# Patient Record
Sex: FEMALE | Race: WHITE | Employment: FULL TIME | URBAN - METROPOLITAN AREA
[De-identification: names, ages, dates, MRNs, and addresses within clinical notes are randomized per-mention and may not be internally consistent; named-entity substitution may affect disease eponyms.]

---

## 2017-01-02 DIAGNOSIS — Z94.81 S/P ALLOGENEIC BONE MARROW TRANSPLANT (H): Primary | ICD-10-CM

## 2017-01-02 DIAGNOSIS — B00.9 HSV (HERPES SIMPLEX VIRUS) INFECTION: ICD-10-CM

## 2017-01-02 RX ORDER — VALACYCLOVIR HYDROCHLORIDE 500 MG/1
1000 TABLET, FILM COATED ORAL 3 TIMES DAILY
Qty: 100 TABLET | Refills: 1 | Status: SHIPPED | OUTPATIENT
Start: 2017-01-02 | End: 2017-03-10

## 2017-01-04 ENCOUNTER — CARE COORDINATION (OUTPATIENT)
Dept: TRANSPLANT | Facility: CLINIC | Age: 62
End: 2017-01-04

## 2017-01-04 NOTE — PROGRESS NOTES
Quantity limit override for Valtrex 500mg tabs approved for 60 tablets/30 days, Case #[id number]. Insurance phone # [phone]. Pt notified of the approval.

## 2017-02-15 ENCOUNTER — ONCOLOGY VISIT (OUTPATIENT)
Dept: TRANSPLANT | Facility: CLINIC | Age: 62
End: 2017-02-15
Attending: INTERNAL MEDICINE
Payer: MEDICARE

## 2017-02-15 ENCOUNTER — APPOINTMENT (OUTPATIENT)
Dept: LAB | Facility: CLINIC | Age: 62
End: 2017-02-15
Attending: INTERNAL MEDICINE
Payer: MEDICARE

## 2017-02-15 VITALS
BODY MASS INDEX: 40.97 KG/M2 | OXYGEN SATURATION: 94 % | WEIGHT: 224 LBS | SYSTOLIC BLOOD PRESSURE: 147 MMHG | DIASTOLIC BLOOD PRESSURE: 75 MMHG | TEMPERATURE: 98.9 F | HEART RATE: 101 BPM | RESPIRATION RATE: 16 BRPM

## 2017-02-15 DIAGNOSIS — Z13.29 SCREENING FOR THYROID DISORDER: ICD-10-CM

## 2017-02-15 DIAGNOSIS — C92.01 ACUTE MYELOID LEUKEMIA IN REMISSION (H): Primary | ICD-10-CM

## 2017-02-15 LAB
ALBUMIN SERPL-MCNC: 3.3 G/DL (ref 3.4–5)
ALP SERPL-CCNC: 67 U/L (ref 40–150)
ALT SERPL W P-5'-P-CCNC: 18 U/L (ref 0–50)
ANION GAP SERPL CALCULATED.3IONS-SCNC: 9 MMOL/L (ref 3–14)
AST SERPL W P-5'-P-CCNC: 13 U/L (ref 0–45)
BASOPHILS # BLD AUTO: 0 10E9/L (ref 0–0.2)
BASOPHILS NFR BLD AUTO: 0.2 %
BILIRUB SERPL-MCNC: 0.6 MG/DL (ref 0.2–1.3)
BUN SERPL-MCNC: 8 MG/DL (ref 7–30)
CALCIUM SERPL-MCNC: 8.5 MG/DL (ref 8.5–10.1)
CHLORIDE SERPL-SCNC: 104 MMOL/L (ref 94–109)
CO2 SERPL-SCNC: 27 MMOL/L (ref 20–32)
CREAT SERPL-MCNC: 0.94 MG/DL (ref 0.52–1.04)
DIFFERENTIAL METHOD BLD: NORMAL
EOSINOPHIL # BLD AUTO: 0.2 10E9/L (ref 0–0.7)
EOSINOPHIL NFR BLD AUTO: 1.9 %
ERYTHROCYTE [DISTWIDTH] IN BLOOD BY AUTOMATED COUNT: 14.6 % (ref 10–15)
GFR SERPL CREATININE-BSD FRML MDRD: 61 ML/MIN/1.7M2
GLUCOSE SERPL-MCNC: 138 MG/DL (ref 70–99)
HCT VFR BLD AUTO: 38.3 % (ref 35–47)
HGB BLD-MCNC: 12.2 G/DL (ref 11.7–15.7)
IMM GRANULOCYTES # BLD: 0 10E9/L (ref 0–0.4)
IMM GRANULOCYTES NFR BLD: 0.2 %
LYMPHOCYTES # BLD AUTO: 2.7 10E9/L (ref 0.8–5.3)
LYMPHOCYTES NFR BLD AUTO: 29.1 %
MCH RBC QN AUTO: 28.8 PG (ref 26.5–33)
MCHC RBC AUTO-ENTMCNC: 31.9 G/DL (ref 31.5–36.5)
MCV RBC AUTO: 91 FL (ref 78–100)
MONOCYTES # BLD AUTO: 0.8 10E9/L (ref 0–1.3)
MONOCYTES NFR BLD AUTO: 8.9 %
NEUTROPHILS # BLD AUTO: 5.6 10E9/L (ref 1.6–8.3)
NEUTROPHILS NFR BLD AUTO: 59.7 %
NRBC # BLD AUTO: 0 10*3/UL
NRBC BLD AUTO-RTO: 0 /100
PLATELET # BLD AUTO: 177 10E9/L (ref 150–450)
POTASSIUM SERPL-SCNC: 4 MMOL/L (ref 3.4–5.3)
PROT SERPL-MCNC: 6.8 G/DL (ref 6.8–8.8)
RBC # BLD AUTO: 4.23 10E12/L (ref 3.8–5.2)
SODIUM SERPL-SCNC: 140 MMOL/L (ref 133–144)
WBC # BLD AUTO: 9.4 10E9/L (ref 4–11)

## 2017-02-15 PROCEDURE — 80053 COMPREHEN METABOLIC PANEL: CPT | Performed by: INTERNAL MEDICINE

## 2017-02-15 PROCEDURE — 87799 DETECT AGENT NOS DNA QUANT: CPT | Performed by: INTERNAL MEDICINE

## 2017-02-15 PROCEDURE — 85025 COMPLETE CBC W/AUTO DIFF WBC: CPT | Performed by: INTERNAL MEDICINE

## 2017-02-15 PROCEDURE — 36415 COLL VENOUS BLD VENIPUNCTURE: CPT

## 2017-02-15 PROCEDURE — 82784 ASSAY IGA/IGD/IGG/IGM EACH: CPT | Performed by: INTERNAL MEDICINE

## 2017-02-15 NOTE — PROGRESS NOTES
BMT Clinic Progress Note    ID:  61 yo woman s/p NMA DCBT for AML  CC: follow-up   HPI:  Here for follow-up and with her . She had influenza infection. Still has cough but progressively getting better. Not short of breath but still coughing. No fevers. She will go back to orthopedics in march. bilateral shoulder pain is back but got better when she took steroids for URI. Off coumadin, Off prednisone since 4/20/16, Off MMF since 9/13/16. No CRISTOBAL, N/V/D or new rash.  Old rash wax and wanes. She will breast nodule biopsy later this week. Joint and leg pain improved more recently.     ROS:  Remainder 10 point ROS negative.   Physical Exam:    Wt Readings from Last 4 Encounters:   02/15/17 101.6 kg (224 lb)   12/16/16 99.5 kg (219 lb 6.4 oz)   11/04/16 101.6 kg (223 lb 15.8 oz)   10/24/16 104.8 kg (231 lb)     EXAM: /75  Pulse 101  Temp 98.9  F (37.2  C) (Tympanic)  Resp 16  Wt 101.6 kg (224 lb)  SpO2 94%  BMI 40.97 kg/m2  Gen: Alert, oriented x 3, NAD, cushingoid.  Prominent buffalo hump.    HEENT: OP no ulcerations. No lichenoid changes.  No petechia.   CLARICE. Sclera anicteric. No conj petech  CV: RRR, no m/r/g  Pulm: cta X 2. No wheezing or rhonchi   Abd: obese, soft, NT, ND, BS+  Ext: wearing compression stockings, bilateral LE trace edema. No calf tenderness  Skin: dry rash with scaly skin on skin folds.   MSK:  Has decreased ROM both shoulders  Neuro; non-focal. No nystagmus.     Labs:   Lab Results   Component Value Date    WBC 9.4 02/15/2017    ANEU 5.6 02/15/2017    HGB 12.2 02/15/2017    HCT 38.3 02/15/2017     02/15/2017     02/15/2017    POTASSIUM 4.0 02/15/2017    CHLORIDE 104 02/15/2017    CO2 27 02/15/2017     (H) 02/15/2017    BUN 8 02/15/2017    CR 0.94 02/15/2017    MAG 1.8 10/05/2016    INR 0.94 08/03/2016    BILITOTAL 0.6 02/15/2017    AST 13 02/15/2017    ALT 18 02/15/2017    ALKPHOS 67 02/15/2017    PROTTOTAL 6.8 02/15/2017    ALBUMIN 3.3 (L) 02/15/2017   BMBX no  leukemia; 100% single donor  CD 4 count 500's  IgG 425    A/P: 61 yo woman 18 months s/p NMA DCBT for AML day +832 (2 yr post BMT) in CR with KPS 90%    1. BMT/AML: two year post transplant BM 40% cellular, trilineage hematopoiesis, no leukemia by morphology and flow negative; % donor #2.      2. HEME: stable good counts  - Keep Hgb>8g/dL and plts>10k. No bleeding.   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  Received rituximab X 4 September 2015.    3. DVT- Repeat doppler L lower ext negative on OCT 2016. Off coumadin since JUL 2016.     4. ID: resolving influenza. 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   Had Flu shot on 10/5/16. Continue immunizations on FEB 2017 visit  - PPX: off fluconazole, azithromycin 250 3x/week and pentamidine monthly (last on 12/9)    4. 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 Perhaps skin could be some GVHD  - manifested as hemolytic anemia; Off prednisone since 4/20/16; finished MMF taper on 9/13/16. Improved joint aches. Persistent shoulder pain being followed by Orthopedics. Eczema less visible and being controlled with steroid cream PRN. May need to go back on immuno suppression in case I would consider sirolimus w or w/o prednisone; ECP logisticaly difficult.     5. GI:  No complaints. Continue Protonix.   - LFTs wnl 12/23    6. FEN/Renal: nor Cr and lytes.    7. Cardiovascular: High cholesterol and triglycerides may use cholesterol medication. Continue norvasc for HTN.   - Cont carvedilol 6.25mg bid (increased 12/9) and Norvasc 7.5 mg daily at night. - Needs to watch diet.     8. Neuro: No focal signs.     9. Musculoskelatal: doing PT at home now. Okay to use NSAIDs as long as well hydrated. Continue to follow with PT and Orthopedics and consider palliative care consult for pain control if needed.  - Dexa scan with osteopenia (5/5/15). Had bisphosphonate 10/5/16; repeat in ~12 months  - Continue Calcium and Vit D supplementation 2,800 units daily  - try to  use tramadol that was working better than hydrocodone/acetaminophen     10. Pulm: improving respiratory issues today. much improved with stable Hgb and weight loss.   - Sleep apnea being managed, using CPAP.     11. Endocrine: steroid induced diabetes. Off insulin now.      12. Breast: breast nodule biopsy later this week. Monitor results.       PLAN:  RTC [facility] on  March 2017 with labs; sooner if needed  Continue immunizations on 2017 visit; delayed because of recent influenza infection  Okay to use NSAIDs as long as well hydrated  continue acyclovir because of HSV on lips

## 2017-02-15 NOTE — NURSING NOTE
Chief Complaint   Patient presents with     Blood Draw     Vitals done and labs drawn peripherally from right arm.    Marilu Dowell, TIMOTHYN

## 2017-02-15 NOTE — LETTER
2/15/2017      RE: Eryn Bennett  PO   [facility] [address]       BMT Clinic Progress Note    ID:  61 yo woman s/p NMA DCBT for AML  CC: follow-up   HPI:  Here for follow-up and with her . She had influenza infection. Still has cough but progressively getting better. Not short of breath but still coughing. No fevers. She will go back to orthopedics in march. bilateral shoulder pain is back but got better when she took steroids for URI. Off coumadin, Off prednisone since 4/20/16, Off MMF since 9/13/16. No CRISTOBAL, N/V/D or new rash.  Old rash wax and wanes. She will breast nodule biopsy later this week. Joint and leg pain improved more recently.     ROS:  Remainder 10 point ROS negative.   Physical Exam:    Wt Readings from Last 4 Encounters:   02/15/17 101.6 kg (224 lb)   12/16/16 99.5 kg (219 lb 6.4 oz)   11/04/16 101.6 kg (223 lb 15.8 oz)   10/24/16 104.8 kg (231 lb)     EXAM: /75  Pulse 101  Temp 98.9  F (37.2  C) (Tympanic)  Resp 16  Wt 101.6 kg (224 lb)  SpO2 94%  BMI 40.97 kg/m2  Gen: Alert, oriented x 3, NAD, cushingoid.  Prominent buffalo hump.    HEENT: OP no ulcerations. No lichenoid changes.  No petechia.   CLARICE. Sclera anicteric. No conj petech  CV: RRR, no m/r/g  Pulm: cta X 2. No wheezing or rhonchi   Abd: obese, soft, NT, ND, BS+  Ext: wearing compression stockings, bilateral LE trace edema. No calf tenderness  Skin: dry rash with scaly skin on skin folds.   MSK:  Has decreased ROM both shoulders  Neuro; non-focal. No nystagmus.     Labs:   Lab Results   Component Value Date    WBC 9.4 02/15/2017    ANEU 5.6 02/15/2017    HGB 12.2 02/15/2017    HCT 38.3 02/15/2017     02/15/2017     02/15/2017    POTASSIUM 4.0 02/15/2017    CHLORIDE 104 02/15/2017    CO2 27 02/15/2017     (H) 02/15/2017    BUN 8 02/15/2017    CR 0.94 02/15/2017    MAG 1.8 10/05/2016    INR 0.94 08/03/2016    BILITOTAL 0.6 02/15/2017    AST 13 02/15/2017    ALT 18 02/15/2017    ALKPHOS 67 02/15/2017     PROTTOTAL 6.8 02/15/2017    ALBUMIN 3.3 (L) 02/15/2017   BMBX no leukemia; 100% single donor  CD 4 count 500's  IgG 425    A/P: 59 yo woman 18 months s/p NMA DCBT for AML day +832 (2 yr post BMT) in CR with KPS 90%    1. BMT/AML: two year post transplant BM 40% cellular, trilineage hematopoiesis, no leukemia by morphology and flow negative; % donor #2.      2. HEME: stable good counts  - Keep Hgb>8g/dL and plts>10k. No bleeding.   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  Received rituximab X 4 September 2015.    3. DVT- Repeat doppler L lower ext negative on OCT 2016. Off coumadin since JUL 2016.     4. ID: resolving influenza. 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   Had Flu shot on 10/5/16. Continue immunizations on FEB 2017 visit  - PPX: off fluconazole, azithromycin 250 3x/week and pentamidine monthly (last on 12/9)    4. 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 Perhaps skin could be some GVHD  - manifested as hemolytic anemia; Off prednisone since 4/20/16; finished MMF taper on 9/13/16. Improved joint aches. Persistent shoulder pain being followed by Orthopedics. Eczema less visible and being controlled with steroid cream PRN. May need to go back on immuno suppression in case I would consider sirolimus w or w/o prednisone; ECP logisticaly difficult.     5. GI:  No complaints. Continue Protonix.   - LFTs wnl 12/23    6. FEN/Renal: nor Cr and lytes.    7. Cardiovascular: High cholesterol and triglycerides may use cholesterol medication. Continue norvasc for HTN.   - Cont carvedilol 6.25mg bid (increased 12/9) and Norvasc 7.5 mg daily at night. - Needs to watch diet.     8. Neuro: No focal signs.     9. Musculoskelatal: doing PT at home now. Okay to use NSAIDs as long as well hydrated. Continue to follow with PT and Orthopedics and consider palliative care consult for pain control if needed.  - Dexa scan with osteopenia (5/5/15). Had bisphosphonate 10/5/16; repeat in ~12 months  -  Continue Calcium and Vit D supplementation 2,800 units daily  - try to use tramadol that was working better than hydrocodone/acetaminophen     10. Pulm: improving respiratory issues today. much improved with stable Hgb and weight loss.   - Sleep apnea being managed, using CPAP.     11. Endocrine: steroid induced diabetes. Off insulin now.      12. Breast: breast nodule biopsy later this week. Monitor results.       PLAN:  RTC [facility] on  March 2017 with labs; sooner if needed  Continue immunizations on 2017 visit; delayed because of recent influenza infection  Okay to use NSAIDs as long as well hydrated  continue acyclovir because of HSV on lips    Irving Ni MD

## 2017-02-15 NOTE — MR AVS SNAPSHOT
After Visit Summary   2/15/2017    Eryn Bennett    MRN: [id number]           Patient Information     Date Of Birth          1955        Visit Information        Provider Department      2/15/2017 10:30 AM Irving Ni MD [facility]        Today's Diagnoses     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)    -  1    Screening for thyroid disorder              [facility] ([facility])  [address] [place] [address]  Phone: [phone]  Clinic Hours:   Monday-Friday:    8am to 4:30pm   Weekends and holidays:    8am to noon (in general)  If your fever is 100.5  or greater,   call the clinic.  After hours call the   hospital at [phone] or   [phone]. Ask for the BMT   fellow for pediatric or adult patients           Care Instructions    3/21 @ 3pm ortho appt    3/22 @ 10:30am labs 11:00 Irving        Follow-ups after your visit        Your next 10 appointments already scheduled     Mar 21, 2017  3:15 PM CDT   (Arrive by 3:00 PM)   Return Visit with Renny Tompkins MD   [facility] ([facility])    [address] [place] [address]  [address]th [facility] [address] [phone]            Mar 22, 2017 11:00 AM CDT   Return with Irving Ni MD   [facility] ([facility])    [address] [place] [address]  2nd [facility] [id number] [phone]              Future tests that were ordered for you today     Open Standing Orders        Priority Remaining Interval Expires Ordered    Comprehensive metabolic panel Routine 2/2  3/31/2017 2/15/2017    CBC with platelets differential Routine 2/2  3/31/2017 2/15/2017    IgG Routine 2/2  3/31/2017 2/15/2017    TSH with free T4 reflex Routine 2/2  3/31/2017 2/15/2017            Who to contact     If you have questions or need follow up information about today's clinic visit or your schedule  please contact [facility] directly at [phone].  Normal or non-critical lab and imaging results will be communicated to you by Skyp[employer]hart, letter or phone within 4 business days after the clinic has received the results. If you do not hear from us within 7 days, please contact the clinic through [employer] or phone. If you have a critical or abnormal lab result, we will notify you by phone as soon as possible.  Submit refill requests through [employer] or call your pharmacy and they will forward the refill request to us. Please allow 3 business days for your refill to be completed.          Additional Information About Your Visit        [email or web address] Information     [employer] gives you secure access to your electronic health record. If you see a primary care provider, you can also send messages to your care team and make appointments. If you have questions, please call your primary care clinic.  If you do not have a primary care provider, please call [phone] and they will assist you.        Care EveryWhere ID     This is your Care EveryWhere ID. This could be used by other organizations to access your [facility] medical records  [id number]        Your Vitals Were     Pulse Temperature Respirations Pulse Oximetry BMI (Body Mass Index)       101 98.9  F (37.2  C) (Tympanic) 16 94% 40.97 kg/m2        Blood Pressure from Last 3 Encounters:   02/15/17 147/75   12/16/16 133/72   11/04/16 138/73    Weight from Last 3 Encounters:   02/15/17 101.6 kg (224 lb)   12/16/16 99.5 kg (219 lb 6.4 oz)   11/04/16 101.6 kg (223 lb 15.8 oz)              We Performed the Following     CBC with platelets differential     CMV DNA quantification     Comprehensive metabolic panel     EBV DNA PCR Quantitative Whole Blood     IgG          Today's Medication Changes          These changes are accurate as of: 2/15/17 10:56 AM.  If you have any questions, ask your nurse or doctor.               These medicines have changed or  have updated prescriptions.        Dose/Directions    amLODIPine 5 MG tablet   Commonly known as:  NORVASC   This may have changed:    - how much to take  - additional instructions   Used for:  AML (acute myelogenous leukemia) (H), Essential hypertension        Dose:  7.5 mg   Take 1.5 tablets (7.5 mg) by mouth daily Take 7.5mg daily x1 week then increase to 10mg daily after checking blood pressure.   Quantity:  90 tablet   Refills:  3                Recent Review Flowsheet Data     BMT Recent Results Latest Ref Rng & Units 6/15/2016 6/21/2016 8/3/2016 10/5/2016 11/4/2016 12/16/2016 2/15/2017    WBC 4.0 - 11.0 10e9/L 4.9 5.8 3.8(L) 6.7 8.7 7.7 9.4    Hemoglobin 11.7 - 15.7 g/dL 11.9 11.8 12.8 12.9 13.1 13.6 12.2    Platelet Count 150 - 450 10e9/L 217 210 184 193 193 214 177    Neutrophils (Absolute) 1.6 - 8.3 10e9/L 1.9 2.5 1.5(L) 3.2 4.5 4.4 5.6    INR 0.86 - 1.14 - 2.46(H) 0.94 - - - -    Sodium 133 - 144 mmol/L 139 140 139 140 139 142 140    Potassium 3.4 - 5.3 mmol/L 4.0 3.3(L) 3.8 4.0 4.1 4.0 4.0    Chloride 94 - 109 mmol/L 106 105 105 105 107 107 104    Glucose 70 - 99 mg/dL 111(H) 135(H) 121(H) 127(H) 100(H) 193(H) 138(H)    Urea Nitrogen 7 - 30 mg/dL 17 12 15 13 26 22 8    Creatinine 0.52 - 1.04 mg/dL 1.00 0.90 0.90 1.00 0.94 1.04 0.94    Calcium (Total) 8.5 - 10.1 mg/dL 9.5 9.0 9.3 9.3 8.5 8.8 8.5    Protein (Total) 6.8 - 8.8 g/dL 7.0 6.8 6.7(L) 6.8 6.9 6.9 6.8    Albumin 3.4 - 5.0 g/dL 3.6 3.5 3.7 3.8 3.8 3.7 3.3(L)    Alkaline Phosphatase 40 - 150 U/L 74 74 83 87 97 71 67    AST 0 - 45 U/L 24 22 18 22 12 11 13    ALT 0 - 50 U/L 24 25 31 28 24 25 18    MCV 78 - 100 fl 90 90 93 92 92 94 91               Primary Care Provider Office Phone # Fax #    Carmelo Camacho [phone]       [address] [address]        Thank you!     Thank you for choosing [facility]  for your care. Our goal is always to provide you with excellent care.  Hearing back from our patients is one way we can continue to improve our services. Please take a few minutes to complete the written survey that you may receive in the mail after your visit with us. Thank you!             Your Updated Medication List - Protect others around you: Learn how to safely use, store and throw away your medicines at www.disposemymeds.org.          This list is accurate as of: 2/15/17 10:56 AM.  Always use your most recent med list.                   Brand Name Dispense Instructions for use    acetaminophen 325 MG tablet    TYLENOL    100 tablet    Take 1-2 tablets (325-650 mg) by mouth every 4 hours as needed for mild pain or fever       amLODIPine 5 MG tablet    NORVASC    90 tablet    Take 1.5 tablets (7.5 mg) by mouth daily Take 7.5mg daily x1 week then increase to 10mg daily after checking blood pressure.       amoxicillin-clavulanate 875-125 MG per tablet    AUGMENTIN    20 tablet    Take 1 tablet by mouth 2 times daily       calcium-vitamin D 500-125 MG-UNIT Tabs      Take by mouth 2 times daily       carvedilol 6.25 MG tablet    COREG    60 tablet    Take 1 tablet (6.25 mg) by mouth 2 times daily (with meals)       desonide 0.05 % cream    DESOWEN    60 g    Apply sparingly to affected area three times daily as needed.       folic acid 1 MG tablet    FOLVITE    90 tablet    Take 1 tablet (1 mg) by mouth daily       guaiFENesin-codeine 100-10 MG/5ML Syrp syrup    guaiFENesin AC     Take 10 mLs by mouth as needed       HYDROcodone-acetaminophen 5-325 MG per tablet    NORCO    60 tablet    1 - 2 tablets approximately one hour prior to doing shoulder exercises. Should be taken in the evening and only once per day.       LORazepam 0.5 MG tablet    ATIVAN    90 tablet    Take 1-2 tablets (0.5-1 mg) by mouth nightly as needed for anxiety (nausea/vomiting/sleep)       pantoprazole 40 MG EC tablet    PROTONIX    30 tablet    Take 1 tablet (40 mg) by mouth daily       traMADol 50 MG tablet     ULTRAM    60 tablet    Take 1 tablet (50 mg) by mouth every 6 hours as needed for moderate pain       valACYclovir 500 MG tablet    VALTREX    100 tablet    Take 2 tablets (1,000 mg) by mouth 3 times daily For 10 days. Then decrease to 1 tablet twice a day.       VITAMIN D (CHOLECALCIFEROL) PO      Take 1,000 Units by mouth daily

## 2017-02-16 LAB
CMV DNA SPEC NAA+PROBE-ACNC: NORMAL [IU]/ML
CMV DNA SPEC NAA+PROBE-LOG#: NORMAL {LOG_IU}/ML
EBV DNA # SPEC NAA+PROBE: 1546 {COPIES}/ML
EBV DNA SPEC NAA+PROBE-LOG#: 3.2 {LOG_COPIES}/ML
IGG SERPL-MCNC: 652 MG/DL (ref 695–1620)
SPECIMEN SOURCE: NORMAL

## 2017-03-10 ENCOUNTER — ONCOLOGY VISIT (OUTPATIENT)
Dept: TRANSPLANT | Facility: CLINIC | Age: 62
End: 2017-03-10
Attending: INTERNAL MEDICINE
Payer: MEDICARE

## 2017-03-10 ENCOUNTER — APPOINTMENT (OUTPATIENT)
Dept: LAB | Facility: CLINIC | Age: 62
End: 2017-03-10
Attending: INTERNAL MEDICINE
Payer: MEDICARE

## 2017-03-10 VITALS
RESPIRATION RATE: 16 BRPM | OXYGEN SATURATION: 95 % | WEIGHT: 221.5 LBS | DIASTOLIC BLOOD PRESSURE: 69 MMHG | HEART RATE: 89 BPM | SYSTOLIC BLOOD PRESSURE: 124 MMHG | TEMPERATURE: 98.5 F | BODY MASS INDEX: 40.51 KG/M2

## 2017-03-10 DIAGNOSIS — Z94.81 S/P ALLOGENEIC BONE MARROW TRANSPLANT (H): ICD-10-CM

## 2017-03-10 DIAGNOSIS — I10 BENIGN ESSENTIAL HYPERTENSION: ICD-10-CM

## 2017-03-10 DIAGNOSIS — C92.01 ACUTE MYELOID LEUKEMIA IN REMISSION (H): ICD-10-CM

## 2017-03-10 DIAGNOSIS — Z13.29 SCREENING FOR THYROID DISORDER: ICD-10-CM

## 2017-03-10 DIAGNOSIS — I10 ESSENTIAL HYPERTENSION: ICD-10-CM

## 2017-03-10 DIAGNOSIS — B00.9 HSV (HERPES SIMPLEX VIRUS) INFECTION: ICD-10-CM

## 2017-03-10 LAB
ALBUMIN SERPL-MCNC: 3.5 G/DL (ref 3.4–5)
ALP SERPL-CCNC: 79 U/L (ref 40–150)
ALT SERPL W P-5'-P-CCNC: 18 U/L (ref 0–50)
ANION GAP SERPL CALCULATED.3IONS-SCNC: 10 MMOL/L (ref 3–14)
AST SERPL W P-5'-P-CCNC: 16 U/L (ref 0–45)
BASOPHILS # BLD AUTO: 0 10E9/L (ref 0–0.2)
BASOPHILS NFR BLD AUTO: 0.3 %
BILIRUB SERPL-MCNC: 0.4 MG/DL (ref 0.2–1.3)
BUN SERPL-MCNC: 18 MG/DL (ref 7–30)
CALCIUM SERPL-MCNC: 9 MG/DL (ref 8.5–10.1)
CHLORIDE SERPL-SCNC: 104 MMOL/L (ref 94–109)
CO2 SERPL-SCNC: 26 MMOL/L (ref 20–32)
CREAT SERPL-MCNC: 0.98 MG/DL (ref 0.52–1.04)
DIFFERENTIAL METHOD BLD: ABNORMAL
EOSINOPHIL # BLD AUTO: 0.3 10E9/L (ref 0–0.7)
EOSINOPHIL NFR BLD AUTO: 2.8 %
ERYTHROCYTE [DISTWIDTH] IN BLOOD BY AUTOMATED COUNT: 14.8 % (ref 10–15)
GFR SERPL CREATININE-BSD FRML MDRD: 58 ML/MIN/1.7M2
GLUCOSE SERPL-MCNC: 109 MG/DL (ref 70–99)
HCT VFR BLD AUTO: 39.5 % (ref 35–47)
HGB BLD-MCNC: 12.3 G/DL (ref 11.7–15.7)
IGG SERPL-MCNC: 720 MG/DL (ref 695–1620)
IMM GRANULOCYTES # BLD: 0 10E9/L (ref 0–0.4)
IMM GRANULOCYTES NFR BLD: 0.4 %
LYMPHOCYTES # BLD AUTO: 3.1 10E9/L (ref 0.8–5.3)
LYMPHOCYTES NFR BLD AUTO: 34.6 %
MCH RBC QN AUTO: 27.6 PG (ref 26.5–33)
MCHC RBC AUTO-ENTMCNC: 31.1 G/DL (ref 31.5–36.5)
MCV RBC AUTO: 89 FL (ref 78–100)
MONOCYTES # BLD AUTO: 0.6 10E9/L (ref 0–1.3)
MONOCYTES NFR BLD AUTO: 7.1 %
NEUTROPHILS # BLD AUTO: 4.9 10E9/L (ref 1.6–8.3)
NEUTROPHILS NFR BLD AUTO: 54.8 %
NRBC # BLD AUTO: 0 10*3/UL
NRBC BLD AUTO-RTO: 0 /100
PLATELET # BLD AUTO: 246 10E9/L (ref 150–450)
POTASSIUM SERPL-SCNC: 4 MMOL/L (ref 3.4–5.3)
PROT SERPL-MCNC: 7.3 G/DL (ref 6.8–8.8)
RBC # BLD AUTO: 4.46 10E12/L (ref 3.8–5.2)
SODIUM SERPL-SCNC: 140 MMOL/L (ref 133–144)
TSH SERPL DL<=0.005 MIU/L-ACNC: 2 MU/L (ref 0.4–4)
WBC # BLD AUTO: 9 10E9/L (ref 4–11)

## 2017-03-10 PROCEDURE — 90713 POLIOVIRUS IPV SC/IM: CPT | Mod: ZF | Performed by: INTERNAL MEDICINE

## 2017-03-10 PROCEDURE — 82784 ASSAY IGA/IGD/IGG/IGM EACH: CPT | Performed by: INTERNAL MEDICINE

## 2017-03-10 PROCEDURE — G0009 ADMIN PNEUMOCOCCAL VACCINE: HCPCS

## 2017-03-10 PROCEDURE — 90472 IMMUNIZATION ADMIN EACH ADD: CPT

## 2017-03-10 PROCEDURE — 80053 COMPREHEN METABOLIC PANEL: CPT | Performed by: INTERNAL MEDICINE

## 2017-03-10 PROCEDURE — G0010 ADMIN HEPATITIS B VACCINE: HCPCS

## 2017-03-10 PROCEDURE — 25000125 ZZHC RX 250: Mod: ZF | Performed by: INTERNAL MEDICINE

## 2017-03-10 PROCEDURE — 84443 ASSAY THYROID STIM HORMONE: CPT | Performed by: INTERNAL MEDICINE

## 2017-03-10 PROCEDURE — 85025 COMPLETE CBC W/AUTO DIFF WBC: CPT | Performed by: INTERNAL MEDICINE

## 2017-03-10 PROCEDURE — 90715 TDAP VACCINE 7 YRS/> IM: CPT | Mod: ZF | Performed by: INTERNAL MEDICINE

## 2017-03-10 PROCEDURE — 90746 HEPB VACCINE 3 DOSE ADULT IM: CPT | Mod: ZF | Performed by: INTERNAL MEDICINE

## 2017-03-10 PROCEDURE — 90670 PCV13 VACCINE IM: CPT | Mod: ZF | Performed by: INTERNAL MEDICINE

## 2017-03-10 PROCEDURE — 90648 HIB PRP-T VACCINE 4 DOSE IM: CPT | Mod: ZF | Performed by: INTERNAL MEDICINE

## 2017-03-10 PROCEDURE — 25000128 H RX IP 250 OP 636: Mod: ZF | Performed by: INTERNAL MEDICINE

## 2017-03-10 RX ORDER — VALACYCLOVIR HYDROCHLORIDE 500 MG/1
500 TABLET, FILM COATED ORAL 2 TIMES DAILY
Qty: 60 TABLET | Refills: 6 | Status: SHIPPED | OUTPATIENT
Start: 2017-03-10 | End: 2017-03-10

## 2017-03-10 RX ORDER — VALACYCLOVIR HYDROCHLORIDE 500 MG/1
500 TABLET, FILM COATED ORAL 2 TIMES DAILY
Qty: 60 TABLET | Refills: 6 | Status: SHIPPED | OUTPATIENT
Start: 2017-03-10 | End: 2017-10-25

## 2017-03-10 RX ORDER — CARVEDILOL 6.25 MG/1
6.25 TABLET ORAL 2 TIMES DAILY WITH MEALS
Qty: 180 TABLET | Refills: 3 | Status: SHIPPED | OUTPATIENT
Start: 2017-03-10 | End: 2018-03-20

## 2017-03-10 RX ORDER — AMLODIPINE BESYLATE 5 MG/1
5 TABLET ORAL DAILY
Qty: 90 TABLET | Refills: 3 | Status: SHIPPED | OUTPATIENT
Start: 2017-03-10 | End: 2018-04-02

## 2017-03-10 RX ORDER — PANTOPRAZOLE SODIUM 40 MG/1
40 TABLET, DELAYED RELEASE ORAL DAILY
Qty: 30 TABLET | Refills: 11 | Status: SHIPPED | OUTPATIENT
Start: 2017-03-10 | End: 2017-06-14

## 2017-03-10 RX ADMIN — POLIOVIRUS TYPE 1 ANTIGEN (FORMALDEHYDE INACTIVATED), POLIOVIRUS TYPE 2 ANTIGEN (FORMALDEHYDE INACTIVATED), AND POLIOVIRUS TYPE 3 ANTIGEN (FORMALDEHYDE INACTIVATED) 0.5 ML: 40; 8; 32 INJECTION, SUSPENSION INTRAMUSCULAR at 12:12

## 2017-03-10 RX ADMIN — PNEUMOCOCCAL 13-VALENT CONJUGATE VACCINE 0.5 ML: 2.2; 2.2; 2.2; 2.2; 2.2; 4.4; 2.2; 2.2; 2.2; 2.2; 2.2; 2.2; 2.2 INJECTION, SUSPENSION INTRAMUSCULAR at 12:11

## 2017-03-10 RX ADMIN — HEPATITIS B VACCINE (RECOMBINANT) 20 MCG: 20 INJECTION, SUSPENSION INTRAMUSCULAR at 12:12

## 2017-03-10 RX ADMIN — HAEMOPHILUS B POLYSACCHARIDE CONJUGATE VACCINE FOR INJ 0.5 ML: RECON SOLN at 12:11

## 2017-03-10 RX ADMIN — TETANUS TOXOID, REDUCED DIPHTHERIA TOXOID AND ACELLULAR PERTUSSIS VACCINE, ADSORBED 0.5 ML: 5; 2.5; 8; 8; 2.5 SUSPENSION INTRAMUSCULAR at 12:12

## 2017-03-10 NOTE — PROGRESS NOTES
BMT Clinic Progress Note    ID:  59 yo woman s/p NMA DCBT for AML  CC: follow-up   HPI:  Here for follow-up and with her . Resolved influenza infection. Saw eye doctor and has plan for cataract surgery. Also saw PT and has follow-up with orthopedics for her shoulders. No fevers. Bilateral shoulder pain is controlled. Off coumadin, Off prednisone since 4/20/16, Off MMF since 9/13/16. No CRISTOBAL, N/V/D or new rash.  Old rash wax and wanes. Had breast biopsy and results we negative for cancer, just scar tissue. Joint and leg pain improved more recently.     ROS:  Remainder 10 point ROS negative.   Physical Exam:    Wt Readings from Last 4 Encounters:   03/10/17 100.5 kg (221 lb 8 oz)   02/15/17 101.6 kg (224 lb)   12/16/16 99.5 kg (219 lb 6.4 oz)   11/04/16 101.6 kg (223 lb 15.8 oz)     EXAM: /69 (BP Location: Left arm, Cuff Size: Adult Large)  Pulse 89  Temp 98.5  F (36.9  C) (Oral)  Resp 16  Wt 100.5 kg (221 lb 8 oz)  SpO2 95%  BMI 40.51 kg/m2  Gen: Alert, oriented x 3, NAD, cushingoid.  Prominent buffalo hump.    HEENT: OP no ulcerations. No lichenoid changes.  No petechia.   CLARICE. Sclera anicteric. No conj petech  CV: RRR, no m/r/g  Pulm: cta X 2. No wheezing or rhonchi   Abd: obese, soft, NT, ND, BS+  Ext: wearing compression stockings, bilateral LE trace edema. No calf tenderness  Skin: dry rash with scaly skin on skin folds.   MSK:  Has decreased ROM both shoulders  Neuro; non-focal. No nystagmus.     Labs:   Lab Results   Component Value Date    WBC 9.0 03/10/2017    ANEU 4.9 03/10/2017    HGB 12.3 03/10/2017    HCT 39.5 03/10/2017     03/10/2017     03/10/2017    POTASSIUM 4.0 03/10/2017    CHLORIDE 104 03/10/2017    CO2 26 03/10/2017     (H) 03/10/2017    BUN 18 03/10/2017    CR 0.98 03/10/2017    MAG 1.8 10/05/2016    INR 0.94 08/03/2016    BILITOTAL 0.4 03/10/2017    AST 16 03/10/2017    ALT 18 03/10/2017    ALKPHOS 79 03/10/2017    PROTTOTAL 7.3 03/10/2017    ALBUMIN 3.5  03/10/2017     BMBX no leukemia; 100% single donor  CD 4 count 500's  IgG 425    A/P: 61 yo woman 18 months s/p NMA DCBT for AML day +855 (2 yr post BMT) in CR with KPS 90%    1. BMT/AML: two year post transplant BM 40% cellular, trilineage hematopoiesis, no leukemia by morphology and flow negative; % donor #2.      2. HEME: stable good counts  - Keep Hgb>8g/dL and plts>10k. No bleeding.   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  Received rituximab X 4 September 2015.    3. DVT- Repeat doppler L lower ext negative on OCT 2016. Off coumadin since JUL 2016.     4. 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   Had Flu shot on 10/5/16. Continue immunizations on FEB 2017 visit  - PPX: off fluconazole, azithromycin 250 3x/week and pentamidine monthly (last on 12/9)    4. 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   - manifested as hemolytic anemia; Off prednisone since 4/20/16; finished MMF taper on 9/13/16. Improved joint aches. Persistent shoulder pain being followed by Orthopedics. Eczema less visible and being controlled with steroid cream PRN. May need to go back on immuno suppression in case I would consider sirolimus w or w/o prednisone; ECP logisticaly difficult.     5. GI:  No complaints. Continue Protonix.   - LFTs wnl 12/23    6. FEN/Renal: nor Cr and lytes.    7. Cardiovascular: High cholesterol and triglycerides may use cholesterol medication. Continue norvasc for HTN.   - Cont carvedilol 6.25mg bid (increased 12/9) and Norvasc 7.5 mg daily at night. - Needs to watch diet.     8. Neuro: No focal signs.     9. Musculoskelatal: doing PT at home now. Okay to use NSAIDs as long as well hydrated. Continue to follow with PT and Orthopedics. May consider palliative care consult for pain control if needed.  - Dexa scan with osteopenia (5/5/15). Had bisphosphonate 10/5/16; repeat in ~12 months  - Continue Calcium and Vit D supplementation 2,800 units daily  - try to  use tramadol that was working better than hydrocodone/acetaminophen     10. Pulm: resolved respiratory issues. Needs to discuss best mask with sleep apnea clinic.    - Sleep apnea being managed, using CPAP.     11. Endocrine: steroid induced diabetes. Off insulin now.      12. Breast: breast nodule biopsy showed fatty scar tissue.     13. Eyes: cataracts surgery this month. From our point of view ready to proceed.     PLAN:  RTC [facility] on  5/12/2017 with labs; sooner if needed  Next immunization in Nov 2017  Continue acyclovir because of HSV on lips

## 2017-03-10 NOTE — NURSING NOTE
Chief Complaint   Patient presents with     Lab Only     blood draw   Vitals done and labs drawn peripherally from right arm

## 2017-03-10 NOTE — PATIENT INSTRUCTIONS
ISABELLE Ni on  5/12/2017 with labs; sooner if needed  Next immunization in Nov 2017  Continue acyclovir because of HSV on lips    5/12 10am labs and MD [place] nurse visit/pt will call us    Yessy MILLS

## 2017-03-10 NOTE — MR AVS SNAPSHOT
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  After hours call the   hospital at [phone] or   [phone]. Ask for the BMT   fellow for pediatric or adult patients           Care Instructions    RTC [facility] on  5/12/2017 with labs; sooner if needed  Next immunization in Nov 2017  Continue acyclovir because of HSV on lips    5/12 10am labs and MD        Follow-ups after your visit        Your next 10 appointments already scheduled     Mar 21, 2017  3:15 PM CDT   (Arrive by 3:00 PM)   Return Visit with Renny Tompkins MD   [facility] ([facility])    [address] [place] [id number] [phone]            May 12, 2017 10:00 AM CDT   [facility] with  [facility]   [facility] ([facility])    [address] [place] [id number] [phone]            May 12, 2017 10:30 AM CDT   Return with Irving Ni MD   [facility] ([facility])    [address] [place] [address]  [id number] [phone]              Future tests that were ordered for you today     Open Future Orders        Priority Expected Expires Ordered    CBC with platelets differential Routine 5/12/2017 5/12/2017 3/10/2017    Comprehensive metabolic panel Routine 5/12/2017 5/12/2017 3/10/2017    IgG Routine 5/12/2017 5/12/2017 3/10/2017            Who to contact     If you have questions or need follow up information about today's clinic visit or your schedule please contact [facility] directly at [phone].  Normal or non-critical lab and imaging results will be communicated to you by Stack Exchangehart, letter or phone within 4 business days after the clinic has received the results. If you do not hear from us within 7 days, please contact the clinic through Bozuko or phone. If you have a critical or abnormal lab result, we will notify you by phone as soon as possible.  Submit refill requests through Bo[employer]uko or call your pharmacy and they will forward the refill request to us. Please allow 3 business days for your refill to be completed.          Additional Information About Your Visit        MyChart Information     B[employer]o gives you secure access to your electronic health record. If you see a primary care provider, you can also send messages to your care team and make appointments. If you have questions, please call your primary care clinic.  If you do not have a primary care provider, please call [phone] and they will assist you.        Care EveryWhere ID     This is your Care EveryWhere ID. This could be used by other organizations to access your [facility] medical records  [id number]        Your Vitals Were     Pulse Temperature Respirations Pulse Oximetry BMI (Body Mass Index)       89 98.5  F (36.9  C) (Oral) 16 95% 40.51 kg/m2        Blood Pressure from Last 3 Encounters:   03/10/17 124/69   02/15/17 147/75   12/16/16 133/72    Weight from Last 3 Encounters:   03/10/17 100.5 kg (221 lb  8 oz)   02/15/17 101.6 kg (224 lb)   12/16/16 99.5 kg (219 lb 6.4 oz)              We Performed the Following     CBC with platelets differential     Comprehensive metabolic panel     IgG     TSH with free T4 reflex          Today's Medication Changes          These changes are accurate as of: 3/10/17 12:19 PM.  If you have any questions, ask your nurse or doctor.               These medicines have changed or have updated prescriptions.        Dose/Directions    valACYclovir 500 MG tablet   Commonly known as:  VALTREX   This may have changed:    - how much to take  - when to take this   Used for:  S/P allogeneic bone marrow transplant (H), HSV (herpes simplex virus) infection   Changed by:  Irving Ni MD        Dose:  500 mg   Take 1 tablet (500 mg) by mouth 2 times daily For 10 days. Then decrease to 1 tablet twice a day.   Quantity:  60 tablet   Refills:  6         Stop taking these medicines if you haven't already. Please contact your care team if you have questions.     amoxicillin-clavulanate 875-125 MG per tablet   Commonly known as:  AUGMENTIN   Stopped by:  Irving Ni MD           folic acid 1 MG tablet   Commonly known as:  FOLVITE   Stopped by:  Irving Ni MD           guaiFENesin-codeine 100-10 MG/5ML Syrp syrup   Commonly known as:  guaiFENesin AC   Stopped by:  Irving Ni MD           HYDROcodone-acetaminophen 5-325 MG per tablet   Commonly known as:  NORCO   Stopped by:  Irving Ni MD           LORazepam 0.5 MG tablet   Commonly known as:  ATIVAN   Stopped by:  Irving Ni MD                Where to get your medicines      These medications were sent to [facility] [address] [place] [address]     Phone:  [phone]     amLODIPine 5 MG tablet    carvedilol 6.25 MG tablet    pantoprazole 40 MG EC tablet    valACYclovir 500 MG tablet                 Recent Review Flowsheet Data     BMT Recent Results Latest Ref Rng & Units 6/21/2016 8/3/2016 10/5/2016 11/4/2016 12/16/2016 2/15/2017 3/10/2017    WBC 4.0 - 11.0 10e9/L 5.8 3.8(L) 6.7 8.7 7.7 9.4 9.0    Hemoglobin 11.7 - 15.7 g/dL 11.8 12.8 12.9 13.1 13.6 12.2 12.3    Platelet Count 150 - 450 10e9/L 210 184 193 193 214 177 246    Neutrophils (Absolute) 1.6 - 8.3 10e9/L 2.5 1.5(L) 3.2 4.5 4.4 5.6 4.9    INR 0.86 - 1.14 2.46(H) 0.94 - - - - -    Sodium 133 - 144 mmol/L 140 139 140 139 142 140 140    Potassium 3.4 - 5.3 mmol/L 3.3(L) 3.8 4.0 4.1 4.0 4.0 4.0    Chloride 94 - 109 mmol/L 105 105 105 107 107 104 104    Glucose 70 - 99 mg/dL 135(H) 121(H) 127(H) 100(H) 193(H) 138(H) 109(H)    Urea Nitrogen 7 - 30 mg/dL 12 15 13 26 22 8 18    Creatinine 0.52 - 1.04 mg/dL 0.90 0.90 1.00 0.94 1.04 0.94 0.98    Calcium (Total) 8.5 - 10.1 mg/dL 9.0 9.3 9.3 8.5 8.8 8.5 9.0    Protein (Total) 6.8 - 8.8 g/dL 6.8 6.7(L) 6.8 6.9 6.9 6.8 7.3    Albumin 3.4 - 5.0 g/dL 3.5 3.7 3.8 3.8 3.7 3.3(L) 3.5    Alkaline Phosphatase 40 - 150 U/L 74 83 87 97 71 67 79    AST 0 - 45 U/L 22 18 22 12 11 13 16    ALT 0 - 50 U/L 25 31 28 24 25 18 18    MCV 78 - 100 fl 90 93 92 92 94 91 89               Primary Care Provider Office Phone # Fax #    Carmelo Camacho [phone]       [address] [address]        Thank you!     Thank you for choosing [facility]  for your care. Our goal is always to provide you with excellent care. Hearing back from our patients is one way we can continue to improve our services. Please take a few minutes to complete the written survey that you may receive in the mail after your visit with us. Thank you!             Your Updated Medication List - Protect others around you: Learn how to safely use, store and throw away your medicines at www.disposemymeds.org.          This list is accurate as of: 3/10/17 12:19 PM.  Always use your most recent med  list.                   Brand Name Dispense Instructions for use    acetaminophen 325 MG tablet    TYLENOL    100 tablet    Take 1-2 tablets (325-650 mg) by mouth every 4 hours as needed for mild pain or fever       amLODIPine 5 MG tablet    NORVASC    90 tablet    Take 1 tablet (5 mg) by mouth daily       calcium-vitamin D 500-125 MG-UNIT Tabs      Take by mouth 2 times daily       carvedilol 6.25 MG tablet    COREG    180 tablet    Take 1 tablet (6.25 mg) by mouth 2 times daily (with meals)       desonide 0.05 % cream    DESOWEN    60 g    Apply sparingly to affected area three times daily as needed.       pantoprazole 40 MG EC tablet    PROTONIX    30 tablet    Take 1 tablet (40 mg) by mouth daily       traMADol 50 MG tablet    ULTRAM    60 tablet    Take 1 tablet (50 mg) by mouth every 6 hours as needed for moderate pain       valACYclovir 500 MG tablet    VALTREX    60 tablet    Take 1 tablet (500 mg) by mouth 2 times daily For 10 days. Then decrease to 1 tablet twice a day.       VITAMIN D (CHOLECALCIFEROL) PO      Take 1,000 Units by mouth daily

## 2017-03-10 NOTE — LETTER
3/10/2017      RE: Eryn Bennett  PO   [facility] [address]       BMT Clinic Progress Note    ID:  61 yo woman s/p NMA DCBT for AML  CC: follow-up   HPI:  Here for follow-up and with her . Resolved influenza infection. Saw eye doctor and has plan for cataract surgery. Also saw PT and has follow-up with orthopedics for her shoulders. No fevers. Bilateral shoulder pain is controlled. Off coumadin, Off prednisone since 4/20/16, Off MMF since 9/13/16. No CRISTOBAL, N/V/D or new rash.  Old rash wax and wanes. Had breast biopsy and results we negative for cancer, just scar tissue. Joint and leg pain improved more recently.     ROS:  Remainder 10 point ROS negative.   Physical Exam:    Wt Readings from Last 4 Encounters:   03/10/17 100.5 kg (221 lb 8 oz)   02/15/17 101.6 kg (224 lb)   12/16/16 99.5 kg (219 lb 6.4 oz)   11/04/16 101.6 kg (223 lb 15.8 oz)     EXAM: /69 (BP Location: Left arm, Cuff Size: Adult Large)  Pulse 89  Temp 98.5  F (36.9  C) (Oral)  Resp 16  Wt 100.5 kg (221 lb 8 oz)  SpO2 95%  BMI 40.51 kg/m2  Gen: Alert, oriented x 3, NAD, cushingoid.  Prominent buffalo hump.    HEENT: OP no ulcerations. No lichenoid changes.  No petechia.   CLARICE. Sclera anicteric. No conj petech  CV: RRR, no m/r/g  Pulm: cta X 2. No wheezing or rhonchi   Abd: obese, soft, NT, ND, BS+  Ext: wearing compression stockings, bilateral LE trace edema. No calf tenderness  Skin: dry rash with scaly skin on skin folds.   MSK:  Has decreased ROM both shoulders  Neuro; non-focal. No nystagmus.     Labs:   Lab Results   Component Value Date    WBC 9.0 03/10/2017    ANEU 4.9 03/10/2017    HGB 12.3 03/10/2017    HCT 39.5 03/10/2017     03/10/2017     03/10/2017    POTASSIUM 4.0 03/10/2017    CHLORIDE 104 03/10/2017    CO2 26 03/10/2017     (H) 03/10/2017    BUN 18 03/10/2017    CR 0.98 03/10/2017    MAG 1.8 10/05/2016    INR 0.94 08/03/2016    BILITOTAL 0.4 03/10/2017    AST 16 03/10/2017    ALT 18 03/10/2017     ALKPHOS 79 03/10/2017    PROTTOTAL 7.3 03/10/2017    ALBUMIN 3.5 03/10/2017     BMBX no leukemia; 100% single donor  CD 4 count 500's  IgG 425    A/P: 59 yo woman 18 months s/p NMA DCBT for AML day +855 (2 yr post BMT) in CR with KPS 90%    1. BMT/AML: two year post transplant BM 40% cellular, trilineage hematopoiesis, no leukemia by morphology and flow negative; % donor #2.      2. HEME: stable good counts  - Keep Hgb>8g/dL and plts>10k. No bleeding.   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  Received rituximab X 4 September 2015.    3. DVT- Repeat doppler L lower ext negative on OCT 2016. Off coumadin since JUL 2016.     4. 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   Had Flu shot on 10/5/16. Continue immunizations on FEB 2017 visit  - PPX: off fluconazole, azithromycin 250 3x/week and pentamidine monthly (last on 12/9)    4. 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   - manifested as hemolytic anemia; Off prednisone since 4/20/16; finished MMF taper on 9/13/16. Improved joint aches. Persistent shoulder pain being followed by Orthopedics. Eczema less visible and being controlled with steroid cream PRN. May need to go back on immuno suppression in case I would consider sirolimus w or w/o prednisone; ECP logisticaly difficult.     5. GI:  No complaints. Continue Protonix.   - LFTs wnl 12/23    6. FEN/Renal: nor Cr and lytes.    7. Cardiovascular: High cholesterol and triglycerides may use cholesterol medication. Continue norvasc for HTN.   - Cont carvedilol 6.25mg bid (increased 12/9) and Norvasc 7.5 mg daily at night. - Needs to watch diet.     8. Neuro: No focal signs.     9. Musculoskelatal: doing PT at home now. Okay to use NSAIDs as long as well hydrated. Continue to follow with PT and Orthopedics. May consider palliative care consult for pain control if needed.  - Dexa scan with osteopenia (5/5/15). Had bisphosphonate 10/5/16; repeat in ~12 months  -  Continue Calcium and Vit D supplementation 2,800 units daily  - try to use tramadol that was working better than hydrocodone/acetaminophen     10. Pulm: resolved respiratory issues. Needs to discuss best mask with sleep apnea clinic.    - Sleep apnea being managed, using CPAP.     11. Endocrine: steroid induced diabetes. Off insulin now.      12. Breast: breast nodule biopsy showed fatty scar tissue.     13. Eyes: cataracts surgery this month. From our point of view ready to proceed.     PLAN:  RTC [facility] on  5/12/2017 with labs; sooner if needed  Next immunization in Nov 2017  Continue acyclovir because of HSV on lips    Irving Ni MD

## 2017-03-21 ENCOUNTER — OFFICE VISIT (OUTPATIENT)
Dept: ORTHOPEDICS | Facility: CLINIC | Age: 62
End: 2017-03-21

## 2017-03-21 VITALS — WEIGHT: 221 LBS | BODY MASS INDEX: 40.67 KG/M2 | HEIGHT: 62 IN

## 2017-03-21 DIAGNOSIS — M75.01 ADHESIVE CAPSULITIS OF BOTH SHOULDERS: Primary | ICD-10-CM

## 2017-03-21 DIAGNOSIS — M75.02 ADHESIVE CAPSULITIS OF BOTH SHOULDERS: Primary | ICD-10-CM

## 2017-03-21 NOTE — LETTER
3/21/2017    RE: Eryn Bennett  PO   [facility] [address]      Subjective:   Eryn Bennett is a 61 year old female who is here following up on bilateral shoulder adhesive capsulitis. She notes an increase in pain sleeping at night, a decrease in pain throughout the day.  She has completed her physical therapy.  She acknowledges that she has had improvement in her right shoulder and feels as if she has near full range of motion in that.  She has had limited improvement in the left shoulder.  She does note that she is not quite as faithful as she should be with regards to her home exercise program.  She has stopped using Vicodin and uses tramadol on an intermittent basis for pain but for the most part she has decreased her use for the left shoulder primarily to Tylenol.  She has had no interval injury.  She has had no particular increase in pain or change in pain to suggest progression of a cuff tear.       Date of injury: NA  Date last seen: 11/28/2016  Following Therapeutic Plan: Yes, physical therapy sessions, HEP  Pain: Improving  Function: Improving on right shoulder, left shoulder has stayed the same.   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 She also has no past medical history of Atypical fibroxanthoma; Basal cell carcinoma; Cutaneous T-cell lymphoma (H); Malignant melanoma (H); Other specified malignant neoplasm of skin of trunk, except scrotum; or Squamous cell carcinoma (H).  She  has a past surgical history that includes picc insertion (Left,  "8/28/2014); Dos Santos Catheter Placment (Right, 2010); Cholecystectomy (1987); GYN surgery; Hysterectomy vaginal, bilateral salpingo-oopherectomy, combined; GI surgery; Bladder surgery; and Esophagoscopy, gastroscopy, duodenoscopy (EGD), combined (N/A, 12/11/2014).  Her family history includes CANCER in her father and sister; DIABETES in her sister; Glaucoma in her maternal grandfather and mother; Intellectual Disability (Mental Retardation) in her sister; LUNG DISEASE in her sister; Melanoma in her daughter. There is no history of Macular Degeneration.  She reports that she has never smoked. She has never used smokeless tobacco. She reports that she does not drink alcohol or use illicit drugs.    ALLERGIES: She is allergic to blood transfusion related (informational only); cefepime; sulfa drugs; allopurinol; levofloxacin; and vancomycin.    CURRENT MEDICATIONS: She has a current medication list which includes the following prescription(s): carvedilol, amlodipine, pantoprazole, valacyclovir, tramadol, desonide, acetaminophen, calcium-vitamin d, and cholecalciferol.     REVIEW OF SYSTEMS: 10 point review of systems is negative except as noted above.     Exam:   Ht 5' 2\" (1.575 m)  Wt 221 lb (100.2 kg)  BMI 40.42 kg/m2      CONSTITUTIONIAL: alert and no distress  HEAD: Normocephalic. No masses, lesions, tenderness or abnormalities  SKIN: no suspicious lesions or rashes  GAIT: obese pattern  NEUROLOGIC: Non-focal  PSYCHIATRIC: affect normal/bright and mentation appears normal.  RIGHT SHOULDER:  Range of motion includes external rotation 70 degrees, internal rotation 90 degrees,  flexion 170 degrees, abduction 160 degrees and extension is full.  Manual muscle testing of the rotator cuff demonstrates intact supraspinatus, infraspinatus and subscapularis.   LEFT SHOULDER:  Range of motion demonstrates external rotation of 40 degrees, internal rotation of 60 degrees, flexion of 90 degrees, abduction of 90 degrees and " extension which is full.  She has pain at end ranges of motion.  Active internal rotation, external rotation and supraspinatus testing are 4/5-5/5 in all motions.      ASSESSMENT:  Bilateral frozen shoulder with right improving significantly more than left.      PLAN:  I have recommended that she continue with her home exercise program and her stretching.  I have advised her that the natural history of this is that resolution typically occurs at about 20 months.  She is well ahead of that with regards to the right shoulder but she is being very limited in that left shoulder rehabilitation.  She will return to see me in June for a 30-minute visit.  All other questions have been answered.  She will continue to use Tylenol and/or tramadol for her pain.  If she has any concerns, she can return to therapy which I will authorize if she needs some intermittent assistance.         Renny Tompkins MD

## 2017-03-21 NOTE — PROGRESS NOTES
Subjective:   Eryn Bennett is a 61 year old female who is here following up on bilateral shoulder adhesive capsulitis. She notes an increase in pain sleeping at night, a decrease in pain throughout the day.  She has completed her physical therapy.  She acknowledges that she has had improvement in her right shoulder and feels as if she has near full range of motion in that.  She has had limited improvement in the left shoulder.  She does note that she is not quite as faithful as she should be with regards to her home exercise program.  She has stopped using Vicodin and uses tramadol on an intermittent basis for pain but for the most part she has decreased her use for the left shoulder primarily to Tylenol.  She has had no interval injury.  She has had no particular increase in pain or change in pain to suggest progression of a cuff tear.       Date of injury: NA  Date last seen: 11/28/2016  Following Therapeutic Plan: Yes, physical therapy sessions, HEP  Pain: Improving  Function: Improving on right shoulder, left shoulder has stayed the same.   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 She also has no past medical history of Atypical fibroxanthoma; Basal cell carcinoma; Cutaneous T-cell lymphoma (H); Malignant melanoma (H); Other specified malignant neoplasm of skin of trunk, except scrotum; or Squamous cell carcinoma (H).  She  has a past surgical history that includes picc insertion (Left, 8/28/2014); Dos Santos Catheter Placment (Right, 2010); Cholecystectomy  "(1987); GYN surgery; Hysterectomy vaginal, bilateral salpingo-oopherectomy, combined; GI surgery; Bladder surgery; and Esophagoscopy, gastroscopy, duodenoscopy (EGD), combined (N/A, 12/11/2014).  Her family history includes CANCER in her father and sister; DIABETES in her sister; Glaucoma in her maternal grandfather and mother; Intellectual Disability (Mental Retardation) in her sister; LUNG DISEASE in her sister; Melanoma in her daughter. There is no history of Macular Degeneration.  She reports that she has never smoked. She has never used smokeless tobacco. She reports that she does not drink alcohol or use illicit drugs.    ALLERGIES: She is allergic to blood transfusion related (informational only); cefepime; sulfa drugs; allopurinol; levofloxacin; and vancomycin.    CURRENT MEDICATIONS: She has a current medication list which includes the following prescription(s): carvedilol, amlodipine, pantoprazole, valacyclovir, tramadol, desonide, acetaminophen, calcium-vitamin d, and cholecalciferol.     REVIEW OF SYSTEMS: 10 point review of systems is negative except as noted above.     Exam:   Ht 5' 2\" (1.575 m)  Wt 221 lb (100.2 kg)  BMI 40.42 kg/m2      CONSTITUTIONIAL: alert and no distress  HEAD: Normocephalic. No masses, lesions, tenderness or abnormalities  SKIN: no suspicious lesions or rashes  GAIT: obese pattern  NEUROLOGIC: Non-focal  PSYCHIATRIC: affect normal/bright and mentation appears normal.  RIGHT SHOULDER:  Range of motion includes external rotation 70 degrees, internal rotation 90 degrees,  flexion 170 degrees, abduction 160 degrees and extension is full.  Manual muscle testing of the rotator cuff demonstrates intact supraspinatus, infraspinatus and subscapularis.   LEFT SHOULDER:  Range of motion demonstrates external rotation of 40 degrees, internal rotation of 60 degrees, flexion of 90 degrees, abduction of 90 degrees and extension which is full.  She has pain at end ranges of motion.  Active " internal rotation, external rotation and supraspinatus testing are 4/5-5/5 in all motions.      ASSESSMENT:  Bilateral frozen shoulder with right improving significantly more than left.      PLAN:  I have recommended that she continue with her home exercise program and her stretching.  I have advised her that the natural history of this is that resolution typically occurs at about 20 months.  She is well ahead of that with regards to the right shoulder but she is being very limited in that left shoulder rehabilitation.  She will return to see me in June for a 30-minute visit.  All other questions have been answered.  She will continue to use Tylenol and/or tramadol for her pain.  If she has any concerns, she can return to therapy which I will authorize if she needs some intermittent assistance.

## 2017-03-21 NOTE — MR AVS SNAPSHOT
After Visit Summary   3/21/2017    Eryn Bennett    MRN: [id number]           Patient Information     Date Of Birth          1955        Visit Information        Provider Department      3/21/2017 3:15 PM Renny Tompkins MD [facility] Medicine         Follow-ups after your visit        Your next 10 appointments already scheduled     Mar 21, 2017  3:15 PM CDT   (Arrive by 3:00 PM)   Return Visit with Renny Tompkins MD   [facility] Medicine ([facility])    [address] [place] [id number] [phone]            May 10, 2017 10:30 AM CDT   Masonic Lab Draw with  MASONIC LAB DRAW   [facility] Masonic Lab Draw ([facility])    [address] [place] [id number] [phone]            May 10, 2017 11:00 AM CDT   Return with Irving Ni MD   [facility] Blood and Marrow Transplant ([facility])    [address] [place] [id number] [phone]            Jun 22, 2017  1:00 PM CDT   (Arrive by 12:45 PM)   Return Visit with Renny Tompkins MD   [facility] ([facility])    [address] [place] [id number] [phone]              Who to contact     Please call your clinic at [phone] to:    Ask questions about your health    Make or cancel appointments    Discuss your medicines    Learn about your test results    Speak to your doctor   If you have compliments or concerns about an experience at your clinic, or if you wish to file a complaint, please contact [facility] Patient Relations at [phone] or email us at [email or web address]         Additional Information About Your Visit        MyChart Information     MyChart gives you secure access to your electronic health record. If you see a primary care  "provider, you can also send messages to your care team and make appointments. If you have questions, please call your primary care clinic.  If you do not have a primary care provider, please call [phone] and they will assist you.      [employer] is an electronic gateway that provides easy, online access to your medical records. With [employer], you can request a clinic appointment, read your test results, renew a prescription or communicate with your care team.     To access your existing account, please contact your [facility] or call [phone] for assistance.        Care EveryWhere ID     This is your Care EveryWhere ID. This could be used by other organizations to access your [place] medical records  [id number]        Your Vitals Were     Height BMI (Body Mass Index)                5' 2\" (1.575 m) 40.42 kg/m2           Blood Pressure from Last 3 Encounters:   03/10/17 124/69   02/15/17 147/75   12/16/16 133/72    Weight from Last 3 Encounters:   03/21/17 221 lb (100.2 kg)   03/10/17 221 lb 8 oz (100.5 kg)   02/15/17 224 lb (101.6 kg)              Today, you had the following     No orders found for display       Primary Care Provider Office Phone # Fax #    Carmelo Camacho [phone]       [place] [address]        Thank you!     Thank you for choosing [facility]  for your care. Our goal is always to provide you with excellent care. Hearing back from our patients is one way we can continue to improve our services. Please take a few minutes to complete the written survey that you may receive in the mail after your visit with us. Thank you!             Your Updated Medication List - Protect others around you: Learn how to safely use, store and throw away your medicines at www.disposemymeds.org.          This list is accurate as of: 3/21/17  1:45 PM.  Always use your most recent med list.                   " Brand Name Dispense Instructions for use    acetaminophen 325 MG tablet    TYLENOL    100 tablet    Take 1-2 tablets (325-650 mg) by mouth every 4 hours as needed for mild pain or fever       amLODIPine 5 MG tablet    NORVASC    90 tablet    Take 1 tablet (5 mg) by mouth daily       calcium-vitamin D 500-125 MG-UNIT Tabs      Take by mouth 2 times daily       carvedilol 6.25 MG tablet    COREG    180 tablet    Take 1 tablet (6.25 mg) by mouth 2 times daily (with meals)       desonide 0.05 % cream    DESOWEN    60 g    Apply sparingly to affected area three times daily as needed.       pantoprazole 40 MG EC tablet    PROTONIX    30 tablet    Take 1 tablet (40 mg) by mouth daily       traMADol 50 MG tablet    ULTRAM    60 tablet    Take 1 tablet (50 mg) by mouth every 6 hours as needed for moderate pain       valACYclovir 500 MG tablet    VALTREX    60 tablet    Take 1 tablet (500 mg) by mouth 2 times daily       VITAMIN D (CHOLECALCIFEROL) PO      Take 1,000 Units by mouth daily

## 2017-03-23 ENCOUNTER — TELEPHONE (OUTPATIENT)
Dept: TRANSPLANT | Facility: CLINIC | Age: 62
End: 2017-03-23

## 2017-03-23 ENCOUNTER — APPOINTMENT (OUTPATIENT)
Dept: LAB | Facility: CLINIC | Age: 62
End: 2017-03-23
Attending: PHYSICIAN ASSISTANT
Payer: MEDICARE

## 2017-03-23 ENCOUNTER — INFUSION THERAPY VISIT (OUTPATIENT)
Dept: TRANSPLANT | Facility: CLINIC | Age: 62
End: 2017-03-23
Attending: PHYSICIAN ASSISTANT
Payer: MEDICARE

## 2017-03-23 VITALS
DIASTOLIC BLOOD PRESSURE: 80 MMHG | SYSTOLIC BLOOD PRESSURE: 131 MMHG | OXYGEN SATURATION: 96 % | RESPIRATION RATE: 14 BRPM | TEMPERATURE: 97.4 F | HEART RATE: 104 BPM

## 2017-03-23 DIAGNOSIS — C92.01 ACUTE MYELOID LEUKEMIA IN REMISSION (H): Primary | ICD-10-CM

## 2017-03-23 DIAGNOSIS — R19.7 DIARRHEA OF PRESUMED INFECTIOUS ORIGIN: ICD-10-CM

## 2017-03-23 LAB
ALBUMIN SERPL-MCNC: 3.7 G/DL (ref 3.4–5)
ALP SERPL-CCNC: 74 U/L (ref 40–150)
ALT SERPL W P-5'-P-CCNC: 25 U/L (ref 0–50)
ANION GAP SERPL CALCULATED.3IONS-SCNC: 10 MMOL/L (ref 3–14)
AST SERPL W P-5'-P-CCNC: 27 U/L (ref 0–45)
BASOPHILS # BLD AUTO: 0 10E9/L (ref 0–0.2)
BASOPHILS NFR BLD AUTO: 0.3 %
BILIRUB SERPL-MCNC: 0.4 MG/DL (ref 0.2–1.3)
BUN SERPL-MCNC: 14 MG/DL (ref 7–30)
C DIFF TOX B STL QL: NORMAL
CALCIUM SERPL-MCNC: 8.8 MG/DL (ref 8.5–10.1)
CAMPYLOBACTER GROUP BY NAT: NOT DETECTED
CHLORIDE SERPL-SCNC: 106 MMOL/L (ref 94–109)
CO2 SERPL-SCNC: 24 MMOL/L (ref 20–32)
CREAT SERPL-MCNC: 0.98 MG/DL (ref 0.52–1.04)
DIFFERENTIAL METHOD BLD: ABNORMAL
ENTERIC PATHOGEN COMMENT: NORMAL
EOSINOPHIL # BLD AUTO: 0.1 10E9/L (ref 0–0.7)
EOSINOPHIL NFR BLD AUTO: 1.2 %
ERYTHROCYTE [DISTWIDTH] IN BLOOD BY AUTOMATED COUNT: 15.2 % (ref 10–15)
GFR SERPL CREATININE-BSD FRML MDRD: 58 ML/MIN/1.7M2
GLUCOSE SERPL-MCNC: 112 MG/DL (ref 70–99)
HCT VFR BLD AUTO: 41.2 % (ref 35–47)
HGB BLD-MCNC: 13 G/DL (ref 11.7–15.7)
IMM GRANULOCYTES # BLD: 0 10E9/L (ref 0–0.4)
IMM GRANULOCYTES NFR BLD: 0.3 %
LYMPHOCYTES # BLD AUTO: 3.3 10E9/L (ref 0.8–5.3)
LYMPHOCYTES NFR BLD AUTO: 45.3 %
MCH RBC QN AUTO: 27.4 PG (ref 26.5–33)
MCHC RBC AUTO-ENTMCNC: 31.6 G/DL (ref 31.5–36.5)
MCV RBC AUTO: 87 FL (ref 78–100)
MONOCYTES # BLD AUTO: 0.8 10E9/L (ref 0–1.3)
MONOCYTES NFR BLD AUTO: 10.5 %
NEUTROPHILS # BLD AUTO: 3.1 10E9/L (ref 1.6–8.3)
NEUTROPHILS NFR BLD AUTO: 42.4 %
NOROVIRUS I AND II BY NAT: NOT DETECTED
NRBC # BLD AUTO: 0 10*3/UL
NRBC BLD AUTO-RTO: 0 /100
PLATELET # BLD AUTO: 215 10E9/L (ref 150–450)
POTASSIUM SERPL-SCNC: 3.2 MMOL/L (ref 3.4–5.3)
PROT SERPL-MCNC: 7.4 G/DL (ref 6.8–8.8)
RBC # BLD AUTO: 4.74 10E12/L (ref 3.8–5.2)
ROTAVIRUS A BY NAT: NOT DETECTED
SALMONELLA SPECIES BY NAT: NOT DETECTED
SHIGA TOXIN 1 GENE BY NAT: NOT DETECTED
SHIGA TOXIN 2 GENE BY NAT: NOT DETECTED
SHIGELLA SP+EIEC IPAH STL QL NAA+PROBE: NOT DETECTED
SODIUM SERPL-SCNC: 140 MMOL/L (ref 133–144)
SPECIMEN SOURCE: NORMAL
VIBRIO GROUP BY NAT: NOT DETECTED
WBC # BLD AUTO: 7.3 10E9/L (ref 4–11)
YERSINIA ENTEROCOLITICA BY NAT: NOT DETECTED

## 2017-03-23 PROCEDURE — 85025 COMPLETE CBC W/AUTO DIFF WBC: CPT | Performed by: PHYSICIAN ASSISTANT

## 2017-03-23 PROCEDURE — 80053 COMPREHEN METABOLIC PANEL: CPT | Performed by: PHYSICIAN ASSISTANT

## 2017-03-23 PROCEDURE — 25000132 ZZH RX MED GY IP 250 OP 250 PS 637: Mod: ZF | Performed by: PHYSICIAN ASSISTANT

## 2017-03-23 PROCEDURE — 25000125 ZZHC RX 250: Mod: ZF | Performed by: PHYSICIAN ASSISTANT

## 2017-03-23 PROCEDURE — 87493 C DIFF AMPLIFIED PROBE: CPT | Performed by: PHYSICIAN ASSISTANT

## 2017-03-23 PROCEDURE — A9270 NON-COVERED ITEM OR SERVICE: HCPCS | Mod: ZF | Performed by: PHYSICIAN ASSISTANT

## 2017-03-23 PROCEDURE — 96365 THER/PROPH/DIAG IV INF INIT: CPT | Mod: ZF

## 2017-03-23 PROCEDURE — 99211 OFF/OP EST MAY X REQ PHY/QHP: CPT | Mod: ZF

## 2017-03-23 PROCEDURE — 25000128 H RX IP 250 OP 636: Mod: ZF | Performed by: PHYSICIAN ASSISTANT

## 2017-03-23 PROCEDURE — 87506 IADNA-DNA/RNA PROBE TQ 6-11: CPT | Performed by: PHYSICIAN ASSISTANT

## 2017-03-23 PROCEDURE — 96361 HYDRATE IV INFUSION ADD-ON: CPT | Mod: ZF

## 2017-03-23 RX ORDER — POTASSIUM CHLORIDE 29.8 MG/ML
20 INJECTION INTRAVENOUS ONCE
Status: COMPLETED | OUTPATIENT
Start: 2017-03-23 | End: 2017-03-23

## 2017-03-23 RX ORDER — POTASSIUM CHLORIDE 1500 MG/1
20 TABLET, EXTENDED RELEASE ORAL ONCE
Status: COMPLETED | OUTPATIENT
Start: 2017-03-23 | End: 2017-03-23

## 2017-03-23 RX ORDER — METRONIDAZOLE 500 MG/1
500 TABLET ORAL 3 TIMES DAILY
Qty: 42 TABLET | Refills: 0 | Status: ON HOLD | OUTPATIENT
Start: 2017-03-23 | End: 2017-04-04

## 2017-03-23 RX ADMIN — POTASSIUM CHLORIDE 20 MEQ: 400 INJECTION, SOLUTION INTRAVENOUS at 14:23

## 2017-03-23 RX ADMIN — SODIUM CHLORIDE 1000 ML: 9 INJECTION, SOLUTION INTRAVENOUS at 13:53

## 2017-03-23 RX ADMIN — POTASSIUM CHLORIDE 20 MEQ: 1500 TABLET, EXTENDED RELEASE ORAL at 14:25

## 2017-03-23 NOTE — PROGRESS NOTES
BMT Clinic Note    ID:  59 yo woman 2+ years s/p NMA DCBT for AML  CC: frequent diarrhea x2 days   HPI:  Seen as add-on today for diarrhea that started 2 days ago, Tuesday afternoon. Here on Monday for 2yr vaccinations. Reported a temp to 100.9 Tuesday evening but not since. Diarreha is watery, with mild abd cramping since yesterday. Mild stomach upset but no ruth n/v. Seen at her local urgent care yesterday and stool cx is pending (told it could take at least 2 days). She is taking Imodium, 1 so far today. Creat wnl yesterday and today. Drinking fluids ok but food intake is decreased. No rash. No known sick contacts. Feels dizzy intermittently. Wt down ~5lb per her report. She was treated for influenza and a UTI in February. No hx C.dif.    ROS:  Remainder 10 point ROS negative.   Physical Exam:    EXAM: /80  Pulse 104  Temp 97.4  F (36.3  C) (Oral)  Resp 14  SpO2 96%  Gen: Alert, oriented x 3, NAD, cushingoid.  Prominent buffalo hump.    HEENT: OP no ulcerations. No lichenoid changes.  No petechia.   CLARICE. Sclera anicteric. No conj petech  CV: RRR, no m/r/g  Pulm: CTAB  Abd: obese, soft, NT, ND, hyperactive BS  Ext: trace LE edema bilaterally  Skin: no rash on exposed skin.   Neuro: non-focal.      Labs:   Lab Results   Component Value Date    WBC 7.3 03/23/2017    ANEU 3.1 03/23/2017    HGB 13.0 03/23/2017    HCT 41.2 03/23/2017     03/23/2017     03/23/2017    POTASSIUM 3.2 (L) 03/23/2017    CHLORIDE 106 03/23/2017    CO2 24 03/23/2017     (H) 03/23/2017    BUN 14 03/23/2017    CR 0.98 03/23/2017    MAG 1.8 10/05/2016    INR 0.94 08/03/2016    BILITOTAL 0.4 03/23/2017    AST 27 03/23/2017    ALT 25 03/23/2017    ALKPHOS 74 03/23/2017    PROTTOTAL 7.4 03/23/2017    ALBUMIN 3.7 03/23/2017     BMBX no leukemia; 100% single donor  CD 4 count 500's  IgG 425    A/P: 59 yo woman s/p NMA DCBT for AML day +855 (2 yr post BMT) in CR here with 2d hx diarrhea    1. BMT/AML: two year post  transplant BM 40% cellular, trilineage hematopoiesis, no leukemia by morphology and flow negative; % donor #2.      2. HEME: stable good counts  - Keep Hgb>8g/dL and plts>10k. No bleeding.   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  Received rituximab X 4 September 2015.    3. DVT- Repeat doppler L lower ext negative on OCT 2016. Off coumadin since JUL 2016.     4. ID: one fever 3/21pm. Afebrile today. Possible infectious diarrhea, C.dif and enteric panel pending. Empiric Flagyl Rx given 3/23.   - Flu shot on 10/5/16. 2yr immunizations FEB 2017     5. GVH: new acute diarrhea, check stool cx and monitor for improvement. If persists with neg cx, consider scopes next week.  - manifested as hemolytic anemia; Off prednisone since 4/20/16; finished MMF taper on 9/13/16. Improved joint aches. Persistent shoulder pain being followed by Orthopedics. Eczema less visible and being controlled with steroid cream PRN.     6. GI:  Acute onset diarrhea 3/21. Stool cx pending. Empiric Flagyl. DDx C.dif/infection vs gastroenteritis vs GVHD.   - Continue Protonix.   - LFTs wnl 3/23    7. FEN/Renal: Creat wnl. HypoK form diarrhea, 3.2. Replete 20mEq IV and 20 PO. 1L NS today. Encouraged pushing fluids, gatorade, etc.    8. Cardiovascular: High cholesterol and triglycerides may use cholesterol medication. Continue norvasc for HTN. BPs ok today.  - Cont carvedilol 6.25mg bid (increased 12/9) and Norvasc 7.5 mg daily at night. - Needs to watch diet.     9. Musculoskelatal: (per previous note) doing PT at home now. Okay to use NSAIDs as long as well hydrated. Continue to follow with PT and Orthopedics. May consider palliative care consult for pain control if needed.  - Dexa scan with osteopenia (5/5/15). Had bisphosphonate 10/5/16; repeat in ~12 months  - Continue Calcium and Vit D supplementation 2,800 units daily  - try to use tramadol that was working better than hydrocodone/acetaminophen     10. Pulm:   -  Sleep apnea being managed, using CPAP.     11. Eyes: cataracts surgery this month.     PLAN:  1L NS, replete K  C.dif and enteric panel pending from stool  Empiric Flagyl  Call/RTC with ongoing sx    Emani Newell PA-C  [phone]

## 2017-03-23 NOTE — PROGRESS NOTES
Infusion Nursing Note:  Eryn Bennett presents today for add-on infusion.    Patient seen by provider today: Yes: Emani Newell   present during visit today: Not Applicable.    Note: Labs were monitored.  Stool cultures were sent to lab.    Intravenous Access:  Peripheral IV placed.    Treatment Conditions:  Per Provider order, Patient received 1 liter IV fluids and 20 mEq IV potassium.  Patient received 20 mEq oral potassium.      Post Infusion Assessment:  Patient tolerated infusion without incident.    Discharge Plan:   Patient discharged in stable condition accompanied by: .    NADINE CRAIN RN

## 2017-03-23 NOTE — NURSING NOTE
Chief Complaint   Patient presents with     Blood Draw     Labs Drawn    Peripheral IV started. Labs drawn from IV.     Lauren Schoen, RN

## 2017-03-23 NOTE — MR AVS SNAPSHOT
After Visit Summary   3/23/2017    Eryn Bennett    MRN: [id number]           Patient Information     Date Of Birth          1955        Visit Information        Provider Department      3/23/2017 2:30 PM  BMT SHIRIN #1 [facility] Blood and Marrow Transplant        Today's Diagnoses     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)    -  1    Diarrhea of presumed infectious origin              [facility] ([facility])  [address] [place] [address]  Phone: [phone]  Clinic Hours:   Monday-Friday:    8am to 4:30pm   Weekends and holidays:    8am to noon (in general)  If your fever is 100.5  or greater,   call the clinic.  After hours call the   hospital at [phone] or   [phone]. Ask for the BMT   fellow for pediatric or adult patients            Follow-ups after your visit        Your next 10 appointments already scheduled     May 10, 2017 10:30 AM CDT   Masonic Lab Draw with  MASONIC LAB DRAW   [facility] Masonic Lab Draw ([facility])    [address] [place] [id number] [phone]            May 10, 2017 11:00 AM CDT   Return with Irving Ni MD   [facility] Blood and Marrow Transplant ([facility])    [address] [place] [id number] [phone]            Jun 22, 2017  1:00 PM CDT   (Arrive by 12:45 PM)   Return Visit with Renny Tompkins MD   [facility] Sports Medicine ([facility])    [address] [place] [id number] [phone]              Who to contact     If you have questions or need follow up information about today's clinic visit or your schedule please contact [facility] BLOOD AND MARROW TRANSPLANT directly at [phone].  Normal or non-critical lab and imaging results will be communicated to you by MyChart, letter or phone within 4 business days after the clinic has received the  results. If you do not hear from us within 7 days, please contact the clinic through [employer] or phone. If you have a critical or abnormal lab result, we will notify you by phone as soon as possible.  Submit refill requests through [employer] or call your pharmacy and they will forward the refill request to us. Please allow 3 business days for your refill to be completed.          Additional Information About Your Visit        [email or web address] Information     [employer] gives you secure access to your electronic health record. If you see a primary care provider, you can also send messages to your care team and make appointments. If you have questions, please call your primary care clinic.  If you do not have a primary care provider, please call [phone] and they will assist you.        Care EveryWhere ID     This is your Care EveryWhere ID. This could be used by other organizations to access your [place] medical records  [id number]        Your Vitals Were     Pulse Temperature Respirations Pulse Oximetry          104 97.4  F (36.3  C) (Oral) 14 96%         Blood Pressure from Last 3 Encounters:   03/23/17 131/80   03/10/17 124/69   02/15/17 147/75    Weight from Last 3 Encounters:   03/21/17 100.2 kg (221 lb)   03/10/17 100.5 kg (221 lb 8 oz)   02/15/17 101.6 kg (224 lb)              We Performed the Following     CBC with platelets differential     Clostridium difficile toxin B PCR     Comprehensive metabolic panel     Enteric Bacteria and Virus Panel by KATERYNA Stool          Today's Medication Changes          These changes are accurate as of: 3/23/17  3:44 PM.  If you have any questions, ask your nurse or doctor.               Start taking these medicines.        Dose/Directions    metroNIDAZOLE 500 MG tablet   Commonly known as:  FLAGYL   Used for:  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), Diarrhea of presumed infectious origin        Dose:  500 mg   Take 1 tablet (500 mg) by mouth 3 times daily   Quantity:  42 tablet    Refills:  0            Where to get your medicines      These medications were sent to [facility] [place], [place] - [phone]  [address], [facility] [address]    Hours:  TRANSPLANT PHONE NUMBER [phone] Phone:  [phone]     metroNIDAZOLE 500 MG tablet                Recent Review Flowsheet Data     BMT Recent Results Latest Ref Rng & Units 8/3/2016 10/5/2016 11/4/2016 12/16/2016 2/15/2017 3/10/2017 3/23/2017    WBC 4.0 - 11.0 10e9/L 3.8(L) 6.7 8.7 7.7 9.4 9.0 7.3    Hemoglobin 11.7 - 15.7 g/dL 12.8 12.9 13.1 13.6 12.2 12.3 13.0    Platelet Count 150 - 450 10e9/L 184 193 193 214 177 246 215    Neutrophils (Absolute) 1.6 - 8.3 10e9/L 1.5(L) 3.2 4.5 4.4 5.6 4.9 3.1    INR 0.86 - 1.14 0.94 - - - - - -    Sodium 133 - 144 mmol/L 139 140 139 142 140 140 140    Potassium 3.4 - 5.3 mmol/L 3.8 4.0 4.1 4.0 4.0 4.0 3.2(L)    Chloride 94 - 109 mmol/L 105 105 107 107 104 104 106    Glucose 70 - 99 mg/dL 121(H) 127(H) 100(H) 193(H) 138(H) 109(H) 112(H)    Urea Nitrogen 7 - 30 mg/dL 15 13 26 22 8 18 14    Creatinine 0.52 - 1.04 mg/dL 0.90 1.00 0.94 1.04 0.94 0.98 0.98    Calcium (Total) 8.5 - 10.1 mg/dL 9.3 9.3 8.5 8.8 8.5 9.0 8.8    Protein (Total) 6.8 - 8.8 g/dL 6.7(L) 6.8 6.9 6.9 6.8 7.3 7.4    Albumin 3.4 - 5.0 g/dL 3.7 3.8 3.8 3.7 3.3(L) 3.5 3.7    Alkaline Phosphatase 40 - 150 U/L 83 87 97 71 67 79 74    AST 0 - 45 U/L 18 22 12 11 13 16 27    ALT 0 - 50 U/L 31 28 24 25 18 18 25    MCV 78 - 100 fl 93 92 92 94 91 89 87               Primary Care Provider Office Phone # Fax #    Carmelo BAIG Janice [phone]       [address] [address]        Thank you!     Thank you for choosing [facility] BLOOD AND MARROW TRANSPLANT  for your care. Our goal is always to provide you with excellent care. Hearing back from our patients is one way we can continue to improve our services. Please take a few minutes to complete  the written survey that you may receive in the mail after your visit with us. Thank you!             Your Updated Medication List - Protect others around you: Learn how to safely use, store and throw away your medicines at www.disposemymeds.org.          This list is accurate as of: 3/23/17  3:44 PM.  Always use your most recent med list.                   Brand Name Dispense Instructions for use    acetaminophen 325 MG tablet    TYLENOL    100 tablet    Take 1-2 tablets (325-650 mg) by mouth every 4 hours as needed for mild pain or fever       amLODIPine 5 MG tablet    NORVASC    90 tablet    Take 1 tablet (5 mg) by mouth daily       calcium-vitamin D 500-125 MG-UNIT Tabs      Take by mouth 2 times daily       carvedilol 6.25 MG tablet    COREG    180 tablet    Take 1 tablet (6.25 mg) by mouth 2 times daily (with meals)       desonide 0.05 % cream    DESOWEN    60 g    Apply sparingly to affected area three times daily as needed.       metroNIDAZOLE 500 MG tablet    FLAGYL    42 tablet    Take 1 tablet (500 mg) by mouth 3 times daily       pantoprazole 40 MG EC tablet    PROTONIX    30 tablet    Take 1 tablet (40 mg) by mouth daily       traMADol 50 MG tablet    ULTRAM    60 tablet    Take 1 tablet (50 mg) by mouth every 6 hours as needed for moderate pain       valACYclovir 500 MG tablet    VALTREX    60 tablet    Take 1 tablet (500 mg) by mouth 2 times daily       VITAMIN D (CHOLECALCIFEROL) PO      Take 1,000 Units by mouth daily

## 2017-03-23 NOTE — NURSING NOTE
Chief Complaint   Patient presents with     Blood Draw     Labs Drawn      RECHECK     provider visit s/p bmt txp for AML     Allergies and home medications were reviewed.

## 2017-03-23 NOTE — MR AVS SNAPSHOT
After Visit Summary   3/23/2017    Eryn Bennett    MRN: [id number]           Patient Information     Date Of Birth          1955        Visit Information        Provider Department      3/23/2017 2:00 PM  7 ATC; UC BMT INFUSION [facility] Blood and Marrow Transplant        Today's Diagnoses     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)    -  1          [facility] ([facility]  [address] [place] [address]  Phone: [phone]  Clinic Hours:   Monday-Friday:    8am to 4:30pm   Weekends and holidays:    8am to noon (in general)  If your fever is 100.5  or greater,   call the clinic.  After hours call the   hospital at [phone] or   [phone]. Ask for the BMT   fellow for pediatric or adult patients            Follow-ups after your visit        Your next 10 appointments already scheduled     May 10, 2017 10:30 AM CDT   [facility] with  [facility]   [facility] ([facility])    [address] [place] [id number] [phone]            May 10, 2017 11:00 AM CDT   Return with Irving Ni MD   [facility] Blood and Marrow Transplant ([facility])    [address] [place] [address] [phone]            Jun 22, 2017  1:00 PM CDT   (Arrive by 12:45 PM)   Return Visit with Renny Tompkins MD   [facility] Sports Medicine ([facility])    [address] [place] [id number] [phone]              Who to contact     If you have questions or need follow up information about today's clinic visit or your schedule please contact [facility] BLOOD AND MARROW TRANSPLANT directly at [phone].  Normal or non-critical lab and imaging results will be communicated to you by MyChart, letter or phone within 4 business days after the clinic has received the results. If you do not hear from us  within 7 days, please contact the clinic through [employer] or phone. If you have a critical or abnormal lab result, we will notify you by phone as soon as possible.  Submit refill requests through [employer] or call your pharmacy and they will forward the refill request to us. Please allow 3 business days for your refill to be completed.          Additional Information About Your Visit        [employer] Information     [employer] gives you secure access to your electronic health record. If you see a primary care provider, you can also send messages to your care team and make appointments. If you have questions, please call your primary care clinic.  If you do not have a primary care provider, please call [phone] and they will assist you.        Care EveryWhere ID     This is your Care EveryWhere ID. This could be used by other organizations to access your [facility] medical records  [id number]         Blood Pressure from Last 3 Encounters:   03/23/17 131/80   03/10/17 124/69   02/15/17 147/75    Weight from Last 3 Encounters:   03/21/17 100.2 kg (221 lb)   03/10/17 100.5 kg (221 lb 8 oz)   02/15/17 101.6 kg (224 lb)              Today, you had the following     No orders found for display         Today's Medication Changes          These changes are accurate as of: 3/23/17  5:00 PM.  If you have any questions, ask your nurse or doctor.               Start taking these medicines.        Dose/Directions    metroNIDAZOLE 500 MG tablet   Commonly known as:  FLAGYL   Used for:  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), Diarrhea of presumed infectious origin        Dose:  500 mg   Take 1 tablet (500 mg) by mouth 3 times daily   Quantity:  42 tablet   Refills:  0            Where to get your medicines      These medications were sent to [facility] [place], [place] - [address] [facility] [address]  [address] [place] [address] [address] [address]    Hours:  TRANSPLANT PHONE NUMBER [phone] Phone:   [phone]     metroNIDAZOLE 500 MG tablet                Recent Review Flowsheet Data     BMT Recent Results Latest Ref Rng & Units 8/3/2016 10/5/2016 11/4/2016 12/16/2016 2/15/2017 3/10/2017 3/23/2017    WBC 4.0 - 11.0 10e9/L 3.8(L) 6.7 8.7 7.7 9.4 9.0 7.3    Hemoglobin 11.7 - 15.7 g/dL 12.8 12.9 13.1 13.6 12.2 12.3 13.0    Platelet Count 150 - 450 10e9/L 184 193 193 214 177 246 215    Neutrophils (Absolute) 1.6 - 8.3 10e9/L 1.5(L) 3.2 4.5 4.4 5.6 4.9 3.1    INR 0.86 - 1.14 0.94 - - - - - -    Sodium 133 - 144 mmol/L 139 140 139 142 140 140 140    Potassium 3.4 - 5.3 mmol/L 3.8 4.0 4.1 4.0 4.0 4.0 3.2(L)    Chloride 94 - 109 mmol/L 105 105 107 107 104 104 106    Glucose 70 - 99 mg/dL 121(H) 127(H) 100(H) 193(H) 138(H) 109(H) 112(H)    Urea Nitrogen 7 - 30 mg/dL 15 13 26 22 8 18 14    Creatinine 0.52 - 1.04 mg/dL 0.90 1.00 0.94 1.04 0.94 0.98 0.98    Calcium (Total) 8.5 - 10.1 mg/dL 9.3 9.3 8.5 8.8 8.5 9.0 8.8    Protein (Total) 6.8 - 8.8 g/dL 6.7(L) 6.8 6.9 6.9 6.8 7.3 7.4    Albumin 3.4 - 5.0 g/dL 3.7 3.8 3.8 3.7 3.3(L) 3.5 3.7    Alkaline Phosphatase 40 - 150 U/L 83 87 97 71 67 79 74    AST 0 - 45 U/L 18 22 12 11 13 16 27    ALT 0 - 50 U/L 31 28 24 25 18 18 25    MCV 78 - 100 fl 93 92 92 94 91 89 87               Primary Care Provider Office Phone # Fax #    Carmelo Camacho [phone]       [address] [address]        Thank you!     Thank you for choosing [facility] BLOOD AND MARROW TRANSPLANT  for your care. Our goal is always to provide you with excellent care. Hearing back from our patients is one way we can continue to improve our services. Please take a few minutes to complete the written survey that you may receive in the mail after your visit with us. Thank you!             Your Updated Medication List - Protect others around you: Learn how to safely use, store and throw away your medicines at www.disposemymeds.org.          This list is accurate as  of: 3/23/17  5:00 PM.  Always use your most recent med list.                   Brand Name Dispense Instructions for use    acetaminophen 325 MG tablet    TYLENOL    100 tablet    Take 1-2 tablets (325-650 mg) by mouth every 4 hours as needed for mild pain or fever       amLODIPine 5 MG tablet    NORVASC    90 tablet    Take 1 tablet (5 mg) by mouth daily       calcium-vitamin D 500-125 MG-UNIT Tabs      Take by mouth 2 times daily       carvedilol 6.25 MG tablet    COREG    180 tablet    Take 1 tablet (6.25 mg) by mouth 2 times daily (with meals)       desonide 0.05 % cream    DESOWEN    60 g    Apply sparingly to affected area three times daily as needed.       metroNIDAZOLE 500 MG tablet    FLAGYL    42 tablet    Take 1 tablet (500 mg) by mouth 3 times daily       pantoprazole 40 MG EC tablet    PROTONIX    30 tablet    Take 1 tablet (40 mg) by mouth daily       traMADol 50 MG tablet    ULTRAM    60 tablet    Take 1 tablet (50 mg) by mouth every 6 hours as needed for moderate pain       valACYclovir 500 MG tablet    VALTREX    60 tablet    Take 1 tablet (500 mg) by mouth 2 times daily       VITAMIN D (CHOLECALCIFEROL) PO      Take 1,000 Units by mouth daily

## 2017-03-24 ENCOUNTER — TELEPHONE (OUTPATIENT)
Dept: TRANSPLANT | Facility: CLINIC | Age: 62
End: 2017-03-24

## 2017-03-24 NOTE — TELEPHONE ENCOUNTER
Patient's stool cultures all negative. Discussed with Dr Ni who recommends stopping Flagyl d/t negative c.diff and monitoring symptoms over the weekend. If diarrhea persists or worsens over the weekend, new symptoms develop or if oral intake decreases, patient instructed to call [phone] and ask for the BMT fellow. Patient verbalized understanding and is agreeable with plan.

## 2017-03-27 DIAGNOSIS — C92.01 ACUTE MYELOID LEUKEMIA IN REMISSION (H): ICD-10-CM

## 2017-03-27 DIAGNOSIS — Z94.81 S/P ALLOGENEIC BONE MARROW TRANSPLANT (H): ICD-10-CM

## 2017-03-27 DIAGNOSIS — R19.7 DIARRHEA OF PRESUMED INFECTIOUS ORIGIN: Primary | ICD-10-CM

## 2017-03-28 ENCOUNTER — HOSPITAL ENCOUNTER (OUTPATIENT)
Facility: CLINIC | Age: 62
End: 2017-03-28
Attending: INTERNAL MEDICINE | Admitting: INTERNAL MEDICINE
Payer: MEDICARE

## 2017-03-31 ENCOUNTER — APPOINTMENT (OUTPATIENT)
Dept: GENERAL RADIOLOGY | Facility: CLINIC | Age: 62
DRG: 194 | End: 2017-03-31
Attending: EMERGENCY MEDICINE
Payer: MEDICARE

## 2017-03-31 ENCOUNTER — HOSPITAL ENCOUNTER (INPATIENT)
Facility: CLINIC | Age: 62
LOS: 3 days | Discharge: HOME | DRG: 194 | End: 2017-04-04
Attending: EMERGENCY MEDICINE | Admitting: INTERNAL MEDICINE
Payer: MEDICARE

## 2017-03-31 DIAGNOSIS — Z94.81 S/P ALLOGENEIC BONE MARROW TRANSPLANT (H): ICD-10-CM

## 2017-03-31 DIAGNOSIS — E87.6 HYPOKALEMIA: ICD-10-CM

## 2017-03-31 DIAGNOSIS — R09.02 HYPOXIA: ICD-10-CM

## 2017-03-31 DIAGNOSIS — C92.01 ACUTE MYELOID LEUKEMIA IN REMISSION (H): Primary | ICD-10-CM

## 2017-03-31 DIAGNOSIS — R00.0 SINUS TACHYCARDIA: ICD-10-CM

## 2017-03-31 DIAGNOSIS — R05.9 COUGH: ICD-10-CM

## 2017-03-31 LAB
ALBUMIN SERPL-MCNC: 3.4 G/DL (ref 3.4–5)
ALP SERPL-CCNC: 92 U/L (ref 40–150)
ALT SERPL W P-5'-P-CCNC: 35 U/L (ref 0–50)
ANION GAP SERPL CALCULATED.3IONS-SCNC: 11 MMOL/L (ref 3–14)
AST SERPL W P-5'-P-CCNC: 22 U/L (ref 0–45)
BASOPHILS # BLD AUTO: 0 10E9/L (ref 0–0.2)
BASOPHILS NFR BLD AUTO: 0.2 %
BILIRUB SERPL-MCNC: 0.5 MG/DL (ref 0.2–1.3)
BUN SERPL-MCNC: 14 MG/DL (ref 7–30)
CALCIUM SERPL-MCNC: 8.8 MG/DL (ref 8.5–10.1)
CHLORIDE SERPL-SCNC: 104 MMOL/L (ref 94–109)
CO2 SERPL-SCNC: 24 MMOL/L (ref 20–32)
CREAT SERPL-MCNC: 0.9 MG/DL (ref 0.52–1.04)
DIFFERENTIAL METHOD BLD: ABNORMAL
EOSINOPHIL # BLD AUTO: 0.1 10E9/L (ref 0–0.7)
EOSINOPHIL NFR BLD AUTO: 1 %
ERYTHROCYTE [DISTWIDTH] IN BLOOD BY AUTOMATED COUNT: 15.3 % (ref 10–15)
GFR SERPL CREATININE-BSD FRML MDRD: 64 ML/MIN/1.7M2
GLUCOSE SERPL-MCNC: 118 MG/DL (ref 70–99)
HCT VFR BLD AUTO: 37.6 % (ref 35–47)
HGB BLD-MCNC: 11.6 G/DL (ref 11.7–15.7)
IMM GRANULOCYTES # BLD: 0 10E9/L (ref 0–0.4)
IMM GRANULOCYTES NFR BLD: 0.2 %
INR PPP: 1.03 (ref 0.86–1.14)
LACTATE BLD-SCNC: 0.9 MMOL/L (ref 0.7–2.1)
LYMPHOCYTES # BLD AUTO: 3.8 10E9/L (ref 0.8–5.3)
LYMPHOCYTES NFR BLD AUTO: 40.5 %
MCH RBC QN AUTO: 27.2 PG (ref 26.5–33)
MCHC RBC AUTO-ENTMCNC: 30.9 G/DL (ref 31.5–36.5)
MCV RBC AUTO: 88 FL (ref 78–100)
MONOCYTES # BLD AUTO: 0.7 10E9/L (ref 0–1.3)
MONOCYTES NFR BLD AUTO: 7.2 %
NEUTROPHILS # BLD AUTO: 4.7 10E9/L (ref 1.6–8.3)
NEUTROPHILS NFR BLD AUTO: 50.9 %
NRBC # BLD AUTO: 0 10*3/UL
NRBC BLD AUTO-RTO: 0 /100
PLATELET # BLD AUTO: 172 10E9/L (ref 150–450)
PLATELET # BLD EST: ABNORMAL 10*3/UL
POTASSIUM SERPL-SCNC: 3.2 MMOL/L (ref 3.4–5.3)
PROT SERPL-MCNC: 7 G/DL (ref 6.8–8.8)
RBC # BLD AUTO: 4.27 10E12/L (ref 3.8–5.2)
SODIUM SERPL-SCNC: 138 MMOL/L (ref 133–144)
WBC # BLD AUTO: 9.3 10E9/L (ref 4–11)

## 2017-03-31 PROCEDURE — 85379 FIBRIN DEGRADATION QUANT: CPT | Performed by: EMERGENCY MEDICINE

## 2017-03-31 PROCEDURE — 71020 XR CHEST 2 VW: CPT

## 2017-03-31 PROCEDURE — 84100 ASSAY OF PHOSPHORUS: CPT | Performed by: EMERGENCY MEDICINE

## 2017-03-31 PROCEDURE — 85025 COMPLETE CBC W/AUTO DIFF WBC: CPT | Performed by: EMERGENCY MEDICINE

## 2017-03-31 PROCEDURE — 80053 COMPREHEN METABOLIC PANEL: CPT | Performed by: EMERGENCY MEDICINE

## 2017-03-31 PROCEDURE — 99285 EMERGENCY DEPT VISIT HI MDM: CPT | Mod: 25 | Performed by: EMERGENCY MEDICINE

## 2017-03-31 PROCEDURE — 85610 PROTHROMBIN TIME: CPT | Performed by: EMERGENCY MEDICINE

## 2017-03-31 PROCEDURE — 99285 EMERGENCY DEPT VISIT HI MDM: CPT | Performed by: EMERGENCY MEDICINE

## 2017-03-31 PROCEDURE — 87633 RESP VIRUS 12-25 TARGETS: CPT | Performed by: EMERGENCY MEDICINE

## 2017-03-31 PROCEDURE — 93010 ELECTROCARDIOGRAM REPORT: CPT | Mod: Z6 | Performed by: EMERGENCY MEDICINE

## 2017-03-31 PROCEDURE — 83735 ASSAY OF MAGNESIUM: CPT | Performed by: EMERGENCY MEDICINE

## 2017-03-31 PROCEDURE — 83690 ASSAY OF LIPASE: CPT | Performed by: EMERGENCY MEDICINE

## 2017-03-31 PROCEDURE — 93005 ELECTROCARDIOGRAM TRACING: CPT | Performed by: EMERGENCY MEDICINE

## 2017-03-31 PROCEDURE — 82784 ASSAY IGA/IGD/IGG/IGM EACH: CPT | Performed by: EMERGENCY MEDICINE

## 2017-03-31 PROCEDURE — 87040 BLOOD CULTURE FOR BACTERIA: CPT | Performed by: EMERGENCY MEDICINE

## 2017-03-31 PROCEDURE — 87804 INFLUENZA ASSAY W/OPTIC: CPT | Performed by: EMERGENCY MEDICINE

## 2017-03-31 PROCEDURE — 83605 ASSAY OF LACTIC ACID: CPT | Performed by: EMERGENCY MEDICINE

## 2017-03-31 ASSESSMENT — ENCOUNTER SYMPTOMS
EYE PAIN: 0
VOMITING: 1
HEADACHES: 0
BLOOD IN STOOL: 0
FREQUENCY: 0
NAUSEA: 1
NECK PAIN: 0
ABDOMINAL PAIN: 1
WEAKNESS: 0
DYSURIA: 0
PALPITATIONS: 0
TROUBLE SWALLOWING: 0
CHILLS: 0
SHORTNESS OF BREATH: 0
HEMATURIA: 0
BACK PAIN: 0
COLOR CHANGE: 0
SORE THROAT: 0
CONSTIPATION: 0
POLYDIPSIA: 0

## 2017-04-01 ENCOUNTER — APPOINTMENT (OUTPATIENT)
Dept: CT IMAGING | Facility: CLINIC | Age: 62
DRG: 194 | End: 2017-04-01
Attending: EMERGENCY MEDICINE
Payer: MEDICARE

## 2017-04-01 LAB
ABO + RH BLD: NORMAL
ABO + RH BLD: NORMAL
ALBUMIN UR-MCNC: 30 MG/DL
ANION GAP SERPL CALCULATED.3IONS-SCNC: 9 MMOL/L (ref 3–14)
APPEARANCE UR: CLEAR
BASOPHILS # BLD AUTO: 0 10E9/L (ref 0–0.2)
BASOPHILS NFR BLD AUTO: 0.3 %
BILIRUB UR QL STRIP: NEGATIVE
BLD GP AB SCN SERPL QL: NORMAL
BLOOD BANK CMNT PATIENT-IMP: NORMAL
BUN SERPL-MCNC: 11 MG/DL (ref 7–30)
C DIFF TOX B STL QL: NORMAL
CALCIUM SERPL-MCNC: 8.2 MG/DL (ref 8.5–10.1)
CAMPYLOBACTER GROUP BY NAT: NOT DETECTED
CHLORIDE SERPL-SCNC: 106 MMOL/L (ref 94–109)
CO2 SERPL-SCNC: 22 MMOL/L (ref 20–32)
COLOR UR AUTO: YELLOW
CREAT SERPL-MCNC: 0.89 MG/DL (ref 0.52–1.04)
D DIMER PPP FEU-MCNC: 0.9 UG/ML FEU (ref 0–0.5)
DIFFERENTIAL METHOD BLD: ABNORMAL
ENTERIC PATHOGEN COMMENT: NORMAL
EOSINOPHIL # BLD AUTO: 0.1 10E9/L (ref 0–0.7)
EOSINOPHIL NFR BLD AUTO: 0.6 %
ERYTHROCYTE [DISTWIDTH] IN BLOOD BY AUTOMATED COUNT: 15.3 % (ref 10–15)
FLUAV H1 2009 PAND RNA SPEC QL NAA+PROBE: NEGATIVE
FLUAV H1 RNA SPEC QL NAA+PROBE: NEGATIVE
FLUAV H3 RNA SPEC QL NAA+PROBE: NEGATIVE
FLUAV RNA SPEC QL NAA+PROBE: NEGATIVE
FLUAV+FLUBV AG SPEC QL: NEGATIVE
FLUAV+FLUBV AG SPEC QL: NORMAL
FLUBV RNA SPEC QL NAA+PROBE: NEGATIVE
GFR SERPL CREATININE-BSD FRML MDRD: 65 ML/MIN/1.7M2
GLUCOSE BLDC GLUCOMTR-MCNC: 118 MG/DL (ref 70–99)
GLUCOSE SERPL-MCNC: 118 MG/DL (ref 70–99)
GLUCOSE UR STRIP-MCNC: NEGATIVE MG/DL
HADV DNA SPEC QL NAA+PROBE: NEGATIVE
HADV DNA SPEC QL NAA+PROBE: NEGATIVE
HCT VFR BLD AUTO: 35.1 % (ref 35–47)
HGB BLD-MCNC: 10.8 G/DL (ref 11.7–15.7)
HGB UR QL STRIP: NEGATIVE
HMPV RNA SPEC QL NAA+PROBE: NEGATIVE
HPIV1 RNA SPEC QL NAA+PROBE: NEGATIVE
HPIV2 RNA SPEC QL NAA+PROBE: NEGATIVE
HPIV3 RNA SPEC QL NAA+PROBE: ABNORMAL
IMM GRANULOCYTES # BLD: 0 10E9/L (ref 0–0.4)
IMM GRANULOCYTES NFR BLD: 0.1 %
INTERPRETATION ECG - MUSE: NORMAL
KETONES UR STRIP-MCNC: NEGATIVE MG/DL
L PNEUMO1 AG UR QL IA: NORMAL
LEUKOCYTE ESTERASE UR QL STRIP: NEGATIVE
LIPASE SERPL-CCNC: 124 U/L (ref 73–393)
LYMPHOCYTES # BLD AUTO: 4.1 10E9/L (ref 0.8–5.3)
LYMPHOCYTES NFR BLD AUTO: 42.6 %
MAGNESIUM SERPL-MCNC: 1.8 MG/DL (ref 1.6–2.3)
MCH RBC QN AUTO: 27 PG (ref 26.5–33)
MCHC RBC AUTO-ENTMCNC: 30.8 G/DL (ref 31.5–36.5)
MCV RBC AUTO: 88 FL (ref 78–100)
MICRO REPORT STATUS: NORMAL
MICROBIOLOGIST REVIEW: ABNORMAL
MONOCYTES # BLD AUTO: 1 10E9/L (ref 0–1.3)
MONOCYTES NFR BLD AUTO: 10 %
MUCOUS THREADS #/AREA URNS LPF: PRESENT /LPF
NEUTROPHILS # BLD AUTO: 4.4 10E9/L (ref 1.6–8.3)
NEUTROPHILS NFR BLD AUTO: 46.4 %
NITRATE UR QL: NEGATIVE
NOROVIRUS I AND II BY NAT: NOT DETECTED
NRBC # BLD AUTO: 0 10*3/UL
NRBC BLD AUTO-RTO: 0 /100
PH UR STRIP: 5.5 PH (ref 5–7)
PHOSPHATE SERPL-MCNC: 2.5 MG/DL (ref 2.5–4.5)
PLATELET # BLD AUTO: 167 10E9/L (ref 150–450)
PLATELET # BLD EST: ABNORMAL 10*3/UL
POTASSIUM SERPL-SCNC: 3.5 MMOL/L (ref 3.4–5.3)
RADIOLOGIST FLAGS: NORMAL
RBC # BLD AUTO: 4 10E12/L (ref 3.8–5.2)
RBC #/AREA URNS AUTO: 0 /HPF (ref 0–2)
RHINOVIRUS RNA SPEC QL NAA+PROBE: NEGATIVE
ROTAVIRUS A BY NAT: NOT DETECTED
RSV RNA SPEC QL NAA+PROBE: NEGATIVE
RSV RNA SPEC QL NAA+PROBE: NEGATIVE
SALMONELLA SPECIES BY NAT: NOT DETECTED
SHIGA TOXIN 1 GENE BY NAT: NOT DETECTED
SHIGA TOXIN 2 GENE BY NAT: NOT DETECTED
SHIGELLA SP+EIEC IPAH STL QL NAA+PROBE: NOT DETECTED
SODIUM SERPL-SCNC: 137 MMOL/L (ref 133–144)
SP GR UR STRIP: 1.02 (ref 1–1.03)
SPECIMEN EXP DATE BLD: NORMAL
SPECIMEN SOURCE: ABNORMAL
SPECIMEN SOURCE: NORMAL
SQUAMOUS #/AREA URNS AUTO: <1 /HPF (ref 0–1)
URN SPEC COLLECT METH UR: ABNORMAL
UROBILINOGEN UR STRIP-MCNC: NORMAL MG/DL (ref 0–2)
VIBRIO GROUP BY NAT: NOT DETECTED
WBC # BLD AUTO: 9.6 10E9/L (ref 4–11)
WBC #/AREA URNS AUTO: 1 /HPF (ref 0–2)
YERSINIA ENTEROCOLITICA BY NAT: NOT DETECTED

## 2017-04-01 PROCEDURE — 20000002 ZZH R&B BMT INTERMEDIATE

## 2017-04-01 PROCEDURE — 25000132 ZZH RX MED GY IP 250 OP 250 PS 637: Mod: GY | Performed by: EMERGENCY MEDICINE

## 2017-04-01 PROCEDURE — 86850 RBC ANTIBODY SCREEN: CPT | Performed by: INTERNAL MEDICINE

## 2017-04-01 PROCEDURE — 96368 THER/DIAG CONCURRENT INF: CPT | Performed by: EMERGENCY MEDICINE

## 2017-04-01 PROCEDURE — 36415 COLL VENOUS BLD VENIPUNCTURE: CPT | Performed by: INTERNAL MEDICINE

## 2017-04-01 PROCEDURE — 80048 BASIC METABOLIC PNL TOTAL CA: CPT | Performed by: INTERNAL MEDICINE

## 2017-04-01 PROCEDURE — 25000132 ZZH RX MED GY IP 250 OP 250 PS 637: Mod: GY | Performed by: INTERNAL MEDICINE

## 2017-04-01 PROCEDURE — 25000128 H RX IP 250 OP 636: Performed by: EMERGENCY MEDICINE

## 2017-04-01 PROCEDURE — 87081 CULTURE SCREEN ONLY: CPT | Performed by: INTERNAL MEDICINE

## 2017-04-01 PROCEDURE — A9270 NON-COVERED ITEM OR SERVICE: HCPCS | Mod: GY | Performed by: EMERGENCY MEDICINE

## 2017-04-01 PROCEDURE — A9270 NON-COVERED ITEM OR SERVICE: HCPCS | Mod: GY | Performed by: INTERNAL MEDICINE

## 2017-04-01 PROCEDURE — 00000146 ZZHCL STATISTIC GLUCOSE BY METER IP

## 2017-04-01 PROCEDURE — 81001 URINALYSIS AUTO W/SCOPE: CPT | Performed by: EMERGENCY MEDICINE

## 2017-04-01 PROCEDURE — 25500064 ZZH RX 255 OP 636: Performed by: EMERGENCY MEDICINE

## 2017-04-01 PROCEDURE — 87506 IADNA-DNA/RNA PROBE TQ 6-11: CPT | Performed by: INTERNAL MEDICINE

## 2017-04-01 PROCEDURE — 85025 COMPLETE CBC W/AUTO DIFF WBC: CPT | Performed by: INTERNAL MEDICINE

## 2017-04-01 PROCEDURE — 71260 CT THORAX DX C+: CPT

## 2017-04-01 PROCEDURE — 87493 C DIFF AMPLIFIED PROBE: CPT | Performed by: INTERNAL MEDICINE

## 2017-04-01 PROCEDURE — 83735 ASSAY OF MAGNESIUM: CPT | Performed by: EMERGENCY MEDICINE

## 2017-04-01 PROCEDURE — 87899 AGENT NOS ASSAY W/OPTIC: CPT | Performed by: EMERGENCY MEDICINE

## 2017-04-01 PROCEDURE — 86901 BLOOD TYPING SEROLOGIC RH(D): CPT | Performed by: INTERNAL MEDICINE

## 2017-04-01 PROCEDURE — 86900 BLOOD TYPING SEROLOGIC ABO: CPT | Performed by: INTERNAL MEDICINE

## 2017-04-01 PROCEDURE — 96365 THER/PROPH/DIAG IV INF INIT: CPT | Performed by: EMERGENCY MEDICINE

## 2017-04-01 PROCEDURE — 87040 BLOOD CULTURE FOR BACTERIA: CPT | Performed by: EMERGENCY MEDICINE

## 2017-04-01 PROCEDURE — 25800025 ZZH RX 258: Performed by: INTERNAL MEDICINE

## 2017-04-01 PROCEDURE — 25000128 H RX IP 250 OP 636: Performed by: INTERNAL MEDICINE

## 2017-04-01 RX ORDER — MEROPENEM 500 MG/1
500 INJECTION, POWDER, FOR SOLUTION INTRAVENOUS ONCE
Status: COMPLETED | OUTPATIENT
Start: 2017-04-01 | End: 2017-04-01

## 2017-04-01 RX ORDER — LINEZOLID 2 MG/ML
600 INJECTION, SOLUTION INTRAVENOUS ONCE
Status: COMPLETED | OUTPATIENT
Start: 2017-04-01 | End: 2017-04-01

## 2017-04-01 RX ORDER — DEXTROSE MONOHYDRATE, SODIUM CHLORIDE, AND POTASSIUM CHLORIDE 50; 1.49; 4.5 G/1000ML; G/1000ML; G/1000ML
INJECTION, SOLUTION INTRAVENOUS CONTINUOUS
Status: DISPENSED | OUTPATIENT
Start: 2017-04-01 | End: 2017-04-01

## 2017-04-01 RX ORDER — PROCHLORPERAZINE MALEATE 5 MG
5-10 TABLET ORAL EVERY 6 HOURS PRN
Status: DISCONTINUED | OUTPATIENT
Start: 2017-04-01 | End: 2017-04-04 | Stop reason: HOSPADM

## 2017-04-01 RX ORDER — ACETAMINOPHEN 325 MG/1
325-650 TABLET ORAL EVERY 4 HOURS PRN
Status: DISCONTINUED | OUTPATIENT
Start: 2017-04-01 | End: 2017-04-01

## 2017-04-01 RX ORDER — DESONIDE 0.5 MG/G
CREAM TOPICAL 3 TIMES DAILY PRN
Status: DISCONTINUED | OUTPATIENT
Start: 2017-04-01 | End: 2017-04-04 | Stop reason: HOSPADM

## 2017-04-01 RX ORDER — HEPARIN SODIUM 5000 [USP'U]/.5ML
5000 INJECTION, SOLUTION INTRAVENOUS; SUBCUTANEOUS EVERY 12 HOURS
Status: DISCONTINUED | OUTPATIENT
Start: 2017-04-01 | End: 2017-04-04 | Stop reason: HOSPADM

## 2017-04-01 RX ORDER — AZITHROMYCIN 250 MG/1
250 TABLET, FILM COATED ORAL DAILY
Status: DISCONTINUED | OUTPATIENT
Start: 2017-04-01 | End: 2017-04-01

## 2017-04-01 RX ORDER — POTASSIUM CHLORIDE 7.45 MG/ML
10 INJECTION INTRAVENOUS
Status: DISCONTINUED | OUTPATIENT
Start: 2017-04-01 | End: 2017-04-04 | Stop reason: HOSPADM

## 2017-04-01 RX ORDER — POTASSIUM CHLORIDE 1.5 G/1.58G
20-40 POWDER, FOR SOLUTION ORAL
Status: DISCONTINUED | OUTPATIENT
Start: 2017-04-01 | End: 2017-04-04 | Stop reason: HOSPADM

## 2017-04-01 RX ORDER — CARVEDILOL 6.25 MG/1
6.25 TABLET ORAL 2 TIMES DAILY WITH MEALS
Status: DISCONTINUED | OUTPATIENT
Start: 2017-04-01 | End: 2017-04-04 | Stop reason: HOSPADM

## 2017-04-01 RX ORDER — MAGNESIUM SULFATE HEPTAHYDRATE 40 MG/ML
4 INJECTION, SOLUTION INTRAVENOUS EVERY 4 HOURS PRN
Status: DISCONTINUED | OUTPATIENT
Start: 2017-04-01 | End: 2017-04-04 | Stop reason: HOSPADM

## 2017-04-01 RX ORDER — AZITHROMYCIN 250 MG/1
250 TABLET, FILM COATED ORAL DAILY
Status: DISCONTINUED | OUTPATIENT
Start: 2017-04-02 | End: 2017-04-02

## 2017-04-01 RX ORDER — POTASSIUM CHLORIDE 29.8 MG/ML
20 INJECTION INTRAVENOUS
Status: DISCONTINUED | OUTPATIENT
Start: 2017-04-01 | End: 2017-04-04 | Stop reason: HOSPADM

## 2017-04-01 RX ORDER — MEROPENEM 500 MG/1
500 INJECTION, POWDER, FOR SOLUTION INTRAVENOUS EVERY 6 HOURS
Status: DISCONTINUED | OUTPATIENT
Start: 2017-04-01 | End: 2017-04-02

## 2017-04-01 RX ORDER — NALOXONE HYDROCHLORIDE 0.4 MG/ML
.1-.4 INJECTION, SOLUTION INTRAMUSCULAR; INTRAVENOUS; SUBCUTANEOUS
Status: DISCONTINUED | OUTPATIENT
Start: 2017-04-01 | End: 2017-04-04 | Stop reason: HOSPADM

## 2017-04-01 RX ORDER — ONDANSETRON 2 MG/ML
4 INJECTION INTRAMUSCULAR; INTRAVENOUS EVERY 6 HOURS PRN
Status: DISCONTINUED | OUTPATIENT
Start: 2017-04-01 | End: 2017-04-04 | Stop reason: HOSPADM

## 2017-04-01 RX ORDER — IOPAMIDOL 755 MG/ML
70 INJECTION, SOLUTION INTRAVASCULAR ONCE
Status: COMPLETED | OUTPATIENT
Start: 2017-04-01 | End: 2017-04-01

## 2017-04-01 RX ORDER — VALACYCLOVIR HYDROCHLORIDE 500 MG/1
500 TABLET, FILM COATED ORAL 2 TIMES DAILY
Status: DISCONTINUED | OUTPATIENT
Start: 2017-04-01 | End: 2017-04-04 | Stop reason: HOSPADM

## 2017-04-01 RX ORDER — LORAZEPAM 0.5 MG/1
.5-1 TABLET ORAL EVERY 4 HOURS PRN
Status: DISCONTINUED | OUTPATIENT
Start: 2017-04-01 | End: 2017-04-04 | Stop reason: HOSPADM

## 2017-04-01 RX ORDER — POTASSIUM CHLORIDE 20MEQ/15ML
40 LIQUID (ML) ORAL ONCE
Status: COMPLETED | OUTPATIENT
Start: 2017-04-01 | End: 2017-04-01

## 2017-04-01 RX ORDER — LORAZEPAM 2 MG/ML
.5-1 INJECTION INTRAMUSCULAR EVERY 4 HOURS PRN
Status: DISCONTINUED | OUTPATIENT
Start: 2017-04-01 | End: 2017-04-04 | Stop reason: HOSPADM

## 2017-04-01 RX ORDER — PANTOPRAZOLE SODIUM 40 MG/1
40 TABLET, DELAYED RELEASE ORAL DAILY
Status: DISCONTINUED | OUTPATIENT
Start: 2017-04-01 | End: 2017-04-04 | Stop reason: HOSPADM

## 2017-04-01 RX ORDER — TRAMADOL HYDROCHLORIDE 50 MG/1
50 TABLET ORAL EVERY 6 HOURS PRN
Status: DISCONTINUED | OUTPATIENT
Start: 2017-04-01 | End: 2017-04-04 | Stop reason: HOSPADM

## 2017-04-01 RX ORDER — ACETAMINOPHEN 325 MG/1
325-650 TABLET ORAL EVERY 4 HOURS PRN
Status: DISCONTINUED | OUTPATIENT
Start: 2017-04-01 | End: 2017-04-04 | Stop reason: HOSPADM

## 2017-04-01 RX ORDER — GUAIFENESIN 200 MG/1
200 TABLET ORAL EVERY 4 HOURS PRN
Status: DISCONTINUED | OUTPATIENT
Start: 2017-04-01 | End: 2017-04-01

## 2017-04-01 RX ORDER — LINEZOLID 2 MG/ML
600 INJECTION, SOLUTION INTRAVENOUS EVERY 12 HOURS
Status: DISCONTINUED | OUTPATIENT
Start: 2017-04-01 | End: 2017-04-01

## 2017-04-01 RX ORDER — AMLODIPINE BESYLATE 5 MG/1
5 TABLET ORAL DAILY
Status: DISCONTINUED | OUTPATIENT
Start: 2017-04-01 | End: 2017-04-04 | Stop reason: HOSPADM

## 2017-04-01 RX ORDER — POTASSIUM CHLORIDE 750 MG/1
20-40 TABLET, EXTENDED RELEASE ORAL
Status: DISCONTINUED | OUTPATIENT
Start: 2017-04-01 | End: 2017-04-04 | Stop reason: HOSPADM

## 2017-04-01 RX ADMIN — CARVEDILOL 6.25 MG: 6.25 TABLET, FILM COATED ORAL at 07:37

## 2017-04-01 RX ADMIN — POTASSIUM CHLORIDE, DEXTROSE MONOHYDRATE AND SODIUM CHLORIDE 1000 ML: 150; 5; 450 INJECTION, SOLUTION INTRAVENOUS at 04:04

## 2017-04-01 RX ADMIN — GUAIFENESIN 10 ML: 100 SOLUTION ORAL at 10:46

## 2017-04-01 RX ADMIN — VALACYCLOVIR HYDROCHLORIDE 500 MG: 500 TABLET, FILM COATED ORAL at 07:37

## 2017-04-01 RX ADMIN — LINEZOLID 600 MG: 600 INJECTION, SOLUTION INTRAVENOUS at 03:15

## 2017-04-01 RX ADMIN — MEROPENEM 500 MG: 500 INJECTION, POWDER, FOR SOLUTION INTRAVENOUS at 06:53

## 2017-04-01 RX ADMIN — CARVEDILOL 6.25 MG: 6.25 TABLET, FILM COATED ORAL at 18:10

## 2017-04-01 RX ADMIN — TRAMADOL HYDROCHLORIDE 50 MG: 50 TABLET, COATED ORAL at 08:02

## 2017-04-01 RX ADMIN — HEPARIN SODIUM 5000 UNITS: 5000 INJECTION, SOLUTION INTRAVENOUS; SUBCUTANEOUS at 07:53

## 2017-04-01 RX ADMIN — AZITHROMYCIN MONOHYDRATE 500 MG: 500 INJECTION, POWDER, LYOPHILIZED, FOR SOLUTION INTRAVENOUS at 01:58

## 2017-04-01 RX ADMIN — VITAMIN D, TAB 1000IU (100/BT) 1000 UNITS: 25 TAB at 07:37

## 2017-04-01 RX ADMIN — ACETAMINOPHEN 650 MG: 325 TABLET, FILM COATED ORAL at 04:30

## 2017-04-01 RX ADMIN — Medication 40 MEQ: at 01:16

## 2017-04-01 RX ADMIN — IOPAMIDOL 70 ML: 755 INJECTION, SOLUTION INTRAVENOUS at 02:14

## 2017-04-01 RX ADMIN — MEROPENEM 500 MG: 500 INJECTION, POWDER, FOR SOLUTION INTRAVENOUS at 13:01

## 2017-04-01 RX ADMIN — TRAMADOL HYDROCHLORIDE 50 MG: 50 TABLET, COATED ORAL at 16:24

## 2017-04-01 RX ADMIN — VALACYCLOVIR HYDROCHLORIDE 500 MG: 500 TABLET, FILM COATED ORAL at 20:11

## 2017-04-01 RX ADMIN — MEROPENEM 500 MG: 500 INJECTION, POWDER, FOR SOLUTION INTRAVENOUS at 01:15

## 2017-04-01 RX ADMIN — GUAIFENESIN 10 ML: 100 SOLUTION ORAL at 16:24

## 2017-04-01 RX ADMIN — MEROPENEM 500 MG: 500 INJECTION, POWDER, FOR SOLUTION INTRAVENOUS at 19:07

## 2017-04-01 RX ADMIN — PANTOPRAZOLE SODIUM 40 MG: 40 TABLET, DELAYED RELEASE ORAL at 07:37

## 2017-04-01 RX ADMIN — HEPARIN SODIUM 5000 UNITS: 5000 INJECTION, SOLUTION INTRAVENOUS; SUBCUTANEOUS at 19:05

## 2017-04-01 RX ADMIN — AMLODIPINE BESYLATE 5 MG: 5 TABLET ORAL at 07:36

## 2017-04-01 ASSESSMENT — PAIN DESCRIPTION - DESCRIPTORS: DESCRIPTORS: HEADACHE

## 2017-04-01 NOTE — PLAN OF CARE
"Problem: Goal Outcome Summary  Goal: Goal Outcome Summary  Outcome: No Change  /64 (BP Location: Right arm)  Pulse 76  Temp 96.7  F (35.9  C) (Axillary)  Resp 18  Ht 1.549 m (5' 1\")  Wt 97.7 kg (215 lb 4.8 oz)  SpO2 95%  BMI 40.68 kg/m2        VSS. Afebrile. O2 2L NC sating mid 90's. C/O upper abdomen pain. Gave tramadol with relief. Has frequent non productive cough. Still needs sputum culture. Had 1 soft brown bm. Stool samples sent. Up to bathroom with SBA. IVF d/c'd. Positive for para influenza 3. MD's made aware. On droplet and enteric precautions.    Problem: Blood and Marrow Transplantation  Goal: Blood and Marrow Transplantation  Signs and symptoms of listed problems will be absent or manageable.     04/01/17 1416 04/01/17 1435   Blood and Marrow Transplantation   Blood and Marrow Transplantation Assessed --  all   Blood and Marrow Transplantation Present Infection  --            "

## 2017-04-01 NOTE — H&P
[facility] History and Physical    Eryn Bennett MRN# [id number]   Age: 61 year old YOB: 1955     Date of Admission:  3/31/2017    Home clinic: [facility]  Primary care provider: Carmelo Camacho          Assessment and Plan:   Assessment:   Ms. Eryn Bennett is a 59 yo woman s/p NMA DCBT for AML day +877 (2 yr post BMT) in CR with KPS 90% presenting with increased cough and ongoing nausea.       Plan:   #. BMT/AML: two year post transplant BM 40% cellular, trilineage hematopoiesis, no leukemia by morphology and flow negative; % donor #2.      #. HEME: stable good counts  - Keep Hgb>8g/dL and plts>10k. No bleeding.   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 Received rituximab X 4 September 2015.     #. DVT- Repeat doppler L lower ext negative on OCT 2016. Off coumadin since JUL 2016.      #. ID: Febrile upon presentation to the [facility] ED with cough and hypoxia. Tmax 101 F here. Patient may have viral bronchitis but will also treat for possible nosocomial pneumonia. IV meropenem, linezolid, and oral azithromycin given in the ED. Flu shot given on 10/5/16.  Blood cultures collected in the ED w/ results pending.  - RSV results pending. Influenza negative. Stool studies still pending.   -continue meropenem and azithromycin, will d/c linezolid      #. GVH:  Off prednisone since 4/20/16; finished MMF taper on 9/13/16. Improved joint aches. Persistent shoulder pain being followed by Orthopedics. Eczema less visible and being controlled with steroid cream PRN.      #. GI:  Diarrhea has resolved and suspect viral gastroenteritis. LFTs WNL. Pt still having nausea which could be residual sx of viral gastroenteritis v constitutional symptoms of the patient's acute illness.   -Repeat enteric panel and clostridium difficile PCR ordered in the ED, results pending    -prn zofran for nausea      #. FEN/Renal: Creat wnl. HypoK likely from diarrhea, 3.2.  Will gently hydrate with IV D5 + half normal saline + KCl. Otherwise will order clear liquid diet, ADAT.  -electrolyte replacement protocol      #. Cardiovascular: High cholesterol and triglycerides may use cholesterol medication. Continue norvasc for HTN. Will continue PTA carvedilol 6.25mg bid (increased 12/9) and Norvasc 7.5 mg daily at night.       #. Musculoskelatal: Continue to follow with PT and Orthopedics.  - Dexa scan with osteopenia (5/5/15). Had bisphosphonate 10/5/16; repeat in ~12 months  - Continue Calcium and Vit D supplementation 2,800 units daily  - try to use tramadol that was working better than hydrocodone/acetaminophen      #. Pulm: Pt hypoxic. Please see ID above. Treating suspected pulmonary infection. CT chest negative for pulmonary embolism. Sleep apnea being managed, using CPAP.  RSV results pending.   -will order supportive cares for suspected bronchitis (prn/scheduled albuterol)   -guaifenesin ordered for cough     #. Code Status: Full Code     #. PPx: SQ heparin every 12 hours                Chief Complaint:   Cough, shortness of breath      History is obtained from the patient     Ms. Eryn Bennett is a 59 yo woman s/p NMA DCBT for AML day +877 (2 yr post BMT) in CR with KPS 90% presenting to [facility] ED with increased cough and shortness of breath. The patient started off having loose stools (diarrhea) over a week ago; however, she has yet to have a bowel movement in the last two days. The patient subsequently developed a non productive cough with vomiting and fever of 101F at home. Pt continues to be nauseated and has a poor appetite overall. In the ED, the patient was given IV linezolid, IV meropenem, and oral azithromycin for possible pneumonia. The patient was also sent to radiology for CTPE given positive d dimer. By the time I saw the patient, she was still reporting a dry cough. She also felt nauseated.           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    While pt had Hicman in, pt was on a blood thinner          Past Surgical History:      Past Surgical History:   Procedure Laterality Date     BLADDER SURGERY       CHOLECYSTECTOMY  1987     ESOPHAGOSCOPY, GASTROSCOPY, DUODENOSCOPY (EGD), COMBINED N/A 12/11/2014    Procedure: COMBINED ESOPHAGOSCOPY, GASTROSCOPY, DUODENOSCOPY (EGD), BIOPSY SINGLE OR MULTIPLE;  Surgeon: Saturnino Valera MD;  Location: [facility]     GI SURGERY      part of colon removed r.t benign tumor     GYN SURGERY       Dos Santos Catheter Placment Right 2010    Right IJ vein, in for 8 months     HYSTERECTOMY VAGINAL, BILATERAL SALPINGO-OOPHERECTOMY, COMBINED       PICC INSERTION Left 8/28/2014    5fr DL Power PICC, 46cm (1cm external) in the L basilic vein w/ tip in the SVC RA junction.          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  [employer]     Social History Main Topics     Smoking status: Never Smoker     Smokeless tobacco: Never Used     Alcohol use No     Drug use: No     Sexual activity: Not on file     Other Topics Concern     Not on file     Social History Narrative          Family History:     Family History   Problem Relation Age of Onset     CANCER Father      lung, smoker     CANCER Sister      non-melanoma skin cancer     DIABETES Sister      LUNG DISEASE Sister      Intellectual Disability (Mental Retardation) Sister      Melanoma Daughter      Glaucoma Mother      Glaucoma Maternal Grandfather       Macular Degeneration No family hx of           Immunizations:     Immunization History   Administered Date(s) Administered     DTAP/HEPB/POLIO, INACTIVATED <7Y (PEDIARIX) 11/09/2016     HIB 11/09/2016, 03/10/2017     Hepatitis B 03/10/2017     IPV 03/10/2017     Influenza Vaccine IM 3yrs+ 4 Valent IIV4 01/09/2015, 09/23/2015, 10/05/2016     Pneumococcal (PCV 13) 11/09/2016, 03/10/2017     Pneumococcal 23 valent 10/12/2010     TDAP Vaccine (Boostrix) 03/10/2017          Allergies:      Allergies   Allergen Reactions     Blood Transfusion Related (Informational Only) Other (See Comments)     7/15/15 - Patient has a complex history of clinically significant antibodies against RBC antigens.  Finding compatible RBCs may take up to 24 hours or more.  Consult with the Blood Bank MD for transfusion guidance.  Itching in palms during platelet transfusion in 2010- IV benadryl was given at that time.  Pt has had platelets since then with no reaction.   11/6/14 - Stem cell transplant patient.  Give type O RBCs.     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 Pt would like to avoid, even with premedication, if at all possible.           Medications:       No current facility-administered medications on file prior to encounter.   Current Outpatient Prescriptions on File Prior to Encounter:  carvedilol (COREG) 6.25 MG tablet Take 1 tablet (6.25 mg) by mouth 2 times daily (with meals)   amLODIPine (NORVASC) 5 MG tablet Take 1 tablet (5 mg) by mouth daily   valACYclovir (VALTREX) 500 MG tablet Take 1 tablet (500 mg) by mouth 2 times daily   calcium-vitamin D 500-125 MG-UNIT TABS Take by  "mouth 2 times daily   VITAMIN D, CHOLECALCIFEROL, PO Take 1,000 Units by mouth daily    metroNIDAZOLE (FLAGYL) 500 MG tablet Take 1 tablet (500 mg) by mouth 3 times daily   pantoprazole (PROTONIX) 40 MG EC tablet Take 1 tablet (40 mg) by mouth daily   traMADol (ULTRAM) 50 MG tablet Take 1 tablet (50 mg) by mouth every 6 hours as needed for moderate pain   desonide (DESOWEN) 0.05 % cream Apply sparingly to affected area three times daily as needed.   acetaminophen (TYLENOL) 325 MG tablet Take 1-2 tablets (325-650 mg) by mouth every 4 hours as needed for mild pain or fever          Review of Systems:   The Review of Systems is negative other than noted in the HPI      Blood pressure 119/65, pulse 110, temperature 99  F (37.2  C), temperature source Oral, height 1.549 m (5' 1\"), weight 96.5 kg (212 lb 12.8 oz), SpO2 94 %.  Gen: NAD, pleasant  HEENT: MMM, EOMI  Lungs: CTAB, no W/R/R  CV: RRR, no M/G/R  Abd: NTND, active bowel sounds x4   Ext: no c/c/e   Neuro: AOx3   Skin: no concerning skin lesions on limited skin exam          Data:     ROUTINE LABS (Last four results)  CMP  Recent Labs  Lab 03/31/17 2155      POTASSIUM 3.2*   CHLORIDE 104   CO2 24   ANIONGAP 11   *   BUN 14   CR 0.90   GFRESTIMATED 64   GFRESTBLACK 77   SARAH 8.8   MAG 1.8   PHOS 2.5   PROTTOTAL 7.0   ALBUMIN 3.4   BILITOTAL 0.5   ALKPHOS 92   AST 22   ALT 35     CBC  Recent Labs  Lab 03/31/17 2155   WBC 9.3   RBC 4.27   HGB 11.6*   HCT 37.6   MCV 88   MCH 27.2   MCHC 30.9*   RDW 15.3*        INR  Recent Labs  Lab 03/31/17 2155   INR 1.03     Arterial Blood GasNo lab results found in last 7 days.     All cardiac studies reviewed by me.   All imaging studies reviewed by me.      Attestation:  I have reviewed today's vital signs, notes, medications, labs and imaging.     Avtar Lambert MD  "

## 2017-04-01 NOTE — ED PROVIDER NOTES
History     Chief Complaint   Patient presents with     Nausea & Vomiting     Fever     HPI  Eryn Bennett is a 61 year old female, PMH notable for previous BMT transplant for AML, HTN, AIHA, CMV and EBV viremia, line-related RIJ venous thrombus, who presents to the ED today with nausea, vomiting and fever and cough.     Patient reports about recently having 7-10 days of diarrhea. She reports 24-30 episodes daily for the first 4 days and 10-15 episodes daily for the next 3 days. Her last BM was about 41 hours ago. She now has a persistent nonproductive/nonbloody cough that started about 4 days ago as well as fever and non-bloody vomiting. Highest recorded temperature was 101F at home.     Patient reports upper abdominal pain with cough only. She states she received her post BMT-transplant booster shot about 3 weeks ago. She reports reduced appetite and 9 pound weight loss in the past 7-10 days. She reports difficulty breathing and lightheadedness while when she turns in bed. Patient notes she had Bronchitis in December, influenza A in January, and then she had a UTI. Here in the ED, patient denies any blood in her secretions. No chest pain, back pain or urinary symptoms. She also denies any leg swelling or pain. She denies any new vision or hearing changes. No other new symptoms or complaints at this time. See ROS for further details.         I have reviewed the Medications, Allergies, Past Medical and Surgical History, and Social History in the [facility] system.    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    While pt  had [place] in, pt was on a blood thinner       PAST SURGICAL HISTORY:   Past Surgical History:   Procedure Laterality Date     BLADDER SURGERY       CHOLECYSTECTOMY  1987     ESOPHAGOSCOPY, GASTROSCOPY, DUODENOSCOPY (EGD), COMBINED N/A 12/11/2014    Procedure: COMBINED ESOPHAGOSCOPY, GASTROSCOPY, DUODENOSCOPY (EGD), BIOPSY SINGLE OR MULTIPLE;  Surgeon: Saturnino Valera MD;  Location: [facility]     GI SURGERY      part of colon removed r.t benign tumor     GYN SURGERY       Dos Santos Catheter Placment Right 2010    Right IJ vein, in for 8 months     HYSTERECTOMY VAGINAL, BILATERAL SALPINGO-OOPHERECTOMY, COMBINED       PICC INSERTION Left 8/28/2014    5fr DL Power PICC, 46cm (1cm external) in the L basilic vein w/ tip in the SVC RA junction.       FAMILY HISTORY:   Family History   Problem Relation Age of Onset     CANCER Father      lung, smoker     CANCER Sister      non-melanoma skin cancer     DIABETES Sister      LUNG DISEASE Sister      Intellectual Disability (Mental Retardation) Sister      Melanoma Daughter      Glaucoma Mother      Glaucoma Maternal Grandfather      Macular Degeneration No family hx of        SOCIAL HISTORY:   Social History   Substance Use Topics     Smoking status: Never Smoker     Smokeless tobacco: Never Used     Alcohol use No      No current facility-administered medications for this encounter.      Current Outpatient Prescriptions   Medication     Benzonatate (TESSALON PO)     carvedilol (COREG) 6.25 MG tablet     amLODIPine (NORVASC) 5 MG tablet     valACYclovir (VALTREX) 500 MG tablet     calcium-vitamin D 500-125 MG-UNIT TABS     VITAMIN D, CHOLECALCIFEROL, PO     metroNIDAZOLE (FLAGYL) 500 MG tablet     pantoprazole (PROTONIX) 40 MG EC tablet     traMADol (ULTRAM) 50 MG tablet     desonide (DESOWEN) 0.05 % cream     acetaminophen (TYLENOL) 325 MG tablet          Allergies   Allergen Reactions     Blood Transfusion Related (Informational Only) Other (See Comments)     7/15/15 -  "Patient has a complex history of clinically significant antibodies against RBC antigens.  Finding compatible RBCs may take up to 24 hours or more.  Consult with the Blood Bank MD for transfusion guidance.  Itching in palms during platelet transfusion in 2010- IV benadryl was given at that time.  Pt has had platelets since then with no reaction.   11/6/14 - Stem cell transplant patient.  Give type O RBCs.     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 Pt would like to avoid, even with premedication, if at all possible.            Review of Systems   Constitutional: Positive for fever. Negative for chills.   HENT: Negative for sore throat and trouble swallowing.    Eyes: Negative for pain and visual disturbance.   Respiratory: Positive for cough. Negative for shortness of breath.    Cardiovascular: Negative for chest pain, palpitations and leg swelling.   Gastrointestinal: Positive for abdominal pain, diarrhea (resolved), nausea and vomiting. Negative for blood in stool and constipation.   Endocrine: Negative for polydipsia and polyuria.   Genitourinary: Negative for dysuria, frequency and hematuria.   Musculoskeletal: Negative for back pain and neck pain.   Skin: Negative for color change and rash.   Allergic/Immunologic: Positive for immunocompromised state. Negative for food allergies.   Neurological: Negative for weakness and headaches.       Physical Exam   BP: 161/90  Pulse: 110  Temp: 100.3  F (37.9  C)  Height: 154.9 cm (5' 1\")  Weight: 96.5 kg (212 lb 12.8 oz)  SpO2: 93 %  Physical Exam  CONSTITUTIONAL: Well-developed and well-nourished. Awake and alert. Non-toxic " appearance. No acute distress.   HENT:   - Head: Normocephalic and atraumatic.   - Ears: Hearing and external ear grossly normal.   - Nose: Nose normal. No rhinorrhea. No epistaxis.   - Mouth/Throat: Oropharynx is clear and MMM  EYES: Conjunctivae and lids are normal. No scleral icterus.   NECK: Normal range of motion and phonation normal. Neck supple.  No tracheal deviation, no stridor. No edema or erythema noted.  CARDIOVASCULAR: Normal rate, regular rhythm and no appreciable abnormal heart sounds.  PULMONARY/CHEST: Effort normal. No accessory muscle usage or stridor. No respiratory distress. Crackles left base  ABDOMEN: Soft, non-distended. No tenderness. No rigidity, rebound or guarding.   MUSCULOSKELETAL: Extremities warm and seemingly well perfused. No edema or calf tenderness.  NEUROLOGIC: Awake, alert. Not disoriented. She displays no atrophy and no tremor. Normal tone. No seizure activity. Coordination normal. GCS 15  SKIN: Skin is warm and dry. No rash noted. No diaphoresis. No pallor.   PSYCHIATRIC: Normal mood and affect. Speech and behavior normal. Thought processes linear. Cognition and memory are normal.     ED Course     ED Course     Procedures       10:49 PM  The patient was seen and examined by Dr. Celeste in Room 8.            EKG Interpretation:      Interpreted by Dr. Arleen Celeste  Time reviewed: 22:48:47  Symptoms at time of EKG: nausea, vomiting, and fever  Rhythm: sinus tachycardia  Rate: 111 bpm  Axis: Normal  Ectopy: none  Conduction: normal  ST Segments/ T Waves: No ST-T wave changes  Q Waves: none  Comparison to prior: Patient now has sinus tachycardia, the morphology looks a bit more consistent with 10/15/2014 EKG than 01/01/215 EKG.   Clinical Impression: no acute changes outside of increase in HR         Labs Ordered and Resulted from Time of ED Arrival Up to the Time of Departure from the ED   CBC WITH PLATELETS DIFFERENTIAL - Abnormal; Notable for the following:        Result Value     Hemoglobin 11.6 (*)     MCHC 30.9 (*)     RDW 15.3 (*)     All other components within normal limits   COMPREHENSIVE METABOLIC PANEL - Abnormal; Notable for the following:     Potassium 3.2 (*)     Glucose 118 (*)     All other components within normal limits   LACTIC ACID WHOLE BLOOD   INR   CBC WITH PLATELETS DIFFERENTIAL   BASIC METABOLIC PANEL   INFLUENZA A/B ANTIGEN   RESPIRATORY VIRUS PANEL BY PCR       Assessments & Plan (with Medical Decision Making)   IMPRESSION: 60 yo F, PMH notable for BMT transplant in 2014 for AML, recent diarrheal illness with prevous influenza infection earlier this season, et al. As listed above, presents today for ongoing generalized weakness, cough, N/V as described further above in the HPI/ROS. Clinically patient appears nontoxic, NAD, though is having fairly frequent nonproductive coughs.     PLAN: Labs, imaging, viral swabs, likely admission given dropping SpO2    RESULTS:  See ED Course section above for particular pertinent findings and comments  - Labs: D-dimer positive, mild hypokalemia  - Imaging: Images and written preliminary reports reviewed by myself and revealed:  --- CXR: No acute findings reportedly by Radiology. I am still suspicious for the left base based upon auscultory findings and I think there may be some very subtle findings in this area on CXR    INTERVENTIONS:   - Linezolid, Meropenem, Azithromycin (Discussed w/ Pharmacy who reviewed pts case and hx), should cover HCAP causes  - PO KCl    RE-EVALUATION:  See ED Course section above for particular pertinent findings and comments  - The patient's symptoms were grossly unchanged during ED stay, and while symptomatic, continued to appear nontoxic  - Patient observed for multiple hours with multiple repeat exams and remains hemodynamically stable and stable from resp. Stand point.    DISCUSSIONS:  - I discussed the care of the patient with BMT, Pharmacy  - I have reviewed the available findings, plan with the  patient and her SO. They expressed understanding and agreement with this plan. All questions answered to the best of our ability at this time.   DISPOSITION/PLANNING:  - FINAL IMPRESSION: Cough in setting of immunosuppresed state, hypoxia, recent diarrheal illness, mild hypokalemia  - DISPOSITION: Admit to BMT  --- Pending: Blood cultures, respiratory virus panel, CT Chest      ______________________________________________________________________________    - I have reviewed the available nursing notes.          New Prescriptions    No medications on file       Final diagnoses:   None     Shannen RODRIGUEZ , am serving as a trained medical scribe to document services personally performed by Arleen Celeste MD, based on the provider's statements to me.   IArleen MD, was physically present and have reviewed and verified the accuracy of this note documented by Shannen Aguilera.    3/31/2017   [facility], [place], EMERGENCY DEPARTMENT     Arleen Celeste MD  04/02/17 2573

## 2017-04-01 NOTE — PLAN OF CARE
Problem: Blood and Marrow Transplantation  Goal: Blood and Marrow Transplantation  Signs and symptoms of listed problems will be absent or manageable.  Outcome: No Change  S: Pt came in from ED in a wheel chair accompanied by 1 staff. All personal belongings with pt as listed in room. Pt had a temp of 101.0 ax with       Low 02 saturation of 89 to 90 %. Pt continue to have persistent dry nonproductive cough. Pt came with  Azithromycin infusing via left PIV       A/O x 4 up with stand by assist to bathroom. Voided clear yellow urine.  B: Pt Hx AML Day + 877 S/P NMA DUCBT  Presented to ED with persistent cough and Hx frequent N/V/D.   A: Pt got Linezolid when she got here. D51/2 NS + 20 mEq /KCL /L at 100 ML / hour infusing.Pt placed on Enteric and droplet Isolation.HO        Notified with cough and he came to sign blood transfusion consent and also ordered Robitussin but still not here yet. Pt placed on 2 L NC with        Good saturation rafael. Tylenol given with good relief. Blood culture done in ED already.  R: Still needs stool cultures. Continue with plan of care and continue to monitor pt.

## 2017-04-01 NOTE — ED NOTES
Patient arrived for diarrhea lasting 10 days which has resolved. Now she has a persistent cough and a fever and vomiting. She is alert, oriented, ambulatory.

## 2017-04-01 NOTE — PROGRESS NOTES
"BMT Daily Progress Note    Date of Service:4/1/2017    History of Present Illness:  Still coughing.  Dizzy when moving from lying to sitting.  No diarrhea in last ~40 hours or so.  Febrile to 101 overnight.      Review of Systems:  ROS otherwise unremarkable other than what is noted in the HPI.     Physical Exam:    /67 (BP Location: Right arm)  Pulse 81  Temp 97.4  F (36.3  C) (Axillary)  Resp 18  Ht 1.549 m (5' 1\")  Wt 97.7 kg (215 lb 4.8 oz)  SpO2 (!) 83%  BMI 40.68 kg/m2    General: A&O. NAD.   HEENT: CLARICE, sclera anicteric  Neck: supple  Lungs: crackles in bases  Heart: RRR, no m/r/g  Abdomen: +BS, soft, NT/ND, no HSM  Extremities: No edema  Skin: no rashes or petechiae  Neurologic: dizzy when moving from lying to sitting      Laboratory Data:  Lab Results   Component Value Date    WBC 9.6 04/01/2017    ANEU 4.7 03/31/2017    HGB 10.8 (L) 04/01/2017    HCT 35.1 04/01/2017     04/01/2017     04/01/2017    POTASSIUM 3.5 04/01/2017    CHLORIDE 106 04/01/2017    CO2 22 04/01/2017     (H) 04/01/2017    BUN 11 04/01/2017    CR 0.89 04/01/2017    MAG 1.8 03/31/2017    INR 1.03 03/31/2017       Assessment/Plan:  Assessment:    Ms. Eryn Bennett is a 61 yo woman s/p NMA DCBT for AML day +877 (2 yr post BMT) in [place] presenting with increased cough and ongoing nausea.    Plan:    #. BMT/AML: two year post transplant BM 40% cellular, trilineage hematopoiesis, no leukemia by morphology and flow negative; % donor #2.       #. HEME: stable good counts  - Keep Hgb>8g/dL and plts>10k. No bleeding.   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 Received rituximab X 4 September 2015.      #. DVT- Repeat doppler L lower ext negative on OCT 2016. Off coumadin since JUL 2016.        #. ID: Febrile upon presentation to the [facility] ED with cough and hypoxia. Tmax 101 F here. Patient may have viral bronchitis but will also treat for possible nosocomial pneumonia. IV meropenem, " linezolid, and oral azithromycin given in the ED. Flu shot given on 10/5/16. Blood cultures collected in the ED w/ results pending.  - RSV results pending. Influenza negative. Stool studies still pending (no stool output since arrival)  -continue meropenem and azithromycin  - attempt to collect sputum culture.  - valtrex for HSV oral lesions    #. GVH: Off prednisone since 4/20/16; finished MMF taper on 9/13/16. Improved joint aches. Persistent shoulder pain being followed by Orthopedics. Eczema less visible and being controlled with steroid cream PRN.       #. GI: Diarrhea has resolved and suspect viral gastroenteritis. LFTs WNL. Pt still having nausea which could be residual sx of viral gastroenteritis v constitutional symptoms of the patient's acute illness.   -Repeat enteric panel and clostridium difficile PCR ordered in the ED, results pending   -prn zofran for nausea      #. FEN/Renal: Creat wnl. HypoK likely from diarrhea, 3.2. Will gently hydrate with IV D5 + half normal saline + KCl at 100/hour. Clear liquid diet, ADAT.  -electrolyte replacement protocol       #. Cardiovascular: High cholesterol and triglycerides may use cholesterol medication. Continue norvasc for HTN. Will continue PTA carvedilol 6.25mg bid (increased 12/9) and Norvasc 7.5 mg daily at night.       #. Musculoskelatal: Continue to follow with PT and Orthopedics.  - Dexa scan with osteopenia (5/5/15). Had bisphosphonate 10/5/16; repeat in ~12 months  - Continue Calcium and Vit D supplementation 2,800 units daily  - try to use tramadol that was working better than hydrocodone/acetaminophen       #. Pulm: Pt hypoxic. Please see ID above. Treating suspected pulmonary infection. CT chest negative for pulmonary embolism. Sleep apnea being managed, using CPAP. RSV results pending.   -will order supportive cares for suspected bronchitis (prn/scheduled albuterol)   -guaifenesin ordered for cough      #. Code Status: Full Code     #. Indcidental  "breast nodule noted on CT; recommend mammogram f/u on non-urgent basis     #. PPx: SQ heparin every 12 hours         Jacquelyn Bledsoe  4/1/2017    Patient has been seen and evaluated by me. I have reviewed today's vital signs, medications, labs and imaging results independently. I have discussed the plan with the team and agree with the findings and plan in this note.      Admitted with fever and cough    PE:   Blood pressure 114/59, pulse 87, temperature 97.9  F (36.6  C), temperature source Axillary, resp. rate 16, height 1.549 m (5' 1\"), weight 97.8 kg (215 lb 11.2 oz), SpO2 96 %.    HEENT: PERRL, EOMI, MMM  Chest: crackles over bases  CVS: S1 S2 RRR no murmurs or gallops  PA: soft, non tender  CNS: non focal    A/P:  Pulmonary: CT suggestive of infective process as above- RVP and sputum culture pending- will treat with broad spectrum antibiotics as above  H/o DVT  H/o hemolytic anemia  H/o GVHD  Gay Pizarro MD        "

## 2017-04-01 NOTE — PROGRESS NOTES
Patient admitted toto station 5c  Admitted from:ED  Arrived by:W/C  Reason for admission:Fever, N/V/D and persistent cough.  Patient accompanied by: one ED staff  Belongings: as listed with pt in room.  Teaching: IS use

## 2017-04-02 ENCOUNTER — APPOINTMENT (OUTPATIENT)
Dept: OCCUPATIONAL THERAPY | Facility: CLINIC | Age: 62
DRG: 194 | End: 2017-04-02
Attending: INTERNAL MEDICINE
Payer: MEDICARE

## 2017-04-02 LAB
ANION GAP SERPL CALCULATED.3IONS-SCNC: 8 MMOL/L (ref 3–14)
BASOPHILS # BLD AUTO: 0 10E9/L (ref 0–0.2)
BASOPHILS NFR BLD AUTO: 0.3 %
BUN SERPL-MCNC: 9 MG/DL (ref 7–30)
CALCIUM SERPL-MCNC: 7.9 MG/DL (ref 8.5–10.1)
CHLORIDE SERPL-SCNC: 106 MMOL/L (ref 94–109)
CO2 SERPL-SCNC: 26 MMOL/L (ref 20–32)
CREAT SERPL-MCNC: 0.79 MG/DL (ref 0.52–1.04)
DIFFERENTIAL METHOD BLD: ABNORMAL
EOSINOPHIL # BLD AUTO: 0.3 10E9/L (ref 0–0.7)
EOSINOPHIL NFR BLD AUTO: 3.6 %
ERYTHROCYTE [DISTWIDTH] IN BLOOD BY AUTOMATED COUNT: 15.3 % (ref 10–15)
GFR SERPL CREATININE-BSD FRML MDRD: 74 ML/MIN/1.7M2
GLUCOSE SERPL-MCNC: 96 MG/DL (ref 70–99)
HCT VFR BLD AUTO: 34.4 % (ref 35–47)
HGB BLD-MCNC: 10.6 G/DL (ref 11.7–15.7)
IGG SERPL-MCNC: 647 MG/DL (ref 695–1620)
IGG SERPL-MCNC: 738 MG/DL (ref 695–1620)
IMM GRANULOCYTES # BLD: 0 10E9/L (ref 0–0.4)
IMM GRANULOCYTES NFR BLD: 0.1 %
LYMPHOCYTES # BLD AUTO: 2.9 10E9/L (ref 0.8–5.3)
LYMPHOCYTES NFR BLD AUTO: 40.9 %
MCH RBC QN AUTO: 27.3 PG (ref 26.5–33)
MCHC RBC AUTO-ENTMCNC: 30.8 G/DL (ref 31.5–36.5)
MCV RBC AUTO: 89 FL (ref 78–100)
MONOCYTES # BLD AUTO: 0.5 10E9/L (ref 0–1.3)
MONOCYTES NFR BLD AUTO: 7.1 %
NEUTROPHILS # BLD AUTO: 3.4 10E9/L (ref 1.6–8.3)
NEUTROPHILS NFR BLD AUTO: 48 %
NRBC # BLD AUTO: 0 10*3/UL
NRBC BLD AUTO-RTO: 0 /100
PLATELET # BLD AUTO: 137 10E9/L (ref 150–450)
POTASSIUM SERPL-SCNC: 3.3 MMOL/L (ref 3.4–5.3)
POTASSIUM SERPL-SCNC: 3.8 MMOL/L (ref 3.4–5.3)
RBC # BLD AUTO: 3.88 10E12/L (ref 3.8–5.2)
SODIUM SERPL-SCNC: 140 MMOL/L (ref 133–144)
WBC # BLD AUTO: 7 10E9/L (ref 4–11)

## 2017-04-02 PROCEDURE — 25000125 ZZHC RX 250: Performed by: INTERNAL MEDICINE

## 2017-04-02 PROCEDURE — 36415 COLL VENOUS BLD VENIPUNCTURE: CPT | Performed by: INTERNAL MEDICINE

## 2017-04-02 PROCEDURE — 82784 ASSAY IGA/IGD/IGG/IGM EACH: CPT | Performed by: PHYSICIAN ASSISTANT

## 2017-04-02 PROCEDURE — 25000132 ZZH RX MED GY IP 250 OP 250 PS 637: Mod: GY | Performed by: INTERNAL MEDICINE

## 2017-04-02 PROCEDURE — 25000125 ZZHC RX 250: Performed by: PHYSICIAN ASSISTANT

## 2017-04-02 PROCEDURE — 80048 BASIC METABOLIC PNL TOTAL CA: CPT | Performed by: INTERNAL MEDICINE

## 2017-04-02 PROCEDURE — 25000132 ZZH RX MED GY IP 250 OP 250 PS 637: Mod: GY | Performed by: PHYSICIAN ASSISTANT

## 2017-04-02 PROCEDURE — 36415 COLL VENOUS BLD VENIPUNCTURE: CPT | Performed by: PHYSICIAN ASSISTANT

## 2017-04-02 PROCEDURE — 20000002 ZZH R&B BMT INTERMEDIATE

## 2017-04-02 PROCEDURE — A9270 NON-COVERED ITEM OR SERVICE: HCPCS | Mod: GY | Performed by: INTERNAL MEDICINE

## 2017-04-02 PROCEDURE — 25000128 H RX IP 250 OP 636: Performed by: INTERNAL MEDICINE

## 2017-04-02 PROCEDURE — 97165 OT EVAL LOW COMPLEX 30 MIN: CPT | Mod: GO | Performed by: OCCUPATIONAL THERAPIST

## 2017-04-02 PROCEDURE — 94640 AIRWAY INHALATION TREATMENT: CPT

## 2017-04-02 PROCEDURE — 40000275 ZZH STATISTIC RCP TIME EA 10 MIN

## 2017-04-02 PROCEDURE — 40000274 ZZH STATISTIC RCP CONSULT EA 30 MIN

## 2017-04-02 PROCEDURE — 84132 ASSAY OF SERUM POTASSIUM: CPT | Performed by: PHYSICIAN ASSISTANT

## 2017-04-02 PROCEDURE — A9270 NON-COVERED ITEM OR SERVICE: HCPCS | Mod: GY | Performed by: PHYSICIAN ASSISTANT

## 2017-04-02 PROCEDURE — 97530 THERAPEUTIC ACTIVITIES: CPT | Mod: GO | Performed by: OCCUPATIONAL THERAPIST

## 2017-04-02 PROCEDURE — 85025 COMPLETE CBC W/AUTO DIFF WBC: CPT | Performed by: INTERNAL MEDICINE

## 2017-04-02 PROCEDURE — 97535 SELF CARE MNGMENT TRAINING: CPT | Mod: GO | Performed by: OCCUPATIONAL THERAPIST

## 2017-04-02 PROCEDURE — 40000133 ZZH STATISTIC OT WARD VISIT: Performed by: OCCUPATIONAL THERAPIST

## 2017-04-02 RX ORDER — CODEINE PHOSPHATE AND GUAIFENESIN 10; 100 MG/5ML; MG/5ML
5 SOLUTION ORAL EVERY 4 HOURS PRN
Status: DISCONTINUED | OUTPATIENT
Start: 2017-04-02 | End: 2017-04-04 | Stop reason: HOSPADM

## 2017-04-02 RX ORDER — ALBUTEROL SULFATE 0.83 MG/ML
2.5 SOLUTION RESPIRATORY (INHALATION)
Status: DISCONTINUED | OUTPATIENT
Start: 2017-04-02 | End: 2017-04-02

## 2017-04-02 RX ORDER — ALBUTEROL SULFATE 0.83 MG/ML
2.5 SOLUTION RESPIRATORY (INHALATION) EVERY 4 HOURS PRN
Status: DISCONTINUED | OUTPATIENT
Start: 2017-04-02 | End: 2017-04-04 | Stop reason: HOSPADM

## 2017-04-02 RX ADMIN — MEROPENEM 500 MG: 500 INJECTION, POWDER, FOR SOLUTION INTRAVENOUS at 02:55

## 2017-04-02 RX ADMIN — AZITHROMYCIN 250 MG: 250 TABLET, FILM COATED ORAL at 02:55

## 2017-04-02 RX ADMIN — AMLODIPINE BESYLATE 5 MG: 5 TABLET ORAL at 08:43

## 2017-04-02 RX ADMIN — HEPARIN SODIUM 5000 UNITS: 5000 INJECTION, SOLUTION INTRAVENOUS; SUBCUTANEOUS at 20:40

## 2017-04-02 RX ADMIN — VALACYCLOVIR HYDROCHLORIDE 500 MG: 500 TABLET, FILM COATED ORAL at 20:40

## 2017-04-02 RX ADMIN — PANTOPRAZOLE SODIUM 40 MG: 40 TABLET, DELAYED RELEASE ORAL at 08:43

## 2017-04-02 RX ADMIN — CARVEDILOL 6.25 MG: 6.25 TABLET, FILM COATED ORAL at 18:30

## 2017-04-02 RX ADMIN — GUAIFENESIN AND CODEINE PHOSPHATE 5 ML: 100; 10 SOLUTION ORAL at 23:53

## 2017-04-02 RX ADMIN — POTASSIUM CHLORIDE 10 MEQ: 14.9 INJECTION, SOLUTION, CONCENTRATE PARENTERAL at 07:06

## 2017-04-02 RX ADMIN — HEPARIN SODIUM 5000 UNITS: 5000 INJECTION, SOLUTION INTRAVENOUS; SUBCUTANEOUS at 08:43

## 2017-04-02 RX ADMIN — ALBUTEROL SULFATE 2.5 MG: 2.5 SOLUTION RESPIRATORY (INHALATION) at 09:29

## 2017-04-02 RX ADMIN — VITAMIN D, TAB 1000IU (100/BT) 1000 UNITS: 25 TAB at 08:43

## 2017-04-02 RX ADMIN — VALACYCLOVIR HYDROCHLORIDE 500 MG: 500 TABLET, FILM COATED ORAL at 08:43

## 2017-04-02 RX ADMIN — CARVEDILOL 6.25 MG: 6.25 TABLET, FILM COATED ORAL at 08:43

## 2017-04-02 RX ADMIN — POTASSIUM CHLORIDE 10 MEQ: 14.9 INJECTION, SOLUTION, CONCENTRATE PARENTERAL at 08:45

## 2017-04-02 RX ADMIN — POTASSIUM CHLORIDE 20 MEQ: 750 TABLET, EXTENDED RELEASE ORAL at 11:24

## 2017-04-02 ASSESSMENT — ENCOUNTER SYMPTOMS
DIARRHEA: 1
FEVER: 1
COUGH: 1

## 2017-04-02 NOTE — PLAN OF CARE
Problem: Blood and Marrow Transplantation  Goal: Blood and Marrow Transplantation  Signs and symptoms of listed problems will be absent or manageable.   Outcome: No Change  Pt sating well on 2L NC but still feels SOB at times. She had a frequent dry cough and we still need to obtain a sputum culture but she has been unable to productively cough. Her lung sounds have fine crackles in the bases but are otherwise clear. 10K given this morning. She will need 30 more meq K and a two hour recheck. No other replacements needed this morning. VSS and WNL.        04/02/17 0608   Blood and Marrow Transplantation   Blood and Marrow Transplantation Assessed all   Blood and Marrow Transplantation Present none

## 2017-04-02 NOTE — PLAN OF CARE
Problem: Goal Outcome Summary  Goal: Goal Outcome Summary  PT 5C: DEFER- Per discussion with OT, pt does not have skilled IP PT needs at this time. Pt ambulating and transferring independently. PT order completed.

## 2017-04-02 NOTE — PLAN OF CARE
Problem: Goal Outcome Summary  Goal: Goal Outcome Summary  OT 5C: Occupational therapy evaluation completed and treatment initiated. Patient demonstrated independence with ADLs and functional mobility. Ambulated in hallway without AD and distant SBA, fatigued. Patient would benefit from occupational therapy to increase endurance and strength needed for ADLs. Recommend discharge home with family asst once medically appropriate

## 2017-04-02 NOTE — PLAN OF CARE
Problem: Blood and Marrow Transplantation  Goal: Blood and Marrow Transplantation  Signs and symptoms of listed problems will be absent or manageable.   VSS. 2L NC. SOB with activity. Lungs diminished. Frequent dry cough. k 3.3 replaced- recheck 3.8. Loose BM today x 1. Showered. PIV saline locked.             04/02/17 1423   Blood and Marrow Transplantation   Blood and Marrow Transplantation Assessed all   Blood and Marrow Transplantation Present none

## 2017-04-02 NOTE — PROGRESS NOTES
" 04/02/17 1310   Quick Adds   Type of Visit Initial Occupational Therapy Evaluation   Living Environment   Lives With spouse   Living Arrangements house   Home Accessibility stairs to enter home   Number of Stairs to Enter Home 7   Number of Stairs Within Home 0   Transportation Available family or friend will provide   Living Environment Comment Patient states stairs are difficult getting into house and states she has to \"take one step at a time\"   Self-Care   Dominant Hand right   Usual Activity Tolerance good   Current Activity Tolerance fair   Regular Exercise yes   Activity/Exercise Type other (see comments);walking  (theraband exercises)   Exercise Amount/Frequency 3-5 times/wk   Equipment Currently Used at Home other (see comments)   Functional Level Prior   Ambulation 0-->independent   Transferring 0-->independent   Toileting 0-->independent   Bathing 0-->independent   Dressing 2-->assistive person   Eating 0-->independent   Communication 0-->understands/communicates without difficulty   Swallowing 0-->swallows foods/liquids without difficulty   Cognition 0 - no cognition issues reported   Fall history within last six months no   Which of the above functional risks had a recent onset or change? ambulation;transferring;toileting;bathing;dressing   Prior Functional Level Comment Independent with ADL/IADLs   General Information   Onset of Illness/Injury or Date of Surgery - Date 03/31/17   Referring Physician Avtar Lambert MD   Patient/Family Goals Statement To get better   Additional Occupational Profile Info/Pertinent History of Current Problem Per chart review: \"61 yo woman s/p NMA DCBT for AML day +877 (2 yr post BMT) in CR presenting with increased cough and ongoing nausea\"   Precautions/Limitations oxygen therapy device and L/min   Heart Disease Risk Factors Medical history   General Observations Patient supine in bed upon arrival, on 2L O2 NC   Cognitive Status Examination   Orientation orientation " to person, place and time   Level of Consciousness alert   Able to Follow Commands WNL/WFL   Personal Safety (Cognitive) WNL/WFL   Memory intact   Attention No deficits were identified   Visual Perception   Visual Perception Wears glasses   Sensory Examination   Sensory Quick Adds No deficits were identified   Pain Assessment   Patient Currently in Pain No   Integumentary/Edema   Integumentary/Edema no deficits were identifed   Posture   Posture forward head position;protracted shoulders   Range of Motion (ROM)   ROM Comment Patient with previous bilateral frozen shoulders, limited shoulder mobility   Strength   Strength Comments Functional during transfers and ADLs   Muscle Tone Assessment   Muscle Tone Quick Adds No deficits were identified   Coordination   Upper Extremity Coordination No deficits were identified   Mobility   Bed Mobility Comments Independent supine<>sit    Transfer Skills   Transfer Comments SBA sit<>Stand   Transfer Skill: Toilet Transfer   Level of [place]: Toilet stand-by assist   Physical Assist/Nonphysical Assist: Toilet supervision   Lower Body Dressing   Level of [place]: Dress Lower Body stand-by assist   Physical Assist/Nonphysical Assist: Dress Lower Body 1 person assist;verbal cues   Activities of Daily Living Analysis   Impairments Contributing to Impaired Activities of Daily Living balance impaired;strength decreased;ROM decreased   General Therapy Interventions   Planned Therapy Interventions ADL retraining;balance training;strengthening;stretching;ROM;transfer training;home program guidelines;progressive activity/exercise;risk factor education   Clinical Impression   Criteria for Skilled Therapeutic Interventions Met yes, treatment indicated   OT Diagnosis Decrease independence with ADLs and functional mobility   Influenced by the following impairments Decrease strength, decrease endurance   Assessment of Occupational Performance 1-3 Performance Deficits   Therapy  "Frequency (4x/week)   Predicted Duration of Therapy Intervention (days/wks) x1 week   Anticipated Discharge Disposition Home with Assist   Risks and Benefits of Treatment have been explained. Yes   Patient, Family & other staff in agreement with plan of care Yes   [facility] TM \"6 Clicks\"   2016, Trustees of [facility], under license to [employer].  All rights reserved.   6 Clicks Short Forms Daily Activity Inpatient Short Form   [facility]  \"6 Clicks\" Daily Activity Inpatient Short Form   1. Putting on and taking off regular lower body clothing? 4 - None   2. Bathing (including washing, rinsing, drying)? 3 - A Little   3. Toileting, which includes using toilet, bedpan or urinal? 4 - None   4. Putting on and taking off regular upper body clothing? 4 - None   5. Taking care of personal grooming such as brushing teeth? 4 - None   6. Eating meals? 4 - None   Daily Activity Raw Score (Score out of 24.Lower scores equate to lower levels of function) 23   Total Evaluation Time   Total Evaluation Time (Minutes) 8     "

## 2017-04-03 ENCOUNTER — APPOINTMENT (OUTPATIENT)
Dept: OCCUPATIONAL THERAPY | Facility: CLINIC | Age: 62
DRG: 194 | End: 2017-04-03
Payer: MEDICARE

## 2017-04-03 LAB
ALBUMIN SERPL-MCNC: 2.8 G/DL (ref 3.4–5)
ALP SERPL-CCNC: 70 U/L (ref 40–150)
ALT SERPL W P-5'-P-CCNC: 22 U/L (ref 0–50)
ANION GAP SERPL CALCULATED.3IONS-SCNC: 10 MMOL/L (ref 3–14)
AST SERPL W P-5'-P-CCNC: 9 U/L (ref 0–45)
BACTERIA SPEC CULT: NORMAL
BASOPHILS # BLD AUTO: 0 10E9/L (ref 0–0.2)
BASOPHILS NFR BLD AUTO: 0.2 %
BILIRUB DIRECT SERPL-MCNC: <0.1 MG/DL (ref 0–0.2)
BILIRUB SERPL-MCNC: 0.3 MG/DL (ref 0.2–1.3)
BUN SERPL-MCNC: 10 MG/DL (ref 7–30)
CALCIUM SERPL-MCNC: 7.9 MG/DL (ref 8.5–10.1)
CHLORIDE SERPL-SCNC: 108 MMOL/L (ref 94–109)
CO2 SERPL-SCNC: 24 MMOL/L (ref 20–32)
CREAT SERPL-MCNC: 0.81 MG/DL (ref 0.52–1.04)
DIFFERENTIAL METHOD BLD: ABNORMAL
EOSINOPHIL # BLD AUTO: 0.2 10E9/L (ref 0–0.7)
EOSINOPHIL NFR BLD AUTO: 3 %
ERYTHROCYTE [DISTWIDTH] IN BLOOD BY AUTOMATED COUNT: 15 % (ref 10–15)
GFR SERPL CREATININE-BSD FRML MDRD: 72 ML/MIN/1.7M2
GLUCOSE SERPL-MCNC: 111 MG/DL (ref 70–99)
HCT VFR BLD AUTO: 34.8 % (ref 35–47)
HGB BLD-MCNC: 10.4 G/DL (ref 11.7–15.7)
IMM GRANULOCYTES # BLD: 0 10E9/L (ref 0–0.4)
IMM GRANULOCYTES NFR BLD: 0.2 %
INR PPP: 1.03 (ref 0.86–1.14)
LYMPHOCYTES # BLD AUTO: 2.9 10E9/L (ref 0.8–5.3)
LYMPHOCYTES NFR BLD AUTO: 44.3 %
MAGNESIUM SERPL-MCNC: 1.8 MG/DL (ref 1.6–2.3)
MCH RBC QN AUTO: 26.8 PG (ref 26.5–33)
MCHC RBC AUTO-ENTMCNC: 29.9 G/DL (ref 31.5–36.5)
MCV RBC AUTO: 90 FL (ref 78–100)
MICRO REPORT STATUS: NORMAL
MONOCYTES # BLD AUTO: 0.6 10E9/L (ref 0–1.3)
MONOCYTES NFR BLD AUTO: 8.6 %
NEUTROPHILS # BLD AUTO: 2.8 10E9/L (ref 1.6–8.3)
NEUTROPHILS NFR BLD AUTO: 43.7 %
NRBC # BLD AUTO: 0 10*3/UL
NRBC BLD AUTO-RTO: 0 /100
PHOSPHATE SERPL-MCNC: 2.2 MG/DL (ref 2.5–4.5)
PLATELET # BLD AUTO: 140 10E9/L (ref 150–450)
POTASSIUM SERPL-SCNC: 3.8 MMOL/L (ref 3.4–5.3)
PROT SERPL-MCNC: 6 G/DL (ref 6.8–8.8)
RBC # BLD AUTO: 3.88 10E12/L (ref 3.8–5.2)
SODIUM SERPL-SCNC: 142 MMOL/L (ref 133–144)
SPECIMEN SOURCE: NORMAL
WBC # BLD AUTO: 6.4 10E9/L (ref 4–11)

## 2017-04-03 PROCEDURE — 36415 COLL VENOUS BLD VENIPUNCTURE: CPT | Performed by: INTERNAL MEDICINE

## 2017-04-03 PROCEDURE — 97110 THERAPEUTIC EXERCISES: CPT | Mod: GO | Performed by: OCCUPATIONAL THERAPIST

## 2017-04-03 PROCEDURE — A9270 NON-COVERED ITEM OR SERVICE: HCPCS | Mod: GY | Performed by: INTERNAL MEDICINE

## 2017-04-03 PROCEDURE — A9270 NON-COVERED ITEM OR SERVICE: HCPCS | Mod: GY | Performed by: PHYSICIAN ASSISTANT

## 2017-04-03 PROCEDURE — 20600000 ZZH R&B BMT

## 2017-04-03 PROCEDURE — 25000128 H RX IP 250 OP 636: Performed by: INTERNAL MEDICINE

## 2017-04-03 PROCEDURE — 85025 COMPLETE CBC W/AUTO DIFF WBC: CPT | Performed by: INTERNAL MEDICINE

## 2017-04-03 PROCEDURE — 80048 BASIC METABOLIC PNL TOTAL CA: CPT | Performed by: INTERNAL MEDICINE

## 2017-04-03 PROCEDURE — 25000132 ZZH RX MED GY IP 250 OP 250 PS 637: Mod: GY | Performed by: PHYSICIAN ASSISTANT

## 2017-04-03 PROCEDURE — 25000132 ZZH RX MED GY IP 250 OP 250 PS 637: Mod: GY | Performed by: INTERNAL MEDICINE

## 2017-04-03 PROCEDURE — 85610 PROTHROMBIN TIME: CPT | Performed by: INTERNAL MEDICINE

## 2017-04-03 PROCEDURE — 83735 ASSAY OF MAGNESIUM: CPT | Performed by: INTERNAL MEDICINE

## 2017-04-03 PROCEDURE — 80076 HEPATIC FUNCTION PANEL: CPT | Performed by: INTERNAL MEDICINE

## 2017-04-03 PROCEDURE — 40000133 ZZH STATISTIC OT WARD VISIT: Performed by: OCCUPATIONAL THERAPIST

## 2017-04-03 PROCEDURE — 84100 ASSAY OF PHOSPHORUS: CPT | Performed by: INTERNAL MEDICINE

## 2017-04-03 RX ORDER — CALCIUM CARBONATE 500(1250)
1250 TABLET ORAL 2 TIMES DAILY WITH MEALS
Status: DISCONTINUED | OUTPATIENT
Start: 2017-04-03 | End: 2017-04-04 | Stop reason: HOSPADM

## 2017-04-03 RX ORDER — BENZONATATE 100 MG/1
100 CAPSULE ORAL 3 TIMES DAILY
Status: DISCONTINUED | OUTPATIENT
Start: 2017-04-03 | End: 2017-04-04 | Stop reason: HOSPADM

## 2017-04-03 RX ADMIN — HEPARIN SODIUM 5000 UNITS: 5000 INJECTION, SOLUTION INTRAVENOUS; SUBCUTANEOUS at 18:54

## 2017-04-03 RX ADMIN — AMLODIPINE BESYLATE 5 MG: 5 TABLET ORAL at 08:00

## 2017-04-03 RX ADMIN — CALCIUM 1250 MG: 500 TABLET ORAL at 10:55

## 2017-04-03 RX ADMIN — CARVEDILOL 6.25 MG: 6.25 TABLET, FILM COATED ORAL at 17:36

## 2017-04-03 RX ADMIN — VALACYCLOVIR HYDROCHLORIDE 500 MG: 500 TABLET, FILM COATED ORAL at 19:51

## 2017-04-03 RX ADMIN — BENZONATATE 100 MG: 100 CAPSULE, LIQUID FILLED ORAL at 19:51

## 2017-04-03 RX ADMIN — GUAIFENESIN AND CODEINE PHOSPHATE 5 ML: 100; 10 SOLUTION ORAL at 11:37

## 2017-04-03 RX ADMIN — HEPARIN SODIUM 5000 UNITS: 5000 INJECTION, SOLUTION INTRAVENOUS; SUBCUTANEOUS at 08:00

## 2017-04-03 RX ADMIN — CALCIUM 1250 MG: 500 TABLET ORAL at 18:54

## 2017-04-03 RX ADMIN — GUAIFENESIN AND CODEINE PHOSPHATE 5 ML: 100; 10 SOLUTION ORAL at 04:40

## 2017-04-03 RX ADMIN — TRAMADOL HYDROCHLORIDE 50 MG: 50 TABLET, COATED ORAL at 04:41

## 2017-04-03 RX ADMIN — BENZONATATE 100 MG: 100 CAPSULE, LIQUID FILLED ORAL at 13:55

## 2017-04-03 RX ADMIN — CARVEDILOL 6.25 MG: 6.25 TABLET, FILM COATED ORAL at 08:00

## 2017-04-03 RX ADMIN — VALACYCLOVIR HYDROCHLORIDE 500 MG: 500 TABLET, FILM COATED ORAL at 08:00

## 2017-04-03 RX ADMIN — VITAMIN D, TAB 1000IU (100/BT) 1000 UNITS: 25 TAB at 08:00

## 2017-04-03 RX ADMIN — PANTOPRAZOLE SODIUM 40 MG: 40 TABLET, DELAYED RELEASE ORAL at 08:00

## 2017-04-03 ASSESSMENT — PAIN DESCRIPTION - DESCRIPTORS: DESCRIPTORS: ACHING

## 2017-04-03 NOTE — PLAN OF CARE
Problem: Goal Outcome Summary  Goal: Goal Outcome Summary  Outcome: Improving  AVSS.  Sats on RA 88% this am.  Walking to the bathroom with no O2 and sats are ok.  Had her walk with therapy and do sats.  She got light headed with no O2 on.  Lightheadedness improved with oxygen on at 1L NC.  No pain, no nausea.  Frequent cough that is sounding looser but still not productive.  Tessalon pearls started and robitussin x1 given.  Heparin SQ for HX of clots.  And calcium restarted.  Eating and drinking ok.  Stool x1.  Will stay until she does not require O2.      Problem: Blood and Marrow Transplantation  Goal: Blood and Marrow Transplantation  Signs and symptoms of listed problems will be absent or manageable.   Outcome: Improving    04/03/17 1807   Blood and Marrow Transplantation   Blood and Marrow Transplantation Assessed all   Blood and Marrow Transplantation Present hypoxia/hypoxemia

## 2017-04-03 NOTE — PROGRESS NOTES
"BMT Daily Progress Note    Date of Service:4/3/2017    History of Present Illness:  Still coughing but no hemoptysis. Patient has parainfluenza by NP swab.Tmax=99.5. Denies n,v,d.  No bleeding or rash.     Review of Systems:  ROS otherwise unremarkable other than what is noted in the HPI.     Physical Exam:    /54 (BP Location: Right arm)  Pulse 83  Temp 98.7  F (37.1  C) (Axillary)  Resp 18  Ht 1.549 m (5' 1\")  Wt 97.7 kg (215 lb 6.2 oz)  SpO2 96%  BMI 40.7 kg/m2    General: A&O. NAD.   HEENT: CLARICE, sclera anicteric  Neck: supple  Lungs: crackles in bases bilaterally  Heart: RRR, no m/r/g  Abdomen: +BS, soft, NT/ND, no HSM  Extremities: No edema  Skin: no rashes or petechiae  Peripheral IV      Laboratory Data:  Lab Results   Component Value Date    WBC 6.4 04/03/2017    ANEU 2.8 04/03/2017    HGB 10.4 (L) 04/03/2017    HCT 34.8 (L) 04/03/2017     (L) 04/03/2017     04/03/2017    POTASSIUM 3.8 04/03/2017    CHLORIDE 108 04/03/2017    CO2 24 04/03/2017     (H) 04/03/2017    BUN 10 04/03/2017    CR 0.81 04/03/2017    MAG 1.8 04/03/2017    INR 1.03 04/03/2017       Assessment/Plan:  Assessment:    Ms. Eryn Bennett is a 61 yo woman s/p NMA DCBT for AML day +877 (2 yr post BMT) in CR presenting with increased cough and ongoing nausea.    Plan:    1. BMT/AML: two year post transplant BM 40% cellular, trilineage hematopoiesis, no leukemia by morphology and flow negative; % donor #2.       2. HEME: stable   - Keep Hgb>8g/dL and plts>10k. No bleeding.   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 Received rituximab X 4 September 2015.   -. DVT- Repeat doppler L lower ext negative on OCT 2016. Off coumadin since JUL 2016.      -Px: SQ heparin every 12 hours   3. ID: tmax last 24 hours=99.5. 3/31 and 4/1 BC are negative.  -Parainfluenza:  3/31 NP swab shows para influenza.Febrile upon presentation to the [facility] ED with cough and hypoxia .  Had treated with meropenem, " linezolid, and oral azithromycin  . for possible nosocomial pneumonia. Stopped with return of NP swab results with virus.  - Flu shot given on 10/5/16.   - Prophy valtrex for history of HSV oral lesions    4. GVH: No sign of GVH, n,v,d essentially resolved.Cancel outpatient scopes ordered  -Off prednisone since 4/20/16; finished MMF taper on 9/13/16  -Eczema less visible and being controlled with steroid cream PRN.       5. GI: Diarrhea has resolved and suspect viral gastroenteritis. 4/3 LFTs WNL.  -4/1 Repeat enteric panel and clostridium difficile PCR are negative   -prn zofran for nausea but overall having some nausea associated with coughing.      6. FEN/Renal: Creat wnl.  -Hypokalemia resolving with resolution of diarrhea.  K=3.8 today  -electrolyte replacement protocol       7. Cardiovascular: High cholesterol and triglycerides may use cholesterol medication. Continue norvasc for HTN. Will continue PTA carvedilol 6.25mg bid       8. Musculoskelatal:   -Persistent shoulder pain being followed by Orthopedics  - Dexa scan with osteopenia (5/5/15). Had bisphosphonate 10/5/16; repeat in ~12 months  - Continue Calcium and Vit D supplementation 2,800 units daily  - tramadol that was working better than hydrocodone/acetaminophen       9. Pulm: Pt hypoxic but today is 93-94% on room air while laying down.  CT chest negative for pulmonary embolism. Sleep apnea being managed, using CPAP. Has parainfluenza, treat symptoms.  Continue robitussin/guaf and add tessalon perles for cough today.   -(prn albuterol)   -          10. Indcidental breast nodule noted on CT; recommend mammogram f/u on non-urgent basis             ASHLEY Mojica  [phone]  Patient has been seen and evaluated by me. I have reviewed today's vital signs, medications, labs and imaging results independently. I have discussed the plan with the team and agree with the findings and plan in this note.      Admitted with fever and cough    PE:   Blood pressure  "104/54, pulse 83, temperature 98.7  F (37.1  C), temperature source Axillary, resp. rate 18, height 1.549 m (5' 1\"), weight 97.7 kg (215 lb 6.2 oz), SpO2 96 %.    HEENT: PERRL, EOMI, MMM  Chest: crackles over bases  CVS: S1 S2 RRR no murmurs or gallops  PA: soft, non tender  CNS: non focal    A/P:  Pulmonary: CT suggestive of infective process as above- RVP positive for parainfluenza- manage symptomatically- she is improving  H/o DVT  H/o hemolytic anemia  H/o GVHD      Gay Pizarro  "

## 2017-04-03 NOTE — PLAN OF CARE
Problem: Goal Outcome Summary  Goal: Goal Outcome Summary  Outcome: No Change  Pt Avss. On 2lpm NC sating mid 90s. Denies nausea. C/o chronic right hip pain, received prn Tramadol x1. Intermittent nonproductive cough, received prn Robitussin codeine x2 with relief. Cont with poc.     Problem: Blood and Marrow Transplantation  Goal: Blood and Marrow Transplantation  Signs and symptoms of listed problems will be absent or manageable.     04/03/17 0613   Blood and Marrow Transplantation   Blood and Marrow Transplantation Assessed all   Blood and Marrow Transplantation Present acute pain;hypoxia/hypoxemia

## 2017-04-03 NOTE — PLAN OF CARE
Problem: Goal Outcome Summary  Goal: Goal Outcome Summary  Outcome: No Change  VSS- T max 99.5.  Dry hacky cough-offered cough syrup but pt declined.  Slight sore throat.  Ice chips and popsicle given.  Eating and drinking ok.  States she was on Calium BID at home and would like this restarted.   Will continue to monitor.      Problem: Blood and Marrow Transplantation  Goal: Blood and Marrow Transplantation  Signs and symptoms of listed problems will be absent or manageable.   Outcome: No Change    04/02/17 2151   Blood and Marrow Transplantation   Blood and Marrow Transplantation Assessed all   Blood and Marrow Transplantation Present infection --> SIRS --> sepsis

## 2017-04-03 NOTE — PLAN OF CARE
Problem: Goal Outcome Summary  Goal: Goal Outcome Summary     5C: Recommend discharge home with daily (I) exercise. Pt issued, educated on, and completed BLE HEP. Pt able to complete functional mobility ~800ft with SBA-(I) and no AD. Pt's O2 on RA resting 92-95% (higher saturation after having been in bathroom without O2 which necessitated deeper breathing; lower if truly resting for several minutes); with mobility O2 sats 91-93% on RA. Pt did report some lightheadedness with O2 sats at 91% on RA - /84; placed 1L O2 on pt and pt reported relief after a few minutes. RN informed.

## 2017-04-04 VITALS
HEIGHT: 61 IN | WEIGHT: 215.83 LBS | SYSTOLIC BLOOD PRESSURE: 132 MMHG | OXYGEN SATURATION: 92 % | DIASTOLIC BLOOD PRESSURE: 57 MMHG | BODY MASS INDEX: 40.75 KG/M2 | TEMPERATURE: 97.6 F | HEART RATE: 87 BPM | RESPIRATION RATE: 16 BRPM

## 2017-04-04 LAB
ABO + RH BLD: NORMAL
ABO + RH BLD: NORMAL
ANION GAP SERPL CALCULATED.3IONS-SCNC: 7 MMOL/L (ref 3–14)
BACTERIA SPEC CULT: ABNORMAL
BASOPHILS # BLD AUTO: 0 10E9/L (ref 0–0.2)
BASOPHILS NFR BLD AUTO: 0.2 %
BLD GP AB SCN SERPL QL: NORMAL
BLOOD BANK CMNT PATIENT-IMP: NORMAL
BUN SERPL-MCNC: 13 MG/DL (ref 7–30)
CALCIUM SERPL-MCNC: 8.2 MG/DL (ref 8.5–10.1)
CHLORIDE SERPL-SCNC: 103 MMOL/L (ref 94–109)
CO2 SERPL-SCNC: 29 MMOL/L (ref 20–32)
CREAT SERPL-MCNC: 0.88 MG/DL (ref 0.52–1.04)
DIFFERENTIAL METHOD BLD: ABNORMAL
EOSINOPHIL # BLD AUTO: 0.3 10E9/L (ref 0–0.7)
EOSINOPHIL NFR BLD AUTO: 4.1 %
ERYTHROCYTE [DISTWIDTH] IN BLOOD BY AUTOMATED COUNT: 15.1 % (ref 10–15)
GFR SERPL CREATININE-BSD FRML MDRD: 66 ML/MIN/1.7M2
GLUCOSE SERPL-MCNC: 129 MG/DL (ref 70–99)
GRAM STN SPEC: ABNORMAL
HCT VFR BLD AUTO: 35 % (ref 35–47)
HGB BLD-MCNC: 10.6 G/DL (ref 11.7–15.7)
IMM GRANULOCYTES # BLD: 0 10E9/L (ref 0–0.4)
IMM GRANULOCYTES NFR BLD: 0.2 %
LYMPHOCYTES # BLD AUTO: 2.6 10E9/L (ref 0.8–5.3)
LYMPHOCYTES NFR BLD AUTO: 39.8 %
Lab: ABNORMAL
MCH RBC QN AUTO: 27 PG (ref 26.5–33)
MCHC RBC AUTO-ENTMCNC: 30.3 G/DL (ref 31.5–36.5)
MCV RBC AUTO: 89 FL (ref 78–100)
MICRO REPORT STATUS: ABNORMAL
MICRO REPORT STATUS: ABNORMAL
MONOCYTES # BLD AUTO: 0.7 10E9/L (ref 0–1.3)
MONOCYTES NFR BLD AUTO: 10.6 %
NEUTROPHILS # BLD AUTO: 3 10E9/L (ref 1.6–8.3)
NEUTROPHILS NFR BLD AUTO: 45.1 %
NRBC # BLD AUTO: 0 10*3/UL
NRBC BLD AUTO-RTO: 0 /100
PLATELET # BLD AUTO: 189 10E9/L (ref 150–450)
POTASSIUM SERPL-SCNC: 3.8 MMOL/L (ref 3.4–5.3)
RBC # BLD AUTO: 3.93 10E12/L (ref 3.8–5.2)
SODIUM SERPL-SCNC: 139 MMOL/L (ref 133–144)
SPECIMEN EXP DATE BLD: NORMAL
SPECIMEN SOURCE: ABNORMAL
SPECIMEN SOURCE: ABNORMAL
WBC # BLD AUTO: 6.6 10E9/L (ref 4–11)

## 2017-04-04 PROCEDURE — A9270 NON-COVERED ITEM OR SERVICE: HCPCS | Mod: GY | Performed by: PHYSICIAN ASSISTANT

## 2017-04-04 PROCEDURE — 25000132 ZZH RX MED GY IP 250 OP 250 PS 637: Mod: GY | Performed by: INTERNAL MEDICINE

## 2017-04-04 PROCEDURE — 25000132 ZZH RX MED GY IP 250 OP 250 PS 637: Mod: GY | Performed by: PHYSICIAN ASSISTANT

## 2017-04-04 PROCEDURE — A9270 NON-COVERED ITEM OR SERVICE: HCPCS | Mod: GY | Performed by: INTERNAL MEDICINE

## 2017-04-04 PROCEDURE — 80048 BASIC METABOLIC PNL TOTAL CA: CPT | Performed by: INTERNAL MEDICINE

## 2017-04-04 PROCEDURE — 36415 COLL VENOUS BLD VENIPUNCTURE: CPT | Performed by: INTERNAL MEDICINE

## 2017-04-04 PROCEDURE — 86850 RBC ANTIBODY SCREEN: CPT | Performed by: INTERNAL MEDICINE

## 2017-04-04 PROCEDURE — 87205 SMEAR GRAM STAIN: CPT | Performed by: PHYSICIAN ASSISTANT

## 2017-04-04 PROCEDURE — 86901 BLOOD TYPING SEROLOGIC RH(D): CPT | Performed by: INTERNAL MEDICINE

## 2017-04-04 PROCEDURE — 85025 COMPLETE CBC W/AUTO DIFF WBC: CPT | Performed by: INTERNAL MEDICINE

## 2017-04-04 PROCEDURE — 25000128 H RX IP 250 OP 636: Performed by: INTERNAL MEDICINE

## 2017-04-04 PROCEDURE — 86900 BLOOD TYPING SEROLOGIC ABO: CPT | Performed by: INTERNAL MEDICINE

## 2017-04-04 RX ORDER — PROCHLORPERAZINE MALEATE 5 MG
5-10 TABLET ORAL EVERY 6 HOURS PRN
Qty: 20 TABLET | Refills: 0 | Status: SHIPPED | OUTPATIENT
Start: 2017-04-04 | End: 2019-02-13

## 2017-04-04 RX ORDER — GUAIFENESIN AND CODEINE PHOSPHATE 100; 10 MG/5ML; MG/5ML
1-2 SOLUTION ORAL ONCE
Qty: 420 ML | Refills: 0 | Status: SHIPPED | OUTPATIENT
Start: 2017-04-04 | End: 2017-04-04

## 2017-04-04 RX ORDER — GUAIFENESIN AND CODEINE PHOSPHATE 100; 10 MG/5ML; MG/5ML
1-2 SOLUTION ORAL EVERY 4 HOURS PRN
Qty: 420 ML | Refills: 0 | Status: SHIPPED | OUTPATIENT
Start: 2017-04-04 | End: 2017-06-14

## 2017-04-04 RX ORDER — BENZONATATE 100 MG/1
100 CAPSULE ORAL 3 TIMES DAILY
Qty: 42 CAPSULE | Refills: 0 | Status: SHIPPED | OUTPATIENT
Start: 2017-04-04 | End: 2017-04-18

## 2017-04-04 RX ADMIN — BENZONATATE 100 MG: 100 CAPSULE, LIQUID FILLED ORAL at 08:09

## 2017-04-04 RX ADMIN — CALCIUM 1250 MG: 500 TABLET ORAL at 08:08

## 2017-04-04 RX ADMIN — AMLODIPINE BESYLATE 5 MG: 5 TABLET ORAL at 08:08

## 2017-04-04 RX ADMIN — VALACYCLOVIR HYDROCHLORIDE 500 MG: 500 TABLET, FILM COATED ORAL at 08:08

## 2017-04-04 RX ADMIN — HEPARIN SODIUM 5000 UNITS: 5000 INJECTION, SOLUTION INTRAVENOUS; SUBCUTANEOUS at 08:09

## 2017-04-04 RX ADMIN — CARVEDILOL 6.25 MG: 6.25 TABLET, FILM COATED ORAL at 08:09

## 2017-04-04 RX ADMIN — PANTOPRAZOLE SODIUM 40 MG: 40 TABLET, DELAYED RELEASE ORAL at 08:08

## 2017-04-04 RX ADMIN — VITAMIN D, TAB 1000IU (100/BT) 1000 UNITS: 25 TAB at 08:09

## 2017-04-04 NOTE — DISCHARGE SUMMARY
[facility]  Discharge Summary   Eryn Bennett MRN# [id number]   Age: 61 year old  YOB: 1955   Date of Admission: 3/31/2017  Date of Discharge:    Admitting Physician: Avtar Lambert MD  Discharge Physician: Dr. Gay Pizarro  Primary BMT physician:  Dr. Irving Ni   Discharge Diagnoses:    1. Parainfluenza Virus 3 pneumonia  2. Worsening cough and hypoxia, secondary to Parainfluenza 3  3. Fevers secondary to Parainfluenza Virus 3  4. S/P NMA DUCBT for AML on 11/6/2014  5. History of culture negative diarrhea, resolved  6. Poor appetite/nausea resolved on discharge  7. Hyperkalemia, resolved  8. History of hypertension, controlled with medication  Discharge Medications:       Eryn Bennett   Home Medication Instructions JUDD:[id number]    Printed on:04/04/17 0985   Medication Information                      acetaminophen (TYLENOL) 325 MG tablet  Take 1-2 tablets (325-650 mg) by mouth every 4 hours as needed for mild pain or fever             amLODIPine (NORVASC) 5 MG tablet  Take 1 tablet (5 mg) by mouth daily             benzonatate (TESSALON) 100 MG capsule  Take 1 capsule (100 mg) by mouth 3 times daily for 14 days             calcium-vitamin D 500-125 MG-UNIT TABS  Take by mouth 2 times daily             carvedilol (COREG) 6.25 MG tablet  Take 1 tablet (6.25 mg) by mouth 2 times daily (with meals)             desonide (DESOWEN) 0.05 % cream  Apply sparingly to affected area three times daily as needed.             guaiFENesin-codeine (ROBITUSSIN AC) 100-10 MG/5ML SOLN  Take 1-2 tsp. by mouth every 4 hours as needed for cough             pantoprazole (PROTONIX) 40 MG EC tablet  Take 1 tablet (40 mg) by mouth daily             prochlorperazine (COMPAZINE) 5 MG tablet  Take 1-2 tablets (5-10 mg) by mouth every 6 hours as needed for nausea or vomiting             traMADol (ULTRAM) 50 MG tablet  Take 1 tablet (50 mg) by mouth every 6 hours as needed for moderate pain              valACYclovir (VALTREX) 500 MG tablet  Take 1 tablet (500 mg) by mouth 2 times daily             VITAMIN D, CHOLECALCIFEROL, PO  Take 1,000 Units by mouth daily                ROUTINE IP LABS (Last four results)  BMP  Recent Labs  Lab 04/04/17  0330 04/03/17  0300 04/02/17  1323 04/02/17  0547 04/01/17  0541    142  --  140 137   POTASSIUM 3.8 3.8 3.8 3.3* 3.5   CHLORIDE 103 108  --  106 106   SARAH 8.2* 7.9*  --  7.9* 8.2*   CO2 29 24  --  26 22   BUN 13 10  --  9 11   CR 0.88 0.81  --  0.79 0.89   * 111*  --  96 118*     CBC  Recent Labs  Lab 04/04/17  0330 04/03/17  0300 04/02/17  0547 04/01/17  0541   WBC 6.6 6.4 7.0 9.6   RBC 3.93 3.88 3.88 4.00   HGB 10.6* 10.4* 10.6* 10.8*   HCT 35.0 34.8* 34.4* 35.1   MCV 89 90 89 88   MCH 27.0 26.8 27.3 27.0   MCHC 30.3* 29.9* 30.8* 30.8*   RDW 15.1* 15.0 15.3* 15.3*    140* 137* 167     INR  Recent Labs  Lab 04/03/17  0300 03/31/17  2155   INR 1.03 1.03     IMAGING:  CT Chest 4/1:No pulmonary embolism demonstrated. Left lower lobe micronodularity with bronchial wall thickening, new airspace nodule right middle lobe. Findings favored to be due to infective etiology, differential includes aspiration or inflammatory etiology. Other small pulmonary nodules are stable, since the prior exam when they were noted to be stable since 1/3/2014. Small right breast upper inner quadrant nodule is similar to prior  and may be correlated with a mammogram on a nonemergent basis.    Brief History of Illness:    **Adopted from H&P  Eryn Bennett is a 59 yo woman who is s/p NMA DCBT for AML day +877 (2 yr post BMT) in CR with KPS 90% presented to [facility] ED with increased cough and shortness of breath. The patient started with loose stools (diarrhea) over a week ago but over the last 2 days prior to admission she has yet to have a bowel movement. The patient subsequently developed a non productive cough with vomiting and fever of 101F at home. On the day of admission  "the patient continued to be nauseated with a  poor appetite. In the ED, the patient was given IV linezolid, IV meropenem, and oral azithromycin for possible pneumonia. The patient was also sent to radiology for CTPE given positive d dimer. Patient was admitted to work up nausea, diarrhea, cough and hypoxia.     Physical Exam:    /57  Pulse 87  Temp 97.6  F (36.4  C) (Axillary)  Resp 16  Ht 1.549 m (5' 1\")  Wt 97.9 kg (215 lb 13.3 oz)  SpO2 92%  BMI 40.78 kg/m2  General: A&O. NAD.   HEENT: CLARICE, sclera anicteric  Neck: supple  Lungs: crackles in bases of right, but left lung sounds better  Heart: RRR, no m/r/g  Abdomen: +BS, soft, NT/ND, no HSM  Extremities: No edema  Skin: no rashes or petechiae  Peripheral IV  Hospital Course:    Ms. Eryn Bennett is a 61 yo woman s/p NMA DCBT for AML day +878 (2 yr post BMT) in CR presented with increased cough, hypoxia, nausea.     1. BMT/AML: two year post transplant BM 40% cellular, trilineage hematopoiesis, no leukemia by morphology and flow negative; % donor #2.   2. HEME: Platelets had dropped with viral illness but are better today at 189.She did not require any transfusions.  Transfusion parameters were to keep Hgb>8g/dL and plts>10k. No bleeding.   Patient has history of Hemolytic anemia: Warm anti E; IgG and completment. Received rituximab X 4 September 2015.Hgb was stable during admission.   Patient has history DVT but her repeat doppler L lower ext negative on OCT 2016. Off coumadin since JUL 2016. During this admission as she was less mobile with viral illness she was given SQ heparin every 12 hours which was stopped on discharge.   3. ID:  On admission in the ED Eryn had a low grade temperature to 100.3. She had one other fever spike to 101 while inpatient but otherwise temperatures were 98-99. Her tmax prior to discharge was 98.  She was treated with  meropenem, linezolid, and oral azithromycin for possible nosocomial pneumonia, started in the " ED.  Antibiotics were stopped with return of Naso pharyngeal swab results with virus.  Eryn was found to have Parainfluenza: 3/31 NP swab shows parainfluenza virus. Her treatment for virus is symptomatic support. Flu shot given on 10/5/16.   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  She had also had diarrhea but it stopped 2 days prior to admission with no stool.  She did not have diarrhea during her admission.  She was admitted with nausea/decreased appetite with differential diagnosis of GI gastroenteritis as sx resolved during admission.  No signs of GVH. On 4/3 after discussion her outpatient scopes, communicated with outpatient NC were cancelled..  -Off prednisone since 4/20/16; finished MMF taper on 9/13/16  -Eczema less visible and being controlled with steroid cream PRN.       5. GI: Diarrhea and nausea resolved during admission. Again  suspect viral gastroenteritis. 4/3 LFTs WNL. She did have 4/1 repeat enteric panel and clostridium difficile PCR are negative.  She was eating better with improvement in viral illness. She did have some occasional gagging/nausea but after coughing spasms.  She was sent home with some compazine.         6. FEN/Renal: Creat remained wnl.Creat on discharge=0.8. She had some hypokalemia resolving with resolution of diarrhea. K=3.8 on discharge  -Her electrolytes were replaced per protocol       7. Cardiovascular: High cholesterol and triglycerides may use cholesterol medication. Continue norvasc for HTN. Will continue PTA carvedilol 6.25mg bid.  Some dizziness when she sat up but orthostatic blood pressures did not show drop in BP/increase HR with sitting or standing.  Dizziness resolved on discharge.      8. Musculoskelatal:   -Persistent shoulder pain being followed by Orthopedics. No complaints during the last 2 days of admission.  - Dexa scan with osteopenia (5/5/15). Had  bisphosphonate 10/5/16; repeat in ~12 months  - Continue Calcium and Vit D supplementation 2,800 units daily  - tramadol that was working better than hydrocodone/acetaminophen       9. Pulm:   Admitted with hypoxia and changes on CT scan as above. Hypoxia improved top resolved during admission. Today on discharge saturation were 91-92% on room air while laying down. She was walked 4/3/2017 and sats>91%. CT chest negative for pulmonary embolism. Sleep apnea  managed, using CPAP. Has parainfluenza, treat symptoms. Continue robitussin with codeine and tessalon perles for cough today. She declined albuterol on discharge.      10. Indcidental breast nodule noted on CT; recommend mammogram f/u on non-urgent basis.  Notified primary BMT physician.  CODE STATUS:   FULL  Discharge Instructions and Follow-Up:    Discharge diet: Regular diet as tolerated  Discharge activity: Activity as tolerated   Discharge follow-up: Follow up with BMT Clinic as follows:  1. BMT Clinic: 4/7/2017: Check in at 09:30 am for labs and see provider at 10:00  2. BMT Clinic: 5/10/2017: Check in at 10:30 for labs and see Dr. Ni at 11:00     Discharge Disposition:    Discharged to home.    Sara Drew PA-C  Pager: [phone]  Blood and Marrow Transplant  April 4, 2017    Patient has been seen and evaluated by me. I have reviewed today's vital signs, medications, labs and imaging results independently. I have discussed the plan with the team and agree with the findings and plan in this note.    Gay Pizarro MD

## 2017-04-04 NOTE — INTERIM SUMMARY
Eryn eBnnett   Home Medication Instructions JUDD:[id number]    Printed on:04/04/17 105   Medication Information                      acetaminophen (TYLENOL) 325 MG tablet  Take 1-2 tablets (325-650 mg) by mouth every 4 hours as needed for mild pain or fever             amLODIPine (NORVASC) 5 MG tablet  Take 1 tablet (5 mg) by mouth daily             benzonatate (TESSALON) 100 MG capsule  Take 1 capsule (100 mg) by mouth 3 times daily for 14 days for cough             calcium-vitamin D 500-125 MG-UNIT TABS  Take by mouth 2 times daily             carvedilol (COREG) 6.25 MG tablet  Take 1 tablet (6.25 mg) by mouth 2 times daily (with meals)             desonide (DESOWEN) 0.05 % cream  Apply sparingly to affected area three times daily as needed.             guaiFENesin-codeine (ROBITUSSIN AC) 100-10 MG/5ML SOLN  Take 1-2 tsp. by mouth once for 1 dose for cough             pantoprazole (PROTONIX) 40 MG EC tablet  Take 1 tablet (40 mg) by mouth daily             prochlorperazine (COMPAZINE) 5 MG tablet  Take 1-2 tablets (5-10 mg) by mouth every 6 hours as needed for nausea or vomiting             traMADol (ULTRAM) 50 MG tablet  Take 1 tablet (50 mg) by mouth every 6 hours as needed for moderate pain             valACYclovir (VALTREX) 500 MG tablet  Take 1 tablet (500 mg) by mouth 2 times daily             VITAMIN D, CHOLECALCIFEROL, PO  Take 1,000 Units by mouth daily                Stop Empiric flagyl given in clinic 3/23/2017.  Stool testing is negative for c. diff

## 2017-04-04 NOTE — PROGRESS NOTES
"BMT Daily Progress Note    Date of Service:4/4/2017    History of Present Illness: Eryn is still coughing, coughed a sputum specimen that was an unsatisfatory sample.She has not had fevers.  No hemoptysis but at times coughs so much she gags/has nausea. No diarrhea. No abdominal pain.  She was walked yesterday and her sats were 91-93%. On Room air this am her sats were 91-92%. No other bleeding.  No rash.  She would like to go home.    Review of Systems:  ROS otherwise unremarkable other than what is noted in the HPI.     Physical Exam:    /57  Pulse 87  Temp 97.6  F (36.4  C) (Axillary)  Resp 16  Ht 1.549 m (5' 1\")  Wt 97.9 kg (215 lb 13.3 oz)  SpO2 92%  BMI 40.78 kg/m2    General: A&O. NAD.   HEENT: CLARICE, sclera anicteric  Neck: supple  Lungs: crackles in bases of right, but left sounds better  Heart: RRR, no m/r/g  Abdomen: +BS, soft, NT/ND, no HSM  Extremities: No edema  Skin: no rashes or petechiae  Peripheral IV      Laboratory Data:  Lab Results   Component Value Date    WBC 6.6 04/04/2017    ANEU 3.0 04/04/2017    HGB 10.6 (L) 04/04/2017    HCT 35.0 04/04/2017     04/04/2017     04/04/2017    POTASSIUM 3.8 04/04/2017    CHLORIDE 103 04/04/2017    CO2 29 04/04/2017     (H) 04/04/2017    BUN 13 04/04/2017    CR 0.88 04/04/2017    MAG 1.8 04/03/2017    INR 1.03 04/03/2017       Assessment/Plan:  Assessment:    Ms. Eryn Bennett is a 61 yo woman s/p NMA DCBT for AML day +878 (2 yr post BMT) in [place] presenting with increased cough and ongoing nausea.    Plan:    1. BMT/AML: two year post transplant BM 40% cellular, trilineage hematopoiesis, no leukemia by morphology and flow negative; % donor #2.       2. HEME: Platelets had dropped with viral illness but are better today at 189.   - Keep Hgb>8g/dL and plts>10k. No bleeding.   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 Received rituximab X 4 September 2015.   -. DVT- Repeat doppler L lower ext negative on " OCT 2016. Off coumadin since JUL 2016.      -Px: SQ heparin every 12 hours, stopped on discharge.   3. ID: tmax last 24 hours=98.8. 3/31 and 4/1 BC are negative.  -Parainfluenza:  3/31 NP swab shows para influenza.Febrile upon presentation to the [facility] ED with cough and hypoxia .  Had treated with meropenem, linezolid, and oral azithromycin  . for possible nosocomial pneumonia. Stopped with return of NP swab results with virus.  - Flu shot given on 10/5/16.   - Prophy valtrex for history of HSV oral lesions    4. GVH: No sign of GVH, n,v,d essentially resolved.Cancel outpatient scopes, communicated with outpatient NC to cancel.  -Off prednisone since 4/20/16; finished MMF taper on 9/13/16  -Eczema less visible and being controlled with steroid cream PRN.       5. GI: Diarrhea has resolved and suspect viral gastroenteritis. 4/3 LFTs WNL.  -4/1 Repeat enteric panel and clostridium difficile PCR are negative   -prn compazine for nausea but overall having some nausea associated with coughing.      6. FEN/Renal: Creat wnl.  -Hypokalemia resolving with resolution of diarrhea.  K=3.8 today  -electrolyte replacement protocol       7. Cardiovascular: High cholesterol and triglycerides may use cholesterol medication. Continue norvasc for HTN. Will continue PTA carvedilol 6.25mg bid       8. Musculoskelatal:   -Persistent shoulder pain being followed by Orthopedics.  No complaints during the last 2 days of admission.  - Dexa scan with osteopenia (5/5/15). Had bisphosphonate 10/5/16; repeat in ~12 months  - Continue Calcium and Vit D supplementation 2,800 units daily  - tramadol that was working better than hydrocodone/acetaminophen       9. Pulm: Hypoxia improving, today is 91-92% on room air while laying down.  She was walked yesterday and sats>91%.  CT chest negative for pulmonary embolism. Sleep apnea being managed, using CPAP. Has parainfluenza, treat symptoms.  Continue robitussin with codeine and  tessalon perles for  "cough today. She declined albuterol on discharge.         10. Indcidental breast nodule noted on CT; recommend mammogram f/u on non-urgent basis           Dispo: discharge today.  She said she did not need any meds refilled that she is currently on.  She just received cheritussin and tessalone perles from her other physician.  She will follow up on Friday with BMT clinic, sooner if she worsens.  Cough will hang around for quite some time.  Sara Drew, PA_C  [phone]  Patient has been seen and evaluated by me. I have reviewed today's vital signs, medications, labs and imaging results independently. I have discussed the plan with the team and agree with the findings and plan in this note.      Fever resolved, cough improved    PE:   Blood pressure 132/57, pulse 87, temperature 97.6  F (36.4  C), temperature source Axillary, resp. rate 16, height 1.549 m (5' 1\"), weight 97.9 kg (215 lb 13.3 oz), SpO2 92 %.    HEENT: PERRL, EOMI, MMM  Chest: crackles over bases  CVS: S1 S2 RRR no murmurs or gallops  PA: soft, non tender  CNS: non focal    A/P:  Pulmonary: CT suggestive of infective process as above- RVP positive for parainfluenza- managed symptomatically- she is improved  H/o DVT  H/o hemolytic anemia  H/o GVHD    We will discharge to home today      Gay Pizarro  "

## 2017-04-04 NOTE — PLAN OF CARE
Problem: Goal Outcome Summary  Goal: Goal Outcome Summary  Outcome: No Change  Pt on 1lpm NC sating mid 90s. Ongoing dry cough, was able to cough up small phlegm this morning-sent for sputum Cx. IS at bedside and encouraged to use while awake. Denies pain or nausea. Offers no complaints. Cont with poc.     Problem: Blood and Marrow Transplantation  Goal: Blood and Marrow Transplantation  Signs and symptoms of listed problems will be absent or manageable.     04/04/17 0556   Blood and Marrow Transplantation   Blood and Marrow Transplantation Assessed all   Blood and Marrow Transplantation Present hypoxia/hypoxemia

## 2017-04-04 NOTE — INTERIM SUMMARY
BMT Summary of Care        April 4, 2017 10:52 AM  Eryn Bennett  MRN: [id number]    Discharge Date:  4/4/2017       BMT Primary Physician: Dr. Ni    BMT Nurse Coordinator: Natacha Prather    Discharge Diagnosis:  1. Parainfluenza    Discharge To: Home    Activity: As tolerated      Nutrition: Regular diet as tolerated    Blood Transfusions:  Transfuse if Hemoglobin < or equal 8 mg/dL  Red Blood Cell Order: 2 units, irradiated and leukoreduced   Transfuse if Platelet count < or equal 10 uL  Platelet order: 1 adult dose, irradiated and leukoreduced    Laboratory Tests:  At next clinic appointment (date: 4/7/2017)  Hemogram (CBC) differential, platelet count  Comprehensive Metabolic Panel    Support Services:  None    Appointments:   BMT Clinic (date, time, provider):     1.  BMT Clinic: 4/7/2017: Check in at 09:30 am for labs and see provider at 10:00  2.  BMT Clinic: 5/10/2017: Check in at 10:30 for labs and see provider at 11:00     Sara Drew PA-C

## 2017-04-04 NOTE — PROGRESS NOTES
"Discharge SBAR:    Situation:  Eryn Bennett is a 61 year old being discharged to: Home  Admission reason: Shortness of Breath/parainfluenza B  Is this a readmission? Yes    Background:  Primary diagnosis: AML  /57  Pulse 87  Temp 97.6  F (36.4  C) (Axillary)  Resp 16  Ht 1.549 m (5' 1\")  Wt 97.9 kg (215 lb 13.3 oz)  SpO2 92%  BMI 40.78 kg/m2  Type of donor: Allogeneic  Type of stem cells: cords  Relapsed? No  Falls Precautions? No  Isolation? Yes droplet, contact  DNR? No  DNI? No  Confidential Patient? No  Positive blood cultures? No    Assessment:  Discharge teaching    Review discharge medications and schedule: Yes    Set up pill box: No    Discharge instructions reviewed: Yes    Special considerations (think about previous reactions, issues with flushing CVC, premedication needs, etc): No    Patient Concerns: No    Recommendations:  Anticipated needs:    Daily infusions: No    Daily transfusions: No    G-CSF: No    Other: Not Applicable  Verbal report called to clinic: No      "

## 2017-04-04 NOTE — PLAN OF CARE
Problem: Goal Outcome Summary  Goal: Goal Outcome Summary  Occupational Therapy Discharge Summary     Reason for therapy discharge:    Discharged to home.     Progress towards therapy goal(s). See goals on Care Plan in [facility] electronic health record for goal details.  Goals partially met.  Barriers to achieving goals:   discharge from facility.     Therapy recommendation(s):    Continue home exercise program.

## 2017-04-07 ENCOUNTER — ONCOLOGY VISIT (OUTPATIENT)
Dept: TRANSPLANT | Facility: CLINIC | Age: 62
End: 2017-04-07
Attending: INTERNAL MEDICINE
Payer: MEDICARE

## 2017-04-07 ENCOUNTER — APPOINTMENT (OUTPATIENT)
Dept: LAB | Facility: CLINIC | Age: 62
End: 2017-04-07
Attending: STUDENT IN AN ORGANIZED HEALTH CARE EDUCATION/TRAINING PROGRAM
Payer: MEDICARE

## 2017-04-07 VITALS
TEMPERATURE: 98 F | SYSTOLIC BLOOD PRESSURE: 104 MMHG | BODY MASS INDEX: 40.91 KG/M2 | WEIGHT: 216.5 LBS | OXYGEN SATURATION: 94 % | HEART RATE: 98 BPM | DIASTOLIC BLOOD PRESSURE: 70 MMHG

## 2017-04-07 DIAGNOSIS — C92.01 ACUTE MYELOID LEUKEMIA IN REMISSION (H): ICD-10-CM

## 2017-04-07 DIAGNOSIS — C92.00 AML (ACUTE MYELOGENOUS LEUKEMIA) (H): Primary | ICD-10-CM

## 2017-04-07 LAB
ALBUMIN SERPL-MCNC: 3.2 G/DL (ref 3.4–5)
ALP SERPL-CCNC: 76 U/L (ref 40–150)
ALT SERPL W P-5'-P-CCNC: 28 U/L (ref 0–50)
ANION GAP SERPL CALCULATED.3IONS-SCNC: 4 MMOL/L (ref 3–14)
AST SERPL W P-5'-P-CCNC: 21 U/L (ref 0–45)
BACTERIA SPEC CULT: NO GROWTH
BACTERIA SPEC CULT: NO GROWTH
BASOPHILS # BLD AUTO: 0 10E9/L (ref 0–0.2)
BASOPHILS NFR BLD AUTO: 0.4 %
BILIRUB SERPL-MCNC: 0.5 MG/DL (ref 0.2–1.3)
BUN SERPL-MCNC: 19 MG/DL (ref 7–30)
CALCIUM SERPL-MCNC: 9.9 MG/DL (ref 8.5–10.1)
CHLORIDE SERPL-SCNC: 101 MMOL/L (ref 94–109)
CO2 SERPL-SCNC: 34 MMOL/L (ref 20–32)
CREAT SERPL-MCNC: 0.98 MG/DL (ref 0.52–1.04)
DIFFERENTIAL METHOD BLD: ABNORMAL
EOSINOPHIL # BLD AUTO: 0.2 10E9/L (ref 0–0.7)
EOSINOPHIL NFR BLD AUTO: 3.4 %
ERYTHROCYTE [DISTWIDTH] IN BLOOD BY AUTOMATED COUNT: 14.7 % (ref 10–15)
GFR SERPL CREATININE-BSD FRML MDRD: 58 ML/MIN/1.7M2
GLUCOSE SERPL-MCNC: 139 MG/DL (ref 70–99)
HCT VFR BLD AUTO: 39.2 % (ref 35–47)
HGB BLD-MCNC: 12 G/DL (ref 11.7–15.7)
IMM GRANULOCYTES # BLD: 0 10E9/L (ref 0–0.4)
IMM GRANULOCYTES NFR BLD: 0.3 %
LYMPHOCYTES # BLD AUTO: 2.7 10E9/L (ref 0.8–5.3)
LYMPHOCYTES NFR BLD AUTO: 37.7 %
MCH RBC QN AUTO: 27.3 PG (ref 26.5–33)
MCHC RBC AUTO-ENTMCNC: 30.6 G/DL (ref 31.5–36.5)
MCV RBC AUTO: 89 FL (ref 78–100)
MICRO REPORT STATUS: NORMAL
MICRO REPORT STATUS: NORMAL
MONOCYTES # BLD AUTO: 0.7 10E9/L (ref 0–1.3)
MONOCYTES NFR BLD AUTO: 10.3 %
NEUTROPHILS # BLD AUTO: 3.4 10E9/L (ref 1.6–8.3)
NEUTROPHILS NFR BLD AUTO: 47.9 %
NRBC # BLD AUTO: 0 10*3/UL
NRBC BLD AUTO-RTO: 0 /100
PLATELET # BLD AUTO: 246 10E9/L (ref 150–450)
POTASSIUM SERPL-SCNC: 4.8 MMOL/L (ref 3.4–5.3)
PROT SERPL-MCNC: 7.1 G/DL (ref 6.8–8.8)
RBC # BLD AUTO: 4.4 10E12/L (ref 3.8–5.2)
SODIUM SERPL-SCNC: 138 MMOL/L (ref 133–144)
SPECIMEN SOURCE: NORMAL
SPECIMEN SOURCE: NORMAL
WBC # BLD AUTO: 7.1 10E9/L (ref 4–11)

## 2017-04-07 PROCEDURE — 99211 OFF/OP EST MAY X REQ PHY/QHP: CPT | Mod: ZF

## 2017-04-07 PROCEDURE — 40000141 ZZH STATISTIC PERIPHERAL IV START W/O US GUIDANCE

## 2017-04-07 PROCEDURE — 25000128 H RX IP 250 OP 636: Mod: ZF | Performed by: STUDENT IN AN ORGANIZED HEALTH CARE EDUCATION/TRAINING PROGRAM

## 2017-04-07 PROCEDURE — 85025 COMPLETE CBC W/AUTO DIFF WBC: CPT | Performed by: PHYSICIAN ASSISTANT

## 2017-04-07 PROCEDURE — 96360 HYDRATION IV INFUSION INIT: CPT

## 2017-04-07 PROCEDURE — 80053 COMPREHEN METABOLIC PANEL: CPT | Performed by: PHYSICIAN ASSISTANT

## 2017-04-07 RX ADMIN — SODIUM CHLORIDE 1000 ML: 9 INJECTION, SOLUTION INTRAVENOUS at 10:35

## 2017-04-07 ASSESSMENT — PAIN SCALES - GENERAL: PAINLEVEL: NO PAIN (0)

## 2017-04-07 NOTE — MR AVS SNAPSHOT
After Visit Summary   4/7/2017    Eryn Bennett    MRN: [id number]           Patient Information     Date Of Birth          1955        Visit Information        Provider Department      4/7/2017 11:00 AM  5 ATC; UC BMT INFUSION [facility] Blood and Marrow Transplant        Today's Diagnoses     AML (acute myelogenous leukemia) (H)    -  1          [facility] ([facility])  [address] [place] [address]  Phone: [phone]  Clinic Hours:   Monday-Friday:    8am to 4:30pm   Weekends and holidays:    8am to noon (in general)  If your fever is 100.5  or greater,   call the clinic.  After hours call the   hospital at [phone] or   [phone]. Ask for the BMT   fellow for pediatric or adult patients            Follow-ups after your visit        Your next 10 appointments already scheduled     Apr 14, 2017 11:30 AM CDT   Masonic Lab Draw with  MASONIC LAB DRAW   [facility] Masonic Lab Draw ([facility])    [address] [place] [id number] [phone]            Apr 14, 2017 12:00 PM CDT   Return with Irving Ni MD   [facility] Blood and Marrow Transplant ([facility])    [address] [place] [id number] [phone]            May 10, 2017 10:30 AM CDT   Masonic Lab Draw with  MASONIC LAB DRAW   [facility] Masonic Lab Draw ([facility])    [address] [place] [address]   [phone]            May 10, 2017 11:00 AM CDT   Return with Irving Ni MD   [facility] Blood and Marrow Transplant ([facility])    [address] [place] [address]   [phone]            Jun 26, 2017  1:00 PM CDT   (Arrive by 12:45 PM)   Return Visit with Renny Tompkins MD   [facility] Sports Medicine ([facility])    [address] [place] [address]  Se  5th Floor  [facility] [address] [phone]              Who to contact     If you have questions or need follow up information about today's clinic visit or your schedule please contact [facility] BLOOD AND MARROW TRANSPLANT directly at [phone].  Normal or non-critical lab and imaging results will be communicated to you by MyChart, letter or phone within 4 business days after the clinic has received the results. If you do not hear from us within 7 days, please contact the clinic through Cybersourcehart or phone. If you have a critical or abnormal lab result, we will notify you by phone as soon as possible.  Submit refill requests through Nubity or call your pharmacy and they will forward the refill request to us. Please allow 3 business days for your refill to be completed.          Additional Information About Your Visit        Cybersourcehart Information     Nubity gives you secure access to your electronic health record. If you see a primary care provider, you can also send messages to your care team and make appointments. If you have questions, please call your primary care clinic.  If you do not have a primary care provider, please call [phone] and they will assist you.        Care EveryWhere ID     This is your Care EveryWhere ID. This could be used by other organizations to access your [place] medical records  [id number]         Blood Pressure from Last 3 Encounters:   04/07/17 104/70   04/04/17 132/57   03/23/17 131/80    Weight from Last 3 Encounters:   04/07/17 98.2 kg (216 lb 8 oz)   04/04/17 97.9 kg (215 lb 13.3 oz)   03/21/17 100.2 kg (221 lb)              Today, you had the following     No orders found for display       Recent Review Flowsheet Data     BMT Recent Results Latest Ref Rng & Units 4/1/2017 4/1/2017 4/2/2017 4/2/2017 4/3/2017 4/4/2017 4/7/2017    WBC 4.0 - 11.0 10e9/L - 9.6 7.0 - 6.4 6.6 7.1    Hemoglobin 11.7 - 15.7 g/dL - 10.8(L) 10.6(L) - 10.4(L) 10.6(L) 12.0    Platelet Count  150 - 450 10e9/L - 167 137(L) - 140(L) 189 246    Neutrophils (Absolute) 1.6 - 8.3 10e9/L - 4.4 3.4 - 2.8 3.0 3.4    INR 0.86 - 1.14 - - - - 1.03 - -    Sodium 133 - 144 mmol/L - 137 140 - 142 139 138    Potassium 3.4 - 5.3 mmol/L - 3.5 3.3(L) 3.8 3.8 3.8 4.8    Chloride 94 - 109 mmol/L - 106 106 - 108 103 101    Glucose 70 - 99 mg/dL 118(H) 118(H) 96 - 111(H) 129(H) 139(H)    Urea Nitrogen 7 - 30 mg/dL - 11 9 - 10 13 19    Creatinine 0.52 - 1.04 mg/dL - 0.89 0.79 - 0.81 0.88 0.98    Calcium (Total) 8.5 - 10.1 mg/dL - 8.2(L) 7.9(L) - 7.9(L) 8.2(L) 9.9    Protein (Total) 6.8 - 8.8 g/dL - - - - 6.0(L) - 7.1    Albumin 3.4 - 5.0 g/dL - - - - 2.8(L) - 3.2(L)    Bilirubin (Direct) 0.0 - 0.2 mg/dL - - - - <0.1 - -    Alkaline Phosphatase 40 - 150 U/L - - - - 70 - 76    AST 0 - 45 U/L - - - - 9 - 21    ALT 0 - 50 U/L - - - - 22 - 28    MCV 78 - 100 fl - 88 89 - 90 89 89               Primary Care Provider Office Phone # Fax #    Carmelo BAIG Janice [phone]       [address] [address]        Thank you!     Thank you for choosing [facility] BLOOD AND MARROW TRANSPLANT  for your care. Our goal is always to provide you with excellent care. Hearing back from our patients is one way we can continue to improve our services. Please take a few minutes to complete the written survey that you may receive in the mail after your visit with us. Thank you!             Your Updated Medication List - Protect others around you: Learn how to safely use, store and throw away your medicines at www.disposemymeds.org.          This list is accurate as of: 4/7/17 11:33 AM.  Always use your most recent med list.                   Brand Name Dispense Instructions for use    acetaminophen 325 MG tablet    TYLENOL    100 tablet    Take 1-2 tablets (325-650 mg) by mouth every 4 hours as needed for mild pain or fever       amLODIPine 5 MG tablet    NORVASC    90 tablet    Take 1 tablet (5 mg) by mouth  daily       benzonatate 100 MG capsule    TESSALON    42 capsule    Take 1 capsule (100 mg) by mouth 3 times daily for 14 days       calcium-vitamin D 500-125 MG-UNIT Tabs      Take by mouth 2 times daily       carvedilol 6.25 MG tablet    COREG    180 tablet    Take 1 tablet (6.25 mg) by mouth 2 times daily (with meals)       desonide 0.05 % cream    DESOWEN    60 g    Apply sparingly to affected area three times daily as needed.       guaiFENesin-codeine 100-10 MG/5ML Soln    ROBITUSSIN AC    420 mL    Take 1-2 tsp. by mouth every 4 hours as needed for cough       pantoprazole 40 MG EC tablet    PROTONIX    30 tablet    Take 1 tablet (40 mg) by mouth daily       prochlorperazine 5 MG tablet    COMPAZINE    20 tablet    Take 1-2 tablets (5-10 mg) by mouth every 6 hours as needed for nausea or vomiting       traMADol 50 MG tablet    ULTRAM    60 tablet    Take 1 tablet (50 mg) by mouth every 6 hours as needed for moderate pain       valACYclovir 500 MG tablet    VALTREX    60 tablet    Take 1 tablet (500 mg) by mouth 2 times daily       VITAMIN D (CHOLECALCIFEROL) PO      Take 1,000 Units by mouth daily

## 2017-04-07 NOTE — PROGRESS NOTES
Chief Complaint   Patient presents with     Infusion     Add on Fluids post BMT for AML     Infusion Nursing Note:  Eryn Bennett presents today for add on IV fluids.    Patient seen by provider today: Yes: Diana CARLISLE   present during visit today: Not Applicable.    Note: Pt received 1 liter of normal saline today.    Intravenous Access:  Peripheral IV placed.    Treatment Conditions:  Not Applicable.      Post Infusion Assessment:  Patient tolerated infusion without incident.    Discharge Plan:   Patient discharged in stable condition accompanied by: .    DIONICIO GHOTRA RN

## 2017-04-07 NOTE — MR AVS SNAPSHOT
After Visit Summary   4/7/2017    Eryn Bennett    MRN: [id number]           Patient Information     Date Of Birth          1955        Visit Information        Provider Department      4/7/2017 10:30 AM  BMT SHIRIN #4 [facility] Blood and Marrow Transplant        Today's Diagnoses     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)              [facility] ([facility])  [address] [place] [address]  Phone: [phone]  Clinic Hours:   Monday-Friday:    8am to 4:30pm   Weekends and holidays:    8am to noon (in general)  If your fever is 100.5  or greater,   call the clinic.  After hours call the   hospital at [phone] or   [phone]. Ask for the BMT   fellow for pediatric or adult patients           Care Instructions    Pt left/no instructions as 1238pm    Yessy        Follow-ups after your visit        Your next 10 appointments already scheduled     Apr 14, 2017 11:30 AM CDT   Masonic Lab Draw with  MASONIC LAB DRAW   [facility] Masonic Lab Draw ([facility])    [address] [place] [address]   [phone]            Apr 14, 2017 12:00 PM CDT   Return with Irving Ni MD   [facility] Blood and Marrow Transplant ([facility])    [address] [place] [address]   [phone]            May 10, 2017 10:30 AM CDT   Masonic Lab Draw with  MASONIC LAB DRAW   [facility] Masonic Lab Draw ([facility])    [address] [place] [address]   [phone]            May 10, 2017 11:00 AM CDT   Return with Irving Ni MD   [facility] Blood and Marrow Transplant ([facility])    [address] [place] [address]   [phone]            Jun 26, 2017  1:00 PM CDT   (Arrive by 12:45 PM)   Return Visit with Renny Tompkins MD   [facility] Sports Medicine   [facility])    [address] [facility]  5th Floor  [facility] [id number] [phone]              Future tests that were ordered for you today     Open Future Orders        Priority Expected Expires Ordered    CBC with platelets differential Routine 4/14/2017 4/26/2017 4/7/2017    Comprehensive metabolic panel Routine 4/14/2017 4/26/2017 4/7/2017            Who to contact     If you have questions or need follow up information about today's clinic visit or your schedule please contact [facility] BLOOD AND MARROW TRANSPLANT directly at [phone].  Normal or non-critical lab and imaging results will be communicated to you by Acid Labshart, letter or phone within 4 business days after the clinic has received the results. If you do not hear from us within 7 days, please contact the clinic through elicitt or phone. If you have a critical or abnormal lab result, we will notify you by phone as soon as possible.  Submit refill requests through DSTLD or call your pharmacy and they will forward the refill request to us. Please allow 3 business days for your refill to be completed.          Additional Information About Your Visit        MyChart Information     DSTLD gives you secure access to your electronic health record. If you see a primary care provider, you can also send messages to your care team and make appointments. If you have questions, please call your primary care clinic.  If you do not have a primary care provider, please call [phone] and they will assist you.        Care EveryWhere ID     This is your Care EveryWhere ID. This could be used by other organizations to access your [place] medical records  [id number]        Your Vitals Were     Pulse Temperature Pulse Oximetry BMI (Body Mass Index)          98 98  F (36.7  C) (Oral) 94% 40.91 kg/m2         Blood Pressure from Last 3 Encounters:   04/07/17 104/70   04/04/17 132/57   03/23/17 131/80    Weight from Last 3 Encounters:    04/07/17 98.2 kg (216 lb 8 oz)   04/04/17 97.9 kg (215 lb 13.3 oz)   03/21/17 100.2 kg (221 lb)              We Performed the Following     CBC with platelets differential     Comprehensive metabolic panel        Recent Review Flowsheet Data     BMT Recent Results Latest Ref Rng & Units 4/1/2017 4/1/2017 4/2/2017 4/2/2017 4/3/2017 4/4/2017 4/7/2017    WBC 4.0 - 11.0 10e9/L - 9.6 7.0 - 6.4 6.6 7.1    Hemoglobin 11.7 - 15.7 g/dL - 10.8(L) 10.6(L) - 10.4(L) 10.6(L) 12.0    Platelet Count 150 - 450 10e9/L - 167 137(L) - 140(L) 189 246    Neutrophils (Absolute) 1.6 - 8.3 10e9/L - 4.4 3.4 - 2.8 3.0 3.4    INR 0.86 - 1.14 - - - - 1.03 - -    Sodium 133 - 144 mmol/L - 137 140 - 142 139 138    Potassium 3.4 - 5.3 mmol/L - 3.5 3.3(L) 3.8 3.8 3.8 4.8    Chloride 94 - 109 mmol/L - 106 106 - 108 103 101    Glucose 70 - 99 mg/dL 118(H) 118(H) 96 - 111(H) 129(H) 139(H)    Urea Nitrogen 7 - 30 mg/dL - 11 9 - 10 13 19    Creatinine 0.52 - 1.04 mg/dL - 0.89 0.79 - 0.81 0.88 0.98    Calcium (Total) 8.5 - 10.1 mg/dL - 8.2(L) 7.9(L) - 7.9(L) 8.2(L) 9.9    Protein (Total) 6.8 - 8.8 g/dL - - - - 6.0(L) - 7.1    Albumin 3.4 - 5.0 g/dL - - - - 2.8(L) - 3.2(L)    Bilirubin (Direct) 0.0 - 0.2 mg/dL - - - - <0.1 - -    Alkaline Phosphatase 40 - 150 U/L - - - - 70 - 76    AST 0 - 45 U/L - - - - 9 - 21    ALT 0 - 50 U/L - - - - 22 - 28    MCV 78 - 100 fl - 88 89 - 90 89 89               Primary Care Provider Office Phone # Fax #    Carmelo Camacho [phone]       [address] [address]        Thank you!     Thank you for choosing [facility] BLOOD AND MARROW TRANSPLANT  for your care. Our goal is always to provide you with excellent care. Hearing back from our patients is one way we can continue to improve our services. Please take a few minutes to complete the written survey that you may receive in the mail after your visit with us. Thank you!             Your Updated Medication List -  Protect others around you: Learn how to safely use, store and throw away your medicines at www.disposemymeds.org.          This list is accurate as of: 4/7/17  3:57 PM.  Always use your most recent med list.                   Brand Name Dispense Instructions for use    acetaminophen 325 MG tablet    TYLENOL    100 tablet    Take 1-2 tablets (325-650 mg) by mouth every 4 hours as needed for mild pain or fever       amLODIPine 5 MG tablet    NORVASC    90 tablet    Take 1 tablet (5 mg) by mouth daily       benzonatate 100 MG capsule    TESSALON    42 capsule    Take 1 capsule (100 mg) by mouth 3 times daily for 14 days       calcium-vitamin D 500-125 MG-UNIT Tabs      Take by mouth 2 times daily       carvedilol 6.25 MG tablet    COREG    180 tablet    Take 1 tablet (6.25 mg) by mouth 2 times daily (with meals)       desonide 0.05 % cream    DESOWEN    60 g    Apply sparingly to affected area three times daily as needed.       guaiFENesin-codeine 100-10 MG/5ML Soln    ROBITUSSIN AC    420 mL    Take 1-2 tsp. by mouth every 4 hours as needed for cough       pantoprazole 40 MG EC tablet    PROTONIX    30 tablet    Take 1 tablet (40 mg) by mouth daily       prochlorperazine 5 MG tablet    COMPAZINE    20 tablet    Take 1-2 tablets (5-10 mg) by mouth every 6 hours as needed for nausea or vomiting       traMADol 50 MG tablet    ULTRAM    60 tablet    Take 1 tablet (50 mg) by mouth every 6 hours as needed for moderate pain       valACYclovir 500 MG tablet    VALTREX    60 tablet    Take 1 tablet (500 mg) by mouth 2 times daily       VITAMIN D (CHOLECALCIFEROL) PO      Take 1,000 Units by mouth daily

## 2017-04-07 NOTE — PROGRESS NOTES
BMT Clinic Progress Notes    Recently admitted with parainfluenza viral infection    History of Present Illness: Eryn returns after hospital discharge earlier this week. She reports a persistent cough. No increased sputum production. No increased SOB. Sleep is disturbed by frequent coughing. She denies hemoptysis. No coughing fits causing gaging or vomiting. She is having trouble sleeping and states she cannot use CPAP as it causes her throat to be irritated and cough more. She endorses some early satiety and an upset stomach that is not improved since the hospital. No diarrhea. Had been on Protonix in the past with improvement of similar symptoms. No other bleeding. No rash. She would like to go home. No fevers at home.     Review of Systems:  ROS otherwise unremarkable other than what is noted in the HPI.     Physical Exam  /70  Pulse 90  Temp 98  F (36.7  C) (Oral)  Wt 98.2 kg (216 lb 8 oz)  SpO2 94%  BMI 40.91 kg/m2  General: A&O. NAD.   HEENT: CLARICE, sclera anicteric  Neck: supple  Lungs: CTA bilaterally to bases, no crackles   Heart: RRR, no m/r/g  Abdomen: +BS, soft, NT/ND, no HSM  Extremities: No edema  Skin: no rashes or petechiae  Peripheral IV    Labs  CBC RESULTS:   Recent Labs   Lab Test  04/07/17   0932   WBC  7.1   RBC  4.40   HGB  12.0   HCT  39.2   MCV  89   MCH  27.3   MCHC  30.6*   RDW  14.7   PLT  246     Last Basic Metabolic Panel:  Lab Results   Component Value Date     04/07/2017      Lab Results   Component Value Date    POTASSIUM 4.8 04/07/2017     Lab Results   Component Value Date    CHLORIDE 101 04/07/2017     Lab Results   Component Value Date    SARAH 9.9 04/07/2017     Lab Results   Component Value Date    CO2 34 04/07/2017     Lab Results   Component Value Date    BUN 19 04/07/2017     Lab Results   Component Value Date    CR 0.98 04/07/2017     Lab Results   Component Value Date     04/07/2017       CCT from 3/31  IMPRESSION: 1. No pulmonary embolism  demonstrated. 2. Left lower lobe micronodularity with bronchial wall thickening, new airspace nodule right middle lobe. Findings favored to be due to infective etiology, differential includes aspiration or inflammatory etiology. Other small pulmonary nodules are stable, since the prior exam when they were noted to be stable since 1/3/2014. 3. Small right breast upper inner quadrant nodule is similar to prior  and may be correlated with a mammogram on a nonemergent basis.    Ms. Eryn Bennett is a 59 yo woman s/p NMA DCBT for AML day +883 (2 yr post BMT) in [place] presented with increased cough, hypoxia, nausea.      1. BMT/AML: two year post transplant BM 40% cellular, trilineage hematopoiesis, no leukemia by morphology and flow negative; % donor #2.     2. HEME: Transfusion parameters keep Hgb>8g/dL and plts>10k. No bleeding.   - Patient has history of Hemolytic anemia: Warm anti E; IgG and completment. Received rituximab X 4 September 2015.    Patient has history DVT but her repeat doppler L lower ext negative on OCT 2016. Off coumadin since JUL 2016. During this admission as she was less mobile with viral illness she was given SQ heparin every 12 hours which was stopped on discharge.     3. ID: afebrile, parainfluenza sx of cough and fatigue persistent, no new sx  - Last fevers on day of admission in the ED Eryn had a low grade temperature and +parainfluenza viral swab 3/31 tx with supportive measures. Prior to viral result she was treated with  meropenem, linezolid, and oral azithromycin for possible nosocomial pneumonia  - Flu shot given on 10/5/16.   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 GVH:   - Recent anorexia and mild nausea, diagnosis of GI gastroenteritis as sx resolved during admission. No signs of GVH, see GI plan below  - Off prednisone since 4/20/16; finished MMF taper on 9/13/16  - Eczema less visible and being controlled with steroid cream PRN.       5. GI: Mild  nausea, early satiety, some GI upset, resume protonix. No v/d  - Diarrhea and nausea resolved during admission, suspected viral gastroenteritis. 4/3 LFTs WNL. She did have 4/1 repeat enteric panel and clostridium difficile PCR are negative.   - Compazine available prn      6. FEN/Renal: WNL      7. Cardiovascular: High cholesterol and triglycerides may use cholesterol medication. HOLD norvasc (was on 5mg daily) for soft pressures and dizziness this morning at home. Not orthostatic here in clinic. Will continue PTA carvedilol 6.25mg bid      8. Musculoskelatal:   - Persistent shoulder pain being followed by Orthopedics. No complaints during the last 2 days of admission.  - Dexa scan with osteopenia (5/5/15). Had bisphosphonate 10/5/16; repeat in ~12 months  - Continue Calcium and Vit D supplementation 2,800 units daily  - tramadol that was working better than hydrocodone/acetaminophen       9. Pulm: +parainfluenza virus. Persistent cough. Using Robitussin with codeine and tessalon pearls with improvement during the day, difficult to sleep at night. O2 94% in clinic. Encouraged her to use CPAP machine and take trazadone at night in addition to antitussives.   - Admitted with hypoxia and changes on CT scan as above. Hypoxia improved top resolved during admission.   - She states she cannot use her CPAP machine as it causes her to feel dry in her throat and cough more. Using biotene spray.  - She declined albuterol on discharge.      10. Indcidental breast nodule noted on CT; recommend mammogram f/u on non-urgent basis. Notified primary BMT physician.    Plan: Resume protonix. Take trazadone for sleep and use CPAP machine as able  RTC: next week with Dr Ni, sooner with worsening GI sx SOB or other issues    Diana Salvador PAC  [phone]

## 2017-04-07 NOTE — NURSING NOTE
Chief Complaint   Patient presents with     Blood Draw     Pt with hx of aml labs collected via venipuncture.

## 2017-04-14 ENCOUNTER — APPOINTMENT (OUTPATIENT)
Dept: LAB | Facility: CLINIC | Age: 62
End: 2017-04-14
Attending: STUDENT IN AN ORGANIZED HEALTH CARE EDUCATION/TRAINING PROGRAM
Payer: MEDICARE

## 2017-04-14 ENCOUNTER — ONCOLOGY VISIT (OUTPATIENT)
Dept: TRANSPLANT | Facility: CLINIC | Age: 62
End: 2017-04-14
Attending: INTERNAL MEDICINE
Payer: MEDICARE

## 2017-04-14 VITALS
DIASTOLIC BLOOD PRESSURE: 66 MMHG | OXYGEN SATURATION: 94 % | SYSTOLIC BLOOD PRESSURE: 141 MMHG | HEART RATE: 93 BPM | TEMPERATURE: 98.4 F | BODY MASS INDEX: 40.83 KG/M2 | RESPIRATION RATE: 18 BRPM | WEIGHT: 216.1 LBS

## 2017-04-14 DIAGNOSIS — C92.01 ACUTE MYELOID LEUKEMIA IN REMISSION (H): ICD-10-CM

## 2017-04-14 PROBLEM — H26.9 CATARACT: Status: ACTIVE | Noted: 2017-04-14

## 2017-04-14 LAB
ALBUMIN SERPL-MCNC: 3.7 G/DL (ref 3.4–5)
ALP SERPL-CCNC: 72 U/L (ref 40–150)
ALT SERPL W P-5'-P-CCNC: 19 U/L (ref 0–50)
ANION GAP SERPL CALCULATED.3IONS-SCNC: 9 MMOL/L (ref 3–14)
AST SERPL W P-5'-P-CCNC: 18 U/L (ref 0–45)
BASOPHILS # BLD AUTO: 0 10E9/L (ref 0–0.2)
BASOPHILS NFR BLD AUTO: 0.5 %
BILIRUB SERPL-MCNC: 0.5 MG/DL (ref 0.2–1.3)
BUN SERPL-MCNC: 18 MG/DL (ref 7–30)
CALCIUM SERPL-MCNC: 9.2 MG/DL (ref 8.5–10.1)
CHLORIDE SERPL-SCNC: 101 MMOL/L (ref 94–109)
CO2 SERPL-SCNC: 28 MMOL/L (ref 20–32)
CREAT SERPL-MCNC: 1.04 MG/DL (ref 0.52–1.04)
DIFFERENTIAL METHOD BLD: ABNORMAL
EOSINOPHIL # BLD AUTO: 0.3 10E9/L (ref 0–0.7)
EOSINOPHIL NFR BLD AUTO: 4.3 %
ERYTHROCYTE [DISTWIDTH] IN BLOOD BY AUTOMATED COUNT: 14.8 % (ref 10–15)
GFR SERPL CREATININE-BSD FRML MDRD: 54 ML/MIN/1.7M2
GLUCOSE SERPL-MCNC: 126 MG/DL (ref 70–99)
HCT VFR BLD AUTO: 39.5 % (ref 35–47)
HGB BLD-MCNC: 12 G/DL (ref 11.7–15.7)
IMM GRANULOCYTES # BLD: 0 10E9/L (ref 0–0.4)
IMM GRANULOCYTES NFR BLD: 0.2 %
LYMPHOCYTES # BLD AUTO: 2.8 10E9/L (ref 0.8–5.3)
LYMPHOCYTES NFR BLD AUTO: 42.3 %
MCH RBC QN AUTO: 26.5 PG (ref 26.5–33)
MCHC RBC AUTO-ENTMCNC: 30.4 G/DL (ref 31.5–36.5)
MCV RBC AUTO: 87 FL (ref 78–100)
MONOCYTES # BLD AUTO: 0.6 10E9/L (ref 0–1.3)
MONOCYTES NFR BLD AUTO: 8.5 %
NEUTROPHILS # BLD AUTO: 2.9 10E9/L (ref 1.6–8.3)
NEUTROPHILS NFR BLD AUTO: 44.2 %
NRBC # BLD AUTO: 0 10*3/UL
NRBC BLD AUTO-RTO: 0 /100
PLATELET # BLD AUTO: 233 10E9/L (ref 150–450)
POTASSIUM SERPL-SCNC: 4 MMOL/L (ref 3.4–5.3)
PROT SERPL-MCNC: 7 G/DL (ref 6.8–8.8)
RBC # BLD AUTO: 4.52 10E12/L (ref 3.8–5.2)
SODIUM SERPL-SCNC: 138 MMOL/L (ref 133–144)
WBC # BLD AUTO: 6.5 10E9/L (ref 4–11)

## 2017-04-14 PROCEDURE — 99212 OFFICE O/P EST SF 10 MIN: CPT | Mod: ZF

## 2017-04-14 PROCEDURE — 80053 COMPREHEN METABOLIC PANEL: CPT | Performed by: STUDENT IN AN ORGANIZED HEALTH CARE EDUCATION/TRAINING PROGRAM

## 2017-04-14 PROCEDURE — 36415 COLL VENOUS BLD VENIPUNCTURE: CPT

## 2017-04-14 PROCEDURE — 85025 COMPLETE CBC W/AUTO DIFF WBC: CPT | Performed by: STUDENT IN AN ORGANIZED HEALTH CARE EDUCATION/TRAINING PROGRAM

## 2017-04-14 NOTE — NURSING NOTE
BMT Heme Malignancy Rooming Note    Eryn Bennett - 4/14/2017 11:19 AM     Chief Complaint   Patient presents with     Blood Draw     RECHECK     AML        /66  Pulse 93  Temp 98.4  F (36.9  C) (Oral)  Resp 18  Wt 98 kg (216 lb 1.6 oz)  SpO2 94%  BMI 40.83 kg/m2     Medications reviewed: Yes    Labs drawn: No    Dressing changed: No     Medications given: No    Staff time:7    Additional information if applicable: n/a    TERESITA JORDAN CMA

## 2017-04-14 NOTE — LETTER
4/14/2017      RE: Eryn Bennett  PO   [facility] [id number]       BMT Clinic Progress Notes    Ms Bennett, 61 you s/p NMA DUCB transplant    CC: follow up\    History of Present Illness: progressively getting better. Still sleeping poorly. Diarrhea resolved. No increased sputum production. No SOB. No using CPAO, yet. Eating better. No rash. No fevers at home.     Review of Systems:  ROS otherwise unremarkable other than what is noted in the HPI.     Physical Exam  /66  Pulse 93  Temp 98.4  F (36.9  C) (Oral)  Resp 18  Wt 98 kg (216 lb 1.6 oz)  SpO2 94%  BMI 40.83 kg/m2  General: A&O. NAD.   HEENT: CLARICE, sclera anicteric  Neck: supple  Lungs: CTA bilaterally to bases, no crackles, no wheezing   Heart: RRR, no m/r/g  Abdomen: +BS, soft, NT/ND, no HSM  Extremities: No edema  Skin: no rashes or petechiae  Peripheral IV    Labs  Lab Results   Component Value Date    WBC 6.5 04/14/2017    ANEU 2.9 04/14/2017    HGB 12.0 04/14/2017    HCT 39.5 04/14/2017     04/14/2017     04/14/2017    POTASSIUM 4.0 04/14/2017    CHLORIDE 101 04/14/2017    CO2 28 04/14/2017     (H) 04/14/2017    BUN 18 04/14/2017    CR 1.04 04/14/2017    MAG 1.8 04/03/2017    INR 1.03 04/03/2017       Lab Results   Component Value Date    AST 18 04/14/2017     Lab Results   Component Value Date    ALT 19 04/14/2017     No results found for: BILICONJ   Lab Results   Component Value Date    BILITOTAL 0.5 04/14/2017     Lab Results   Component Value Date    ALBUMIN 3.7 04/14/2017     Lab Results   Component Value Date    PROTTOTAL 7.0 04/14/2017      Lab Results   Component Value Date    ALKPHOS 72 04/14/2017       Ms. Eryn Bennett is a 61 yo woman s/p NMA DCBT for AML day +890 (2 yr post BMT) in [place] presented with increased cough, hypoxia, nausea.      1. BMT/AML: two year post transplant BM 40% cellular, trilineage hematopoiesis, no leukemia by morphology and flow negative; % donor #2.     2. HEME: Transfusion  parameters keep Hgb>8g/dL and plts>10k. Stable counts.  - Patient has history of Hemolytic anemia: Warm anti E; IgG and completment. Received rituximab X 4 September 2015.    Patient has history DVT but her repeat doppler L lower ext negative on OCT 2016. Off coumadin since JUL 2016. During this admission as she was less mobile with viral illness she was given SQ heparin every 12 hours which was stopped on discharge.     3. ID: afebrile, resolving parainfluenza sx of cough and fatigue persistent, no new sx  - Flu shot given on 10/5/16.   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 GVH: none active.  - Recent anorexia and mild nausea, diagnosis of GI gastroenteritis as sx resolved during admission. No signs of GVH, see GI plan below  - Off prednisone since 4/20/16; finished MMF taper on 9/13/16  - Eczema less visible and being controlled with steroid cream PRN.       5. GI: Mild nausea, early satiety, some GI upset, resume protonix. No v/d  - Diarrhea and nausea resolved during admission, suspected viral gastroenteritis. 4/3 LFTs WNL. She did have 4/1 repeat enteric panel and clostridium difficile PCR are negative.   - Compazine available prn      6. FEN/Renal: WNL      7. Cardiovascular: High cholesterol and triglycerides may use cholesterol medication. HOLD norvasc (was on 5mg daily) for soft pressures and dizziness this morning at home. Not orthostatic here in clinic. Will continue PTA carvedilol 6.25mg bid      8. Musculoskelatal: no change still has significant joint problems as below.  - Persistent shoulder pain being followed by Orthopedics. No complaints during the last 2 days of admission.  - Dexa scan with osteopenia (5/5/15). Had bisphosphonate 10/5/16; repeat in ~12 months  - Continue Calcium and Vit D supplementation 2,800 units daily  - tramadol that was working better than hydrocodone/acetaminophen       9. Pulm: +parainfluenza virus. Improving symptoms. Reducing need for  Robitussin with codeine and tessalon pearls Improving to sleep at night. O2 94% in clinic. Encouraged her to use CPAP machine and take trazadone at night in addition to antitussives.       Plan:   RTC Raine on 6/14/17 with labs      Irving Ni MD

## 2017-04-14 NOTE — NURSING NOTE
Chief Complaint   Patient presents with     Blood Draw       Martha Vigil CMA April 14, 2017 11:05 AM  Labs and vitals done see flow sheets.

## 2017-04-14 NOTE — MR AVS SNAPSHOT
After Visit Summary   4/14/2017    Eryn Bennett    MRN: [id number]           Patient Information     Date Of Birth          1955        Visit Information        Provider Department      4/14/2017 12:00 PM Irving Ni MD [facility]        Today's Diagnoses     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)              [facility] [place]  [address] [place] [address]  Phone: [phone]  Clinic Hours:   Monday-Friday:    8am to 4:30pm   Weekends and holidays:    8am to noon (in general)  If your fever is 100.5  or greater,   call the clinic.  After hours call the   hospital at [phone] or   [phone]. Ask for the BMT   fellow for pediatric or adult patients           Care Instructions    6/14 10am arrival         Follow-ups after your visit        Your next 10 appointments already scheduled     Jun 14, 2017 10:00 AM CDT   [facility] with  [facility]   [facility] ([facility])    [address] [place] [id number]   [phone]            Jun 14, 2017 10:30 AM CDT   Return with Irving Ni MD   [facility] ([facility])    [address] [place] [id number]   [phone]            Jun 26, 2017  1:00 PM CDT   (Arrive by 12:45 PM)   Return Visit with Renny Tompkins MD   [facility] ([facility])    [address] [place] [id number]   [phone]              Future tests that were ordered for you today     Open Future Orders        Priority Expected Expires Ordered    IgG Routine 6/14/2017 6/14/2017 4/14/2017    T cell subset extended profile Routine 6/14/2017 6/14/2017 4/14/2017    Comprehensive metabolic panel Routine 6/14/2017 6/14/2017 4/14/2017    CBC with platelets differential Routine  6/14/2017 6/14/2017 4/14/2017            Who to contact     If you have questions or need follow up information about today's clinic visit or your schedule please contact [facility] directly at [phone].  Normal or non-critical lab and imaging results will be communicated to you by [email or web address], letter or phone within 4 business days after the clinic has received the results. If you do not hear from us within 7 days, please contact the clinic through KnowledgeTreet or phone. If you have a critical or abnormal lab result, we will notify you by phone as soon as possible.  Submit refill requests through [employer] or call your pharmacy and they will forward the refill request to us. Please allow 3 business days for your refill to be completed.          Additional Information About Your Visit        [employer] Information     [employer] gives you secure access to your electronic health record. If you see a primary care provider, you can also send messages to your care team and make appointments. If you have questions, please call your primary care clinic.  If you do not have a primary care provider, please call [phone] and they will assist you.        Care EveryWhere ID     This is your Care EveryWhere ID. This could be used by other organizations to access your [facility] medical records  [id number]        Your Vitals Were     Pulse Temperature Respirations Pulse Oximetry BMI (Body Mass Index)       93 98.4  F (36.9  C) (Oral) 18 94% 40.83 kg/m2        Blood Pressure from Last 3 Encounters:   04/14/17 141/66   04/07/17 104/70   04/04/17 132/57    Weight from Last 3 Encounters:   04/14/17 98 kg (216 lb 1.6 oz)   04/07/17 98.2 kg (216 lb 8 oz)   04/04/17 97.9 kg (215 lb 13.3 oz)              We Performed the Following     CBC with platelets differential     Comprehensive metabolic panel        Recent Review Flowsheet Data     BMT Recent Results Latest Ref Rng & Units 4/1/2017 4/2/2017 4/2/2017 4/3/2017  4/4/2017 4/7/2017 4/14/2017    WBC 4.0 - 11.0 10e9/L 9.6 7.0 - 6.4 6.6 7.1 6.5    Hemoglobin 11.7 - 15.7 g/dL 10.8(L) 10.6(L) - 10.4(L) 10.6(L) 12.0 12.0    Platelet Count 150 - 450 10e9/L 167 137(L) - 140(L) 189 246 233    Neutrophils (Absolute) 1.6 - 8.3 10e9/L 4.4 3.4 - 2.8 3.0 3.4 2.9    INR 0.86 - 1.14 - - - 1.03 - - -    Sodium 133 - 144 mmol/L 137 140 - 142 139 138 138    Potassium 3.4 - 5.3 mmol/L 3.5 3.3(L) 3.8 3.8 3.8 4.8 4.0    Chloride 94 - 109 mmol/L 106 106 - 108 103 101 101    Glucose 70 - 99 mg/dL 118(H) 96 - 111(H) 129(H) 139(H) 126(H)    Urea Nitrogen 7 - 30 mg/dL 11 9 - 10 13 19 18    Creatinine 0.52 - 1.04 mg/dL 0.89 0.79 - 0.81 0.88 0.98 1.04    Calcium (Total) 8.5 - 10.1 mg/dL 8.2(L) 7.9(L) - 7.9(L) 8.2(L) 9.9 9.2    Protein (Total) 6.8 - 8.8 g/dL - - - 6.0(L) - 7.1 7.0    Albumin 3.4 - 5.0 g/dL - - - 2.8(L) - 3.2(L) 3.7    Bilirubin (Direct) 0.0 - 0.2 mg/dL - - - <0.1 - - -    Alkaline Phosphatase 40 - 150 U/L - - - 70 - 76 72    AST 0 - 45 U/L - - - 9 - 21 18    ALT 0 - 50 U/L - - - 22 - 28 19    MCV 78 - 100 fl 88 89 - 90 89 89 87               Primary Care Provider Office Phone # Fax #    Carmelo Camacho [phone]       [address] [address]        Thank you!     Thank you for choosing [facility]  for your care. Our goal is always to provide you with excellent care. Hearing back from our patients is one way we can continue to improve our services. Please take a few minutes to complete the written survey that you may receive in the mail after your visit with us. Thank you!             Your Updated Medication List - Protect others around you: Learn how to safely use, store and throw away your medicines at www.disposemymeds.org.          This list is accurate as of: 4/14/17 12:04 PM.  Always use your most recent med list.                   Brand Name Dispense Instructions for use    acetaminophen 325 MG tablet     TYLENOL    100 tablet    Take 1-2 tablets (325-650 mg) by mouth every 4 hours as needed for mild pain or fever       amLODIPine 5 MG tablet    NORVASC    90 tablet    Take 1 tablet (5 mg) by mouth daily       benzonatate 100 MG capsule    TESSALON    42 capsule    Take 1 capsule (100 mg) by mouth 3 times daily for 14 days       calcium-vitamin D 500-125 MG-UNIT Tabs      Take by mouth 2 times daily       carvedilol 6.25 MG tablet    COREG    180 tablet    Take 1 tablet (6.25 mg) by mouth 2 times daily (with meals)       desonide 0.05 % cream    DESOWEN    60 g    Apply sparingly to affected area three times daily as needed.       guaiFENesin-codeine 100-10 MG/5ML Soln    ROBITUSSIN AC    420 mL    Take 1-2 tsp. by mouth every 4 hours as needed for cough       pantoprazole 40 MG EC tablet    PROTONIX    30 tablet    Take 1 tablet (40 mg) by mouth daily       prochlorperazine 5 MG tablet    COMPAZINE    20 tablet    Take 1-2 tablets (5-10 mg) by mouth every 6 hours as needed for nausea or vomiting       traMADol 50 MG tablet    ULTRAM    60 tablet    Take 1 tablet (50 mg) by mouth every 6 hours as needed for moderate pain       valACYclovir 500 MG tablet    VALTREX    60 tablet    Take 1 tablet (500 mg) by mouth 2 times daily       VITAMIN D (CHOLECALCIFEROL) PO      Take 1,000 Units by mouth daily

## 2017-04-14 NOTE — PROGRESS NOTES
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 Still sleeping poorly. Diarrhea resolved. No increased sputum production. No SOB. No using CPAO, yet. Eating better. No rash. No fevers at home.     Review of Systems:  ROS otherwise unremarkable other than what is noted in the HPI.     Physical Exam  /66  Pulse 93  Temp 98.4  F (36.9  C) (Oral)  Resp 18  Wt 98 kg (216 lb 1.6 oz)  SpO2 94%  BMI 40.83 kg/m2  General: A&O. NAD.   HEENT: CLARICE, sclera anicteric  Neck: supple  Lungs: CTA bilaterally to bases, no crackles, no wheezing   Heart: RRR, no m/r/g  Abdomen: +BS, soft, NT/ND, no HSM  Extremities: No edema  Skin: no rashes or petechiae  Peripheral IV    Labs  Lab Results   Component Value Date    WBC 6.5 04/14/2017    ANEU 2.9 04/14/2017    HGB 12.0 04/14/2017    HCT 39.5 04/14/2017     04/14/2017     04/14/2017    POTASSIUM 4.0 04/14/2017    CHLORIDE 101 04/14/2017    CO2 28 04/14/2017     (H) 04/14/2017    BUN 18 04/14/2017    CR 1.04 04/14/2017    MAG 1.8 04/03/2017    INR 1.03 04/03/2017       Lab Results   Component Value Date    AST 18 04/14/2017     Lab Results   Component Value Date    ALT 19 04/14/2017     No results found for: BILICONJ   Lab Results   Component Value Date    BILITOTAL 0.5 04/14/2017     Lab Results   Component Value Date    ALBUMIN 3.7 04/14/2017     Lab Results   Component Value Date    PROTTOTAL 7.0 04/14/2017      Lab Results   Component Value Date    ALKPHOS 72 04/14/2017       Ms. Eryn Bennett is a 61 yo woman s/p NMA DCBT for AML day +890 (2 yr post BMT) in [place] presented with increased cough, hypoxia, nausea.      1. BMT/AML: two year post transplant BM 40% cellular, trilineage hematopoiesis, no leukemia by morphology and flow negative; % donor #2.     2. HEME: Transfusion parameters keep Hgb>8g/dL and plts>10k. Stable counts.  - Patient has  history of Hemolytic anemia: Warm anti E; IgG and completment. Received rituximab X 4 September 2015.    Patient has history DVT but her repeat doppler L lower ext negative on OCT 2016. Off coumadin since JUL 2016. During this admission as she was less mobile with viral illness she was given SQ heparin every 12 hours which was stopped on discharge.     3. ID: afebrile, resolving parainfluenza sx of cough and fatigue persistent, no new sx  - Flu shot given on 10/5/16.   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 GVH: none active.  - Recent anorexia and mild nausea, diagnosis of GI gastroenteritis as sx resolved during admission. No signs of GVH, see GI plan below  - Off prednisone since 4/20/16; finished MMF taper on 9/13/16  - Eczema less visible and being controlled with steroid cream PRN.       5. GI: Mild nausea, early satiety, some GI upset, resume protonix. No v/d  - Diarrhea and nausea resolved during admission, suspected viral gastroenteritis. 4/3 LFTs WNL. She did have 4/1 repeat enteric panel and clostridium difficile PCR are negative.   - Compazine available prn      6. FEN/Renal: WNL      7. Cardiovascular: High cholesterol and triglycerides may use cholesterol medication. HOLD norvasc (was on 5mg daily) for soft pressures and dizziness this morning at home. Not orthostatic here in clinic. Will continue PTA carvedilol 6.25mg bid      8. Musculoskelatal: no change still has significant joint problems as below.  - Persistent shoulder pain being followed by Orthopedics. No complaints during the last 2 days of admission.  - Dexa scan with osteopenia (5/5/15). Had bisphosphonate 10/5/16; repeat in ~12 months  - Continue Calcium and Vit D supplementation 2,800 units daily  - tramadol that was working better than hydrocodone/acetaminophen       9. Pulm: +parainfluenza virus. Improving symptoms. Reducing need for Robitussin with codeine and tessalon pearls Improving to sleep at night.  O2 94% in clinic. Encouraged her to use CPAP machine and take trazadone at night in addition to antitussives.       Plan:   RTC [facility] on 6/14/17 with labs

## 2017-06-14 ENCOUNTER — ONCOLOGY VISIT (OUTPATIENT)
Dept: TRANSPLANT | Facility: CLINIC | Age: 62
End: 2017-06-14
Attending: INTERNAL MEDICINE
Payer: MEDICARE

## 2017-06-14 VITALS
TEMPERATURE: 97.6 F | SYSTOLIC BLOOD PRESSURE: 138 MMHG | BODY MASS INDEX: 41.44 KG/M2 | HEART RATE: 89 BPM | RESPIRATION RATE: 18 BRPM | WEIGHT: 219.3 LBS | OXYGEN SATURATION: 92 % | DIASTOLIC BLOOD PRESSURE: 81 MMHG

## 2017-06-14 DIAGNOSIS — C92.01 ACUTE MYELOID LEUKEMIA IN REMISSION (H): ICD-10-CM

## 2017-06-14 LAB
ALBUMIN SERPL-MCNC: 3.8 G/DL (ref 3.4–5)
ALP SERPL-CCNC: 89 U/L (ref 40–150)
ALT SERPL W P-5'-P-CCNC: 18 U/L (ref 0–50)
ANION GAP SERPL CALCULATED.3IONS-SCNC: 7 MMOL/L (ref 3–14)
AST SERPL W P-5'-P-CCNC: 14 U/L (ref 0–45)
BASOPHILS # BLD AUTO: 0 10E9/L (ref 0–0.2)
BASOPHILS NFR BLD AUTO: 0.2 %
BILIRUB SERPL-MCNC: 0.4 MG/DL (ref 0.2–1.3)
BUN SERPL-MCNC: 23 MG/DL (ref 7–30)
CALCIUM SERPL-MCNC: 9.3 MG/DL (ref 8.5–10.1)
CD19 CELLS # BLD: 567 CELLS/UL (ref 107–698)
CD19 CELLS NFR BLD: 20 % (ref 6–27)
CD3 CELLS # BLD: 1857 CELLS/UL (ref 603–2990)
CD3 CELLS NFR BLD: 65 % (ref 49–84)
CD3+CD4+ CELLS # BLD: 497 CELLS/UL (ref 441–2156)
CD3+CD4+ CELLS NFR BLD: 17 % (ref 28–63)
CD3+CD4+ CELLS/CD3+CD8+ CLL BLD: 0.35 % (ref 1.4–2.6)
CD3+CD8+ CELLS # BLD: 1373 CELLS/UL (ref 125–1312)
CD3+CD8+ CELLS NFR BLD: 48 % (ref 10–40)
CD3-CD16+CD56+ CELLS # BLD: 406 CELLS/UL (ref 95–640)
CD3-CD16+CD56+ CELLS NFR BLD: 14 % (ref 4–25)
CHLORIDE SERPL-SCNC: 103 MMOL/L (ref 94–109)
CO2 SERPL-SCNC: 27 MMOL/L (ref 20–32)
CREAT SERPL-MCNC: 1.13 MG/DL (ref 0.52–1.04)
DIFFERENTIAL METHOD BLD: ABNORMAL
EOSINOPHIL # BLD AUTO: 0.2 10E9/L (ref 0–0.7)
EOSINOPHIL NFR BLD AUTO: 2.7 %
ERYTHROCYTE [DISTWIDTH] IN BLOOD BY AUTOMATED COUNT: 14.8 % (ref 10–15)
GFR SERPL CREATININE-BSD FRML MDRD: 49 ML/MIN/1.7M2
GLUCOSE SERPL-MCNC: 109 MG/DL (ref 70–99)
HCT VFR BLD AUTO: 40.3 % (ref 35–47)
HGB BLD-MCNC: 12.5 G/DL (ref 11.7–15.7)
IFC SPECIMEN: ABNORMAL
IGG SERPL-MCNC: 726 MG/DL (ref 695–1620)
IMM GRANULOCYTES # BLD: 0 10E9/L (ref 0–0.4)
IMM GRANULOCYTES NFR BLD: 0.4 %
IMMUNODEFICIENCY MARKERS SPEC-IMP: ABNORMAL
LYMPHOCYTES # BLD AUTO: 2.8 10E9/L (ref 0.8–5.3)
LYMPHOCYTES NFR BLD AUTO: 34.9 %
MCH RBC QN AUTO: 27.5 PG (ref 26.5–33)
MCHC RBC AUTO-ENTMCNC: 31 G/DL (ref 31.5–36.5)
MCV RBC AUTO: 89 FL (ref 78–100)
MONOCYTES # BLD AUTO: 0.7 10E9/L (ref 0–1.3)
MONOCYTES NFR BLD AUTO: 8.3 %
NEUTROPHILS # BLD AUTO: 4.3 10E9/L (ref 1.6–8.3)
NEUTROPHILS NFR BLD AUTO: 53.5 %
NRBC # BLD AUTO: 0 10*3/UL
NRBC BLD AUTO-RTO: 0 /100
PLATELET # BLD AUTO: 194 10E9/L (ref 150–450)
POTASSIUM SERPL-SCNC: 4.2 MMOL/L (ref 3.4–5.3)
PROT SERPL-MCNC: 7 G/DL (ref 6.8–8.8)
RBC # BLD AUTO: 4.55 10E12/L (ref 3.8–5.2)
SODIUM SERPL-SCNC: 137 MMOL/L (ref 133–144)
WBC # BLD AUTO: 8 10E9/L (ref 4–11)

## 2017-06-14 PROCEDURE — 86359 T CELLS TOTAL COUNT: CPT | Performed by: INTERNAL MEDICINE

## 2017-06-14 PROCEDURE — 86355 B CELLS TOTAL COUNT: CPT | Performed by: INTERNAL MEDICINE

## 2017-06-14 PROCEDURE — 85025 COMPLETE CBC W/AUTO DIFF WBC: CPT | Performed by: INTERNAL MEDICINE

## 2017-06-14 PROCEDURE — 86357 NK CELLS TOTAL COUNT: CPT | Performed by: INTERNAL MEDICINE

## 2017-06-14 PROCEDURE — 86360 T CELL ABSOLUTE COUNT/RATIO: CPT | Performed by: INTERNAL MEDICINE

## 2017-06-14 PROCEDURE — 80053 COMPREHEN METABOLIC PANEL: CPT | Performed by: INTERNAL MEDICINE

## 2017-06-14 PROCEDURE — 36415 COLL VENOUS BLD VENIPUNCTURE: CPT | Performed by: INTERNAL MEDICINE

## 2017-06-14 PROCEDURE — 82784 ASSAY IGA/IGD/IGG/IGM EACH: CPT | Performed by: INTERNAL MEDICINE

## 2017-06-14 ASSESSMENT — PAIN SCALES - GENERAL: PAINLEVEL: SEVERE PAIN (6)

## 2017-06-14 NOTE — NURSING NOTE
Chief Complaint   Patient presents with     Blood Draw     Labs only     Vitals and labs done peripherally.  See doc flow sheets for details.  Roxanne Nixon CMA

## 2017-06-14 NOTE — MR AVS SNAPSHOT
After Visit Summary   6/14/2017    Eryn Bennett    MRN: [id number]           Patient Information     Date Of Birth          1955        Visit Information        Provider Department      6/14/2017 10:30 AM Irving Ni MD [facility] Blood and Marrow Transplant        Today's Diagnoses     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)              [facility] ([facility])  [address] [place] [id number]  Phone: [phone]  Clinic Hours:   Monday-Thursday:7am to 7pm   Friday: 7am to 5pm   Weekends and holidays:    8am to noon (in general)  If your fever is 100.5  or greater,   call the clinic.  After hours call the   hospital at [phone] or   [phone]. Ask for the BMT   fellow on-call           Care Instructions    RTSOWMYA Ni on 11/10/17 with labs  Plan for zolendronic acid infusion 11/10  Plan for vaccine round #3 with live vaccines 11/10    11/10 10am labs and            Follow-ups after your visit        Your next 10 appointments already scheduled     Jun 26, 2017  1:00 PM CDT   (Arrive by 12:45 PM)   Return Visit with Renny Tompkins MD   [facility] ([facility])    [address] [place] [address]  [address] [facility] [address]   [phone]            Nov 10, 2017 10:00 AM CST   [facility] Draw with [facility] DRAW   [facility] Draw ([facility])    [address] [place] [address]  [address] [facility] [address]   [phone]            Nov 10, 2017 10:30 AM CST   Return with Irving Ni MD   [facility] Blood and Marrow Transplant ([facility])    [address] [place] [address]  [address] [facility] [address]   [phone]            Nov 10, 2017 11:00 AM CST   Infusion 60 with UC BMT INFUSION, UC 1 ATC   [facility] and Marrow Transplant ([facility])    [address] [place] [address]  [id number] [phone]              Future tests that were ordered for you today     Open Future Orders        Priority Expected Expires Ordered    IgG Routine 11/10/2017 12/11/2017 6/14/2017    Comprehensive metabolic panel Routine 11/10/2017 11/10/2017 6/14/2017    CBC with platelets differential Routine 11/10/2017 11/10/2017 6/14/2017            Who to contact     If you have questions or need follow up information about today's clinic visit or your schedule please contact [facility] B[facility]OD [facility] MARROW TRANSPL[facility] directly at [phone].  Normal or non-critical lab and imaging results will be communicated to you by [place], letter or phone within 4 business days after the clinic has received the results. If you do not hear from us within 7 days, please contact the clinic through [facility] or phone. If you have a critical or abnormal lab result, we will notify you by phone as soon as possible.  Submit refill requests through [facility] or call your pharmacy and they will forward the refill request to us. Please allow 3 business days for your refill to be completed.          Additional Information About Your Visit        MyChart Information     [facility] gives you secure access to your electronic health record. If you see a primary care provider, you can also send messages to your care team and make appointments. If you have questions, please call your primary care clinic.  If you do not have a primary care provider, please call [phone] and they will assist you.        Care EveryWhere ID     This is your Care EveryWhere ID. This could be used by other organizations to access your [facility] medical records  [id number]        Your Vitals Were     Pulse Temperature Respirations Pulse Oximetry BMI (Body Mass Index)       89 97.6  F (36.4  C) (Oral) 18 92% 41.44 kg/m2        Blood Pressure from Last 3 Encounters:   06/14/17 138/81   04/14/17 141/66   04/07/17 104/70    Weight from Last 3 Encounters:   06/14/17 99.5 kg  (219 lb 4.8 oz)   04/14/17 98 kg (216 lb 1.6 oz)   04/07/17 98.2 kg (216 lb 8 oz)              We Performed the Following     CBC with platelets differential     Comprehensive metabolic panel     IgG     T cell subset extended profile          Today's Medication Changes          These changes are accurate as of: 6/14/17 11:05 AM.  If you have any questions, ask your nurse or doctor.               Stop taking these medicines if you haven't already. Please contact your care team if you have questions.     guaiFENesin-codeine 100-10 MG/5ML Soln   Commonly known as:  ROBITUSSIN AC   Stopped by:  Irving Ni MD           pantoprazole 40 MG EC tablet   Commonly known as:  PROTONIX   Stopped by:  Irving Ni MD                    Recent Review Flowsheet Data     BMT Recent Results Latest Ref Rng & Units 4/2/2017 4/2/2017 4/3/2017 4/4/2017 4/7/2017 4/14/2017 6/14/2017    WBC 4.0 - 11.0 10e9/L 7.0 - 6.4 6.6 7.1 6.5 8.0    Hemoglobin 11.7 - 15.7 g/dL 10.6(L) - 10.4(L) 10.6(L) 12.0 12.0 12.5    Platelet Count 150 - 450 10e9/L 137(L) - 140(L) 189 246 233 194    Neutrophils (Absolute) 1.6 - 8.3 10e9/L 3.4 - 2.8 3.0 3.4 2.9 4.3    INR 0.86 - 1.14 - - 1.03 - - - -    Sodium 133 - 144 mmol/L 140 - 142 139 138 138 137    Potassium 3.4 - 5.3 mmol/L 3.3(L) 3.8 3.8 3.8 4.8 4.0 4.2    Chloride 94 - 109 mmol/L 106 - 108 103 101 101 103    Glucose 70 - 99 mg/dL 96 - 111(H) 129(H) 139(H) 126(H) 109(H)    Urea Nitrogen 7 - 30 mg/dL 9 - 10 13 19 18 23    Creatinine 0.52 - 1.04 mg/dL 0.79 - 0.81 0.88 0.98 1.04 1.13(H)    Calcium (Total) 8.5 - 10.1 mg/dL 7.9(L) - 7.9(L) 8.2(L) 9.9 9.2 9.3    Protein (Total) 6.8 - 8.8 g/dL - - 6.0(L) - 7.1 7.0 7.0    Albumin 3.4 - 5.0 g/dL - - 2.8(L) - 3.2(L) 3.7 3.8    Bilirubin (Direct) 0.0 - 0.2 mg/dL - - <0.1 - - - -    Alkaline Phosphatase 40 - 150 U/L - - 70 - 76 72 89    AST 0 - 45 U/L - - 9 - 21 18 14    ALT 0 - 50 U/L - - 22 - 28 19 18    MCV 78 - 100 fl 89 - 90 89 89 87  89               Primary Care Provider Office Phone # Fax #    Carmelo Camacho [phone]       [address] [address]        Thank you!     Thank you for choosing [facility]OD [facility]ARROW TRANSPL[facility]  for your care. Our goal is always to provide you with excellent care. Hearing back from our patients is one way we can continue to improve our services. Please take a few minutes to complete the written survey that you may receive in the mail after your visit with us. Thank you!             Your Updated Medication List - Protect others around you: Learn how to safely use, store and throw away your medicines at www.disposemymeds.org.          This list is accurate as of: 6/14/17 11:05 AM.  Always use your most recent med list.                   Brand Name Dispense Instructions for use    acetaminophen 325 MG tablet    TYLENOL    100 tablet    Take 1-2 tablets (325-650 mg) by mouth every 4 hours as needed for mild pain or fever       amLODIPine 5 MG tablet    NORVASC    90 tablet    Take 1 tablet (5 mg) by mouth daily       calcium-vitamin D 500-125 MG-UNIT Tabs      Take by mouth 2 times daily       carvedilol 6.25 MG tablet    COREG    180 tablet    Take 1 tablet (6.25 mg) by mouth 2 times daily (with meals)       desonide 0.05 % cream    DESOWEN    60 g    Apply sparingly to affected area three times daily as needed.       prochlorperazine 5 MG tablet    COMPAZINE    20 tablet    Take 1-2 tablets (5-10 mg) by mouth every 6 hours as needed for nausea or vomiting       traMADol 50 MG tablet    ULTRAM    60 tablet    Take 1 tablet (50 mg) by mouth every 6 hours as needed for moderate pain       valACYclovir 500 MG tablet    VALTREX    60 tablet    Take 1 tablet (500 mg) by mouth 2 times daily       VITAMIN D (CHOLECALCIFEROL) PO      Take 1,000 Units by mouth daily

## 2017-06-14 NOTE — PATIENT INSTRUCTIONS
ISABELLE Ni on 11/10/17 with labs  Plan for zolendronic acid infusion 11/10  Plan for vaccine round #3 with live vaccines 11/10    11/10 10am labs and

## 2017-06-14 NOTE — LETTER
6/14/2017      RE: Eryn Bennett  PO   [facility] [address]       BMT Clinic Progress Notes    Ms Bennett, 61 you s/p NMA DUCB transplant    CC: follow-up    History of Present Illness: her respiratory symptoms resolved in mid may. Continue to have join and muscle pain. Limited physical activity. She thinks she could do more PT at home. Should with limited range on motion. Has to take pain medication for muscle and joint pain in mid of the night somewhat often. Not using CPAP for sleep apnea because would make her cough. Has not re challenged with CPAP since cough for parainfiuenza infection resolved. Numbness below feet R > L. SOB on exertion. Trouble climbing steps at home from combination of breathing and leg strength issues. Neck and groin eczema rash. No fevers at home.     Review of Systems:  ROS otherwise unremarkable other than what is noted in the HPI.     Physical Exam  /81  Pulse 89  Temp 97.6  F (36.4  C) (Oral)  Resp 18  Wt 99.5 kg (219 lb 4.8 oz)  SpO2 92%  BMI 41.44 kg/m2  General: A&O. NAD.   HEENT: CLARICE, sclera anicteric  Neck: supple  Lungs: CTA bilaterally to bases, no crackles, no wheezing   Heart: RRR, no m/r/g  Abdomen: +BS, soft, NT/ND, no HSM  Extremities: No edema; unable to raise her shoulder above her head.   Skin: erythematous eczema plaques on her neck and groinrashes. No bruises or petechiae    Labs  Lab Results   Component Value Date    WBC 8.0 06/14/2017    ANEU 4.3 06/14/2017    HGB 12.5 06/14/2017    HCT 40.3 06/14/2017     06/14/2017     06/14/2017    POTASSIUM 4.2 06/14/2017    CHLORIDE 103 06/14/2017    CO2 27 06/14/2017     (H) 06/14/2017    BUN 23 06/14/2017    CR 1.13 (H) 06/14/2017    MAG 1.8 04/03/2017    INR 1.03 04/03/2017     Lab Results   Component Value Date    AST 14 06/14/2017     Lab Results   Component Value Date    ALT 18 06/14/2017     No results found for: BILICONJ   Lab Results   Component Value Date    BILITOTAL 0.4  06/14/2017     Lab Results   Component Value Date    ALBUMIN 3.8 06/14/2017     Lab Results   Component Value Date    PROTTOTAL 7.0 06/14/2017      Lab Results   Component Value Date    ALKPHOS 89 06/14/2017       Ms. Eryn Bennett is a 59 yo woman s/p NMA DCBT for AML day +31 months in CR presented      1. BMT/AML: two year post transplant BM 40% cellular, trilineage hematopoiesis, no leukemia by morphology and flow negative; % donor #2.     2. HEME: Transfusion parameters keep Hgb>8g/dL and plts>10k. Stable counts.  - Patient has history of Hemolytic anemia: Warm anti E; IgG and completment. Received rituximab X 4 September 2015.    Patient has history DVT but her repeat doppler L lower ext negative on OCT 2016. Off coumadin since JUL 2016. During this admission as she was less mobile with viral illness she was given SQ heparin every 12 hours which was stopped on discharge.     3. ID: afebrile; next round of immunization in Nov 2017; flu shot in the fall.  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 GVH: none active. But has eczema rash on her neck and groins that could be mild GVHD. Controlled with creams. musculo-skeletal symptoms could also be GVHD; see below.  - Recent anorexia and mild nausea, diagnosis of GI gastroenteritis as sx resolved during admission. No signs of GVH, see GI plan below  - Off prednisone since 4/20/16; finished MMF taper on 9/13/16      5. GI: no GI symptoms; stop pantoprazole and monitor GERD  - Compazine available prn      6. FEN/Renal: WNL      7. Cardiovascular: High cholesterol and triglycerides may use cholesterol medication. Continue Norvasc 5mg daily Carvedilol 6.25mg bid for cardiomyopathy.      8. Musculoskelatal: still has significant joint problems; physical condition and endurance is limited. Trouble climbing steps at home. Severe stiffness in AM. Must work on exercises at home.   - Persistent shoulder pain being followed by Orthopedics. She  will follow-up with Orthopedics on 6/26.    - Dexa scan with osteopenia (5/5/15). Had bisphosphonate 10/5/16; repeat in ~12 months  - Continue Calcium and Vit D supplementation 2,800 units daily  - continue Tramadol and tylenol for muscle pain and that was working better than hydrocodone/acetaminophen       9. Pulmonary: no active issues. Today. Cough finally resolved in mid may. Need flu shot in the fall.    10. Neurology: feet neuropathy. The Left side seems worse in the last few months. Monitor and refer to neurology/neuropathy group for assessment.     11. Eyes: she will have cataract surgery in August 2017.      Plan:   RTC Raine on 11/10/17 with labs  Plan for zolendronic acid infusion 11/10  Plan for vaccine round #3 with live vaccines 11/10      Irving Ni MD

## 2017-06-14 NOTE — PROGRESS NOTES
BMT Clinic Progress Notes    Ms Bennett, 61 you s/p NMA DUCB transplant    CC: follow-up    History of Present Illness: her respiratory symptoms resolved in mid may. Continue to have join and muscle pain. Limited physical activity. She thinks she could do more PT at home. Should with limited range on motion. Has to take pain medication for muscle and joint pain in mid of the night somewhat often. Not using CPAP for sleep apnea because would make her cough. Has not re challenged with CPAP since cough for parainfiuenza infection resolved. Numbness below feet R > L. SOB on exertion. Trouble climbing steps at home from combination of breathing and leg strength issues. Neck and groin eczema rash. No fevers at home.     Review of Systems:  ROS otherwise unremarkable other than what is noted in the HPI.     Physical Exam  /81  Pulse 89  Temp 97.6  F (36.4  C) (Oral)  Resp 18  Wt 99.5 kg (219 lb 4.8 oz)  SpO2 92%  BMI 41.44 kg/m2  General: A&O. NAD.   HEENT: CLARICE, sclera anicteric  Neck: supple  Lungs: CTA bilaterally to bases, no crackles, no wheezing   Heart: RRR, no m/r/g  Abdomen: +BS, soft, NT/ND, no HSM  Extremities: No edema; unable to raise her shoulder above her head.   Skin: erythematous eczema plaques on her neck and groinrashes. No bruises or petechiae    Labs  Lab Results   Component Value Date    WBC 8.0 06/14/2017    ANEU 4.3 06/14/2017    HGB 12.5 06/14/2017    HCT 40.3 06/14/2017     06/14/2017     06/14/2017    POTASSIUM 4.2 06/14/2017    CHLORIDE 103 06/14/2017    CO2 27 06/14/2017     (H) 06/14/2017    BUN 23 06/14/2017    CR 1.13 (H) 06/14/2017    MAG 1.8 04/03/2017    INR 1.03 04/03/2017     Lab Results   Component Value Date    AST 14 06/14/2017     Lab Results   Component Value Date    ALT 18 06/14/2017     No results found for: BILICONJ   Lab Results   Component Value Date    BILITOTAL 0.4 06/14/2017     Lab Results   Component Value Date    ALBUMIN 3.8 06/14/2017      Lab Results   Component Value Date    PROTTOTAL 7.0 06/14/2017      Lab Results   Component Value Date    ALKPHOS 89 06/14/2017       Ms. Eryn Bennett is a 59 yo woman s/p NMA DCBT for AML day +31 months in CR presented      1. BMT/AML: two year post transplant BM 40% cellular, trilineage hematopoiesis, no leukemia by morphology and flow negative; % donor #2.     2. HEME: Transfusion parameters keep Hgb>8g/dL and plts>10k. Stable counts.  - Patient has history of Hemolytic anemia: Warm anti E; IgG and completment. Received rituximab X 4 September 2015.    Patient has history DVT but her repeat doppler L lower ext negative on OCT 2016. Off coumadin since JUL 2016. During this admission as she was less mobile with viral illness she was given SQ heparin every 12 hours which was stopped on discharge.     3. ID: afebrile; next round of immunization in Nov 2017; flu shot in the fall.  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 GVH: none active. But has eczema rash on her neck and groins that could be mild GVHD. Controlled with creams. musculo-skeletal symptoms could also be GVHD; see below.  - Recent anorexia and mild nausea, diagnosis of GI gastroenteritis as sx resolved during admission. No signs of GVH, see GI plan below  - Off prednisone since 4/20/16; finished MMF taper on 9/13/16      5. GI: no GI symptoms; stop pantoprazole and monitor GERD  - Compazine available prn      6. FEN/Renal: WNL      7. Cardiovascular: High cholesterol and triglycerides may use cholesterol medication. Continue Norvasc 5mg daily Carvedilol 6.25mg bid for cardiomyopathy.      8. Musculoskelatal: still has significant joint problems; physical condition and endurance is limited. Trouble climbing steps at home. Severe stiffness in AM. Must work on exercises at home.   - Persistent shoulder pain being followed by Orthopedics. She will follow-up with Orthopedics on 6/26.    - Dexa scan with osteopenia  (5/5/15). Had bisphosphonate 10/5/16; repeat in ~12 months  - Continue Calcium and Vit D supplementation 2,800 units daily  - continue Tramadol and tylenol for muscle pain and that was working better than hydrocodone/acetaminophen       9. Pulmonary: no active issues. Today. Cough finally resolved in mid may. Need flu shot in the fall.    10. Neurology: feet neuropathy. The Left side seems worse in the last few months. Monitor and refer to neurology/neuropathy group for assessment.     11. Eyes: she will have cataract surgery in August 2017.      Plan:   ISABELLE Ni on 11/10/17 with labs  Plan for zolendronic acid infusion 11/10  Plan for vaccine round #3 with live vaccines 11/10

## 2017-06-26 ENCOUNTER — OFFICE VISIT (OUTPATIENT)
Dept: ORTHOPEDICS | Facility: CLINIC | Age: 62
End: 2017-06-26

## 2017-06-26 VITALS — SYSTOLIC BLOOD PRESSURE: 138 MMHG | DIASTOLIC BLOOD PRESSURE: 81 MMHG | WEIGHT: 219 LBS | BODY MASS INDEX: 41.38 KG/M2

## 2017-06-26 DIAGNOSIS — M75.02 ADHESIVE CAPSULITIS OF LEFT SHOULDER: Primary | ICD-10-CM

## 2017-06-26 NOTE — MR AVS SNAPSHOT
After Visit Summary   6/26/2017    Eryn Bennett    MRN: [id number]           Patient Information     Date Of Birth          1955        Visit Information        Provider Department      6/26/2017 1:00 PM Renny Tompkins MD [facility] Medicine        Today's Diagnoses     Adhesive capsulitis of left shoulder    -  1       Follow-ups after your visit        Your next 10 appointments already scheduled     Jun 26, 2017  1:30 PM CDT   (Arrive by 1:15 PM)   XR INJECTION with UCXR3,  IMAGING NURSE, UC NEURO RAD   [facility] [place] [facility] ([facility])    [address] [place] [address]  [address] [facility] [address] [phone]           Stop drinking 1 hour before the exam.  You may take your medicines as usual, except for blood thinners (Coumadin, Plavix, Ticlid, Persantine, Aggrenox, Pletal, Effient, Brilliant). Talk to your doctor if you take these.  Tell your doctor if:   You have ever had an allergic reaction to X-ray dye (contrast fluid).   There is a chance you may be pregnant.  Please bring a list of your current medicines to your exam. Include vitamins, minerals and over-the-counter medicines.  Please call the Imaging Department at your exam site with any questions.            Sep 18, 2017 10:00 AM CDT   (Arrive by 9:45 AM)   Return Visit with Renny Tompkins MD   [facility] Sports Medicine ([facility])    [address] [place] [address]  [address] [facility] [address]   [phone]            Nov 10, 2017 10:00 AM CST   [facility] Draw with  [facility] DRAW   [facility] Draw ([facility])    [address] [place] [address]  [address] [facility] [address]   [phone]            Nov 10, 2017 10:30 AM CST   Return with Irving Ni MD   [facility] Blood and Marrow Transplant ([facility])    [address] [place] [id number]    [phone]            Nov 10, 2017 11:00 AM CST   Infusion 60 with UC BMT INFUSION, UC 1 ATC   [facility] Blood and Marrow Transplant ([facility])    [address] [address] [phone]              Who to contact     Please call your clinic at [phone] to:    Ask questions about your health    Make or cancel appointments    Discuss your medicines    Learn about your test results    Speak to your doctor   If you have compliments or concerns about an experience at your clinic, or if you wish to file a complaint, please contact [facility] Patient Relations at [phone] or email us at [email or web address]         Additional Information About Your Visit        [employer] Information     [employer] gives you secure access to your electronic health record. If you see a primary care provider, you can also send messages to your care team and make appointments. If you have questions, please call your primary care clinic.  If you do not have a primary care provider, please call [phone] and they will assist you.      [employer] is an electronic gateway that provides easy, online access to your medical records. With [employer], you can request a clinic appointment, read your test results, renew a prescription or communicate with your care team.     To access your existing account, please contact your [facility] or call [phone] for assistance.        Care EveryWhere ID     This is your Care EveryWhere ID. This could be used by other organizations to access your [facility] medical records  [id number]        Your Vitals Were     BMI (Body Mass Index)                   41.38 kg/m2            Blood Pressure from Last 3 Encounters:   06/26/17 138/81   06/14/17 138/81   04/14/17 141/66    Weight from Last 3 Encounters:   06/26/17 99.3 kg (219 lb)   06/14/17 99.5 kg (219 lb 4.8 oz)   04/14/17 98 kg  (216 lb 1.6 oz)               Primary Care Provider Office Phone # Fax #    Carmelo Camacho [phone]       [place] [address]        Equal Access to Services     BRIAN FERNANDEZ : Hadii ni lyles larryo Sorachelali, waaxda luqadaha, qaybta kaalmada adeegyada, susy felixn emily keenan nirmala mandel. So [facility] [phone].    ATENCIÓN: Si habla español, tiene a orona disposición servicios gratuitos de asistencia lingüística. Llame al [phone].    We comply with applicable federal civil rights laws and [place] laws. We do not discriminate on the basis of race, color, national origin, age, disability sex, sexual orientation or gender identity.            Thank you!     Thank you for choosing [facility]  for your care. Our goal is always to provide you with excellent care. Hearing back from our patients is one way we can continue to improve our services. Please take a few minutes to complete the written survey that you may receive in the mail after your visit with us. Thank you!             Your Updated Medication List - Protect others around you: Learn how to safely use, store and throw away your medicines at www.disposemymeds.org.          This list is accurate as of: 6/26/17  1:26 PM.  Always use your most recent med list.                   Brand Name Dispense Instructions for use Diagnosis    acetaminophen 325 MG tablet    TYLENOL    100 tablet    Take 1-2 tablets (325-650 mg) by mouth every 4 hours as needed for mild pain or fever    Influenza B       amLODIPine 5 MG tablet    NORVASC    90 tablet    Take 1 tablet (5 mg) by mouth daily    Essential hypertension       calcium-vitamin D 500-125 MG-UNIT Tabs      Take by mouth 2 times daily        carvedilol 6.25 MG tablet    COREG    180 tablet    Take 1 tablet (6.25 mg) by mouth 2 times daily (with meals)    Essential hypertension       desonide 0.05 % cream    DESOWEN    60 g    Apply sparingly  to affected area three times daily as needed.    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)       traMADol 50 MG tablet    ULTRAM    60 tablet    Take 1 tablet (50 mg) by mouth every 6 hours as needed for moderate pain    S/P allogeneic bone marrow transplant (H), 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

## 2017-06-26 NOTE — LETTER
6/26/2017      RE: Eryn Bennett  PO   [facility] [address]        Subjective:   Eryn Bennett is a 61 year old female who follows up with bilateral shoulder pain. She has been continuing with her home exercise program.  She states that her right shoulder is overall much improved, but her left shoulder still has limited painful motion.  She has had no interval injury.       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 She also has no past medical history of Atypical fibroxanthoma; Basal cell carcinoma; Cutaneous T-cell lymphoma (H); Malignant melanoma (H); Other specified malignant neoplasm of skin of trunk, except scrotum; or Squamous cell carcinoma.  She  has a past surgical history that includes picc insertion (Left, 8/28/2014); Doss Antos Catheter Placment (Right, 2010); Cholecystectomy (1987); GYN surgery; Hysterectomy vaginal, bilateral salpingo-oopherectomy, combined; GI surgery; Bladder surgery; and Esophagoscopy, gastroscopy, duodenoscopy (EGD), combined (N/A, 12/11/2014).  Her family history includes CANCER in her father and sister; DIABETES in her sister; Glaucoma in her maternal grandfather and mother; Intellectual Disability (Mental Retardation) in her sister; LUNG DISEASE in her sister; Melanoma in her daughter. There is no history of Macular Degeneration.  She reports that she has never smoked. She has never used smokeless tobacco.  She reports that she does not drink alcohol or use illicit drugs.    ALLERGIES: She is allergic to blood transfusion related (informational only); cefepime; sulfa drugs; allopurinol; levofloxacin; and vancomycin.    CURRENT MEDICATIONS: She has a current medication list which includes the following prescription(s): valacyclovir, tramadol, desonide, acetaminophen, calcium-vitamin d, cholecalciferol, prochlorperazine, carvedilol, and amlodipine.     REVIEW OF SYSTEMS: 12 point review of systems is negative except as noted above.     Exam:   /81  Wt 219 lb (99.3 kg)  BMI 41.38 kg/m2      CONSTITUTIONAL: healthy, alert and no distress  HEAD: Normocephalic. No masses, lesions, tenderness or abnormalities  SKIN: no suspicious lesions or rashes  GAIT: obese pattern  NEUROLOGIC: Non-focal  PSYCHIATRIC: affect normal/bright and mentation appears normal.  RIGHT SHOULDER:  Range of motion demonstrates external rotation of 70 degrees, internal rotation of 90 degrees, flexion of 160 degrees and abduction of 160 degrees.  Extension is full.  She has minor pain in ranges of motion.   LEFT SHOULDER:  Demonstrates internal rotation of 90 degrees, external rotation of 40 degrees, abduction of 100 degrees and flexion of 90 degrees of full extension.  She has pain at end ranges of motion.      ASSESSMENT:  Frozen shoulder.      PLAN:  With regards to the right shoulder, she is essentially resolved and I told her that we will stop following this unless she has any acute changes.  With regards to the left shoulder, we are going to repeat a glenohumeral intra-articular corticosteroid injection and we would like to do this with 20 mL of fluid total so utilizing a balance of normal saline to have some distention of the joint would be beneficial as well.  I will see her for a 30-minute followup in approximately 2 months following the injection.         Rneny Tompkins MD

## 2017-06-26 NOTE — PROGRESS NOTES
Subjective:   Eryn Bennett is a 61 year old female who follows up with bilateral shoulder pain. She has been continuing with her home exercise program.  She states that her right shoulder is overall much improved, but her left shoulder still has limited painful motion.  She has had no interval injury.       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 She also has no past medical history of Atypical fibroxanthoma; Basal cell carcinoma; Cutaneous T-cell lymphoma (H); Malignant melanoma (H); Other specified malignant neoplasm of skin of trunk, except scrotum; or Squamous cell carcinoma.  She  has a past surgical history that includes picc insertion (Left, 8/28/2014); Dos Santos Catheter Placment (Right, 2010); Cholecystectomy (1987); GYN surgery; Hysterectomy vaginal, bilateral salpingo-oopherectomy, combined; GI surgery; Bladder surgery; and Esophagoscopy, gastroscopy, duodenoscopy (EGD), combined (N/A, 12/11/2014).  Her family history includes CANCER in her father and sister; DIABETES in her sister; Glaucoma in her maternal grandfather and mother; Intellectual Disability (Mental Retardation) in her sister; LUNG DISEASE in her sister; Melanoma in her daughter. There is no history of Macular Degeneration.  She reports that she has never smoked. She has never used smokeless tobacco. She reports that she does not drink alcohol or use illicit  drugs.    ALLERGIES: She is allergic to blood transfusion related (informational only); cefepime; sulfa drugs; allopurinol; levofloxacin; and vancomycin.    CURRENT MEDICATIONS: She has a current medication list which includes the following prescription(s): valacyclovir, tramadol, desonide, acetaminophen, calcium-vitamin d, cholecalciferol, prochlorperazine, carvedilol, and amlodipine.     REVIEW OF SYSTEMS: 12 point review of systems is negative except as noted above.     Exam:   /81  Wt 219 lb (99.3 kg)  BMI 41.38 kg/m2      CONSTITUTIONAL: healthy, alert and no distress  HEAD: Normocephalic. No masses, lesions, tenderness or abnormalities  SKIN: no suspicious lesions or rashes  GAIT: obese pattern  NEUROLOGIC: Non-focal  PSYCHIATRIC: affect normal/bright and mentation appears normal.  RIGHT SHOULDER:  Range of motion demonstrates external rotation of 70 degrees, internal rotation of 90 degrees, flexion of 160 degrees and abduction of 160 degrees.  Extension is full.  She has minor pain in ranges of motion.   LEFT SHOULDER:  Demonstrates internal rotation of 90 degrees, external rotation of 40 degrees, abduction of 100 degrees and flexion of 90 degrees of full extension.  She has pain at end ranges of motion.      ASSESSMENT:  Frozen shoulder.      PLAN:  With regards to the right shoulder, she is essentially resolved and I told her that we will stop following this unless she has any acute changes.  With regards to the left shoulder, we are going to repeat a glenohumeral intra-articular corticosteroid injection and we would like to do this with 20 mL of fluid total so utilizing a balance of normal saline to have some distention of the joint would be beneficial as well.  I will see her for a 30-minute followup in approximately 2 months following the injection.

## 2017-09-18 ENCOUNTER — OFFICE VISIT (OUTPATIENT)
Dept: ORTHOPEDICS | Facility: CLINIC | Age: 62
End: 2017-09-18

## 2017-09-18 VITALS
BODY MASS INDEX: 41.75 KG/M2 | SYSTOLIC BLOOD PRESSURE: 130 MMHG | HEART RATE: 82 BPM | HEIGHT: 62 IN | WEIGHT: 226.9 LBS | DIASTOLIC BLOOD PRESSURE: 71 MMHG

## 2017-09-18 DIAGNOSIS — M75.02 ADHESIVE CAPSULITIS OF LEFT SHOULDER: Primary | ICD-10-CM

## 2017-09-18 NOTE — PROGRESS NOTES
Subjective:   Eryn Bennett is a 62 year old female who is here for follow up of her left adhesive capsulitis. She is doing her home PT. She feels she is much improved. She is much more functional and very little pain in the left shoulder.      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 She also has no past medical history of Atypical fibroxanthoma; Basal cell carcinoma; Cutaneous T-cell lymphoma (H); Malignant melanoma (H); Other specified malignant neoplasm of skin of trunk, except scrotum; or Squamous cell carcinoma.  She  has a past surgical history that includes picc insertion (Left, 8/28/2014); Dos Santos Catheter Placment (Right, 2010); Cholecystectomy (1987); GYN surgery; Hysterectomy vaginal, bilateral salpingo-oopherectomy, combined; GI surgery; Bladder surgery; and Esophagoscopy, gastroscopy, duodenoscopy (EGD), combined (N/A, 12/11/2014).  Her family history includes CANCER in her father and sister; DIABETES in her sister; Glaucoma in her maternal grandfather and mother; Intellectual Disability (Mental Retardation) in her sister; LUNG DISEASE in her sister; Melanoma in her daughter. There is no history of Macular Degeneration.  She reports that she has never smoked. She has never used smokeless tobacco. She reports that she does not drink alcohol or use illicit drugs.    ALLERGIES: She is allergic to blood transfusion related  "(informational only); cefepime; sulfa drugs; allopurinol; levofloxacin; and vancomycin.    CURRENT MEDICATIONS: She has a current medication list which includes the following prescription(s): valacyclovir, tramadol, desonide, acetaminophen, calcium-vitamin d, cholecalciferol, prochlorperazine, carvedilol, and amlodipine.     REVIEW OF SYSTEMS: 10 point review of systems is negative except as noted above.     Exam:   /71  Pulse 82  Ht 5' 1.5\" (1.562 m)  Wt 226 lb 14.4 oz (102.9 kg)  BMI 42.18 kg/m2      CONSTITUTIONAL: healthy, alert and no distress  HEAD: Normocephalic. No masses, lesions, tenderness or abnormalities  GAIT: normal  NEUROLOGIC: Non-focal  PSYCHIATRIC: affect normal/bright and mentation appears normal.    MUSCULOSKELETAL:   LEFT SHOULDER: ROM demonstrates ER of 45 deg, IR of 95 deg, Abduction of 140 deg, and Flexion of 135 deg. MMT of the RC demonstrates intact functions.        Assessment/Plan:   (M75.02) Adhesive capsulitis of left shoulder  (primary encounter diagnosis)  Comment: Will continue with PT HEP for the next 2-3 months until she has resolution. No further follow up is needed. She is markedly improved. She remains at risk for recurrence of this condition affecting either shoulder.        "

## 2017-09-18 NOTE — LETTER
9/18/2017      RE: Eryn Bennett  PO   [facility] [address]        Subjective:   Eryn Bennett is a 62 year old female who is here for follow up of her left adhesive capsulitis. She is doing her home PT. She feels she is much improved. She is much more functional and very little pain in the left shoulder.      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 She also has no past medical history of Atypical fibroxanthoma; Basal cell carcinoma; Cutaneous T-cell lymphoma (H); Malignant melanoma (H); Other specified malignant neoplasm of skin of trunk, except scrotum; or Squamous cell carcinoma.  She  has a past surgical history that includes picc insertion (Left, 8/28/2014); Dos Santos Catheter Placment (Right, 2010); Cholecystectomy (1987); GYN surgery; Hysterectomy vaginal, bilateral salpingo-oopherectomy, combined; GI surgery; Bladder surgery; and Esophagoscopy, gastroscopy, duodenoscopy (EGD), combined (N/A, 12/11/2014).  Her family history includes CANCER in her father and sister; DIABETES in her sister; Glaucoma in her maternal grandfather and mother; Intellectual Disability (Mental Retardation) in her sister; LUNG DISEASE in her sister; Melanoma in her daughter. There is no history of Macular Degeneration.  She reports that she has never smoked. She has never used smokeless tobacco. She reports that she does not drink alcohol or use illicit  "drugs.    ALLERGIES: She is allergic to blood transfusion related (informational only); cefepime; sulfa drugs; allopurinol; levofloxacin; and vancomycin.    CURRENT MEDICATIONS: She has a current medication list which includes the following prescription(s): valacyclovir, tramadol, desonide, acetaminophen, calcium-vitamin d, cholecalciferol, prochlorperazine, carvedilol, and amlodipine.     REVIEW OF SYSTEMS: 10 point review of systems is negative except as noted above.     Exam:   /71  Pulse 82  Ht 5' 1.5\" (1.562 m)  Wt 226 lb 14.4 oz (102.9 kg)  BMI 42.18 kg/m2      CONSTITUTIONAL: healthy, alert and no distress  HEAD: Normocephalic. No masses, lesions, tenderness or abnormalities  GAIT: normal  NEUROLOGIC: Non-focal  PSYCHIATRIC: affect normal/bright and mentation appears normal.    MUSCULOSKELETAL:   LEFT SHOULDER: ROM demonstrates ER of 45 deg, IR of 95 deg, Abduction of 140 deg, and Flexion of 135 deg. MMT of the RC demonstrates intact functions.        Assessment/Plan:   (M75.02) Adhesive capsulitis of left shoulder  (primary encounter diagnosis)  Comment: Will continue with PT HEP for the next 2-3 months until she has resolution. No further follow up is needed. She is markedly improved. She remains at risk for recurrence of this condition affecting either shoulder.        Renny Tompkins MD    "

## 2017-09-18 NOTE — MR AVS SNAPSHOT
After Visit Summary   9/18/2017    Eryn Bennett    MRN: [id number]           Patient Information     Date Of Birth          1955        Visit Information        Provider Department      9/18/2017 10:00 AM Renny Tompkins MD [facility]        Today's Diagnoses     Adhesive capsulitis of left shoulder    -  1       Follow-ups after your visit        Your next 10 appointments already scheduled     Sep 18, 2017 10:00 AM CDT   (Arrive by 9:45 AM)   Return Visit with Renny Tompkins MD   [facility] ([facility])    [address] [place] [address]  [address] [facility] [address]   [phone]            Nov 10, 2017 10:00 AM CST   [facility] with [facility]   [facility] Draw ([facility])    [address] [place] [id number] [phone]            Nov 10, 2017 10:30 AM CST   Return with Irving Ni MD   [facility] ([facility])    [address] [place] [id number] [phone]            Nov 10, 2017 11:00 AM CST   Infusion 60 with UC BMT INFUSION, UC 1 ATC   [facility] ([facility])    [address] [place] [id number] [phone]              Who to contact     Please call your clinic at [phone] to:    Ask questions about your health    Make or cancel appointments    Discuss your medicines    Learn about your test results    Speak to your doctor   If you have compliments or concerns about an experience at your clinic, or if you wish to file a complaint, please contact [facility] Patient Relations at [phone] or email us at [email or web address]         Additional Information About Your Visit        MyChart Information     [employer] gives you secure access  "to your electronic health record. If you see a primary care provider, you can also send messages to your care team and make appointments. If you have questions, please call your primary care clinic.  If you do not have a primary care provider, please call [phone] and they will assist you.      [employer] is an electronic gateway that provides easy, online access to your medical records. With [employer], you can request a clinic appointment, read your test results, renew a prescription or communicate with your care team.     To access your existing account, please contact your [facility] or call [phone] for assistance.        Care EveryWhere ID     This is your Care EveryWhere ID. This could be used by other organizations to access your [facility] medical records  [id number]        Your Vitals Were     Pulse Height BMI (Body Mass Index)             82 5' 1.5\" (1.562 m) 42.18 kg/m2          Blood Pressure from Last 3 Encounters:   09/18/17 130/71   06/26/17 138/81   06/14/17 138/81    Weight from Last 3 Encounters:   09/18/17 226 lb 14.4 oz (102.9 kg)   06/26/17 219 lb (99.3 kg)   06/14/17 219 lb 4.8 oz (99.5 kg)              Today, you had the following     No orders found for display       Primary Care Provider Office Phone # Fax #    Carmelo Camacho [phone]       [place] [address]        Equal Access to Services     BRIAN FERNANDEZ AH: Hadii ni ku hadasho Soomaali, waaxda luqadaha, qaybta kaalmada adeegyada, susy silvestre . So [facility] [phone].    ATENCIÓN: Si habla español, tiene a orona disposición servicios gratuitos de asistencia lingüística. Llame al [phone].    We comply with applicable federal civil rights laws and [place] laws. We do not discriminate on the basis of race, color, national origin, age, disability sex, sexual orientation or gender identity.            Thank you!     " Thank you for choosing [facility]  for your care. Our goal is always to provide you with excellent care. Hearing back from our patients is one way we can continue to improve our services. Please take a few minutes to complete the written survey that you may receive in the mail after your visit with us. Thank you!             Your Updated Medication List - Protect others around you: Learn how to safely use, store and throw away your medicines at www.disposemymeds.org.          This list is accurate as of: 9/18/17  9:21 AM.  Always use your most recent med list.                   Brand Name Dispense Instructions for use Diagnosis    acetaminophen 325 MG tablet    TYLENOL    100 tablet    Take 1-2 tablets (325-650 mg) by mouth every 4 hours as needed for mild pain or fever    Influenza B       amLODIPine 5 MG tablet    NORVASC    90 tablet    Take 1 tablet (5 mg) by mouth daily    Essential hypertension       calcium-vitamin D 500-125 MG-UNIT Tabs      Take by mouth 2 times daily        carvedilol 6.25 MG tablet    COREG    180 tablet    Take 1 tablet (6.25 mg) by mouth 2 times daily (with meals)    Essential hypertension       desonide 0.05 % cream    DESOWEN    60 g    Apply sparingly to affected area three times daily as needed.    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)       traMADol 50 MG tablet    ULTRAM    60 tablet    Take 1 tablet (50 mg) by mouth every 6 hours as  needed for moderate pain    S/P allogeneic bone marrow transplant (H), 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

## 2017-10-25 DIAGNOSIS — Z94.81 S/P ALLOGENEIC BONE MARROW TRANSPLANT (H): ICD-10-CM

## 2017-10-25 DIAGNOSIS — B00.9 HSV (HERPES SIMPLEX VIRUS) INFECTION: ICD-10-CM

## 2017-10-25 RX ORDER — VALACYCLOVIR HYDROCHLORIDE 500 MG/1
TABLET, FILM COATED ORAL
Qty: 60 TABLET | Refills: 6 | Status: SHIPPED | OUTPATIENT
Start: 2017-10-25 | End: 2017-11-17

## 2017-10-30 DIAGNOSIS — Z94.81 S/P ALLOGENEIC BONE MARROW TRANSPLANT (H): ICD-10-CM

## 2017-10-30 DIAGNOSIS — C92.01 ACUTE MYELOID LEUKEMIA IN REMISSION (H): Primary | ICD-10-CM

## 2017-10-30 RX ORDER — PANTOPRAZOLE SODIUM 40 MG/1
40 TABLET, DELAYED RELEASE ORAL DAILY
Qty: 30 TABLET | Refills: 6 | Status: SHIPPED | OUTPATIENT
Start: 2017-10-30 | End: 2019-02-13

## 2017-11-17 ENCOUNTER — ONCOLOGY VISIT (OUTPATIENT)
Dept: TRANSPLANT | Facility: CLINIC | Age: 62
End: 2017-11-17
Attending: INTERNAL MEDICINE
Payer: MEDICARE

## 2017-11-17 ENCOUNTER — APPOINTMENT (OUTPATIENT)
Dept: LAB | Facility: CLINIC | Age: 62
End: 2017-11-17
Attending: INTERNAL MEDICINE
Payer: MEDICARE

## 2017-11-17 VITALS
DIASTOLIC BLOOD PRESSURE: 71 MMHG | WEIGHT: 226.5 LBS | HEART RATE: 80 BPM | OXYGEN SATURATION: 96 % | BODY MASS INDEX: 42.1 KG/M2 | TEMPERATURE: 97.8 F | SYSTOLIC BLOOD PRESSURE: 124 MMHG | RESPIRATION RATE: 16 BRPM

## 2017-11-17 DIAGNOSIS — C92.01 ACUTE MYELOID LEUKEMIA IN REMISSION (H): Primary | ICD-10-CM

## 2017-11-17 LAB
ALBUMIN SERPL-MCNC: 3.8 G/DL (ref 3.4–5)
ALP SERPL-CCNC: 86 U/L (ref 40–150)
ALT SERPL W P-5'-P-CCNC: 24 U/L (ref 0–50)
ANION GAP SERPL CALCULATED.3IONS-SCNC: 8 MMOL/L (ref 3–14)
AST SERPL W P-5'-P-CCNC: 16 U/L (ref 0–45)
BASOPHILS # BLD AUTO: 0 10E9/L (ref 0–0.2)
BASOPHILS NFR BLD AUTO: 0.3 %
BILIRUB SERPL-MCNC: 0.4 MG/DL (ref 0.2–1.3)
BUN SERPL-MCNC: 22 MG/DL (ref 7–30)
CALCIUM SERPL-MCNC: 9.4 MG/DL (ref 8.5–10.1)
CHLORIDE SERPL-SCNC: 104 MMOL/L (ref 94–109)
CO2 SERPL-SCNC: 27 MMOL/L (ref 20–32)
CREAT SERPL-MCNC: 1.04 MG/DL (ref 0.52–1.04)
DIFFERENTIAL METHOD BLD: NORMAL
EOSINOPHIL # BLD AUTO: 0.2 10E9/L (ref 0–0.7)
EOSINOPHIL NFR BLD AUTO: 3.3 %
ERYTHROCYTE [DISTWIDTH] IN BLOOD BY AUTOMATED COUNT: 13.1 % (ref 10–15)
GFR SERPL CREATININE-BSD FRML MDRD: 54 ML/MIN/1.7M2
GLUCOSE SERPL-MCNC: 108 MG/DL (ref 70–99)
HCT VFR BLD AUTO: 42.9 % (ref 35–47)
HGB BLD-MCNC: 13.9 G/DL (ref 11.7–15.7)
IGG SERPL-MCNC: 546 MG/DL (ref 695–1620)
IMM GRANULOCYTES # BLD: 0 10E9/L (ref 0–0.4)
IMM GRANULOCYTES NFR BLD: 0.3 %
LYMPHOCYTES # BLD AUTO: 2.8 10E9/L (ref 0.8–5.3)
LYMPHOCYTES NFR BLD AUTO: 39 %
MCH RBC QN AUTO: 30.3 PG (ref 26.5–33)
MCHC RBC AUTO-ENTMCNC: 32.4 G/DL (ref 31.5–36.5)
MCV RBC AUTO: 94 FL (ref 78–100)
MONOCYTES # BLD AUTO: 0.6 10E9/L (ref 0–1.3)
MONOCYTES NFR BLD AUTO: 7.9 %
NEUTROPHILS # BLD AUTO: 3.5 10E9/L (ref 1.6–8.3)
NEUTROPHILS NFR BLD AUTO: 49.2 %
NRBC # BLD AUTO: 0 10*3/UL
NRBC BLD AUTO-RTO: 0 /100
PLATELET # BLD AUTO: 171 10E9/L (ref 150–450)
POTASSIUM SERPL-SCNC: 4.1 MMOL/L (ref 3.4–5.3)
PROT SERPL-MCNC: 7.1 G/DL (ref 6.8–8.8)
RBC # BLD AUTO: 4.59 10E12/L (ref 3.8–5.2)
SODIUM SERPL-SCNC: 138 MMOL/L (ref 133–144)
WBC # BLD AUTO: 7.2 10E9/L (ref 4–11)

## 2017-11-17 PROCEDURE — 90713 POLIOVIRUS IPV SC/IM: CPT | Mod: ZF | Performed by: INTERNAL MEDICINE

## 2017-11-17 PROCEDURE — 36415 COLL VENOUS BLD VENIPUNCTURE: CPT

## 2017-11-17 PROCEDURE — 85025 COMPLETE CBC W/AUTO DIFF WBC: CPT | Performed by: INTERNAL MEDICINE

## 2017-11-17 PROCEDURE — 82784 ASSAY IGA/IGD/IGG/IGM EACH: CPT | Performed by: INTERNAL MEDICINE

## 2017-11-17 PROCEDURE — 80053 COMPREHEN METABOLIC PANEL: CPT | Performed by: INTERNAL MEDICINE

## 2017-11-17 PROCEDURE — 90715 TDAP VACCINE 7 YRS/> IM: CPT | Mod: ZF | Performed by: INTERNAL MEDICINE

## 2017-11-17 PROCEDURE — 90670 PCV13 VACCINE IM: CPT | Mod: ZF | Performed by: INTERNAL MEDICINE

## 2017-11-17 PROCEDURE — 90648 HIB PRP-T VACCINE 4 DOSE IM: CPT | Mod: ZF | Performed by: INTERNAL MEDICINE

## 2017-11-17 PROCEDURE — 90746 HEPB VACCINE 3 DOSE ADULT IM: CPT | Mod: ZF | Performed by: INTERNAL MEDICINE

## 2017-11-17 PROCEDURE — G0010 ADMIN HEPATITIS B VACCINE: HCPCS

## 2017-11-17 PROCEDURE — 90707 MMR VACCINE SC: CPT | Mod: ZF | Performed by: INTERNAL MEDICINE

## 2017-11-17 PROCEDURE — 90472 IMMUNIZATION ADMIN EACH ADD: CPT

## 2017-11-17 PROCEDURE — G0009 ADMIN PNEUMOCOCCAL VACCINE: HCPCS

## 2017-11-17 PROCEDURE — 25000128 H RX IP 250 OP 636: Mod: ZF | Performed by: INTERNAL MEDICINE

## 2017-11-17 RX ORDER — ZOLEDRONIC ACID 5 MG/100ML
5 INJECTION, SOLUTION INTRAVENOUS ONCE
Status: DISCONTINUED | OUTPATIENT
Start: 2017-11-17 | End: 2017-11-17

## 2017-11-17 RX ADMIN — POLIOVIRUS TYPE 1 ANTIGEN (FORMALDEHYDE INACTIVATED), POLIOVIRUS TYPE 2 ANTIGEN (FORMALDEHYDE INACTIVATED), AND POLIOVIRUS TYPE 3 ANTIGEN (FORMALDEHYDE INACTIVATED) 0.5 ML: 40; 8; 32 INJECTION, SUSPENSION INTRAMUSCULAR at 11:01

## 2017-11-17 RX ADMIN — PNEUMOCOCCAL 13-VALENT CONJUGATE VACCINE 0.5 ML: 2.2; 2.2; 2.2; 2.2; 2.2; 4.4; 2.2; 2.2; 2.2; 2.2; 2.2; 2.2; 2.2 INJECTION, SUSPENSION INTRAMUSCULAR at 11:03

## 2017-11-17 RX ADMIN — MEASLES, MUMPS, AND RUBELLA VIRUS VACCINE LIVE 0.5 ML: 1000; 12500; 1000 INJECTION, POWDER, LYOPHILIZED, FOR SUSPENSION SUBCUTANEOUS at 11:02

## 2017-11-17 RX ADMIN — HEPATITIS B VACCINE (RECOMBINANT) 20 MCG: 20 INJECTION, SUSPENSION INTRAMUSCULAR at 11:02

## 2017-11-17 RX ADMIN — TETANUS TOXOID, REDUCED DIPHTHERIA TOXOID AND ACELLULAR PERTUSSIS VACCINE, ADSORBED 0.5 ML: 5; 2.5; 8; 8; 2.5 SUSPENSION INTRAMUSCULAR at 11:03

## 2017-11-17 RX ADMIN — HAEMOPHILUS B POLYSACCHARIDE CONJUGATE VACCINE FOR INJ 0.5 ML: RECON SOLN at 11:00

## 2017-11-17 ASSESSMENT — PAIN SCALES - GENERAL: PAINLEVEL: MILD PAIN (3)

## 2017-11-17 NOTE — PROGRESS NOTES
BMT Clinic Progress Notes    Ms Bennett, 61 you s/p NMA DUCB transplant    CC: follow-up    History of Present Illness: achyness on muscles and some joint mostly in AM. Improves during the day with movement. Shoulder improved with PT but will not be normal, PT thinks. No breathing problems unless vigorous exercise. No GI issues. No new rash. Had cataract surgery and vision is good. Need to go back for follow-up eye doctor as josh planned. Had flu shot already. Taking pantoprazole on and off. thinks she could do more PT at home. No recent infection.     Review of Systems: ROS otherwise unremarkable other than what is noted in the HPI.     Physical Exam: KPS 80%  /71 (BP Location: Right arm, Patient Position: Sitting, Cuff Size: Adult Large)  Pulse 80  Temp 97.8  F (36.6  C) (Oral)  Resp 16  Wt 102.7 kg (226 lb 8 oz)  SpO2 96%  BMI 42.1 kg/m2  General: A&O. NAD.   HEENT: CLARICE, sclera anicteric  Neck: supple  Lungs: CTA bilaterally to bases, no crackles, no wheezing   Heart: RRR, no m/r/g  Abdomen: +BS, soft, NT/ND, no HSM  Extremities: No edema; unable to raise her shoulder above her head.   Skin: erythematous eczema plaques on her neck and groinrashes. No bruises or petechiae. Carefully examined her hands and there is no skin thickening but some decreased strength and limite motion more notable with R ahnd because of pain on R Thumb. No inflammatory signs.    Labs   Lab Results   Component Value Date    WBC 7.2 11/17/2017    ANEU 3.5 11/17/2017    HGB 13.9 11/17/2017    HCT 42.9 11/17/2017     11/17/2017     11/17/2017    POTASSIUM 4.1 11/17/2017    CHLORIDE 104 11/17/2017    CO2 27 11/17/2017     (H) 11/17/2017    BUN 22 11/17/2017    CR 1.04 11/17/2017    MAG 1.8 04/03/2017    INR 1.03 04/03/2017    BILITOTAL 0.4 11/17/2017    AST 16 11/17/2017    ALT 24 11/17/2017    ALKPHOS 86 11/17/2017    PROTTOTAL 7.1 11/17/2017    ALBUMIN 3.8 11/17/2017       Ms. Eryn Bennett is a 59 yo woman  s/p NMA DCBT for AML day 3+ years      1. BMT/AML: continued CR. two year post transplant BM 40% cellular, trilineage hematopoiesis, no leukemia by morphology and flow negative; % donor #2.     2. HEME: Transfusion parameters keep Hgb>8g/dL and plts>10k. Stable counts.  - Patient has history of Hemolytic anemia: Warm anti E; IgG and completment. Received rituximab X 4 September 2015.    Patient has history DVT but her repeat doppler L lower ext negative on OCT 2016. Off coumadin since JUL 2016. During this admission as she was less mobile with viral illness she was given SQ heparin every 12 hours which was stopped on discharge.     3. ID: afebrile; received immunizations today; she will think about shingles vaccine. Booster in 2 months. She has calendar. Valtrex PRN if HSV reactivation.      4. GVH: none active. But has eczema rash on her neck and groins that could be mild GVHD. Controlled with creams. musculo-skeletal symptoms could also be GVHD but not enough to start systemic steroids;  - Off prednisone since 4/20/16; finished MMF taper on 9/13/16      5. GI: monitor and use PRN medication for GERD  - Compazine available prn      6. FEN/Renal: WNL      7. Cardiovascular: High cholesterol and triglycerides may use cholesterol medication. Continue Norvasc 5mg daily Carvedilol 6.25mg bid for cardiomyopathy.      8. Musculoskelatal: physical condition overall improved but achy body in AM and R thumb pain. Unclear cause could be GVHD but she will try other measures and call if worsening symptoms. Must work on exercises at home.   - Dexa scan with osteopenia (5/5/15). Had bisphosphonate 10/5/16; repeat in ~12 months; she will discuss this with primary care  - Continue Calcium and Vit D supplementation 2,800 units daily  - continue Tramadol and tylenol for muscle pain and that was working better than hydrocodone/acetaminophen       9. Pulmonary: no active issues. Had flu shot this fall already.    10. Neurology:  feet neuropathy not changed. Monitor and refer to neurology/neuropathy group for assessment.     11. Eyes: eye clinic following after cataract surgery in August 2017.      Plan:   [facility] on 5/18/18 with labs  Boosters + MMR vaccine today 11/17/17 as planned; shingle vaccine later if she decides.  Need regular health care screenings including but not limited to bone density and health, cholesterol, glucose, colonoscopy, mammograms, pap smears, oral cancer yearly, skin cancer yearly.  Watch hand function closely; call with questions or new issues.

## 2017-11-17 NOTE — LETTER
11/17/2017      RE: Eryn Bennett  PO   [facility] [address]       BMT Clinic Progress Notes    Ms Bennett, 61 you s/p NMA DUCB transplant    CC: follow-up    History of Present Illness: achyness on muscles and some joint mostly in AM. Improves during the day with movement. Shoulder improved with PT but will not be normal, PT thinks. No breathing problems unless vigorous exercise. No GI issues. No new rash. Had cataract surgery and vision is good. Need to go back for follow-up eye doctor as josh planned. Had flu shot already. Taking pantoprazole on and off. thinks she could do more PT at home. No recent infection.     Review of Systems: ROS otherwise unremarkable other than what is noted in the HPI.     Physical Exam: KPS 80%  /71 (BP Location: Right arm, Patient Position: Sitting, Cuff Size: Adult Large)  Pulse 80  Temp 97.8  F (36.6  C) (Oral)  Resp 16  Wt 102.7 kg (226 lb 8 oz)  SpO2 96%  BMI 42.1 kg/m2  General: A&O. NAD.   HEENT: CLARICE, sclera anicteric  Neck: supple  Lungs: CTA bilaterally to bases, no crackles, no wheezing   Heart: RRR, no m/r/g  Abdomen: +BS, soft, NT/ND, no HSM  Extremities: No edema; unable to raise her shoulder above her head.   Skin: erythematous eczema plaques on her neck and groinrashes. No bruises or petechiae. Carefully examined her hands and there is no skin thickening but some decreased strength and limite motion more notable with R ahnd because of pain on R Thumb. No inflammatory signs.    Labs   Lab Results   Component Value Date    WBC 7.2 11/17/2017    ANEU 3.5 11/17/2017    HGB 13.9 11/17/2017    HCT 42.9 11/17/2017     11/17/2017     11/17/2017    POTASSIUM 4.1 11/17/2017    CHLORIDE 104 11/17/2017    CO2 27 11/17/2017     (H) 11/17/2017    BUN 22 11/17/2017    CR 1.04 11/17/2017    MAG 1.8 04/03/2017    INR 1.03 04/03/2017    BILITOTAL 0.4 11/17/2017    AST 16 11/17/2017    ALT 24 11/17/2017    ALKPHOS 86 11/17/2017    PROTTOTAL 7.1  11/17/2017    ALBUMIN 3.8 11/17/2017       Ms. Eryn Bennett is a 61 yo woman s/p NMA DCBT for AML day 3+ years      1. BMT/AML: continued CR. two year post transplant BM 40% cellular, trilineage hematopoiesis, no leukemia by morphology and flow negative; % donor #2.     2. HEME: Transfusion parameters keep Hgb>8g/dL and plts>10k. Stable counts.  - Patient has history of Hemolytic anemia: Warm anti E; IgG and completment. Received rituximab X 4 September 2015.    Patient has history DVT but her repeat doppler L lower ext negative on OCT 2016. Off coumadin since JUL 2016. During this admission as she was less mobile with viral illness she was given SQ heparin every 12 hours which was stopped on discharge.     3. ID: afebrile; received immunizations today; she will think about shingles vaccine. Booster in 2 months. She has calendar. Valtrex PRN if HSV reactivation.      4. GVH: none active. But has eczema rash on her neck and groins that could be mild GVHD. Controlled with creams. musculo-skeletal symptoms could also be GVHD but not enough to start systemic steroids;  - Off prednisone since 4/20/16; finished MMF taper on 9/13/16      5. GI: monitor and use PRN medication for GERD  - Compazine available prn      6. FEN/Renal: WNL      7. Cardiovascular: High cholesterol and triglycerides may use cholesterol medication. Continue Norvasc 5mg daily Carvedilol 6.25mg bid for cardiomyopathy.      8. Musculoskelatal: physical condition overall improved but achy body in AM and R thumb pain. Unclear cause could be GVHD but she will try other measures and call if worsening symptoms. Must work on exercises at home.   - Dexa scan with osteopenia (5/5/15). Had bisphosphonate 10/5/16; repeat in ~12 months; she will discuss this with primary care  - Continue Calcium and Vit D supplementation 2,800 units daily  - continue Tramadol and tylenol for muscle pain and that was working better than hydrocodone/acetaminophen       9.  Pulmonary: no active issues. Had flu shot this fall already.    10. Neurology: feet neuropathy not changed. Monitor and refer to neurology/neuropathy group for assessment.     11. Eyes: eye clinic following after cataract surgery in August 2017.      Plan:   RTC [facility] on 5/18/18 with labs  Boosters + MMR vaccine today 11/17/17 as planned; shingle vaccine later if she decides.  Need regular health care screenings including but not limited to bone density and health, cholesterol, glucose, colonoscopy, mammograms, pap smears, oral cancer yearly, skin cancer yearly.  Watch hand function closely; call with questions or new issues.    BMT DOM

## 2017-11-17 NOTE — MR AVS SNAPSHOT
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  After hours call the   hospital at [phone] or   [phone]. Ask for the BMT   fellow on-call           Care Instructions    ISABELLE Ni on 5/18/18 with labs  Boosters + MMR vaccine today 11/17/17 as planned; shingle vaccine later if she decides.  Need regular health care screenings including but not limited to bone density and health, cholesterol, glucose, colonoscopy, mammograms, pap smears, oral cancer yearly, skin cancer yearly.  Watch hand function closely; call with questions or new issues.          Follow-ups after your visit        Your next 10 appointments already scheduled     May 18, 2018 10:00 AM CDT   [facility] with  [facility]   [facility] ([facility])    [address] [place] [id number] [phone]            May 18, 2018 10:30 AM CDT   Return with Irving Ni MD   [facility] Blood and Marrow Transplant ([facility])    [address] [place] [id number] [phone]              Future tests that were ordered for you today     Open Future Orders        Priority Expected Expires Ordered    CBC with platelets differential Routine 5/18/2018 6/17/2018 11/17/2017    Comprehensive metabolic panel Routine 5/18/2018 6/17/2018  11/17/2017            Who to contact     If you have questions or need follow up information about today's clinic visit or your schedule please contact [facility] BLOOD AND MARROW TRANSPLANT directly at [phone].  Normal or non-critical lab and imaging results will be communicated to you by MyChart, letter or phone within 4 business days after the clinic has received the results. If you do not hear from us within 7 days, please contact the clinic through LocalSorthart or phone. If you have a critical or abnormal lab result, we will notify you by phone as soon as possible.  Submit refill requests through FileTrek or call your pharmacy and they will forward the refill request to us. Please allow 3 business days for your refill to be completed.          Additional Information About Your Visit        FileTrek Information     FileTrek gives you secure access to your electronic health record. If you see a primary care provider, you can also send messages to your care team and make appointments. If you have questions, please call your primary care clinic.  If you do not have a primary care provider, please call [phone] and they will assist you.        Care EveryWhere ID     This is your Care EveryWhere ID. This could be used by other organizations to access your [place] medical records  [id number]        Your Vitals Were     Pulse Temperature Respirations Pulse Oximetry BMI (Body Mass Index)       80 97.8  F (36.6  C) (Oral) 16 96% 42.1 kg/m2        Blood Pressure from Last 3 Encounters:   11/17/17 124/71   09/18/17 130/71   06/26/17 138/81    Weight from Last 3 Encounters:   11/17/17 102.7 kg (226 lb 8 oz)   09/18/17 102.9 kg (226 lb 14.4 oz)   06/26/17 99.3 kg (219 lb)              We Performed the Following     CBC with platelets differential     Comprehensive metabolic panel     IgG          Today's Medication Changes          These changes are accurate as of: 11/17/17 11:22 AM.  If you have any questions, ask your  nurse or doctor.               Stop taking these medicines if you haven't already. Please contact your care team if you have questions.     valACYclovir 500 MG tablet   Commonly known as:  VALTREX                    Recent Review Flowsheet Data     BMT Recent Results Latest Ref Rng & Units 4/2/2017 4/3/2017 4/4/2017 4/7/2017 4/14/2017 6/14/2017 11/17/2017    WBC 4.0 - 11.0 10e9/L - 6.4 6.6 7.1 6.5 8.0 7.2    Hemoglobin 11.7 - 15.7 g/dL - 10.4(L) 10.6(L) 12.0 12.0 12.5 13.9    Platelet Count 150 - 450 10e9/L - 140(L) 189 246 233 194 171    Neutrophils (Absolute) 1.6 - 8.3 10e9/L - 2.8 3.0 3.4 2.9 4.3 3.5    INR 0.86 - 1.14 - 1.03 - - - - -    Sodium 133 - 144 mmol/L - 142 139 138 138 137 138    Potassium 3.4 - 5.3 mmol/L 3.8 3.8 3.8 4.8 4.0 4.2 4.1    Chloride 94 - 109 mmol/L - 108 103 101 101 103 104    Glucose 70 - 99 mg/dL - 111(H) 129(H) 139(H) 126(H) 109(H) 108(H)    Urea Nitrogen 7 - 30 mg/dL - 10 13 19 18 23 22    Creatinine 0.52 - 1.04 mg/dL - 0.81 0.88 0.98 1.04 1.13(H) 1.04    Calcium (Total) 8.5 - 10.1 mg/dL - 7.9(L) 8.2(L) 9.9 9.2 9.3 9.4    Protein (Total) 6.8 - 8.8 g/dL - 6.0(L) - 7.1 7.0 7.0 7.1    Albumin 3.4 - 5.0 g/dL - 2.8(L) - 3.2(L) 3.7 3.8 3.8    Bilirubin (Direct) 0.0 - 0.2 mg/dL - <0.1 - - - - -    Alkaline Phosphatase 40 - 150 U/L - 70 - 76 72 89 86    AST 0 - 45 U/L - 9 - 21 18 14 16    ALT 0 - 50 U/L - 22 - 28 19 18 24    MCV 78 - 100 fl - 90 89 89 87 89 94               Primary Care Provider Office Phone # Fax #    Carmelo Camacho [phone]       [place] [address]        Equal Access to Services     BRIAN FERNANDEZ AH: Hadii ni Hogan, wakobeda luqadaha, qaybta kaalmada adeegyacarmela, susy mandel. [facility] [phone].    ATENCIÓN: Si habla español, tiene a orona disposición servicios gratuitos de asistencia lingüística. Teagan al [phone].    We comply with applicable federal civil rights laws and  [place] laws. We do not discriminate on the basis of race, color, national origin, age, disability, sex, sexual orientation, or gender identity.            Thank you!     Thank you for choosing [facility] BLOOD AND MARROW TRANSPLANT  for your care. Our goal is always to provide you with excellent care. Hearing back from our patients is one way we can continue to improve our services. Please take a few minutes to complete the written survey that you may receive in the mail after your visit with us. Thank you!             Your Updated Medication List - Protect others around you: Learn how to safely use, store and throw away your medicines at www.disposemymeds.org.          This list is accurate as of: 11/17/17 11:22 AM.  Always use your most recent med list.                   Brand Name Dispense Instructions for use Diagnosis    acetaminophen 325 MG tablet    TYLENOL    100 tablet    Take 1-2 tablets (325-650 mg) by mouth every 4 hours as needed for mild pain or fever    Influenza B       amLODIPine 5 MG tablet    NORVASC    90 tablet    Take 1 tablet (5 mg) by mouth daily    Essential hypertension       calcium-vitamin D 500-125 MG-UNIT Tabs      Take by mouth 2 times daily        carvedilol 6.25 MG tablet    COREG    180 tablet    Take 1 tablet (6.25 mg) by mouth 2 times daily (with meals)    Essential hypertension       desonide 0.05 % cream    DESOWEN    60 g    Apply sparingly to affected area three times daily as needed.    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), EBV (Georgette-Barr virus) viremia, Cytomegalovirus (CMV) viremia (H), Autoimmune hemolytic anemia (H)       pantoprazole 40 MG EC tablet    PROTONIX    30 tablet    Take 1 tablet (40 mg) by mouth daily    Acute  myeloid leukemia in remission (H), S/P allogeneic bone marrow transplant (H)       prochlorperazine 5 MG tablet    COMPAZINE    20 tablet    Take 1-2 tablets (5-10 mg) by mouth every 6 hours as needed for nausea or vomiting    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

## 2017-11-17 NOTE — NURSING NOTE
Chief Complaint   Patient presents with     Blood Draw     labs drawn with IV start by rn.  vs taken.     Labs drawn with IV start by RN.  Vital signs taken.  Jenna Ji RN

## 2017-11-17 NOTE — MR AVS SNAPSHOT
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  After hours call the   hospital at [phone] or   [phone]. Ask for the BMT   fellow on-call            Follow-ups after your visit        Your next 10 appointments already scheduled     Nov 17, 2017 10:30 AM CST   Infusion 61 with UC BMT INFUSION, UC 1 ATC   [facility] Blood and Marrow Transplant ([facility])    [address] [place] [id number] [phone]              Who to contact     If you have questions or need follow up information about today's clinic visit or your schedule please contact [facility] BLOOD AND MARROW TRANSPLANT directly at [phone].  Normal or non-critical lab and imaging results will be communicated to you by [employer]esp[employer] Innovationshart, letter or phone within 4 business days after the clinic has received the results. If you do not hear from us within 7 days, please contact the clinic through MyChart or phone. If you have a critical or abnormal lab result, we will notify you by phone as soon as possible.  Submit refill requests through RIGID or call your pharmacy and they will forward the refill request to us. Please allow 3 business days for your refill to be completed.          Additional Information About Your Visit        RIGID Information     RIGID gives you secure access to your electronic health  record. If you see a primary care provider, you can also send messages to your care team and make appointments. If you have questions, please call your primary care clinic.  If you do not have a primary care provider, please call [phone] and they will assist you.        Care EveryWhere ID     This is your Care EveryWhere ID. This could be used by other organizations to access your [place] medical records  [id number]         Blood Pressure from Last 3 Encounters:   11/17/17 124/71   09/18/17 130/71   06/26/17 138/81    Weight from Last 3 Encounters:   11/17/17 102.7 kg (226 lb 8 oz)   09/18/17 102.9 kg (226 lb 14.4 oz)   06/26/17 99.3 kg (219 lb)              Today, you had the following     No orders found for display         Today's Medication Changes          These changes are accurate as of: 11/17/17 10:15 AM.  If you have any questions, ask your nurse or doctor.               Stop taking these medicines if you haven't already. Please contact your care team if you have questions.     valACYclovir 500 MG tablet   Commonly known as:  VALTREX                    Recent Review Flowsheet Data     BMT Recent Results Latest Ref Rng & Units 4/2/2017 4/3/2017 4/4/2017 4/7/2017 4/14/2017 6/14/2017 11/17/2017    WBC 4.0 - 11.0 10e9/L - 6.4 6.6 7.1 6.5 8.0 7.2    Hemoglobin 11.7 - 15.7 g/dL - 10.4(L) 10.6(L) 12.0 12.0 12.5 13.9    Platelet Count 150 - 450 10e9/L - 140(L) 189 246 233 194 171    Neutrophils (Absolute) 1.6 - 8.3 10e9/L - 2.8 3.0 3.4 2.9 4.3 3.5    INR 0.86 - 1.14 - 1.03 - - - - -    Sodium 133 - 144 mmol/L - 142 139 138 138 137 -    Potassium 3.4 - 5.3 mmol/L 3.8 3.8 3.8 4.8 4.0 4.2 -    Chloride 94 - 109 mmol/L - 108 103 101 101 103 -    Glucose 70 - 99 mg/dL - 111(H) 129(H) 139(H) 126(H) 109(H) -    Urea Nitrogen 7 - 30 mg/dL - 10 13 19 18 23 -    Creatinine 0.52 - 1.04 mg/dL - 0.81 0.88 0.98 1.04 1.13(H) -    Calcium (Total) 8.5 - 10.1 mg/dL - 7.9(L) 8.2(L) 9.9 9.2 9.3 -    Protein (Total) 6.8 - 8.8  g/dL - 6.0(L) - 7.1 7.0 7.0 -    Albumin 3.4 - 5.0 g/dL - 2.8(L) - 3.2(L) 3.7 3.8 -    Bilirubin (Direct) 0.0 - 0.2 mg/dL - <0.1 - - - - -    Alkaline Phosphatase 40 - 150 U/L - 70 - 76 72 89 -    AST 0 - 45 U/L - 9 - 21 18 14 -    ALT 0 - 50 U/L - 22 - 28 19 18 -    MCV 78 - 100 fl - 90 89 89 87 89 94               Primary Care Provider Office Phone # Fax #    Carmelo Camacho [phone]       [place] [address]        Equal Access to Services     BRIAN FERNANDEZ : Kyle becker Soseth, waaxda luqadaha, qaybta kaalmada adeegyada, susy silvestre . [facility] [phone].    ATENCIÓN: Si habla español, tiene a orona disposición servicios gratuitos de asistencia lingüística. [facility] of [phone].    We comply with applicable federal civil rights laws and [place] laws. We do not discriminate on the basis of race, color, national origin, age, disability, sex, sexual orientation, or gender identity.            Thank you!     Thank you for choosing [facility] BLOOD AND MARROW TRANSPLANT  for your care. Our goal is always to provide you with excellent care. Hearing back from our patients is one way we can continue to improve our services. Please take a few minutes to complete the written survey that you may receive in the mail after your visit with us. Thank you!             Your Updated Medication List - Protect others around you: Learn how to safely use, store and throw away your medicines at www.disposemymeds.org.          This list is accurate as of: 11/17/17 10:16 AM.  Always use your most recent med list.                   Brand Name Dispense Instructions for use Diagnosis    acetaminophen 325 MG tablet    TYLENOL    100 tablet    Take 1-2 tablets (325-650 mg) by mouth every 4 hours as needed for mild pain or fever    Influenza B       amLODIPine 5 MG tablet    NORVASC    90 tablet    Take 1 tablet (5 mg) by mouth daily    Essential  hypertension       calcium-vitamin D 500-125 MG-UNIT Tabs      Take by mouth 2 times daily        carvedilol 6.25 MG tablet    COREG    180 tablet    Take 1 tablet (6.25 mg) by mouth 2 times daily (with meals)    Essential hypertension       desonide 0.05 % cream    DESOWEN    60 g    Apply sparingly to affected area three times daily as needed.    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), S/P allogeneic bone marrow transplant (H)       prochlorperazine 5 MG tablet    COMPAZINE    20 tablet    Take 1-2 tablets (5-10 mg) by mouth every 6 hours as needed for nausea or vomiting    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

## 2017-11-17 NOTE — PATIENT INSTRUCTIONS
RTC Raine on 5/18/18 with labs  Boosters + MMR vaccine today 11/17/17 as planned; shingle vaccine later if she decides.  Need regular health care screenings including but not limited to bone density and health, cholesterol, glucose, colonoscopy, mammograms, pap smears, oral cancer yearly, skin cancer yearly.  Watch hand function closely; call with questions or new issues.

## 2017-12-15 ENCOUNTER — TRANSFERRED RECORDS (OUTPATIENT)
Dept: HEALTH INFORMATION MANAGEMENT | Facility: CLINIC | Age: 62
End: 2017-12-15

## 2017-12-17 ENCOUNTER — HEALTH MAINTENANCE LETTER (OUTPATIENT)
Age: 62
End: 2017-12-17

## 2018-01-10 ENCOUNTER — CARE COORDINATION (OUTPATIENT)
Dept: TRANSPLANT | Facility: CLINIC | Age: 63
End: 2018-01-10

## 2018-01-10 NOTE — PROGRESS NOTES
Received disability insurance correspondence letter from Dr Jaiden Landry with [employer] [facility] depicting recent phone conversation with Dr Ni regarding pt's medical history/status. Dr Ni reviewed this letter and made additional notes. Writer faxed letter back to Dr Landry at [phone]. Letter scanned into pt's EMR.

## 2018-02-22 NOTE — PROGRESS NOTES
BMT Clinic Notes    Ms Bennett, 62 yo female, 3yr s/p NMA DUCB transplant for AML    History of Present Illness: Eryn is here as an add on today.  Around thanksgiving she had URI symptoms that lasted for 6 weeks.  She was seen by her local MD & according to the patient she had a negative resp viral panel, but was treated with Tamiflu.  Her symptoms eventually resolved & she was back to her usual state of health.  On 2/12/18 she had vaccine boosters & on 2/13/18 developed a fever to 102.3 with nasal congestion, cough & diarrhea.  She had fevers that persisted for 3 days & finally went back to her local MD.  She had negative resp viral panel, negative strep & clear lungs.  She was started on a a Z pack ion 2/15 & her fevers resolved, but she still has cough, congestion & fatigue.  In addition she developed a cold sore & started taking Valtrex BID.     Review of Systems: ROS otherwise unremarkable other than what is noted in the HPI.     Physical Exam: KPS 80%  /78  Pulse 74  Temp 98.3  F (36.8  C) (Tympanic)  Resp 16  Wt 102.5 kg (226 lb)  SpO2 98%  BMI 42.01 kg/m2  General: A&O. NAD.   HEENT: CLARICE, sclera anicteric.  No sinus pain  Lungs: CTA bilaterally to bases, no crackles, no wheezing   Heart: RRR, no m/r/g  Abdomen: +BS, soft, NT/ND, no HSM  Extremities: No edema   Skin: no rashes or petiechaie    Labs   Lab Results   Component Value Date    WBC 7.9 02/23/2018    ANEU 3.9 02/23/2018    HGB 13.2 02/23/2018    HCT 42.3 02/23/2018     02/23/2018     02/23/2018    POTASSIUM 4.3 02/23/2018    CHLORIDE 103 02/23/2018    CO2 29 02/23/2018     (H) 02/23/2018    BUN 21 02/23/2018    CR 1.07 (H) 02/23/2018    MAG 1.8 04/03/2017    INR 1.03 04/03/2017    BILITOTAL 0.3 02/23/2018    AST 14 02/23/2018    ALT 21 02/23/2018    ALKPHOS 88 02/23/2018    PROTTOTAL 7.1 02/23/2018    ALBUMIN 3.6 02/23/2018     Sinus CT:  There is bubbly debris and small areas of air-fluid levels within the bilateral  maxillary sinuses. Fluid in mucosal thickening is  seen within the sphenoid, ethmoid, and frontal sinuses.  The ostiomeatal units appear occluded bilaterally at the maxillary  ostium. The bony walls of the paranasal sinuses are intact. Mild leftward septal deviation.    Chest CT:  Persistent bronchial wall thickening and nodularity in the in the left lower lobe, slightly improved when compared with exam dated 4/1/2017. Findings may represent ongoing aspiration/infection. Atypical infection cannot be excluded in this patient who is status  post bone marrow transplant. Sub-6 mm pulmonary nodules are stable since at least 5/19/2015, and are therefore statistically benign. No new or enlarging pulmonary nodules. 7 mm nodule in the right breast is similar to prior exam, but new when compared with exam dated 5/19/2015. Recommend correlation with nonemergent mammogram.    Ms. Eryn Bennett is a 61 yo woman s/p NMA DCBT for AML day 3+ years      1. BMT/AML: continued CR.    2.  ID:  Afebrile but with persistant cough & congestion. RVP & strep negative 2/15.  S/P Z pack started 2/15.    - Obtained a CCT & sinus CT today, results as above.  - Begin Levaquin 750 mg QD x10 days for sinusitis.   - Using Valtrex PRN for cold sores  - Refilled Robitussin with codeine    Annetrte Friedheim

## 2018-02-23 ENCOUNTER — RADIANT APPOINTMENT (OUTPATIENT)
Dept: CT IMAGING | Facility: CLINIC | Age: 63
End: 2018-02-23
Attending: NURSE PRACTITIONER
Payer: COMMERCIAL

## 2018-02-23 ENCOUNTER — ONCOLOGY VISIT (OUTPATIENT)
Dept: TRANSPLANT | Facility: CLINIC | Age: 63
End: 2018-02-23
Attending: NURSE PRACTITIONER
Payer: MEDICARE

## 2018-02-23 ENCOUNTER — APPOINTMENT (OUTPATIENT)
Dept: LAB | Facility: CLINIC | Age: 63
End: 2018-02-23
Attending: NURSE PRACTITIONER
Payer: MEDICARE

## 2018-02-23 VITALS
RESPIRATION RATE: 16 BRPM | BODY MASS INDEX: 42.01 KG/M2 | OXYGEN SATURATION: 98 % | HEART RATE: 74 BPM | DIASTOLIC BLOOD PRESSURE: 78 MMHG | TEMPERATURE: 98.3 F | WEIGHT: 226 LBS | SYSTOLIC BLOOD PRESSURE: 127 MMHG

## 2018-02-23 DIAGNOSIS — C92.01 ACUTE MYELOID LEUKEMIA IN REMISSION (H): Primary | ICD-10-CM

## 2018-02-23 DIAGNOSIS — Z94.81 S/P ALLOGENEIC BONE MARROW TRANSPLANT (H): ICD-10-CM

## 2018-02-23 LAB
ALBUMIN SERPL-MCNC: 3.6 G/DL (ref 3.4–5)
ALP SERPL-CCNC: 88 U/L (ref 40–150)
ALT SERPL W P-5'-P-CCNC: 21 U/L (ref 0–50)
ANION GAP SERPL CALCULATED.3IONS-SCNC: 5 MMOL/L (ref 3–14)
AST SERPL W P-5'-P-CCNC: 14 U/L (ref 0–45)
BASOPHILS # BLD AUTO: 0 10E9/L (ref 0–0.2)
BASOPHILS NFR BLD AUTO: 0.4 %
BILIRUB SERPL-MCNC: 0.3 MG/DL (ref 0.2–1.3)
BUN SERPL-MCNC: 21 MG/DL (ref 7–30)
CALCIUM SERPL-MCNC: 9.1 MG/DL (ref 8.5–10.1)
CHLORIDE SERPL-SCNC: 103 MMOL/L (ref 94–109)
CO2 SERPL-SCNC: 29 MMOL/L (ref 20–32)
CREAT SERPL-MCNC: 1.07 MG/DL (ref 0.52–1.04)
DIFFERENTIAL METHOD BLD: ABNORMAL
EOSINOPHIL # BLD AUTO: 0.3 10E9/L (ref 0–0.7)
EOSINOPHIL NFR BLD AUTO: 3.5 %
ERYTHROCYTE [DISTWIDTH] IN BLOOD BY AUTOMATED COUNT: 13 % (ref 10–15)
GFR SERPL CREATININE-BSD FRML MDRD: 52 ML/MIN/1.7M2
GLUCOSE SERPL-MCNC: 132 MG/DL (ref 70–99)
HCT VFR BLD AUTO: 42.3 % (ref 35–47)
HGB BLD-MCNC: 13.2 G/DL (ref 11.7–15.7)
IMM GRANULOCYTES # BLD: 0.1 10E9/L (ref 0–0.4)
IMM GRANULOCYTES NFR BLD: 0.6 %
LYMPHOCYTES # BLD AUTO: 3.2 10E9/L (ref 0.8–5.3)
LYMPHOCYTES NFR BLD AUTO: 40.3 %
MCH RBC QN AUTO: 29 PG (ref 26.5–33)
MCHC RBC AUTO-ENTMCNC: 31.2 G/DL (ref 31.5–36.5)
MCV RBC AUTO: 93 FL (ref 78–100)
MONOCYTES # BLD AUTO: 0.5 10E9/L (ref 0–1.3)
MONOCYTES NFR BLD AUTO: 6.6 %
NEUTROPHILS # BLD AUTO: 3.9 10E9/L (ref 1.6–8.3)
NEUTROPHILS NFR BLD AUTO: 48.6 %
NRBC # BLD AUTO: 0 10*3/UL
NRBC BLD AUTO-RTO: 0 /100
PLATELET # BLD AUTO: 221 10E9/L (ref 150–450)
POTASSIUM SERPL-SCNC: 4.3 MMOL/L (ref 3.4–5.3)
PROT SERPL-MCNC: 7.1 G/DL (ref 6.8–8.8)
RADIOLOGIST FLAGS: NORMAL
RBC # BLD AUTO: 4.55 10E12/L (ref 3.8–5.2)
SODIUM SERPL-SCNC: 136 MMOL/L (ref 133–144)
WBC # BLD AUTO: 7.9 10E9/L (ref 4–11)

## 2018-02-23 PROCEDURE — 36415 COLL VENOUS BLD VENIPUNCTURE: CPT

## 2018-02-23 PROCEDURE — 80053 COMPREHEN METABOLIC PANEL: CPT | Performed by: NURSE PRACTITIONER

## 2018-02-23 PROCEDURE — 85025 COMPLETE CBC W/AUTO DIFF WBC: CPT | Performed by: NURSE PRACTITIONER

## 2018-02-23 PROCEDURE — G0463 HOSPITAL OUTPT CLINIC VISIT: HCPCS

## 2018-02-23 RX ORDER — CODEINE PHOSPHATE AND GUAIFENESIN 10; 100 MG/5ML; MG/5ML
1-2 SOLUTION ORAL EVERY 4 HOURS PRN
Qty: 150 ML | Refills: 0 | Status: SHIPPED | OUTPATIENT
Start: 2018-02-23 | End: 2019-02-13

## 2018-02-23 RX ORDER — LEVOFLOXACIN 750 MG/1
750 TABLET, FILM COATED ORAL DAILY
Qty: 10 TABLET | Refills: 0 | Status: SHIPPED | OUTPATIENT
Start: 2018-02-23 | End: 2018-06-08

## 2018-02-23 ASSESSMENT — PAIN SCALES - GENERAL: PAINLEVEL: NO PAIN (0)

## 2018-02-23 NOTE — MR AVS SNAPSHOT
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  After hours call the   hospital at [phone] or   [phone]. Ask for the BMT   fellow on-call           Care Instructions    Pt check out/no instruction    Yessy.          Follow-ups after your visit        Your next 10 appointments already scheduled     May 25, 2018 10:00 AM CDT   [facility]asonic Lab Draw with  MASONIC LAB DRAW   [facility] Mason[facility] Lab Draw ([facility])    [address] [place] [address]  [address] [place] [address] [phone]            May 25, 2018 10:30 AM CDT   Return with Irving Ni MD   [facility] Blood and Marrow Transplant ([facility])    [address] [place] [address] [phone]              Who to contact     If you have questions or need follow up information about today's clinic visit or your schedule please contact [facility] BLOOD AND MARROW TRANSPLANT directly at [phone].  Normal or non-critical lab and imaging results will be communicated to you by MyChart, letter or phone within 4 business days after the clinic has received the results. If you do not hear from us within 7 days, please contact the clinic through MyChart or phone. If you have a critical or abnormal lab result, we will notify you by phone as soon as  possible.  Submit refill requests through [employer] or call your pharmacy and they will forward the refill request to us. Please allow 3 business days for your refill to be completed.          Additional Information About Your Visit        [employer] Information     [employer] gives you secure access to your electronic health record. If you see a primary care provider, you can also send messages to your care team and make appointments. If you have questions, please call your primary care clinic.  If you do not have a primary care provider, please call [phone] and they will assist you.        Care EveryWhere ID     This is your Care EveryWhere ID. This could be used by other organizations to access your [place] medical records  [id number]        Your Vitals Were     Pulse Temperature Respirations Pulse Oximetry BMI (Body Mass Index)       74 98.3  F (36.8  C) (Tympanic) 16 98% 42.01 kg/m2        Blood Pressure from Last 3 Encounters:   02/23/18 127/78   11/17/17 124/71   09/18/17 130/71    Weight from Last 3 Encounters:   02/23/18 102.5 kg (226 lb)   11/17/17 102.7 kg (226 lb 8 oz)   09/18/17 102.9 kg (226 lb 14.4 oz)              We Performed the Following     CBC with platelets differential     Comprehensive metabolic panel     CT Chest w/o contrast     CT Maxillofacial w/o Contrast          Today's Medication Changes          These changes are accurate as of 2/23/18 11:39 AM.  If you have any questions, ask your nurse or doctor.               Start taking these medicines.        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  Ask your nurse if you have questions.     Bring a paper prescription for each of these medications     guaiFENesin-codeine 100-10 MG/5ML Soln solution                Recent Review Flowsheet Data     BMT Recent Results Latest Ref Rng & Units 4/3/2017 4/4/2017 4/7/2017 4/14/2017 6/14/2017 11/17/2017 2/23/2018    WBC 4.0 - 11.0 10e9/L 6.4 6.6 7.1 6.5 8.0 7.2 7.9    Hemoglobin 11.7 - 15.7 g/dL 10.4(L) 10.6(L) 12.0 12.0 12.5 13.9 13.2    Platelet Count 150 - 450 10e9/L 140(L) 189 246 233 194 171 221    Neutrophils (Absolute) 1.6 - 8.3 10e9/L 2.8 3.0 3.4 2.9 4.3 3.5 3.9    INR 0.86 - 1.14 1.03 - - - - - -    Sodium 133 - 144 mmol/L 142 139 138 138 137 138 136    Potassium 3.4 - 5.3 mmol/L 3.8 3.8 4.8 4.0 4.2 4.1 4.3    Chloride 94 - 109 mmol/L 108 103 101 101 103 104 103    Glucose 70 - 99 mg/dL 111(H) 129(H) 139(H) 126(H) 109(H) 108(H) 132(H)    Urea Nitrogen 7 - 30 mg/dL 10 13 19 18 23 22 21    Creatinine 0.52 - 1.04 mg/dL 0.81 0.88 0.98 1.04 1.13(H) 1.04 1.07(H)    Calcium (Total) 8.5 - 10.1 mg/dL 7.9(L) 8.2(L) 9.9 9.2 9.3 9.4 9.1    Protein (Total) 6.8 - 8.8 g/dL 6.0(L) - 7.1 7.0 7.0 7.1 7.1    Albumin 3.4 - 5.0 g/dL 2.8(L) - 3.2(L) 3.7 3.8 3.8 3.6    Bilirubin (Direct) 0.0 - 0.2 mg/dL <0.1 - - - - - -    Alkaline Phosphatase 40 - 150 U/L 70 - 76 72 89 86 88    AST 0 - 45 U/L 9 - 21 18 14 16 14    ALT 0 - 50 U/L 22 - 28 19 18 24 21    MCV 78 - 100 fl 90 89 89 87 89 94 93               Primary Care Provider Office Phone # Fax #    Carmelo Camacho [phone]       [address] [address]        Equal  Access to Services     [facility]: Hadii aad ku hadshelbijose Renatoali, wakobeda luqbruno, qaandra kazullysusy bishop. So [facility] [phone].    ATENCIÓN: Si jeramiela sana, tiene a orona disposición servicios gratuitos de asistencia lingüística. Llame al [phone].    We comply with applicable federal civil rights laws and [place] laws. We do not discriminate on the basis of race, color, national origin, age, disability, sex, sexual orientation, or gender identity.            Thank you!     Thank you for choosing [facility] BLOOD AND MARROW TRANSPLANT  for your care. Our goal is always to provide you with excellent care. Hearing back from our patients is one way we can continue to improve our services. Please take a few minutes to complete the written survey that you may receive in the mail after your visit with us. Thank you!             Your Updated Medication List - Protect others around you: Learn how to safely use, store and throw away your medicines at www.disposemymeds.org.          This list is accurate as of 2/23/18 11:39 AM.  Always use your most recent med list.                   Brand Name Dispense Instructions for use Diagnosis    acetaminophen 325 MG tablet    TYLENOL    100 tablet    Take 1-2 tablets (325-650 mg) by mouth every 4 hours as needed for mild pain or fever    Influenza B       amLODIPine 5 MG tablet    NORVASC    90 tablet    Take 1 tablet (5 mg) by mouth daily    Essential hypertension       Ca Phosphate-Cholecalciferol 200-200 MG-UNIT Chew           calcium-vitamin D 500-125 MG-UNIT Tabs      Take by mouth 2 times daily        carvedilol 6.25 MG tablet    COREG    180 tablet    Take 1 tablet (6.25 mg) by mouth 2 times daily (with meals)    Essential hypertension       desonide 0.05 % cream    DESOWEN    60 g    Apply sparingly to affected area three times daily as needed.    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)       levofloxacin 750 MG tablet    LEVAQUIN    10 tablet    Take 1 tablet (750 mg) by mouth daily    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)       pantoprazole 40 MG EC tablet    PROTONIX    30 tablet    Take 1 tablet (40 mg) by mouth daily    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), S/P allogeneic bone marrow transplant (H)       prochlorperazine 5 MG tablet    COMPAZINE    20 tablet    Take 1-2 tablets (5-10 mg) by mouth every 6 hours as needed for nausea or vomiting    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)       traMADol 50 MG tablet    ULTRAM    60 tablet    Take 1 tablet (50 mg) by mouth every 6 hours as needed for moderate pain    S/P allogeneic bone marrow transplant (H), 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

## 2018-02-23 NOTE — NURSING NOTE
"Oncology Rooming Note    February 23, 2018 7:38 AM   Eryn Bennett is a 62 year old female who presents for:    Chief Complaint   Patient presents with     Blood Draw     labs and vitals done by NALDO PARRA     Pt is here for S/P BMT AML--here for a rtn     Initial Vitals: /78  Pulse 74  Temp 98.3  F (36.8  C) (Tympanic)  Resp 16  Wt 102.5 kg (226 lb)  SpO2 98%  BMI 42.01 kg/m2 Estimated body mass index is 42.01 kg/(m^2) as calculated from the following:    Height as of 9/18/17: 1.562 m (5' 1.5\").    Weight as of this encounter: 102.5 kg (226 lb). Body surface area is 2.11 meters squared.  No Pain (0) Comment: Data Unavailable   No LMP recorded. Patient has had a hysterectomy.  Allergies reviewed: Yes  Medications reviewed: Yes    Medications: Medication refills not needed today.  Pharmacy name entered into [employer]:    [facility] [place], [place] - [address] [place] [address] [phone]  [facility] [address] [place] [address] [address] [place] [address] AT [facility] [address]  [facility] [address] [place] [address]    Clinical concerns: Pt is here for a follow up. Pt has had a fever with nasal congestion and a cough. Pt was given a z-jagdeep.   Pt is wondering if she should keep taking her Vatrex. Pt started taking due to having a cold sore.     6 minutes for nursing intake (face to face time)     Barbie Kulkarni MA              "

## 2018-02-23 NOTE — NURSING NOTE
Chief Complaint   Patient presents with     Blood Draw     labs and vitals done by NALDO Francis MA

## 2018-02-27 DIAGNOSIS — C92.01 ACUTE MYELOID LEUKEMIA IN REMISSION (H): Primary | ICD-10-CM

## 2018-02-27 DIAGNOSIS — R05.9 COUGH: ICD-10-CM

## 2018-02-27 DIAGNOSIS — Z94.81 S/P ALLOGENEIC BONE MARROW TRANSPLANT (H): ICD-10-CM

## 2018-02-27 DIAGNOSIS — D80.1 HYPOGAMMAGLOBULINEMIA (H): ICD-10-CM

## 2018-02-27 DIAGNOSIS — R06.02 SOB (SHORTNESS OF BREATH): ICD-10-CM

## 2018-03-13 ENCOUNTER — OFFICE VISIT (OUTPATIENT)
Dept: PULMONOLOGY | Facility: CLINIC | Age: 63
End: 2018-03-13
Attending: INTERNAL MEDICINE
Payer: MEDICARE

## 2018-03-13 ENCOUNTER — APPOINTMENT (OUTPATIENT)
Dept: LAB | Facility: CLINIC | Age: 63
End: 2018-03-13
Attending: INTERNAL MEDICINE
Payer: MEDICARE

## 2018-03-13 VITALS
BODY MASS INDEX: 42.46 KG/M2 | DIASTOLIC BLOOD PRESSURE: 74 MMHG | HEART RATE: 74 BPM | OXYGEN SATURATION: 96 % | RESPIRATION RATE: 16 BRPM | WEIGHT: 228.4 LBS | SYSTOLIC BLOOD PRESSURE: 134 MMHG | TEMPERATURE: 98.5 F

## 2018-03-13 DIAGNOSIS — R05.9 COUGH: ICD-10-CM

## 2018-03-13 DIAGNOSIS — D80.1 HYPOGAMMAGLOBULINEMIA (H): ICD-10-CM

## 2018-03-13 DIAGNOSIS — C92.01 ACUTE MYELOID LEUKEMIA IN REMISSION (H): ICD-10-CM

## 2018-03-13 DIAGNOSIS — R06.02 SOB (SHORTNESS OF BREATH): ICD-10-CM

## 2018-03-13 DIAGNOSIS — Z94.81 S/P ALLOGENEIC BONE MARROW TRANSPLANT (H): ICD-10-CM

## 2018-03-13 LAB
ALBUMIN SERPL-MCNC: 3.6 G/DL (ref 3.4–5)
ALP SERPL-CCNC: 89 U/L (ref 40–150)
ALT SERPL W P-5'-P-CCNC: 20 U/L (ref 0–50)
ANION GAP SERPL CALCULATED.3IONS-SCNC: 6 MMOL/L (ref 3–14)
AST SERPL W P-5'-P-CCNC: 17 U/L (ref 0–45)
BASOPHILS # BLD AUTO: 0 10E9/L (ref 0–0.2)
BASOPHILS NFR BLD AUTO: 0.5 %
BILIRUB SERPL-MCNC: 0.4 MG/DL (ref 0.2–1.3)
BUN SERPL-MCNC: 22 MG/DL (ref 7–30)
CALCIUM SERPL-MCNC: 9.6 MG/DL (ref 8.5–10.1)
CHLORIDE SERPL-SCNC: 105 MMOL/L (ref 94–109)
CO2 SERPL-SCNC: 26 MMOL/L (ref 20–32)
CREAT SERPL-MCNC: 0.9 MG/DL (ref 0.52–1.04)
DIFFERENTIAL METHOD BLD: ABNORMAL
EOSINOPHIL # BLD AUTO: 0.3 10E9/L (ref 0–0.7)
EOSINOPHIL NFR BLD AUTO: 3.9 %
ERYTHROCYTE [DISTWIDTH] IN BLOOD BY AUTOMATED COUNT: 13.2 % (ref 10–15)
GFR SERPL CREATININE-BSD FRML MDRD: 63 ML/MIN/1.7M2
GLUCOSE SERPL-MCNC: 110 MG/DL (ref 70–99)
HCT VFR BLD AUTO: 42.2 % (ref 35–47)
HGB BLD-MCNC: 13.3 G/DL (ref 11.7–15.7)
IMM GRANULOCYTES # BLD: 0 10E9/L (ref 0–0.4)
IMM GRANULOCYTES NFR BLD: 0.2 %
LYMPHOCYTES # BLD AUTO: 3.2 10E9/L (ref 0.8–5.3)
LYMPHOCYTES NFR BLD AUTO: 50.1 %
MCH RBC QN AUTO: 29.2 PG (ref 26.5–33)
MCHC RBC AUTO-ENTMCNC: 31.5 G/DL (ref 31.5–36.5)
MCV RBC AUTO: 93 FL (ref 78–100)
MONOCYTES # BLD AUTO: 0.5 10E9/L (ref 0–1.3)
MONOCYTES NFR BLD AUTO: 7.9 %
NEUTROPHILS # BLD AUTO: 2.4 10E9/L (ref 1.6–8.3)
NEUTROPHILS NFR BLD AUTO: 37.4 %
NRBC # BLD AUTO: 0 10*3/UL
NRBC BLD AUTO-RTO: 0 /100
PLATELET # BLD AUTO: 145 10E9/L (ref 150–450)
POTASSIUM SERPL-SCNC: 4 MMOL/L (ref 3.4–5.3)
PROT SERPL-MCNC: 6.8 G/DL (ref 6.8–8.8)
RBC # BLD AUTO: 4.56 10E12/L (ref 3.8–5.2)
SODIUM SERPL-SCNC: 138 MMOL/L (ref 133–144)
WBC # BLD AUTO: 6.3 10E9/L (ref 4–11)

## 2018-03-13 PROCEDURE — 82784 ASSAY IGA/IGD/IGG/IGM EACH: CPT | Performed by: INTERNAL MEDICINE

## 2018-03-13 PROCEDURE — 85025 COMPLETE CBC W/AUTO DIFF WBC: CPT | Performed by: INTERNAL MEDICINE

## 2018-03-13 PROCEDURE — 80053 COMPREHEN METABOLIC PANEL: CPT | Performed by: INTERNAL MEDICINE

## 2018-03-13 ASSESSMENT — PAIN SCALES - GENERAL: PAINLEVEL: NO PAIN (0)

## 2018-03-13 NOTE — NURSING NOTE
"Oncology Rooming Note    March 13, 2018 3:47 PM   Eryn Bennett is a 62 year old female who presents for:    Chief Complaint   Patient presents with     Blood Draw     labs drawn with vpt by rn.  vs taken     Initial Vitals: /74 (BP Location: Right arm, Patient Position: Sitting, Cuff Size: Adult Large)  Pulse 74  Temp 98.5  F (36.9  C) (Oral)  Resp 16  Wt 103.6 kg (228 lb 6.4 oz)  SpO2 96%  BMI 42.46 kg/m2 Estimated body mass index is 42.46 kg/(m^2) as calculated from the following:    Height as of 9/18/17: 1.562 m (5' 1.5\").    Weight as of this encounter: 103.6 kg (228 lb 6.4 oz). Body surface area is 2.12 meters squared.  No Pain (0) Comment: Data Unavailable   No LMP recorded. Patient has had a hysterectomy.  Allergies reviewed: Yes  Medications reviewed: Yes    Medications: Medication refills not needed today.  Pharmacy name entered into [facility]:    [facility] [place], [place] - [address] [facility] [phone]  [facility] [id number] [place] [address] [address]  AT [facility] [address]  [facility] [address] [place] [address] - [address] [facility]    Clinical concerns: none     6 minutes for nursing intake (face to face time)     Barbie Kulkarni MA              "

## 2018-03-13 NOTE — MR AVS SNAPSHOT
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      Your next 10 appointments already scheduled     May 25, 2018 10:00 AM CDT   [facility] Lab Draw with  MASONIC LAB DRAW   [facility] ([facility])    [address] [place] [address]  [address]  [facility] [address] [phone]            May 25, 2018 10:30 AM CDT   Return with Irving Ni MD   [facility] Blood and Marrow Transplant ([facility])    [address] [place] [address]  [address]  [facility] [address] [phone]              Who to contact     If you have questions or need follow up information about today's clinic visit or your schedule please contact [facility] directly at [phone].  Normal or non-critical lab and imaging results will be communicated to you by MyChart, letter or phone within 4 business days after the clinic has received the results. If you do not hear from us within 7 days, please contact the clinic through [employer] or phone. If you have a critical or abnormal lab result, we will notify you by phone as soon as possible.  Submit refill requests through [employer] or call your pharmacy and they will forward the refill request to us. Please allow 3 business days for your refill to be completed.          Additional Information About Your Visit        [employer] Information     [employer] gives you secure access to your  electronic health record. If you see a primary care provider, you can also send messages to your care team and make appointments. If you have questions, please call your primary care clinic.  If you do not have a primary care provider, please call [phone] and they will assist you.        Care EveryWhere ID     This is your Care EveryWhere ID. This could be used by other organizations to access your [place] medical records  [id number]        Your Vitals Were     Pulse Temperature Respirations Pulse Oximetry BMI (Body Mass Index)       74 98.5  F (36.9  C) (Oral) 16 96% 42.46 kg/m2        Blood Pressure from Last 3 Encounters:   03/13/18 134/74   02/23/18 127/78   11/17/17 124/71    Weight from Last 3 Encounters:   03/13/18 103.6 kg (228 lb 6.4 oz)   02/23/18 102.5 kg (226 lb)   11/17/17 102.7 kg (226 lb 8 oz)              We Performed the Following     CBC with platelets differential     Comprehensive metabolic panel     IgG        Primary Care Provider Office Phone # Fax #    Carmelo Camacho [phone]       [place] [address]        Equal Access to Services     BRIAN FERNANDEZ AH: Hadii ni juarezo Soseth, waaxda luqadaha, qaybta kaalmada adeegyada, susy mandel. So [facility] [phone].    ATENCIÓN: Si habla español, tiene a orona disposición servicios gratuitos de asistencia lingüística. Llame al [phone].    We comply with applicable federal civil rights laws and [place] laws. We do not discriminate on the basis of race, color, national origin, age, disability, sex, sexual orientation, or gender identity.            Thank you!     Thank you for choosing [facility]  for your care. Our goal is always to provide you with excellent care. Hearing back from our patients is one way we can continue to improve our services. Please take a few minutes to complete the written survey that you may receive in the  mail after your visit with us. Thank you!             Your Updated Medication List - Protect others around you: Learn how to safely use, store and throw away your medicines at www.disposemymeds.org.          This list is accurate as of 3/13/18 11:59 PM.  Always use your most recent med list.                   Brand Name Dispense Instructions for use Diagnosis    acetaminophen 325 MG tablet    TYLENOL    100 tablet    Take 1-2 tablets (325-650 mg) by mouth every 4 hours as needed for mild pain or fever    Influenza B       amLODIPine 5 MG tablet    NORVASC    90 tablet    Take 1 tablet (5 mg) by mouth daily    Essential hypertension       Ca Phosphate-Cholecalciferol 200-200 MG-UNIT Chew           calcium-vitamin D 500-125 MG-UNIT Tabs      Take by mouth 2 times daily        carvedilol 6.25 MG tablet    COREG    180 tablet    Take 1 tablet (6.25 mg) by mouth 2 times daily (with meals)    Essential hypertension       desonide 0.05 % cream    DESOWEN    60 g    Apply sparingly to affected area three times daily as needed.    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)       levofloxacin 750 MG tablet    LEVAQUIN    10 tablet    Take 1 tablet (750 mg) by mouth daily    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)       pantoprazole 40 MG EC tablet    PROTONIX    30 tablet    Take 1 tablet (40 mg) by mouth daily    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), S/P allogeneic bone marrow transplant (H)        prochlorperazine 5 MG tablet    COMPAZINE    20 tablet    Take 1-2 tablets (5-10 mg) by mouth every 6 hours as needed for nausea or vomiting    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)       traMADol 50 MG tablet    ULTRAM    60 tablet    Take 1 tablet (50 mg) by mouth every 6 hours as needed for moderate pain    S/P allogeneic bone marrow transplant (H), 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

## 2018-03-13 NOTE — NURSING NOTE
Chief Complaint   Patient presents with     Blood Draw     labs drawn with vpt by rn.  vs taken     Labs drawn with vpt by rn.  Pt tolerated well.  VS taken.      Sandhya Abreu RN

## 2018-03-13 NOTE — PROGRESS NOTES
Reason for Visit  Eryn Bennett is a 62 year old year old female who is being seen for Blood Draw (labs drawn with vpt by rn.  vs taken)    Pulmonary HPI  61 YO with AML s/p NMA DCBT over 3 years ago who is referred to the Pulmonary BMT clinic for evaluation of cough and SOB.  Developed URI in 11-17 that had persisted for 6 weeks.  During this time noticed nasal congestion and drainage, post-nasal drip, fevers, and cough. Denies chest burning. Seen in clinic, PCR for influenza was negative but she was treated with Tamiflu.  Was given course of Azithromycin twice for 5 days apiece, felt somewhat better with Azithromycin but symptoms recurred shortly thereafter.   Presented again in 2-12-18 with fever, cough, and nasal congestion. CT scan of sinuses revealed sinusitis, chest CT with LLL bronchial wall thickening and nodularity that was actually improved compared to April 2017.  Was placed on a 10 day course of Levofloxacin, states started to feel better after 5 days. Since completion of antibiotics in the past 10 days she thinks her symptoms have all resolved; no further nasal congestion or drainage, no cough.  All fevers have resolved.  Similar course last Winter with frequent URI's which were slow to resolve.  No prior history of sinus disease or allergies.  No tobacco use.  IgG level checked 11-17 and was 547.  Off all immunosuppressives.    The patient was seen and examined by Troy Lomeli           Current Outpatient Prescriptions   Medication     ValACYclovir HCl (VALTREX PO)     desonide (DESOWEN) 0.05 % cream     acetaminophen (TYLENOL) 325 MG tablet     calcium-vitamin D 500-125 MG-UNIT TABS     VITAMIN D, CHOLECALCIFEROL, PO     Ca Phosphate-Cholecalciferol 200-200 MG-UNIT CHEW     levofloxacin (LEVAQUIN) 750 MG tablet     guaiFENesin-codeine (ROBITUSSIN AC) 100-10 MG/5ML SOLN solution     pantoprazole (PROTONIX) 40 MG EC tablet     prochlorperazine (COMPAZINE) 5 MG tablet     carvedilol (COREG) 6.25  MG tablet     amLODIPine (NORVASC) 5 MG tablet     traMADol (ULTRAM) 50 MG tablet     No current facility-administered medications for this visit.      Allergies   Allergen Reactions     Blood Transfusion Related (Informational Only) Other (See Comments)     7/15/15 - Patient has a complex history of clinically significant antibodies against RBC antigens.  Finding compatible RBCs may take up to 24 hours or more.  Consult with the Blood Bank MD for transfusion guidance.  Itching in palms during platelet transfusion in 2010- IV benadryl was given at that time.  Pt has had platelets since then with no reaction.   11/6/14 - Stem cell transplant patient.  Give type O RBCs.     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 Pt would like to avoid, even with premedication, if at all possible.      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    While pt had Hic[place] in, pt was on a blood thinner       Past Surgical History:   Procedure Laterality Date     BLADDER SURGERY       CHOLECYSTECTOMY  1987     ESOPHAGOSCOPY, GASTROSCOPY, DUODENOSCOPY (EGD), COMBINED N/A 12/11/2014    Procedure: COMBINED ESOPHAGOSCOPY, GASTROSCOPY, DUODENOSCOPY (EGD), BIOPSY SINGLE OR MULTIPLE;  Surgeon: Saturnino Valera MD;  Location:  [facility]     GI SURGERY      part of colon removed r.t benign tumor     GYN SURGERY       Dos Santos Catheter Placment Right 2010    Right IJ vein, in for 8 months     HYSTERECTOMY VAGINAL, BILATERAL SALPINGO-OOPHERECTOMY, COMBINED       PICC INSERTION Left 8/28/2014    5fr DL Power PICC, 46cm (1cm external) in the L basilic vein w/ tip in the SVC RA junction.       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  /74 (BP Location: Right arm, Patient Position: Sitting, Cuff Size: Adult Large)  Pulse 74  Temp 98.5  F (36.9  C) (Oral)  Resp 16  Wt 103.6 kg (228 lb 6.4 oz)  SpO2 96%  BMI 42.46 kg/m2  Exam:   GENERAL APPEARANCE: Well developed, well nourished, alert, and in no apparent distress.  EYES: PERRL, EOMI  HENT: Nasal mucosa with no edema and no hyperemia. No nasal polyps.  No sinus tenderness  EARS: Canals clear, TMs normal  MOUTH: Oral mucosa is moist, without any lesions, no tonsillar enlargement, no oropharyngeal exudate.  NECK: supple, no masses, no thyromegaly.  LYMPHATICS: No significant axillary, cervical, or supraclavicular nodes.  RESP:  Good air flow throughout.  No crackles. No rhonchi. No wheezes.  CV: Normal S1, S2, regular rhythm, normal rate. No murmur.  No rub. No gallop. No LE edema.   ABDOMEN:  Bowel sounds normal, soft, nontender, no HSM or masses.   MS: extremities normal. No clubbing. No cyanosis.  SKIN: no rash on limited exam  NEURO: Mentation intact, speech normal, normal strength and tone, normal gait and  stance  PSYCH: mentation appears normal. and affect normal/bright  Results:  Full PFT's were reviewed in clinic  FVC 2.26 (77%), FEV-1 1.73 (75%), FEV-1/FVC 77%  FRC 83%, %, TLC 97%  cDLCO 79%  No airflow limitation.  Normal lung volumes.  Normal corrected diffusion capacity  Recent Results (from the past 168 hour(s))   General PFT Lab (Please always keep checked)    Collection Time: 03/13/18 10:33 AM   Result Value Ref Range    FVC-Pred 2.90 L    FVC-Pre 2.26 L    FVC-%Pred-Pre 77 %    FEV1-Pre 1.73 L    FEV1-%Pred-Pre 75 %    FEV1FVC-Pred 78 %    FEV1FVC-Pre 77 %    FEFMax-Pred 5.90 L/sec    FEFMax-Pre 5.81 L/sec    FEFMax-%Pred-Pre 98 %    FEF2575-Pred 2.10 L/sec    FEF2575-Pre 1.36 L/sec    VAB6394-%Pred-Pre 64 %    ExpTime-Pre 8.63 sec    FIFMax-Pre 4.08 L/sec    VC-Pred 2.98 L    VC-Pre 2.44 L    VC-%Pred-Pre 81 %    IC-Pred 2.80 L    IC-Pre 2.33 L    IC-%Pred-Pre 83 %    ERV-Pred 0.18 L    ERV-Pre 0.10 L    ERV-%Pred-Pre 56 %    FEV1FEV6-Pred 80 %    FEV1FEV6-Pre 77 %    FRCPleth-Pred 2.59 L    FRCPleth-Pre 2.16 L    FRCPleth-%Pred-Pre 83 %    RVPleth-Pred 1.84 L    RVPleth-Pre 2.06 L    RVPleth-%Pred-Pre 112 %    TLCPleth-Pred 4.60 L    TLCPleth-Pre 4.50 L    TLCPleth-%Pred-Pre 97 %    DLCOunc-Pred 20.63 ml/min/mmHg    DLCOunc-Pre 16.27 ml/min/mmHg    DLCOunc-%Pred-Pre 78 %    DLCOcor-Pre 16.37 ml/min/mmHg    DLCOcor-%Pred-Pre 79 %    VA-Pre 3.74 L    VA-%Pred-Pre 79 %    FEV1SVC-Pred 77 %    FEV1SVC-Pre 71 %   CBC with platelets differential    Collection Time: 03/13/18 11:51 AM   Result Value Ref Range    WBC 6.3 4.0 - 11.0 10e9/L    RBC Count 4.56 3.8 - 5.2 10e12/L    Hemoglobin 13.3 11.7 - 15.7 g/dL    Hematocrit 42.2 35.0 - 47.0 %    MCV 93 78 - 100 fl    MCH 29.2 26.5 - 33.0 pg    MCHC 31.5 31.5 - 36.5 g/dL    RDW 13.2 10.0 - 15.0 %    Platelet Count 145 (L) 150 - 450 10e9/L    Diff Method Automated Method     % Neutrophils 37.4 %    % Lymphocytes 50.1 %    % Monocytes 7.9 %    % Eosinophils 3.9  %    % Basophils 0.5 %    % Immature Granulocytes 0.2 %    Nucleated RBCs 0 0 /100    Absolute Neutrophil 2.4 1.6 - 8.3 10e9/L    Absolute Lymphocytes 3.2 0.8 - 5.3 10e9/L    Absolute Monocytes 0.5 0.0 - 1.3 10e9/L    Absolute Eosinophils 0.3 0.0 - 0.7 10e9/L    Absolute Basophils 0.0 0.0 - 0.2 10e9/L    Abs Immature Granulocytes 0.0 0 - 0.4 10e9/L    Absolute Nucleated RBC 0.0    Comprehensive metabolic panel    Collection Time: 03/13/18 11:51 AM   Result Value Ref Range    Sodium 138 133 - 144 mmol/L    Potassium 4.0 3.4 - 5.3 mmol/L    Chloride 105 94 - 109 mmol/L    Carbon Dioxide 26 20 - 32 mmol/L    Anion Gap 6 3 - 14 mmol/L    Glucose 110 (H) 70 - 99 mg/dL    Urea Nitrogen 22 7 - 30 mg/dL    Creatinine 0.90 0.52 - 1.04 mg/dL    GFR Estimate 63 >60 mL/min/1.7m2    GFR Estimate If Black 76 >60 mL/min/1.7m2    Calcium 9.6 8.5 - 10.1 mg/dL    Bilirubin Total 0.4 0.2 - 1.3 mg/dL    Albumin 3.6 3.4 - 5.0 g/dL    Protein Total 6.8 6.8 - 8.8 g/dL    Alkaline Phosphatase 89 40 - 150 U/L    ALT 20 0 - 50 U/L    AST 17 0 - 45 U/L       Assessment and plan:   63 YO with recent viral illness in November 2017 with resultant sinusitis as seen on CT.  Majority of symptoms were upper respiratory in nature at that time.  CT chest 2-18 actually improved.   Off all immunosuppressives and IgG level was > 400.  Symptoms have al resolved after a course of Levofloxacin.  Asked her to call office if symptoms recurred over the next few weeks, will need a second course of Levofloxacin.  Will need to follow her closely next Fall and treat any residual URI or sinus symptoms that may occur after normal course of viral infection.    RTC prn.  Patient to call clinic with any questions or concerns

## 2018-03-13 NOTE — LETTER
3/13/2018       RE: Eryn Bennett  PO   [facility] [id number]     Dear Colleague,    Thank you for referring your patient, Eryn Bennett, to the [facility] at [facility]. Please see a copy of my visit note below.    Reason for Visit  Eryn Bennett is a 62 year old year old female who is being seen for Blood Draw (labs drawn with vpt by rn.  vs taken)    Pulmonary HPI  63 YO with AML s/p NMA DCBT over 3 years ago who is referred to the Pulmonary BMT clinic for evaluation of cough and SOB.  Developed URI in 11-17 that had persisted for 6 weeks.  During this time noticed nasal congestion and drainage, post-nasal drip, fevers, and cough. Denies chest burning. Seen in clinic, PCR for influenza was negative but she was treated with Tamiflu.  Was given course of Azithromycin twice for 5 days apiece, felt somewhat better with Azithromycin but symptoms recurred shortly thereafter.   Presented again in 2-12-18 with fever, cough, and nasal congestion. CT scan of sinuses revealed sinusitis, chest CT with LLL bronchial wall thickening and nodularity that was actually improved compared to April 2017.  Was placed on a 10 day course of Levofloxacin, states started to feel better after 5 days. Since completion of antibiotics in the past 10 days she thinks her symptoms have all resolved; no further nasal congestion or drainage, no cough.  All fevers have resolved.  Similar course last Winter with frequent URI's which were slow to resolve.  No prior history of sinus disease or allergies.  No tobacco use.  IgG level checked 11-17 and was 547.  Off all immunosuppressives.    The patient was seen and examined by Troy Lomeli           Current Outpatient Prescriptions   Medication     ValACYclovir HCl (VALTREX PO)     desonide (DESOWEN) 0.05 % cream     acetaminophen (TYLENOL) 325 MG tablet     calcium-vitamin D 500-125 MG-UNIT TABS     VITAMIN D, CHOLECALCIFEROL, PO      Ca Phosphate-Cholecalciferol 200-200 MG-UNIT CHEW     levofloxacin (LEVAQUIN) 750 MG tablet     guaiFENesin-codeine (ROBITUSSIN AC) 100-10 MG/5ML SOLN solution     pantoprazole (PROTONIX) 40 MG EC tablet     prochlorperazine (COMPAZINE) 5 MG tablet     carvedilol (COREG) 6.25 MG tablet     amLODIPine (NORVASC) 5 MG tablet     traMADol (ULTRAM) 50 MG tablet     No current facility-administered medications for this visit.      Allergies   Allergen Reactions     Blood Transfusion Related (Informational Only) Other (See Comments)     7/15/15 - Patient has a complex history of clinically significant antibodies against RBC antigens.  Finding compatible RBCs may take up to 24 hours or more.  Consult with the Blood Bank MD for transfusion guidance.  Itching in palms during platelet transfusion in 2010- IV benadryl was given at that time.  Pt has had platelets since then with no reaction.   11/6/14 - Stem cell transplant patient.  Give type O RBCs.     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 Pt would like to avoid, even with premedication, if at all possible.      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    While pt had [place] in, pt was on a blood thinner       Past Surgical History:   Procedure  Laterality Date     BLADDER SURGERY       CHOLECYSTECTOMY  1987     ESOPHAGOSCOPY, GASTROSCOPY, DUODENOSCOPY (EGD), COMBINED N/A 12/11/2014    Procedure: COMBINED ESOPHAGOSCOPY, GASTROSCOPY, DUODENOSCOPY (EGD), BIOPSY SINGLE OR MULTIPLE;  Surgeon: Saturnino Valera MD;  Location: [facility]     GI SURGERY      part of colon removed r.t benign tumor     GYN SURGERY       Dos Santos Catheter Placment Right 2010    Right IJ vein, in for 8 months     HYSTERECTOMY VAGINAL, BILATERAL SALPINGO-OOPHERECTOMY, COMBINED       PICC INSERTION Left 8/28/2014    5fr DL Power PICC, 46cm (1cm external) in the L basilic vein w/ tip in the SVC RA junction.       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  /74 (BP Location: Right arm, Patient Position: Sitting, Cuff Size: Adult Large)  Pulse 74  Temp 98.5  F (36.9  C) (Oral)  Resp 16  Wt 103.6 kg (228 lb 6.4 oz)  SpO2 96%  BMI 42.46 kg/m2  Exam:   GENERAL APPEARANCE: Well developed, well nourished, alert, and in no apparent distress.  EYES: PERRL, EOMI  HENT: Nasal mucosa with no edema and no hyperemia. No nasal polyps.  No sinus tenderness  EARS: Canals clear, TMs normal  MOUTH: Oral mucosa is moist, without any lesions, no tonsillar enlargement, no oropharyngeal exudate.  NECK: supple, no masses, no thyromegaly.  LYMPHATICS: No significant axillary, cervical, or supraclavicular nodes.  RESP:  Good air flow throughout.  No crackles. No rhonchi. No wheezes.  CV: Normal S1, S2, regular rhythm, normal  rate. No murmur.  No rub. No gallop. No LE edema.   ABDOMEN:  Bowel sounds normal, soft, nontender, no HSM or masses.   MS: extremities normal. No clubbing. No cyanosis.  SKIN: no rash on limited exam  NEURO: Mentation intact, speech normal, normal strength and tone, normal gait and stance  PSYCH: mentation appears normal. and affect normal/bright  Results:  Full PFT's were reviewed in clinic  FVC 2.26 (77%), FEV-1 1.73 (75%), FEV-1/FVC 77%  FRC 83%, %, TLC 97%  cDLCO 79%  No airflow limitation.  Normal lung volumes.  Normal corrected diffusion capacity  Recent Results (from the past 168 hour(s))   General PFT Lab (Please always keep checked)    Collection Time: 03/13/18 10:33 AM   Result Value Ref Range    FVC-Pred 2.90 L    FVC-Pre 2.26 L    FVC-%Pred-Pre 77 %    FEV1-Pre 1.73 L    FEV1-%Pred-Pre 75 %    FEV1FVC-Pred 78 %    FEV1FVC-Pre 77 %    FEFMax-Pred 5.90 L/sec    FEFMax-Pre 5.81 L/sec    FEFMax-%Pred-Pre 98 %    FEF2575-Pred 2.10 L/sec    FEF2575-Pre 1.36 L/sec    PBJ4536-%Pred-Pre 64 %    ExpTime-Pre 8.63 sec    FIFMax-Pre 4.08 L/sec    VC-Pred 2.98 L    VC-Pre 2.44 L    VC-%Pred-Pre 81 %    IC-Pred 2.80 L    IC-Pre 2.33 L    IC-%Pred-Pre 83 %    ERV-Pred 0.18 L    ERV-Pre 0.10 L    ERV-%Pred-Pre 56 %    FEV1FEV6-Pred 80 %    FEV1FEV6-Pre 77 %    FRCPleth-Pred 2.59 L    FRCPleth-Pre 2.16 L    FRCPleth-%Pred-Pre 83 %    RVPleth-Pred 1.84 L    RVPleth-Pre 2.06 L    RVPleth-%Pred-Pre 112 %    TLCPleth-Pred 4.60 L    TLCPleth-Pre 4.50 L    TLCPleth-%Pred-Pre 97 %    DLCOunc-Pred 20.63 ml/min/mmHg    DLCOunc-Pre 16.27 ml/min/mmHg    DLCOunc-%Pred-Pre 78 %    DLCOcor-Pre 16.37 ml/min/mmHg    DLCOcor-%Pred-Pre 79 %    VA-Pre 3.74 L    VA-%Pred-Pre 79 %    FEV1SVC-Pred 77 %    FEV1SVC-Pre 71 %   CBC with platelets differential    Collection Time: 03/13/18 11:51 AM   Result Value Ref Range    WBC 6.3 4.0 - 11.0 10e9/L    RBC Count 4.56 3.8 - 5.2 10e12/L    Hemoglobin 13.3 11.7 - 15.7 g/dL    Hematocrit 42.2  35.0 - 47.0 %    MCV 93 78 - 100 fl    MCH 29.2 26.5 - 33.0 pg    MCHC 31.5 31.5 - 36.5 g/dL    RDW 13.2 10.0 - 15.0 %    Platelet Count 145 (L) 150 - 450 10e9/L    Diff Method Automated Method     % Neutrophils 37.4 %    % Lymphocytes 50.1 %    % Monocytes 7.9 %    % Eosinophils 3.9 %    % Basophils 0.5 %    % Immature Granulocytes 0.2 %    Nucleated RBCs 0 0 /100    Absolute Neutrophil 2.4 1.6 - 8.3 10e9/L    Absolute Lymphocytes 3.2 0.8 - 5.3 10e9/L    Absolute Monocytes 0.5 0.0 - 1.3 10e9/L    Absolute Eosinophils 0.3 0.0 - 0.7 10e9/L    Absolute Basophils 0.0 0.0 - 0.2 10e9/L    Abs Immature Granulocytes 0.0 0 - 0.4 10e9/L    Absolute Nucleated RBC 0.0    Comprehensive metabolic panel    Collection Time: 03/13/18 11:51 AM   Result Value Ref Range    Sodium 138 133 - 144 mmol/L    Potassium 4.0 3.4 - 5.3 mmol/L    Chloride 105 94 - 109 mmol/L    Carbon Dioxide 26 20 - 32 mmol/L    Anion Gap 6 3 - 14 mmol/L    Glucose 110 (H) 70 - 99 mg/dL    Urea Nitrogen 22 7 - 30 mg/dL    Creatinine 0.90 0.52 - 1.04 mg/dL    GFR Estimate 63 >60 mL/min/1.7m2    GFR Estimate If Black 76 >60 mL/min/1.7m2    Calcium 9.6 8.5 - 10.1 mg/dL    Bilirubin Total 0.4 0.2 - 1.3 mg/dL    Albumin 3.6 3.4 - 5.0 g/dL    Protein Total 6.8 6.8 - 8.8 g/dL    Alkaline Phosphatase 89 40 - 150 U/L    ALT 20 0 - 50 U/L    AST 17 0 - 45 U/L       Assessment and plan:   61 YO with recent viral illness in November 2017 with resultant sinusitis as seen on CT.  Majority of symptoms were upper respiratory in nature at that time.  CT chest 2-18 actually improved.   Off all immunosuppressives and IgG level was > 400.  Symptoms have al resolved after a course of Levofloxacin.  Asked her to call office if symptoms recurred over the next few weeks, will need a second course of Levofloxacin.  Will need to follow her closely next Fall and treat any residual URI or sinus symptoms that may occur after normal course of viral infection.    RTC prn.  Patient to call  clinic with any questions or concerns          Again, thank you for allowing me to participate in the care of your patient.      Sincerely,    Troy Lomeli MD

## 2018-03-14 LAB — IGG SERPL-MCNC: 444 MG/DL (ref 695–1620)

## 2018-03-16 ENCOUNTER — CARE COORDINATION (OUTPATIENT)
Dept: TRANSPLANT | Facility: CLINIC | Age: 63
End: 2018-03-16

## 2018-03-16 DIAGNOSIS — R50.9 FEVER: ICD-10-CM

## 2018-03-16 DIAGNOSIS — C92.01 ACUTE MYELOID LEUKEMIA IN REMISSION (H): ICD-10-CM

## 2018-03-16 DIAGNOSIS — Z94.81 S/P ALLOGENEIC BONE MARROW TRANSPLANT (H): Primary | ICD-10-CM

## 2018-03-16 NOTE — PROGRESS NOTES
Patient called c/o sore throat, sinus pressure/congestion and bilateral teeth pain x 2 days; remains afebrile. Per Dr Ni, pt is to start abx (rx for augmentin sent to pt's local [facility]) for 3 weeks and call if symptoms worsen or new symptoms arise, ie fever. Patient is agreeable with this plan and will call if anything new or worse arises or will go to local ED/urgent care.

## 2018-03-19 LAB
DLCOCOR-%PRED-PRE: 79 %
DLCOCOR-PRE: 16.37 ML/MIN/MMHG
DLCOUNC-%PRED-PRE: 78 %
DLCOUNC-PRE: 16.27 ML/MIN/MMHG
DLCOUNC-PRED: 20.63 ML/MIN/MMHG
ERV-%PRED-PRE: 56 %
ERV-PRE: 0.1 L
ERV-PRED: 0.18 L
EXPTIME-PRE: 8.63 SEC
FEF2575-%PRED-PRE: 64 %
FEF2575-PRE: 1.36 L/SEC
FEF2575-PRED: 2.1 L/SEC
FEFMAX-%PRED-PRE: 98 %
FEFMAX-PRE: 5.81 L/SEC
FEFMAX-PRED: 5.9 L/SEC
FEV1-%PRED-PRE: 75 %
FEV1-PRE: 1.73 L
FEV1FEV6-PRE: 77 %
FEV1FEV6-PRED: 80 %
FEV1FVC-PRE: 77 %
FEV1FVC-PRED: 78 %
FEV1SVC-PRE: 71 %
FEV1SVC-PRED: 77 %
FIFMAX-PRE: 4.08 L/SEC
FRCPLETH-%PRED-PRE: 83 %
FRCPLETH-PRE: 2.16 L
FRCPLETH-PRED: 2.59 L
FVC-%PRED-PRE: 77 %
FVC-PRE: 2.26 L
FVC-PRED: 2.9 L
IC-%PRED-PRE: 83 %
IC-PRE: 2.33 L
IC-PRED: 2.8 L
RVPLETH-%PRED-PRE: 112 %
RVPLETH-PRE: 2.06 L
RVPLETH-PRED: 1.84 L
TLCPLETH-%PRED-PRE: 97 %
TLCPLETH-PRE: 4.5 L
TLCPLETH-PRED: 4.6 L
VA-%PRED-PRE: 79 %
VA-PRE: 3.74 L
VC-%PRED-PRE: 81 %
VC-PRE: 2.44 L
VC-PRED: 2.98 L

## 2018-03-20 DIAGNOSIS — I10 ESSENTIAL HYPERTENSION: ICD-10-CM

## 2018-03-20 RX ORDER — CARVEDILOL 6.25 MG/1
6.25 TABLET ORAL 2 TIMES DAILY WITH MEALS
Qty: 180 TABLET | Refills: 3 | Status: SHIPPED | OUTPATIENT
Start: 2018-03-20 | End: 2019-04-02

## 2018-04-02 DIAGNOSIS — I10 ESSENTIAL HYPERTENSION: ICD-10-CM

## 2018-04-03 RX ORDER — AMLODIPINE BESYLATE 5 MG/1
TABLET ORAL
Qty: 90 TABLET | Refills: 3 | Status: SHIPPED | OUTPATIENT
Start: 2018-04-03 | End: 2019-04-02

## 2018-04-06 DIAGNOSIS — J01.01 ACUTE RECURRENT MAXILLARY SINUSITIS: Primary | ICD-10-CM

## 2018-04-06 RX ORDER — METHYLPREDNISOLONE 4 MG
TABLET, DOSE PACK ORAL
Qty: 21 TABLET | Refills: 0 | Status: SHIPPED | OUTPATIENT
Start: 2018-04-06 | End: 2019-02-13

## 2018-04-06 RX ORDER — AZITHROMYCIN 250 MG/1
250 TABLET, FILM COATED ORAL DAILY
Qty: 6 TABLET | Refills: 0 | Status: SHIPPED | OUTPATIENT
Start: 2018-04-06 | End: 2019-02-13

## 2018-04-12 ENCOUNTER — TRANSFERRED RECORDS (OUTPATIENT)
Dept: HEALTH INFORMATION MANAGEMENT | Facility: CLINIC | Age: 63
End: 2018-04-12

## 2018-04-12 ENCOUNTER — TELEPHONE (OUTPATIENT)
Dept: TRANSPLANT | Facility: CLINIC | Age: 63
End: 2018-04-12

## 2018-04-12 NOTE — PROGRESS NOTES
Received a call from pt c/o ongoing cough despite completing most recent course of azithromycin today. States her cough is somewhat improved but still coughing t/o day and night. Remains afebrile and denies dyspnea, however, c/o dysuria x 1 day. Pt was seen by her local PCP who prescribed fluconazole and levaquin for a UTI and cough medicine. Discussed with Dr Ni who recommends pt be seen here again by Dr Lomeli since new respiratory symptoms have developed since her last appt. Instructed pt to call if symptoms worsen or if new ones occur. Patient agreeable with the plan and verbalized understanding. Message sent to BMT  to schedule appt w/ Dr Lomeli.

## 2018-04-17 ENCOUNTER — OFFICE VISIT (OUTPATIENT)
Dept: PULMONOLOGY | Facility: CLINIC | Age: 63
End: 2018-04-17
Attending: INTERNAL MEDICINE
Payer: MEDICARE

## 2018-04-17 VITALS — OXYGEN SATURATION: 95 % | SYSTOLIC BLOOD PRESSURE: 137 MMHG | HEART RATE: 82 BPM | DIASTOLIC BLOOD PRESSURE: 80 MMHG

## 2018-04-17 DIAGNOSIS — J01.00 ACUTE MAXILLARY SINUSITIS, RECURRENCE NOT SPECIFIED: Primary | ICD-10-CM

## 2018-04-17 RX ORDER — FLUCONAZOLE 150 MG/1
TABLET ORAL
COMMUNITY
Start: 2018-04-12 | End: 2018-10-26

## 2018-04-17 RX ORDER — CIPROFLOXACIN 500 MG/1
500 TABLET, FILM COATED ORAL
COMMUNITY
Start: 2018-04-12 | End: 2019-02-13

## 2018-04-17 RX ORDER — FLUTICASONE PROPIONATE 50 MCG
2 SPRAY, SUSPENSION (ML) NASAL DAILY
Qty: 1 BOTTLE | Refills: 3 | Status: SHIPPED | OUTPATIENT
Start: 2018-04-17

## 2018-04-17 NOTE — MR AVS SNAPSHOT
After Visit Summary   4/17/2018    Eryn Bennett    MRN: [id number]           Patient Information     Date Of Birth          1955        Visit Information        Provider Department      4/17/2018 3:30 PM Troy Lomeli MD [facility]        Today's Diagnoses     Acute maxillary sinusitis, recurrence not specified    -  1       Follow-ups after your visit        Follow-up notes from your care team     Return in about 5 weeks (around 5/25/2018).      Your next 10 appointments already scheduled     May 25, 2018 10:00 AM CDT   [facility] Draw with  [facility] DRAW   [facility] Lab Draw ([facility])    [address] [place] [address]  [address]  [facility] [id number] [phone]            May 25, 2018 10:30 AM CDT   Return with Irving Ni MD   [facility] Blood and Marrow Transplant ([facility])    [address] [place] [address]  [address]  [facility] [id number] [phone]            May 25, 2018 11:00 AM CDT   (Arrive by 10:45 AM)   Return Visit with Troy Lomeli MD   [facility] ([facility])    [address] [place] [address]  [address]  [facility] [id number] [phone]              Who to contact     If you have questions or need follow up information about today's clinic visit or your schedule please contact [facility] directly at [phone].  Normal or non-critical lab and imaging results will be communicated to you by MyChart, letter or phone within 4 business days after the clinic has received the results. If you do not hear from us within 7 days, please contact the clinic through MyChart or phone. If you have a critical or abnormal lab result, we will notify you by phone as soon as possible.  Submit refill requests through [facility] or call your pharmacy and they will forward the refill request to us. Please allow 3 business days for your  refill to be completed.          Additional Information About Your Visit        [facility] Information     [facility] gives you secure access to your electronic health record. If you see a primary care provider, you can also send messages to your care team and make appointments. If you have questions, please call your primary care clinic.  If you do not have a primary care provider, please call [phone] and they will assist you.        Care EveryWhere ID     This is your Care EveryWhere ID. This could be used by other organizations to access your [place] medical records  [id number]        Your Vitals Were     Pulse Pulse Oximetry                82 95%           Blood Pressure from Last 3 Encounters:   04/17/18 137/80   03/13/18 134/74   02/23/18 127/78    Weight from Last 3 Encounters:   03/13/18 103.6 kg (228 lb 6.4 oz)   02/23/18 102.5 kg (226 lb)   11/17/17 102.7 kg (226 lb 8 oz)              Today, you had the following     No orders found for display       Primary Care Provider Office Phone # Fax #    BirdSAFIA Camacho [phone]       [place] [address]        Equal Access to Services     ALIDA FERNANDEZ AH: Hadii aad ku hadasho Soomaali, waaxda luqadaha, qaybta kaalmada adeegyada, susy souza hayromyn emily silvestre ah. So [facility] [phone].    ATENCIÓN: Si habla español, tiene a orona disposición servicios gratuitos de asistencia lingüística. Teagan al [phone].    We comply with applicable federal civil rights laws and [place] laws. We do not discriminate on the basis of race, color, national origin, age, disability, sex, sexual orientation, or gender identity.            Thank you!     Thank you for choosing [facility]  for your care. Our goal is always to provide you with excellent care. Hearing back from our patients is one way we can continue to improve our services. Please take a few minutes to complete the written survey that  you may receive in the mail after your visit with us. Thank you!             Your Updated Medication List - Protect others around you: Learn how to safely use, store and throw away your medicines at www.disposemymeds.org.          This list is accurate as of 4/17/18  4:18 PM.  Always use your most recent med list.                   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 Repeat in one week if symptoms persist.        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), S/P allogeneic bone marrow transplant (H)       prochlorperazine 5 MG tablet    COMPAZINE    20 tablet    Take 1-2 tablets (5-10 mg) by mouth every 6 hours as needed for nausea or vomiting    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)       traMADol 50 MG tablet    ULTRAM    60 tablet    Take 1 tablet (50 mg) by mouth every 6 hours as needed for moderate pain    S/P allogeneic bone marrow transplant (H), 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

## 2018-04-17 NOTE — LETTER
4/17/2018       RE: Eryn Bennett  PO   [facility] [address]     Dear Colleague,    Thank you for referring your patient, Eryn Bennett, to the [facility]IC at [facility]. Please see a copy of my visit note below.    Reason for Visit  Eryn Bennett is a 62 year old year old female who is being seen for No chief complaint on file.    Pulmonary HPI  61 YO with AML s/p NMA DCBT over 3 years ago who is referred to the Pulmonary BMT clinic for evaluation of cough and SOB.  Developed URI in 11-17 that had persisted for 6 weeks.  During this time noticed nasal congestion and drainage, post-nasal drip, fevers, and cough. Denies chest burning. Seen in clinic, PCR for influenza was negative but she was treated with Tamiflu.  Was given course of Azithromycin twice for 5 days apiece, felt somewhat better with Azithromycin but symptoms recurred shortly thereafter.   Presented again in 2-12-18 with fever, cough, and nasal congestion. CT scan of sinuses revealed sinusitis, chest CT with LLL bronchial wall thickening and nodularity that was actually improved compared to April 2017.  Was placed on a 10 day course of Levofloxacin, states started to feel better after 5 days. Since completion of antibiotics in the past 10 days she thinks her symptoms have all resolved; no further nasal congestion or drainage, no cough.  All fevers have resolved.  Similar course last Winter with frequent URI's which were slow to resolve.  No prior history of sinus disease or allergies.  No tobacco use.  IgG level checked 11-17 and was 547.  Off all immunosuppressives.    Last seen one month ago. At that visit she was evaluated for cough which had resolved after treatment with Levofloxacin.  Asked to call the office if she had an increase in her symptoms. Developed a recurrence of her symptoms a few days after the appointment.  Was treated with a 10 day course of Levofloxacin with  recurrence shortly after stopping, then was treated with a 21 day course of Augmentin with improvement but then return of symptoms, finally was treated with Azithromycin until 4-11. Says nasal congestion and left sinus pressure/pain are better but still present. States had significant left maxillary sinus pain with extension into left neck; also with increased drainage in throat.  No fevers or chills but fatigue.    The patient was seen and examined by Troy Lomeli           Current Outpatient Prescriptions   Medication     ciprofloxacin (CIPRO) 500 MG tablet     fluconazole (DIFLUCAN) 150 MG tablet     AZITHROMYCIN PO     amLODIPine (NORVASC) 5 MG tablet     carvedilol (COREG) 6.25 MG tablet     Ca Phosphate-Cholecalciferol 200-200 MG-UNIT CHEW     ValACYclovir HCl (VALTREX PO)     guaiFENesin-codeine (ROBITUSSIN AC) 100-10 MG/5ML SOLN solution     desonide (DESOWEN) 0.05 % cream     acetaminophen (TYLENOL) 325 MG tablet     calcium-vitamin D 500-125 MG-UNIT TABS     VITAMIN D, CHOLECALCIFEROL, PO     methylPREDNISolone (MEDROL DOSEPAK) 4 MG tablet     amoxicillin-clavulanate (AUGMENTIN) 875-125 MG per tablet     levofloxacin (LEVAQUIN) 750 MG tablet     pantoprazole (PROTONIX) 40 MG EC tablet     prochlorperazine (COMPAZINE) 5 MG tablet     traMADol (ULTRAM) 50 MG tablet     No current facility-administered medications for this visit.      Allergies   Allergen Reactions     Blood Transfusion Related (Informational Only) Other (See Comments)     7/15/15 - Patient has a complex history of clinically significant antibodies against RBC antigens.  Finding compatible RBCs may take up to 24 hours or more.  Consult with the Blood Bank MD for transfusion guidance.  Itching in palms during platelet transfusion in 2010- IV benadryl was given at that time.  Pt has had platelets since then with no reaction.   11/6/14 - Stem cell transplant patient.  Give type O RBCs.     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 Pt would like to avoid, even with premedication, if at all possible.      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    While pt had Hicman in, pt was on a blood thinner       Past Surgical History:   Procedure Laterality Date     BLADDER SURGERY       CHOLECYSTECTOMY  1987     ESOPHAGOSCOPY, GASTROSCOPY, DUODENOSCOPY (EGD), COMBINED N/A 12/11/2014    Procedure: COMBINED ESOPHAGOSCOPY, GASTROSCOPY, DUODENOSCOPY (EGD), BIOPSY SINGLE OR MULTIPLE;  Surgeon: Saturnino Valera MD;  Location: [facility]     GI SURGERY      part of colon removed r.t benign tumor     GYN SURGERY       Dos Santos Catheter Placment Right 2010    Right IJ vein, in for 8 months     HYSTERECTOMY VAGINAL, BILATERAL SALPINGO-OOPHERECTOMY, COMBINED       PICC INSERTION Left 8/28/2014    5fr DL Power PICC, 46cm (1cm external) in the L basilic vein w/ tip in the SVC RA junction.       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  /80 (BP Location: Right arm, Patient Position: Right side, Cuff Size: Adult Regular)  Pulse 82  SpO2 95%  Exam:   GENERAL APPEARANCE: Well developed, well nourished, alert, and in no apparent distress.  EYES: PERRL, EOMI  HENT: Nasal mucosa with edema and hyperemia. No nasal polyps seen.  Left maxillary sinus tenderness  EARS: Canals clear, TMs normal  MOUTH: Oral mucosa is moist, without any lesions, no tonsillar enlargement, no oropharyngeal exudate.  NECK: supple, no masses, no thyromegaly.  LYMPHATICS: No significant axillary, cervical, or supraclavicular nodes.  RESP: Good air flow throughout.  No crackles. No rhonchi. No wheezes.  CV: Normal S1, S2, regular rhythm, normal rate. No murmur.  No rub. No gallop. No LE edema.   ABDOMEN:  Bowel sounds normal, soft, nontender, no HSM or masses.   MS: extremities normal. No clubbing. No cyanosis.  SKIN: no rash on limited exam  NEURO: Mentation intact, speech normal, normal strength and tone, normal gait and stance  PSYCH: mentation appears normal. and affect normal/bright  Results:  No results found for this or any previous visit (from the past 168 hour(s)).    Assessment and plan:   63 YO with recent viral illness in November 2017 with resultant sinusitis as seen on CT.  Majority of symptoms were upper respiratory in nature at that time.  CT chest 2-18 actually improved.   Off all immunosuppressives and IgG level was > 400.  Symptoms all resolved after an initial course of Levofloxacin at her last visit.   Increase in symptoms shortly after she last saw me.  Resolves somewhat with antibiotics then recurs; prior sinus CT showed narrowing of ostiomeatal units bilaterally.    1. Augmentin 875 BID for 28 days  2. If has recurrence of symptoms after antibiotics, I would repeat sinus CT at her next visit.  If has continued ostiomeatal narrowing,  will need ENT evaluation and possibly surgery. Asked for her to call clinic prior to appt if she has continued sinus symptoms  3. Start Flonase and nasal saline washes     RTC on Friday May 25th.  Patient to call clinic with any questions or concerns          Again, thank you for allowing me to participate in the care of your patient.      Sincerely,    Troy Lomeli MD

## 2018-04-17 NOTE — NURSING NOTE
"Oncology Rooming Note    April 17, 2018 3:23 PM   Eryn Bennett is a 62 year old female who presents for:    No chief complaint on file.    Initial Vitals: /80 (BP Location: Right arm, Patient Position: Right side, Cuff Size: Adult Regular)  Pulse 82  SpO2 95% Estimated body mass index is 42.46 kg/(m^2) as calculated from the following:    Height as of 9/18/17: 1.562 m (5' 1.5\").    Weight as of 3/13/18: 103.6 kg (228 lb 6.4 oz). There is no height or weight on file to calculate BSA.  Data Unavailable Comment: Data Unavailable   No LMP recorded. Patient has had a hysterectomy.  Allergies reviewed: Yes  Medications reviewed: Yes    Medications: Medication refills not needed today.  Pharmacy name entered into EPIC:    [facility] [place], [place] - [address] [facility] [phone]  [facility] [address] [place] [address] [address] [place] [address] AT [facility] [address]  [facility] [address] [place] [address] [address] [facility]    Clinical concerns: none     6 minutes for nursing intake (face to face time)     Barbievalentín Kulkarni MA                "

## 2018-04-17 NOTE — PROGRESS NOTES
Reason for Visit  Eryn Bennett is a 62 year old year old female who is being seen for No chief complaint on file.    Pulmonary HPI  61 YO with AML s/p NMA DCBT over 3 years ago who is referred to the Pulmonary BMT clinic for evaluation of cough and SOB.  Developed URI in 11-17 that had persisted for 6 weeks.  During this time noticed nasal congestion and drainage, post-nasal drip, fevers, and cough. Denies chest burning. Seen in clinic, PCR for influenza was negative but she was treated with Tamiflu.  Was given course of Azithromycin twice for 5 days apiece, felt somewhat better with Azithromycin but symptoms recurred shortly thereafter.   Presented again in 2-12-18 with fever, cough, and nasal congestion. CT scan of sinuses revealed sinusitis, chest CT with LLL bronchial wall thickening and nodularity that was actually improved compared to April 2017.  Was placed on a 10 day course of Levofloxacin, states started to feel better after 5 days. Since completion of antibiotics in the past 10 days she thinks her symptoms have all resolved; no further nasal congestion or drainage, no cough.  All fevers have resolved.  Similar course last Winter with frequent URI's which were slow to resolve.  No prior history of sinus disease or allergies.  No tobacco use.  IgG level checked 11-17 and was 547.  Off all immunosuppressives.    Last seen one month ago. At that visit she was evaluated for cough which had resolved after treatment with Levofloxacin.  Asked to call the office if she had an increase in her symptoms. Developed a recurrence of her symptoms a few days after the appointment.  Was treated with a 10 day course of Levofloxacin with recurrence shortly after stopping, then was treated with a 21 day course of Augmentin with improvement but then return of symptoms, finally was treated with Azithromycin until 4-11. Says nasal congestion and left sinus pressure/pain are better but still present. States had significant  left maxillary sinus pain with extension into left neck; also with increased drainage in throat.  No fevers or chills but fatigue.    The patient was seen and examined by Troy Lomeli           Current Outpatient Prescriptions   Medication     ciprofloxacin (CIPRO) 500 MG tablet     fluconazole (DIFLUCAN) 150 MG tablet     AZITHROMYCIN PO     amLODIPine (NORVASC) 5 MG tablet     carvedilol (COREG) 6.25 MG tablet     Ca Phosphate-Cholecalciferol 200-200 MG-UNIT CHEW     ValACYclovir HCl (VALTREX PO)     guaiFENesin-codeine (ROBITUSSIN AC) 100-10 MG/5ML SOLN solution     desonide (DESOWEN) 0.05 % cream     acetaminophen (TYLENOL) 325 MG tablet     calcium-vitamin D 500-125 MG-UNIT TABS     VITAMIN D, CHOLECALCIFEROL, PO     methylPREDNISolone (MEDROL DOSEPAK) 4 MG tablet     amoxicillin-clavulanate (AUGMENTIN) 875-125 MG per tablet     levofloxacin (LEVAQUIN) 750 MG tablet     pantoprazole (PROTONIX) 40 MG EC tablet     prochlorperazine (COMPAZINE) 5 MG tablet     traMADol (ULTRAM) 50 MG tablet     No current facility-administered medications for this visit.      Allergies   Allergen Reactions     Blood Transfusion Related (Informational Only) Other (See Comments)     7/15/15 - Patient has a complex history of clinically significant antibodies against RBC antigens.  Finding compatible RBCs may take up to 24 hours or more.  Consult with the Blood Bank MD for transfusion guidance.  Itching in palms during platelet transfusion in 2010- IV benadryl was given at that time.  Pt has had platelets since then with no reaction.   11/6/14 - Stem cell transplant patient.  Give type O RBCs.     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 Pt would like to avoid, even with premedication, if at all possible.      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    While pt had Hicman in, pt was on a blood thinner       Past Surgical History:   Procedure Laterality Date     BLADDER SURGERY       CHOLECYSTECTOMY  1987     ESOPHAGOSCOPY, GASTROSCOPY, DUODENOSCOPY (EGD), COMBINED N/A 12/11/2014    Procedure: COMBINED ESOPHAGOSCOPY, GASTROSCOPY, DUODENOSCOPY (EGD), BIOPSY SINGLE OR MULTIPLE;  Surgeon: Saturnino Valera MD;  Location:  GI     GI SURGERY      part of colon removed r.t benign tumor     GYN SURGERY       Dos Santos Catheter Placment Right 2010    Right IJ vein, in for 8 months     HYSTERECTOMY VAGINAL, BILATERAL SALPINGO-OOPHERECTOMY, COMBINED       PICC INSERTION Left 8/28/2014    5fr DL Power PICC, 46cm (1cm external) in the L basilic vein w/ tip in the SVC RA junction.       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  /80 (BP Location: Right arm, Patient Position: Right side, Cuff Size: Adult Regular)  Pulse 82  SpO2 95%  Exam:   GENERAL APPEARANCE: Well developed, well  nourished, alert, and in no apparent distress.  EYES: PERRL, EOMI  HENT: Nasal mucosa with edema and hyperemia. No nasal polyps seen.  Left maxillary sinus tenderness  EARS: Canals clear, TMs normal  MOUTH: Oral mucosa is moist, without any lesions, no tonsillar enlargement, no oropharyngeal exudate.  NECK: supple, no masses, no thyromegaly.  LYMPHATICS: No significant axillary, cervical, or supraclavicular nodes.  RESP: Good air flow throughout.  No crackles. No rhonchi. No wheezes.  CV: Normal S1, S2, regular rhythm, normal rate. No murmur.  No rub. No gallop. No LE edema.   ABDOMEN:  Bowel sounds normal, soft, nontender, no HSM or masses.   MS: extremities normal. No clubbing. No cyanosis.  SKIN: no rash on limited exam  NEURO: Mentation intact, speech normal, normal strength and tone, normal gait and stance  PSYCH: mentation appears normal. and affect normal/bright  Results:  No results found for this or any previous visit (from the past 168 hour(s)).    Assessment and plan:   61 YO with recent viral illness in November 2017 with resultant sinusitis as seen on CT.  Majority of symptoms were upper respiratory in nature at that time.  CT chest 2-18 actually improved.   Off all immunosuppressives and IgG level was > 400.  Symptoms all resolved after an initial course of Levofloxacin at her last visit.  Increase in symptoms shortly after she last saw me.  Resolves somewhat with antibiotics then recurs; prior sinus CT showed narrowing of ostiomeatal units bilaterally.    1. Augmentin 875 BID for 28 days  2. If has recurrence of symptoms after antibiotics, I would repeat sinus CT at her next visit.  If has continued ostiomeatal narrowing, will need ENT evaluation and possibly surgery. Asked for her to call clinic prior to appt if she has continued sinus symptoms  3. Start Flonase and nasal saline washes     RTC on Friday May 25th.  Patient to call clinic with any questions or concerns

## 2018-06-08 ENCOUNTER — APPOINTMENT (OUTPATIENT)
Dept: LAB | Facility: CLINIC | Age: 63
End: 2018-06-08
Attending: INTERNAL MEDICINE
Payer: MEDICARE

## 2018-06-08 ENCOUNTER — ONCOLOGY VISIT (OUTPATIENT)
Dept: TRANSPLANT | Facility: CLINIC | Age: 63
End: 2018-06-08
Attending: INTERNAL MEDICINE
Payer: MEDICARE

## 2018-06-08 VITALS
OXYGEN SATURATION: 98 % | BODY MASS INDEX: 43.07 KG/M2 | DIASTOLIC BLOOD PRESSURE: 74 MMHG | HEART RATE: 79 BPM | WEIGHT: 231.7 LBS | TEMPERATURE: 98.1 F | SYSTOLIC BLOOD PRESSURE: 124 MMHG

## 2018-06-08 DIAGNOSIS — C92.01 ACUTE MYELOID LEUKEMIA IN REMISSION (H): ICD-10-CM

## 2018-06-08 LAB
ALBUMIN SERPL-MCNC: 3.7 G/DL (ref 3.4–5)
ALP SERPL-CCNC: 102 U/L (ref 40–150)
ALT SERPL W P-5'-P-CCNC: 32 U/L (ref 0–50)
ANION GAP SERPL CALCULATED.3IONS-SCNC: 7 MMOL/L (ref 3–14)
AST SERPL W P-5'-P-CCNC: 21 U/L (ref 0–45)
BASOPHILS # BLD AUTO: 0 10E9/L (ref 0–0.2)
BASOPHILS NFR BLD AUTO: 0.3 %
BILIRUB SERPL-MCNC: 0.2 MG/DL (ref 0.2–1.3)
BUN SERPL-MCNC: 15 MG/DL (ref 7–30)
CALCIUM SERPL-MCNC: 8.8 MG/DL (ref 8.5–10.1)
CHLORIDE SERPL-SCNC: 108 MMOL/L (ref 94–109)
CO2 SERPL-SCNC: 27 MMOL/L (ref 20–32)
CREAT SERPL-MCNC: 1 MG/DL (ref 0.52–1.04)
DIFFERENTIAL METHOD BLD: NORMAL
EOSINOPHIL # BLD AUTO: 0.2 10E9/L (ref 0–0.7)
EOSINOPHIL NFR BLD AUTO: 3.2 %
ERYTHROCYTE [DISTWIDTH] IN BLOOD BY AUTOMATED COUNT: 13.7 % (ref 10–15)
GFR SERPL CREATININE-BSD FRML MDRD: 56 ML/MIN/1.7M2
GLUCOSE SERPL-MCNC: 104 MG/DL (ref 70–99)
HCT VFR BLD AUTO: 42 % (ref 35–47)
HGB BLD-MCNC: 13.4 G/DL (ref 11.7–15.7)
IGG SERPL-MCNC: 535 MG/DL (ref 695–1620)
IMM GRANULOCYTES # BLD: 0 10E9/L (ref 0–0.4)
IMM GRANULOCYTES NFR BLD: 0.5 %
LYMPHOCYTES # BLD AUTO: 2.6 10E9/L (ref 0.8–5.3)
LYMPHOCYTES NFR BLD AUTO: 39.4 %
MCH RBC QN AUTO: 28.6 PG (ref 26.5–33)
MCHC RBC AUTO-ENTMCNC: 31.9 G/DL (ref 31.5–36.5)
MCV RBC AUTO: 90 FL (ref 78–100)
MONOCYTES # BLD AUTO: 0.5 10E9/L (ref 0–1.3)
MONOCYTES NFR BLD AUTO: 7.5 %
NEUTROPHILS # BLD AUTO: 3.2 10E9/L (ref 1.6–8.3)
NEUTROPHILS NFR BLD AUTO: 49.1 %
NRBC # BLD AUTO: 0 10*3/UL
NRBC BLD AUTO-RTO: 0 /100
PLATELET # BLD AUTO: 159 10E9/L (ref 150–450)
POTASSIUM SERPL-SCNC: 3.9 MMOL/L (ref 3.4–5.3)
PROT SERPL-MCNC: 6.9 G/DL (ref 6.8–8.8)
RBC # BLD AUTO: 4.68 10E12/L (ref 3.8–5.2)
SODIUM SERPL-SCNC: 142 MMOL/L (ref 133–144)
WBC # BLD AUTO: 6.5 10E9/L (ref 4–11)

## 2018-06-08 PROCEDURE — 85025 COMPLETE CBC W/AUTO DIFF WBC: CPT | Performed by: INTERNAL MEDICINE

## 2018-06-08 PROCEDURE — 90471 IMMUNIZATION ADMIN: CPT

## 2018-06-08 PROCEDURE — 82784 ASSAY IGA/IGD/IGG/IGM EACH: CPT | Performed by: INTERNAL MEDICINE

## 2018-06-08 PROCEDURE — 90750 HZV VACC RECOMBINANT IM: CPT | Mod: ZF | Performed by: INTERNAL MEDICINE

## 2018-06-08 PROCEDURE — 36415 COLL VENOUS BLD VENIPUNCTURE: CPT

## 2018-06-08 PROCEDURE — 80053 COMPREHEN METABOLIC PANEL: CPT | Performed by: INTERNAL MEDICINE

## 2018-06-08 PROCEDURE — G0463 HOSPITAL OUTPT CLINIC VISIT: HCPCS | Mod: 25

## 2018-06-08 PROCEDURE — 25000125 ZZHC RX 250: Mod: ZF | Performed by: INTERNAL MEDICINE

## 2018-06-08 RX ADMIN — ZOSTER VACCINE RECOMBINANT, ADJUVANTED 0.5 ML: KIT at 12:25

## 2018-06-08 NOTE — NURSING NOTE
"Oncology Rooming Note    June 8, 2018 11:33 AM   Eryn Bennett is a 62 year old female who presents for:    Chief Complaint   Patient presents with     Blood Draw     Labs and vitals drawn via vpt by NALDO PARRA     Pt is here for labs and and to see MD     Initial Vitals: /74 (BP Location: Left arm, Patient Position: Left side, Cuff Size: Adult Regular)  Pulse 79  Temp 98.1  F (36.7  C) (Oral)  Wt 105.1 kg (231 lb 11.2 oz)  SpO2 98%  BMI 43.07 kg/m2 Estimated body mass index is 43.07 kg/(m^2) as calculated from the following:    Height as of 9/18/17: 1.562 m (5' 1.5\").    Weight as of this encounter: 105.1 kg (231 lb 11.2 oz). Body surface area is 2.14 meters squared.  Data Unavailable Comment: Data Unavailable   No LMP recorded. Patient has had a hysterectomy.  Allergies reviewed: Yes  Medications reviewed: Yes    Medications: Medication refills not needed today.  Pharmacy name entered into EPIC:    [facility] [place], [place] - [phone]  [facility] [id number] [place] [address] [address] [facility] AT [facility] [address]  [facility] [address] [place] [address] [address] [facility]    Clinical concerns: none     6 minutes for nursing intake (face to face time)     Barbie Kulkarni MA              "

## 2018-06-08 NOTE — NURSING NOTE
"Oncology Rooming Note    June 8, 2018 11:28 AM   Eryn Bennett is a 62 year old female who presents for:    Chief Complaint   Patient presents with     Blood Draw     Labs and vitals drawn via vpt by NALDO PARRA     Pt is here for labs and and to see MD     Initial Vitals: /74 (BP Location: Left arm, Patient Position: Left side, Cuff Size: Adult Regular)  Pulse 79  Temp 98.1  F (36.7  C) (Oral)  Wt 105.1 kg (231 lb 11.2 oz)  SpO2 98%  BMI 43.07 kg/m2 Estimated body mass index is 43.07 kg/(m^2) as calculated from the following:    Height as of 9/18/17: 1.562 m (5' 1.5\").    Weight as of this encounter: 105.1 kg (231 lb 11.2 oz). Body surface area is 2.14 meters squared.  Data Unavailable Comment: Data Unavailable   No LMP recorded. Patient has had a hysterectomy.  Allergies reviewed: Yes  Medications reviewed: Yes    Medications: Medication refills not needed today.  Pharmacy name entered into EPIC:    [facility] [place], [place] - [phone]  [facility] [address] [place] [address] [address] [facility] AT [facility] [address]  [facility] [address] [place] [address] [address] [facility]    Clinical concerns: none     6 minutes for nursing intake (face to face time)     Barbie Kulkarni MA              "

## 2018-06-08 NOTE — PROGRESS NOTES
BMT Clinic Progress Notes    Ms Bennett, 61 you s/p NMA DUCB transplant    CC: follow-up    History of Present Illness: The patient returns for a planned follow-up with her .  She is now doing better from her respiratory issues after having a prolonged course of steroids for her sinus disease.  She is not short of breath or coughing.  She complains of achiness at bedtime.  Otherwise she is eating well and she is physically active significantly better than the last time we saw her.  Still needs to shut up some weight.  Today she had no other new complaints other than her intertriginous rash is back and she is using the creams.      Review of Systems: ROS otherwise unremarkable other than what is noted in the HPI.     Physical Exam: KPS 80%  /74 (BP Location: Left arm, Patient Position: Left side, Cuff Size: Adult Regular)  Pulse 79  Temp 98.1  F (36.7  C) (Oral)  Wt 105.1 kg (231 lb 11.2 oz)  SpO2 98%  BMI 43.07 kg/m2  General: A&O. NAD.   HEENT: CLARICE, sclera anicteric, the mouth mucosa is mildly dry but there is no erosions or ulcerations and actually minimal lichenoid changes, if any.  Neck: supple  Lungs: CTA bilaterally to bases, no crackles, no wheezing   Heart: RRR, no m/r/g  Abdomen: +BS, soft, NT/ND, no HSM  Extremities: No edema; unable to raise her shoulder above her head.   Skin: Mild erythematous eczema plaques on her neck and groinrashes. No bruises or petechiae. Carefully examined her hands and there is no skin thickening but some decreased strength and limite motion more notable with R ahnd because of pain on R Thumb. No inflammatory signs.    Labs   Lab Results   Component Value Date    WBC 6.5 06/08/2018    ANEU 3.2 06/08/2018    HGB 13.4 06/08/2018    HCT 42.0 06/08/2018     06/08/2018     06/08/2018    POTASSIUM 3.9 06/08/2018    CHLORIDE 108 06/08/2018    CO2 27 06/08/2018     (H) 06/08/2018    BUN 15 06/08/2018    CR 1.00 06/08/2018    MAG 1.8 04/03/2017     INR 1.03 04/03/2017    BILITOTAL 0.2 06/08/2018    AST 21 06/08/2018    ALT 32 06/08/2018    ALKPHOS 102 06/08/2018    PROTTOTAL 6.9 06/08/2018    ALBUMIN 3.7 06/08/2018       Ms. Eryn Bennett is a 61 yo woman s/p NMA DCBT for AML day 3+ years      1. BMT/AML: continued CR. two year post transplant BM 40% cellular, trilineage hematopoiesis, no leukemia by morphology and flow negative; % donor #2.     2. HEME: Transfusion parameters keep Hgb>8g/dL and plts>10k. Stable counts.  - Patient has history of Hemolytic anemia: Warm anti E; IgG and completment. Received rituximab X 4 September 2015.    Patient has history DVT but her repeat doppler L lower ext negative on OCT 2016. Off coumadin since JUL 2016. During this admission as she was less mobile with viral illness she was given SQ heparin every 12 hours which was stopped on discharge.     3. ID: afebrile; she is essentially completed her immunizations.  Today she is going to have the virus inactivated shingles vaccine that is now available.  She is only using Valtrex if she develops cold sores.  We will measure her IgG in the fall and replace if clinically indicated.  She will have a flu shot in the fall.       4. GVH: none active. But has eczema rash on her neck and groins that could be mild GVHD. Controlled with creams. musculo-skeletal symptoms could also be GVHD but not enough to start systemic steroids, I offered Flexeril but she prefers to use the heating pad and Tylenol which seems to take care of it.  - Off prednisone since 4/20/16; finished MMF taper on 9/13/16      5. GI: monitor and use PRN medication for GERD  - Compazine available prn      6. FEN/Renal: WNL      7. Cardiovascular: High cholesterol and triglycerides may use cholesterol medication. Continue Norvasc 5mg daily Carvedilol 6.25mg bid for cardiomyopathy.      8. Musculoskelatal: physical condition overall improved but achy body in AM and R thumb pain. Unclear cause could be GVHD but she  will try other measures and call if worsening symptoms. Must work on exercises at home.   - Dexa scan with osteopenia (5/5/15). Had bisphosphonate 10/5/16; repeat in ~12 months; I asked her to rediscuss with her doctor whether or not to get bisphosphonate for osteopenia or osteoporosis.  I would advise to continue with the vitamin D and calcium replacement.    - Continue Calcium and Vit D supplementation 2,800 units daily  - continue Tramadol and tylenol for muscle pain and that was working better than hydrocodone/acetaminophen       9. Pulmonary: no active issues.     10. Neurology: feet neuropathy not changed. Monitor and refer to neurology/neuropathy group for assessment.     11. Eyes: eye clinic following after cataract surgery in August 2017.      Plan:   RTC [place] on 10/12/18 with labs  Completed most vaccines except shingles. Recommended Shingrix if she decides.  Up to date regular health care screenings including but not limited to bone density and health, cholesterol, glucose, colonoscopy, mammograms, pap smears, oral cancer yearly, skin cancer yearly.  Further discuss dexa scan with PC MD  Watch hand function closely; call with questions or new issues.

## 2018-06-08 NOTE — NURSING NOTE
Pt was given her Shingrix vaccine in her left deltoid. She tolerated this well.    Barbie Kulkarni MA     Hpi Title: Evaluation of Skin Lesions How Severe Are Your Spot(S)?: mild Have Your Spot(S) Been Treated In The Past?: has not been treated Additional History: \\n\\n\\n\\n\\nPt has hx of Sjögren’s syndrome being treated by rheumatologist. Pt has lesions on the scalp that rheumatologist would like a bx to r/o skin cancer

## 2018-06-08 NOTE — NURSING NOTE
Chief Complaint   Patient presents with     Blood Draw     Labs and vitals drawn via vpt by NALDO Kulkarni MA

## 2018-06-08 NOTE — PATIENT INSTRUCTIONS
ISABELLE Ni on 10/12/18 with labs  Completed most vaccines except shingles. Recommended Shingrix if she decides.  Up to date regular health care screenings including but not limited to bone density and health, cholesterol, glucose, colonoscopy, mammograms, pap smears, oral cancer yearly, skin cancer yearly.  Further discuss dexa scan with PC MD  Watch hand function closely; call with questions or new issues.

## 2018-06-08 NOTE — MR AVS SNAPSHOT
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  After hours call the   hospital at [phone] or   [phone]. Ask for the BMT   fellow on-call           Care Instructions    RTC Raine on 10/12/18 with labs  Completed most vaccines except shingles. Recommended Shingrix if she decides.  Up to date regular health care screenings including but not limited to bone density and health, cholesterol, glucose, colonoscopy, mammograms, pap smears, oral cancer yearly, skin cancer yearly.  Further discuss dexa scan with PC MD  Watch hand function closely; call with questions or new issues.          Follow-ups after your visit        Your next 10 appointments already scheduled     Oct 02, 2018  3:00 PM CDT   (Arrive by 2:45 PM)   Return Visit with Troy Lomeli MD   [facility] ([facility])    [address] [place] [address]  [address] [place] [id number]   [phone]            Oct 12, 2018 10:00 AM CDT   [facility] Draw with  [facility]   [facility] ([facility])    [address] [place] [address]  [address] [place] [id number]   [phone]            Oct 12, 2018 10:30 AM CDT   Return with Irving Ni MD   [facility] Blood and Marrow Transplant ([facility])    [address] [place] [address]  [address]  [facility] [address] [phone]              Future tests that were ordered for you today     Open Future Orders        Priority Expected Expires Ordered    CBC with platelets differential Routine 10/12/2018 10/12/2018 6/8/2018    Comprehensive metabolic panel Routine 10/12/2018 10/12/2018 6/8/2018    IgG Routine 10/12/2018 10/12/2018 6/8/2018            Who to contact     If you have questions or need follow up information about today's clinic visit or your schedule please contact [facility] BLOOD AND MARROW TRANSPLANT directly at [phone].  Normal or non-critical lab and imaging results will be communicated to you by [employer], letter or phone within 4 business days after the clinic has received the results. If you do not hear from us within 7 days, please contact the clinic through [employer] or phone. If you have a critical or abnormal lab result, we will notify you by phone as soon as possible.  Submit refill requests through [employer] or call your pharmacy and they will forward the refill request to us. Please allow 3 business days for your refill to be completed.          Additional Information About Your Visit        [employer] Information     [employer] gives you secure access to your electronic health record. If you see a primary care provider, you can also send messages to your care team and make appointments. If you have questions, please call your primary care clinic.  If you do not have a primary care provider, please call [phone] and they will assist you.        Care EveryWhere ID     This is your Care EveryWhere ID. This could be used by other organizations to access your [place] medical records  [id number]        Your Vitals Were     Pulse Temperature Pulse Oximetry BMI (Body Mass Index)          79 98.1  F (36.7  C) (Oral) 98% 43.07 kg/m2         Blood Pressure from Last 3 Encounters:   06/08/18 124/74   04/17/18 137/80   03/13/18 134/74    Weight from Last 3 Encounters:   06/08/18 105.1  kg (231 lb 11.2 oz)   03/13/18 103.6 kg (228 lb 6.4 oz)   02/23/18 102.5 kg (226 lb)              We Performed the Following     CBC with platelets differential     Comprehensive metabolic panel     IgG          Today's Medication Changes          These changes are accurate as of 6/8/18 12:32 PM.  If you have any questions, ask your nurse or doctor.               Stop taking these medicines if you haven't already. Please contact your care team if you have questions.     levofloxacin 750 MG tablet   Commonly known as:  LEVAQUIN   Stopped by:  Irving Ni MD                    Recent Review Flowsheet Data     BMT Recent Results Latest Ref Rng & Units 4/7/2017 4/14/2017 6/14/2017 11/17/2017 2/23/2018 3/13/2018 6/8/2018    WBC 4.0 - 11.0 10e9/L 7.1 6.5 8.0 7.2 7.9 6.3 6.5    Hemoglobin 11.7 - 15.7 g/dL 12.0 12.0 12.5 13.9 13.2 13.3 13.4    Platelet Count 150 - 450 10e9/L 246 233 194 171 221 145(L) 159    Neutrophils (Absolute) 1.6 - 8.3 10e9/L 3.4 2.9 4.3 3.5 3.9 2.4 3.2    INR 0.86 - 1.14 - - - - - - -    Sodium 133 - 144 mmol/L 138 138 137 138 136 138 142    Potassium 3.4 - 5.3 mmol/L 4.8 4.0 4.2 4.1 4.3 4.0 3.9    Chloride 94 - 109 mmol/L 101 101 103 104 103 105 108    Glucose 70 - 99 mg/dL 139(H) 126(H) 109(H) 108(H) 132(H) 110(H) 104(H)    Urea Nitrogen 7 - 30 mg/dL 19 18 23 22 21 22 15    Creatinine 0.52 - 1.04 mg/dL 0.98 1.04 1.13(H) 1.04 1.07(H) 0.90 1.00    Calcium (Total) 8.5 - 10.1 mg/dL 9.9 9.2 9.3 9.4 9.1 9.6 8.8    Protein (Total) 6.8 - 8.8 g/dL 7.1 7.0 7.0 7.1 7.1 6.8 6.9    Albumin 3.4 - 5.0 g/dL 3.2(L) 3.7 3.8 3.8 3.6 3.6 3.7    Bilirubin (Direct) 0.0 - 0.2 mg/dL - - - - - - -    Alkaline Phosphatase 40 - 150 U/L 76 72 89 86 88 89 102    AST 0 - 45 U/L 21 18 14 16 14 17 21    ALT 0 - 50 U/L 28 19 18 24 21 20 32    MCV 78 - 100 fl 89 87 89 94 93 93 90               Primary Care Provider Office Phone # Fax #    Carmelo Camacho [phone]       [place] [address]  [facility] [address]        Equal Access to Services     [facility] REBECCA : Hadii ni lyles hadshelbijose Sorachelali, waaxda luqadaha, qaybta kazullycarmela schaeffer, susy torrespaulakaruna silvestre . So [facility] [phone].    ATENCIÓN: Si habla español, tiene a orona disposición servicios gratuitos de asistencia lingüística. Llame al [phone].    We comply with applicable federal civil rights laws and [place] laws. We do not discriminate on the basis of race, color, national origin, age, disability, sex, sexual orientation, or gender identity.            Thank you!     Thank you for choosing [facility] BLOOD [facility] MARROW TRANSPLANT  for your care. Our goal is always to provide you with excellent care. Hearing back from our patients is one way we can continue to improve our services. Please take a few minutes to complete the written survey that you may receive in the mail after your visit with us. Thank you!             Your Updated Medication List - Protect others around you: Learn how to safely use, store and throw away your medicines at www.disposemymeds.org.          This list is accurate as of 6/8/18 12:32 PM.  Always use your most recent med list.                   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)       * amoxicillin-clavulanate 875-125 MG per tablet    AUGMENTIN    60 tablet    Take 1 tablet by mouth 2 times daily    Acute maxillary sinusitis, recurrence not specified       AZITHROMYCIN PO       [id number]        Ca Phosphate-Cholecalciferol 200-200 MG-UNIT Chew           calcium-vitamin D 500-125 MG-UNIT Tabs      Take by mouth 2 times daily        carvedilol 6.25 MG tablet    COREG    180 tablet    Take 1 tablet (6.25 mg) by mouth 2 times daily (with meals)    Essential hypertension       desonide 0.05 % cream    DESOWEN    60 g    Apply sparingly to affected area three times daily as needed.    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 Repeat in one week if symptoms persist.        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), S/P allogeneic bone marrow transplant (H)       prochlorperazine 5 MG tablet    COMPAZINE    20 tablet    Take 1-2 tablets (5-10 mg) by mouth every 6 hours as needed for nausea or vomiting    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)       traMADol 50 MG tablet    ULTRAM    60 tablet    Take 1 tablet (50 mg) by mouth every 6 hours as needed for moderate pain    S/P allogeneic bone marrow transplant (H), Acute  myeloid leukemia in remission (H), EBV (Georgette-Barr virus) viremia, Cytomegalovirus (CMV) viremia (H), Autoimmune hemolytic anemia (H), S/P allogeneic bone marrow transplant (H), Hypogammaglobulinemia (H), Autoimmune hemolytic anemia (H), Deep vein thrombosis (DVT) of left lower extremity, unspecified chronicity, unspecified vein (H)       VALTREX PO      Take 500 mg by mouth as needed        VITAMIN D (CHOLECALCIFEROL) PO      Take 1,000 Units by mouth daily        * Notice:  This list has 2 medication(s) that are the same as other medications prescribed for you. Read the directions carefully, and ask your doctor or other care provider to review them with you.

## 2018-10-09 ENCOUNTER — RADIANT APPOINTMENT (OUTPATIENT)
Dept: CT IMAGING | Facility: CLINIC | Age: 63
End: 2018-10-09
Attending: INTERNAL MEDICINE
Payer: COMMERCIAL

## 2018-10-09 ENCOUNTER — PATIENT OUTREACH (OUTPATIENT)
Dept: CARE COORDINATION | Facility: CLINIC | Age: 63
End: 2018-10-09

## 2018-10-09 ENCOUNTER — OFFICE VISIT (OUTPATIENT)
Dept: PULMONOLOGY | Facility: CLINIC | Age: 63
End: 2018-10-09
Attending: INTERNAL MEDICINE
Payer: MEDICARE

## 2018-10-09 VITALS
SYSTOLIC BLOOD PRESSURE: 132 MMHG | DIASTOLIC BLOOD PRESSURE: 78 MMHG | OXYGEN SATURATION: 95 % | BODY MASS INDEX: 42.55 KG/M2 | HEART RATE: 87 BPM | RESPIRATION RATE: 18 BRPM | WEIGHT: 228.9 LBS

## 2018-10-09 DIAGNOSIS — J32.0 CHRONIC MAXILLARY SINUSITIS: Primary | ICD-10-CM

## 2018-10-09 DIAGNOSIS — J32.0 CHRONIC MAXILLARY SINUSITIS: ICD-10-CM

## 2018-10-09 DIAGNOSIS — C92.01 ACUTE MYELOID LEUKEMIA IN REMISSION (H): ICD-10-CM

## 2018-10-09 RX ORDER — NITROFURANTOIN 25; 75 MG/1; MG/1
CAPSULE ORAL
Refills: 0 | COMMUNITY
Start: 2018-10-08 | End: 2018-10-26

## 2018-10-09 ASSESSMENT — PAIN SCALES - GENERAL: PAINLEVEL: NO PAIN (0)

## 2018-10-09 NOTE — PROGRESS NOTES
Reason for Visit  Eryn Bennett is a 63 year old year old female who is being seen for Oncology Clinic Visit (Pt is here for a rtn for Follow up for )    Pulmonary HPI  61 YO with AML s/p NMA DCBT over 3 years ago who is referred to the Pulmonary BMT clinic for evaluation of cough and SOB.  Developed URI in 11-17 that had persisted for 6 weeks.  During this time noticed nasal congestion and drainage, post-nasal drip, fevers, and cough. Denies chest burning. Seen in clinic, PCR for influenza was negative but she was treated with Tamiflu.  Was given course of Azithromycin twice for 5 days apiece, felt somewhat better with Azithromycin but symptoms recurred shortly thereafter.   Presented again in 2-12-18 with fever, cough, and nasal congestion. CT scan of sinuses revealed sinusitis, chest CT with LLL bronchial wall thickening and nodularity that was actually improved compared to April 2017.  Was placed on a 10 day course of Levofloxacin, states started to feel better after 5 days. Since completion of antibiotics in the past 10 days she thinks her symptoms have all resolved; no further nasal congestion or drainage, no cough.  All fevers have resolved.  Similar course last Winter with frequent URI's which were slow to resolve.  No prior history of sinus disease or allergies.  No tobacco use.  IgG level checked 11-17 and was 547.  Off all immunosuppressives.    Last seen 6 months ago. At her last visit she was placed on Augmentin for 21 days with plans on being seen back in clinic on 5-26.  Since her last visit she has had to be treated for sinus infection two additional times- in June and July, last one with Augmentin.  At present she has noted bilateral nasal congestion but no drainage and no sinus headache or pressure. Breathing is ok.       The patient was seen and examined by Troy Lomeli           Current Outpatient Prescriptions   Medication     acetaminophen (TYLENOL) 325 MG tablet     amLODIPine (NORVASC)  5 MG tablet     amoxicillin-clavulanate (AUGMENTIN) 875-125 MG per tablet     amoxicillin-clavulanate (AUGMENTIN) 875-125 MG per tablet     AZITHROMYCIN PO     Ca Phosphate-Cholecalciferol 200-200 MG-UNIT CHEW     calcium-vitamin D 500-125 MG-UNIT TABS     carvedilol (COREG) 6.25 MG tablet     desonide (DESOWEN) 0.05 % cream     fluconazole (DIFLUCAN) 150 MG tablet     fluticasone (FLONASE ALLERGY RELIEF) 50 MCG/ACT spray     guaiFENesin-codeine (ROBITUSSIN AC) 100-10 MG/5ML SOLN solution     methylPREDNISolone (MEDROL DOSEPAK) 4 MG tablet     pantoprazole (PROTONIX) 40 MG EC tablet     prochlorperazine (COMPAZINE) 5 MG tablet     traMADol (ULTRAM) 50 MG tablet     ValACYclovir HCl (VALTREX PO)     VITAMIN D, CHOLECALCIFEROL, PO     No current facility-administered medications for this visit.      Allergies   Allergen Reactions     Blood Transfusion Related (Informational Only) Other (See Comments)     7/15/15 - Patient has a complex history of clinically significant antibodies against RBC antigens.  Finding compatible RBCs may take up to 24 hours or more.  Consult with the Blood Bank MD for transfusion guidance.  Itching in palms during platelet transfusion in 2010- IV benadryl was given at that time.  Pt has had platelets since then with no reaction.   11/6/14 - Stem cell transplant patient.  Give type O RBCs.     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 Pt would like to avoid, even with premedication, if at all possible.      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    While pt had Hicman in, pt was on a blood thinner       Past Surgical History:   Procedure Laterality Date     BLADDER SURGERY       CHOLECYSTECTOMY  1987     ESOPHAGOSCOPY, GASTROSCOPY, DUODENOSCOPY (EGD), COMBINED N/A 12/11/2014    Procedure: COMBINED ESOPHAGOSCOPY, GASTROSCOPY, DUODENOSCOPY (EGD), BIOPSY SINGLE OR MULTIPLE;  Surgeon: Saturnino Valera MD;  Location: [facility]     GI SURGERY      part of colon removed r.t benign tumor     GYN SURGERY       Dos Santos Catheter Placment Right 2010    Right IJ vein, in for 8 months     HYSTERECTOMY VAGINAL, BILATERAL SALPINGO-OOPHERECTOMY, COMBINED       PICC INSERTION Left 8/28/2014    5fr DL Power PICC, 46cm (1cm external) in the L basilic vein w/ tip in the SVC RA junction.       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  /78  Pulse 87  Resp 18  Wt 103.8 kg (228 lb 14.4 oz)  SpO2 95%  BMI 42.55 kg/m2  Exam:   GENERAL APPEARANCE: Well developed, well nourished, alert, and in no apparent distress.  EYES: PERRL, EOMI  HENT: Nasal mucosa with no edema and no hyperemia. No nasal polyps.  No sinus tenderness.  EARS: Canals clear, TMs normal  MOUTH: Oral mucosa is moist, without any lesions, no tonsillar enlargement, no  oropharyngeal exudate.  NECK: supple, no masses, no thyromegaly.  LYMPHATICS: No significant axillary, cervical, or supraclavicular nodes.  RESP:  Good air flow throughout.  No crackles. No rhonchi. No wheezes.  CV: Normal S1, S2, regular rhythm, normal rate. No murmur.  No rub. No gallop. No LE edema.   ABDOMEN:  Bowel sounds normal, soft, nontender, no HSM or masses.   MS: extremities normal. No clubbing. No cyanosis.  SKIN: no rash on limited exam  NEURO: Mentation intact, speech normal, normal strength and tone, normal gait and stance  PSYCH: mentation appears normal. and affect normal/bright  Results:  No results found for this or any previous visit (from the past 168 hour(s)).    Assessment and plan:   62 YO with recent viral illness in November 2017 with resultant sinusitis as seen on CT.  Majority of symptoms were upper respiratory in nature at that time.  CT chest 2-18 actually improved.   Off all immunosuppressives and IgG level has maintaind > 400, with last testing 6-18.  Has recurrent sinus infections requiring frequent antibiotic regimens.  Resolves somewhat with antibiotics then recurs; prior sinus CT in 2-28 showed narrowing of ostiomeatal units bilaterally.  Needs repeat sinus CT and evaluation by ENT for a definitive procedure.  Will treat with Augmentin if URI symptoms do not resolve in next few days.        1. Augmentin 875 BID for 21 days, one refill- to start if recent symptoms do not resolve  2. Repeat CT sinuses and referral to ENT; likely will need sinus surgery  3. Continue Flonase and nasal saline washes      RTC in 2 months.  Patient to call clinic with any questions or concerns prior to her next clinic visit

## 2018-10-09 NOTE — MR AVS SNAPSHOT
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  Call member services at your health plan with any benefit or coverage questions.      Please bring the following with you to your appointment:    (1) Any X-Rays, CTs or MRIs which have been performed.  Contact the facility where they were done to arrange for  prior to your scheduled appointment.   (2) List of current medications  (3) This referral request   (4) Any documents/labs given to you for this referral                  Follow-up notes from your care team     Return in about 2 months (around 12/9/2018).      Your next 10 appointments already scheduled     Oct 09, 2018  5:20 PM CDT   CT SINUS W/O CONTRAST with UCCT1   [facility] CT ([facility])    [address] [place] [id number] [phone]           How do I prepare for my exam? (Food and drink instructions) No Food and Drink Restrictions.  How do I prepare for my exam? (Other instructions) You do not need to do anything special to prepare for this exam. For a sinus scan: Use your nose spray (nasal decongestant spray) as directed.  What should I wear: Please wear loose clothing, such as a sweat suit or jogging clothes. Avoid snaps, zippers and other metal. We may ask you to undress and put on a hospital gown.  How long does the exam take: Most scans take  less than 20 minutes.  What should I bring: Please bring any scans or X-rays taken at other hospitals, if similar tests were done. Also bring a list of your medicines, including vitamins, minerals and over-the-counter drugs. It is safest to leave personal items at home.  Do I need a : No  is needed.  What do I need to tell my doctor? Be sure to tell your doctor: * If you have any allergies. * If there s any chance you are pregnant. * If you are breastfeeding.  What should I do after the exam: No restrictions, You may resume normal activities.  What is this test: A CT (computed tomography) scan is a series of pictures that allows us to look inside your body. The scanner creates images of the body in cross sections, much like slices of bread. This helps us see any problems more clearly.  Who should I call with questions: If you have any questions, please call the Imaging Department where you will have your exam. Directions, parking instructions, and other information is available on our website, [email or web address].            Oct 26, 2018 12:00 PM CDT   [facility] Draw with  [facility] LAB DRAW   [facility] Lab Draw ([facility])    [address] [place] [address]  [address]  [facility] [address]   [phone]            Oct 26, 2018 12:30 PM CDT   Return with Irving Ni MD   [facility] Blood and Marrow Transplant ([facility])    [address] [place] [address]  [address]  [facility] [address]   [phone]            Oct 26, 2018  2:00 PM CDT   (Arrive by 1:45 PM)   New Patient Visit with Yeimy Gorman MD   [facility] Ear Nose and Throat ([facility])    [address] [place] [address]  4th Floor  [facility] [address]   [phone]            Dec 11, 2018  3:00 PM CST   (Arrive by 2:45 PM)   Return Visit with Troy Lomeli MD   [facility] ([facility])    [address] [place] [address]  [address]  [address]  [facility] [id number] [phone]              Future tests that were ordered for you today     Open Future Orders        Priority Expected Expires Ordered    CT Sinus w/o Contrast Routine  10/9/2019 10/9/2018            Who to contact     If you have questions or need follow up information about today's clinic visit or your schedule please contact [facility] directly at [phone].  Normal or non-critical lab and imaging results will be communicated to you by [employer], letter or phone within 4 business days after the clinic has received the results. If you do not hear from us within 7 days, please contact the clinic through [employer] or phone. If you have a critical or abnormal lab result, we will notify you by phone as soon as possible.  Submit refill requests through [employer] or call your pharmacy and they will forward the refill request to us. Please allow 3 business days for your refill to be completed.          Additional Information About Your Visit        Flashpoint Information     [employer] gives you secure access to your electronic health record. If you see a primary care provider, you can also send messages to your care team and make appointments. If you have questions, please call your primary care clinic.  If you do not have a primary care provider, please call [phone] and they will assist you.        Care EveryWhere ID     This is your Care EveryWhere ID. This could be used by other organizations to access your [facility] medical records  [id number]        Your Vitals Were     Pulse Respirations Pulse Oximetry BMI (Body Mass Index)          87 18 95% 42.55 kg/m2         Blood Pressure from Last 3 Encounters:   10/09/18 132/78   06/08/18 124/74   04/17/18 137/80    Weight from Last 3 Encounters:   10/09/18 103.8 kg (228 lb 14.4 oz)   06/08/18 105.1 kg (231 lb 11.2 oz)   03/13/18 103.6 kg (228 lb 6.4 oz)              We Performed the Following     CBC with  platelets differential     Comprehensive metabolic panel     IgG     OTOLARYNGOLOGY REFERRAL          Today's Medication Changes          These changes are accurate as of 10/9/18  4:51 PM.  If you have any questions, ask your nurse or doctor.               These medicines have changed or have updated prescriptions.        Dose/Directions    * amoxicillin-clavulanate 875-125 MG per tablet   Commonly known as:  AUGMENTIN   This may have changed:  Another medication with the same name was added. Make sure you understand how and when to take each.   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 Make sure you understand how and when to take each.   Used for:  Acute maxillary sinusitis, recurrence not specified   Changed by:  Troy Lomeli MD        Dose:  1 tablet   Take 1 tablet by mouth 2 times daily   Quantity:  60 tablet   Refills:  0       * amoxicillin-clavulanate 875-125 MG per tablet   Commonly known as:  AUGMENTIN   This may have changed:  You were already taking a medication with the same name, and this prescription was added. Make sure you understand how and when to take each.   Used for:  Chronic maxillary sinusitis   Changed by:  Troy Lomeli MD        Dose:  1 tablet   Take 1 tablet by mouth 2 times daily   Quantity:  42 tablet   Refills:  1       * Notice:  This list has 3 medication(s) that are the same as other medications prescribed for you. Read the directions carefully, and ask your doctor or other care provider to review them with you.         Where to get your medicines      These medications were sent to [id number] -  [address] [address]     Phone:  [phone]     amoxicillin-clavulanate 875-125 MG per tablet                Primary Care Provider Office Phone # Fax #    Carmelo Camacho [phone]       [address] [address]        Equal Access to Services     ALIDA FERNANDEZ AH: Hadii aad ku hadasho Soomaali, waaxda luqadaha, qaybta kaalmada adeegyada, waxay idiin hayaan adeeg kharash la'aan ah. So [facility] [phone].    ATENCIÓN: Si habla espyanique, tiene a orona disposición servicios gratuitos de asistencia lingüística. Teagan al [phone].    We comply with applicable federal civil rights laws and [place] laws. We do not discriminate on the basis of race, color, national origin, age, disability, sex, sexual orientation, or gender identity.            Thank you!     Thank you for choosing [facility]  for your care. Our goal is always to provide you with excellent care. Hearing back from our patients is one way we can continue to improve our services. Please take a few minutes to complete the written survey that you may receive in the mail after your visit with us. Thank you!             Your Updated Medication List - Protect others around you: Learn how to safely use, store and throw away your medicines at www.disposemymeds.org.          This list is accurate as of 10/9/18  4:51 PM.  Always use your most recent med list.                   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)       * amoxicillin-clavulanate 875-125 MG per tablet    AUGMENTIN    60 tablet    Take 1 tablet by mouth 2 times daily    Acute maxillary sinusitis, recurrence not specified       * amoxicillin-clavulanate 875-125 MG per tablet    AUGMENTIN    42 tablet    Take 1 tablet by mouth 2 times daily    Chronic maxillary sinusitis       AZITHROMYCIN PO      [id number]        Ca Phosphate-Cholecalciferol 200-200 MG-UNIT Chew           calcium-vitamin D 500-125 MG-UNIT Tabs      Take by mouth 2 times daily        carvedilol 6.25 MG tablet    COREG    180 tablet    Take 1 tablet (6.25 mg) by mouth 2 times daily (with meals)    Essential hypertension       desonide 0.05 % cream    DESOWEN    60 g    Apply sparingly to affected area three times daily as needed.    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 Repeat in one week if symptoms persist.        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)       methylPREDNISolone 4 MG tablet    MEDROL DOSEPAK    21 tablet    Follow package instructions    Acute recurrent maxillary sinusitis       nitroFURantoin (macrocrystal-monohydrate) 100 MG capsule    MACROBID          pantoprazole 40 MG EC tablet     PROTONIX    30 tablet    Take 1 tablet (40 mg) by mouth daily    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), S/P allogeneic bone marrow transplant (H)       prochlorperazine 5 MG tablet    COMPAZINE    20 tablet    Take 1-2 tablets (5-10 mg) by mouth every 6 hours as needed for nausea or vomiting    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)       traMADol 50 MG tablet    ULTRAM    60 tablet    Take 1 tablet (50 mg) by mouth every 6 hours as needed for moderate pain    S/P allogeneic bone marrow transplant (H), 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 Read the directions carefully, and ask your doctor or other care provider to review them with you.

## 2018-10-09 NOTE — NURSING NOTE
"Oncology Rooming Note    October 9, 2018 3:58 PM   Eryn Bennett is a 63 year old female who presents for:    Chief Complaint   Patient presents with     Oncology Clinic Visit     Pt is here for a rtn for Follow up for      Initial Vitals: /78  Pulse 87  Resp 18  Wt 103.8 kg (228 lb 14.4 oz)  SpO2 95%  BMI 42.55 kg/m2 Estimated body mass index is 42.55 kg/(m^2) as calculated from the following:    Height as of 9/18/17: 1.562 m (5' 1.5\").    Weight as of this encounter: 103.8 kg (228 lb 14.4 oz). Body surface area is 2.12 meters squared.  No Pain (0) Comment: Data Unavailable   No LMP recorded. Patient has had a hysterectomy.  Allergies reviewed: Yes  Medications reviewed: Yes    Medications: Medication refills not needed today.  Pharmacy name entered into EPIC:    [facility] [place], [place] - [address] [place] [address] [phone]  [facility] [address] [place] [address] [address] [place] [address] AT [facility] [address]  [facility] [address] [place] [address]    Clinical concerns: none     6 minutes for nursing intake (face to face time)     Barbievalentín Kulkarni MA              "

## 2018-10-09 NOTE — LETTER
10/9/2018       RE: Eryn Bennett  [address]  [facility] [address]     Dear Colleague,    Thank you for referring your patient, Eryn Bennett, to the [facility] at [facility]. Please see a copy of my visit note below.    Reason for Visit  Eryn Bennett is a 63 year old year old female who is being seen for Oncology Clinic Visit (Pt is here for a rtn for Follow up for )    Pulmonary HPI  61 YO with AML s/p NMA DCBT over 3 years ago who is referred to the Pulmonary BMT clinic for evaluation of cough and SOB.  Developed URI in 11-17 that had persisted for 6 weeks.  During this time noticed nasal congestion and drainage, post-nasal drip, fevers, and cough. Denies chest burning. Seen in clinic, PCR for influenza was negative but she was treated with Tamiflu.  Was given course of Azithromycin twice for 5 days apiece, felt somewhat better with Azithromycin but symptoms recurred shortly thereafter.   Presented again in 2-12-18 with fever, cough, and nasal congestion. CT scan of sinuses revealed sinusitis, chest CT with LLL bronchial wall thickening and nodularity that was actually improved compared to April 2017.  Was placed on a 10 day course of Levofloxacin, states started to feel better after 5 days. Since completion of antibiotics in the past 10 days she thinks her symptoms have all resolved; no further nasal congestion or drainage, no cough.  All fevers have resolved.  Similar course last Winter with frequent URI's which were slow to resolve.  No prior history of sinus disease or allergies.  No tobacco use.  IgG level checked 11-17 and was 547.  Off all immunosuppressives.    Last seen 6 months ago. At her last visit she was placed on Augmentin for 21 days with plans on being seen back in clinic on 5-26.  Since her last visit she has had to be treated for sinus infection two additional times- in June and July, last one with Augmentin.  At present she has  noted bilateral nasal congestion but no drainage and no sinus headache or pressure. Breathing is ok.       The patient was seen and examined by Troy Lomeli           Current Outpatient Prescriptions   Medication     acetaminophen (TYLENOL) 325 MG tablet     amLODIPine (NORVASC) 5 MG tablet     amoxicillin-clavulanate (AUGMENTIN) 875-125 MG per tablet     amoxicillin-clavulanate (AUGMENTIN) 875-125 MG per tablet     AZITHROMYCIN PO     Ca Phosphate-Cholecalciferol 200-200 MG-UNIT CHEW     calcium-vitamin D 500-125 MG-UNIT TABS     carvedilol (COREG) 6.25 MG tablet     desonide (DESOWEN) 0.05 % cream     fluconazole (DIFLUCAN) 150 MG tablet     fluticasone (FLONASE ALLERGY RELIEF) 50 MCG/ACT spray     guaiFENesin-codeine (ROBITUSSIN AC) 100-10 MG/5ML SOLN solution     methylPREDNISolone (MEDROL DOSEPAK) 4 MG tablet     pantoprazole (PROTONIX) 40 MG EC tablet     prochlorperazine (COMPAZINE) 5 MG tablet     traMADol (ULTRAM) 50 MG tablet     ValACYclovir HCl (VALTREX PO)     VITAMIN D, CHOLECALCIFEROL, PO     No current facility-administered medications for this visit.      Allergies   Allergen Reactions     Blood Transfusion Related (Informational Only) Other (See Comments)     7/15/15 - Patient has a complex history of clinically significant antibodies against RBC antigens.  Finding compatible RBCs may take up to 24 hours or more.  Consult with the Blood Bank MD for transfusion guidance.  Itching in palms during platelet transfusion in 2010- IV benadryl was given at that time.  Pt has had platelets since then with no reaction.   11/6/14 - Stem cell transplant patient.  Give type O RBCs.     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 Pt would like to avoid, even with premedication, if at all possible.      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    While pt had Hicman in, pt was on a blood thinner       Past Surgical History:   Procedure Laterality Date     BLADDER SURGERY       CHOLECYSTECTOMY  1987     ESOPHAGOSCOPY, GASTROSCOPY, DUODENOSCOPY (EGD), COMBINED N/A 12/11/2014    Procedure: COMBINED ESOPHAGOSCOPY, GASTROSCOPY, DUODENOSCOPY (EGD), BIOPSY SINGLE OR MULTIPLE;  Surgeon: Saturnino Valera MD;  Location: [facility]     GI SURGERY      part of colon removed r.t benign tumor     GYN SURGERY       Dos Santos Catheter Placment Right 2010    Right IJ vein, in for 8 months     HYSTERECTOMY VAGINAL, BILATERAL SALPINGO-OOPHERECTOMY, COMBINED       PICC INSERTION Left 8/28/2014    5fr DL Power PICC, 46cm (1cm external) in the L basilic vein w/ tip in the SVC RA junction.       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  /78  Pulse 87  Resp 18  Wt 103.8 kg (228 lb 14.4 oz)  SpO2 95%  BMI 42.55 kg/m2  Exam:   GENERAL APPEARANCE: Well  developed, well nourished, alert, and in no apparent distress.  EYES: PERRL, EOMI  HENT: Nasal mucosa with no edema and no hyperemia. No nasal polyps.  No sinus tenderness.  EARS: Canals clear, TMs normal  MOUTH: Oral mucosa is moist, without any lesions, no tonsillar enlargement, no oropharyngeal exudate.  NECK: supple, no masses, no thyromegaly.  LYMPHATICS: No significant axillary, cervical, or supraclavicular nodes.  RESP:  Good air flow throughout.  No crackles. No rhonchi. No wheezes.  CV: Normal S1, S2, regular rhythm, normal rate. No murmur.  No rub. No gallop. No LE edema.   ABDOMEN:  Bowel sounds normal, soft, nontender, no HSM or masses.   MS: extremities normal. No clubbing. No cyanosis.  SKIN: no rash on limited exam  NEURO: Mentation intact, speech normal, normal strength and tone, normal gait and stance  PSYCH: mentation appears normal. and affect normal/bright  Results:  No results found for this or any previous visit (from the past 168 hour(s)).    Assessment and plan:   62 YO with recent viral illness in November 2017 with resultant sinusitis as seen on CT.  Majority of symptoms were upper respiratory in nature at that time.  CT chest 2-18 actually improved.   Off all immunosuppressives and IgG level has maintaind > 400, with last testing 6-18.   Has recurrent sinus infections requiring frequent antibiotic regimens.  Resolves somewhat with antibiotics then recurs; prior sinus CT in 2-28 showed narrowing of ostiomeatal units bilaterally.  Needs repeat sinus CT and evaluation by ENT for a definitive procedure.  Will treat with Augmentin if URI symptoms do not resolve in next few days.        1. Augmentin 875 BID for 21 days, one refill- to start if recent symptoms do not resolve  2. Repeat CT sinuses and referral to ENT; likely will need sinus surgery  3. Continue Flonase and nasal saline washes      RTC in 2 months.  Patient to call clinic with any questions or concerns prior to her next clinic  visit        Again, thank you for allowing me to participate in the care of your patient.      Sincerely,    Troy Lomeli MD

## 2018-10-10 NOTE — TELEPHONE ENCOUNTER
FUTURE VISIT INFORMATION      FUTURE VISIT INFORMATION:    Date: 10/26/2018    Time: 2:00    Location: [facility]  REFERRAL INFORMATION:    Referring provider:  DR LARA    Referring providers clinic:  PULMONOLOGY CLINIC    Reason for visit/diagnosis  CHRONIC MAXILLARY SINUSITIS    RECORDS REQUESTED FROM:       Clinic name Comments Records Status Imaging Status   PULMONOLOGY CLINIC OFFICE VISIT: 10/09/2018,04/17/2018  IMAGE: CT SINUS 10/09/2018 INTERNAL YES   [place] [facility] OFFICE VISIT: 03/31/2017  IMAGE: CT CHEST 03/31/2017, XR CHEST 03/31/2017 INTERNAL YES                             RECORDS STATUS

## 2018-10-26 ENCOUNTER — APPOINTMENT (OUTPATIENT)
Dept: LAB | Facility: CLINIC | Age: 63
End: 2018-10-26
Attending: INTERNAL MEDICINE
Payer: MEDICARE

## 2018-10-26 ENCOUNTER — OFFICE VISIT (OUTPATIENT)
Dept: OTOLARYNGOLOGY | Facility: CLINIC | Age: 63
End: 2018-10-26
Payer: COMMERCIAL

## 2018-10-26 ENCOUNTER — ONCOLOGY VISIT (OUTPATIENT)
Dept: TRANSPLANT | Facility: CLINIC | Age: 63
End: 2018-10-26
Attending: INTERNAL MEDICINE
Payer: MEDICARE

## 2018-10-26 ENCOUNTER — PRE VISIT (OUTPATIENT)
Dept: OTOLARYNGOLOGY | Facility: CLINIC | Age: 63
End: 2018-10-26

## 2018-10-26 VITALS
HEIGHT: 61 IN | SYSTOLIC BLOOD PRESSURE: 144 MMHG | DIASTOLIC BLOOD PRESSURE: 81 MMHG | HEART RATE: 77 BPM | OXYGEN SATURATION: 95 % | BODY MASS INDEX: 43.12 KG/M2 | WEIGHT: 228.4 LBS | TEMPERATURE: 97.7 F | RESPIRATION RATE: 16 BRPM

## 2018-10-26 VITALS — BODY MASS INDEX: 42.86 KG/M2 | WEIGHT: 227 LBS | HEIGHT: 61 IN

## 2018-10-26 DIAGNOSIS — C92.01 ACUTE MYELOID LEUKEMIA IN REMISSION (H): ICD-10-CM

## 2018-10-26 DIAGNOSIS — J32.0 CHRONIC MAXILLARY SINUSITIS: Primary | ICD-10-CM

## 2018-10-26 LAB
ALBUMIN SERPL-MCNC: 3.7 G/DL (ref 3.4–5)
ALP SERPL-CCNC: 95 U/L (ref 40–150)
ALT SERPL W P-5'-P-CCNC: 27 U/L (ref 0–50)
ANION GAP SERPL CALCULATED.3IONS-SCNC: 8 MMOL/L (ref 3–14)
AST SERPL W P-5'-P-CCNC: 21 U/L (ref 0–45)
BASOPHILS # BLD AUTO: 0 10E9/L (ref 0–0.2)
BASOPHILS NFR BLD AUTO: 0.5 %
BILIRUB SERPL-MCNC: 0.4 MG/DL (ref 0.2–1.3)
BUN SERPL-MCNC: 22 MG/DL (ref 7–30)
CALCIUM SERPL-MCNC: 8.9 MG/DL (ref 8.5–10.1)
CHLORIDE SERPL-SCNC: 106 MMOL/L (ref 94–109)
CO2 SERPL-SCNC: 27 MMOL/L (ref 20–32)
CREAT SERPL-MCNC: 1.05 MG/DL (ref 0.52–1.04)
DIFFERENTIAL METHOD BLD: NORMAL
EOSINOPHIL # BLD AUTO: 0.2 10E9/L (ref 0–0.7)
EOSINOPHIL NFR BLD AUTO: 3.8 %
ERYTHROCYTE [DISTWIDTH] IN BLOOD BY AUTOMATED COUNT: 14.2 % (ref 10–15)
GFR SERPL CREATININE-BSD FRML MDRD: 53 ML/MIN/1.7M2
GLUCOSE SERPL-MCNC: 136 MG/DL (ref 70–99)
HCT VFR BLD AUTO: 43.1 % (ref 35–47)
HGB BLD-MCNC: 13.6 G/DL (ref 11.7–15.7)
IGG SERPL-MCNC: 625 MG/DL (ref 695–1620)
IMM GRANULOCYTES # BLD: 0 10E9/L (ref 0–0.4)
IMM GRANULOCYTES NFR BLD: 0.2 %
LYMPHOCYTES # BLD AUTO: 3.3 10E9/L (ref 0.8–5.3)
LYMPHOCYTES NFR BLD AUTO: 50.9 %
MCH RBC QN AUTO: 28.6 PG (ref 26.5–33)
MCHC RBC AUTO-ENTMCNC: 31.6 G/DL (ref 31.5–36.5)
MCV RBC AUTO: 91 FL (ref 78–100)
MONOCYTES # BLD AUTO: 0.5 10E9/L (ref 0–1.3)
MONOCYTES NFR BLD AUTO: 7.2 %
NEUTROPHILS # BLD AUTO: 2.4 10E9/L (ref 1.6–8.3)
NEUTROPHILS NFR BLD AUTO: 37.4 %
NRBC # BLD AUTO: 0 10*3/UL
NRBC BLD AUTO-RTO: 0 /100
PLATELET # BLD AUTO: 164 10E9/L (ref 150–450)
POTASSIUM SERPL-SCNC: 3.8 MMOL/L (ref 3.4–5.3)
PROT SERPL-MCNC: 7 G/DL (ref 6.8–8.8)
RBC # BLD AUTO: 4.76 10E12/L (ref 3.8–5.2)
SODIUM SERPL-SCNC: 140 MMOL/L (ref 133–144)
WBC # BLD AUTO: 6.4 10E9/L (ref 4–11)

## 2018-10-26 PROCEDURE — 90471 IMMUNIZATION ADMIN: CPT

## 2018-10-26 PROCEDURE — 80053 COMPREHEN METABOLIC PANEL: CPT | Performed by: INTERNAL MEDICINE

## 2018-10-26 PROCEDURE — 85025 COMPLETE CBC W/AUTO DIFF WBC: CPT | Performed by: INTERNAL MEDICINE

## 2018-10-26 PROCEDURE — 25000581 ZZH RX MED A9270 GY (STAT IND- M) 250: Mod: ZF | Performed by: INTERNAL MEDICINE

## 2018-10-26 PROCEDURE — 90750 HZV VACC RECOMBINANT IM: CPT | Mod: ZF | Performed by: INTERNAL MEDICINE

## 2018-10-26 PROCEDURE — 82784 ASSAY IGA/IGD/IGG/IGM EACH: CPT | Performed by: INTERNAL MEDICINE

## 2018-10-26 PROCEDURE — G0463 HOSPITAL OUTPT CLINIC VISIT: HCPCS | Mod: ZF,25

## 2018-10-26 PROCEDURE — 36415 COLL VENOUS BLD VENIPUNCTURE: CPT

## 2018-10-26 RX ORDER — CLARITHROMYCIN 500 MG/1
500 TABLET, FILM COATED, EXTENDED RELEASE ORAL DAILY
Qty: 30 TABLET | Refills: 3 | Status: SHIPPED | OUTPATIENT
Start: 2018-10-26 | End: 2019-02-13

## 2018-10-26 RX ORDER — FLUTICASONE PROPIONATE 50 MCG
2 SPRAY, SUSPENSION (ML) NASAL DAILY
Qty: 1 BOTTLE | Refills: 3 | Status: SHIPPED | OUTPATIENT
Start: 2018-10-26 | End: 2018-11-25

## 2018-10-26 RX ADMIN — ZOSTER VACCINE RECOMBINANT, ADJUVANTED 0.5 ML: KIT at 13:31

## 2018-10-26 ASSESSMENT — PAIN SCALES - GENERAL
PAINLEVEL: NO PAIN (0)
PAINLEVEL: NO PAIN (0)

## 2018-10-26 NOTE — PROGRESS NOTES
"BMT Clinic Progress Notes    Ms Bennett, 61 you s/p NMA DUCB transplant    CC: follow-up    History of Present Illness: The patient returns for a planned follow-up with her .  She has been doing overall well but continues to have recurrent colds, typically brought in by the grandchildren.  The biggest event in the family was that her daughter's home that is close to there is was destroyed by a tornado a few weeks back.  She complains of some achiness at the bedtime but during the day it does not bother her.  Tylenol takes care of the medication.  There is no fevers, or GI symptoms.  Her weight is stable.  Her appetite is fine.  She says that her vision eyes do not feel dry.  Her mouth is occasionally dry, but not bad.  She does not have any area of fibrosis or induration of her skin.  Continues to have pain on her right thumb    Review of Systems: ROS otherwise unremarkable other than what is noted in the HPI.     Physical Exam: KPS 80%  /81 (BP Location: Right arm, Patient Position: Sitting, Cuff Size: Adult Large)  Pulse 77  Temp 97.7  F (36.5  C) (Oral)  Resp 16  Ht 1.562 m (5' 1.5\")  Wt 103.6 kg (228 lb 6.3 oz)  SpO2 95%  BMI 42.46 kg/m2  General: A&O. NAD.   HEENT: CLARICE, sclera anicteric, the mouth mucosa is mildly dry but there is no erosions or ulcerations and actually minimal lichenoid changes, if any.  Neck: supple  Lungs: CTA bilaterally to bases, no crackles, no wheezing   Heart: RRR, no m/r/g  Abdomen: +BS, soft, NT/ND, no HSM  Extremities: No edema; unable to raise her shoulder above her head.   Skin: Mild erythematous eczema plaques on her neck. No bruises or petechiae. Carefully examined her hands and there is no skin thickening but some decreased strength and limite motion more notable with R ahnd because of pain on R Thumb. No inflammatory signs.  Schirmer's test showed 14 mm millimeters on the right eye and more than 20 mm in the left eye.  Labs   Lab Results   Component Value " Date    WBC 6.4 10/26/2018    ANEU 2.4 10/26/2018    HGB 13.6 10/26/2018    HCT 43.1 10/26/2018     10/26/2018     10/26/2018    POTASSIUM 3.8 10/26/2018    CHLORIDE 106 10/26/2018    CO2 27 10/26/2018     (H) 10/26/2018    BUN 22 10/26/2018    CR 1.05 (H) 10/26/2018    MAG 1.8 04/03/2017    INR 1.03 04/03/2017    BILITOTAL 0.4 10/26/2018    AST 21 10/26/2018    ALT 27 10/26/2018    ALKPHOS 95 10/26/2018    PROTTOTAL 7.0 10/26/2018    ALBUMIN 3.7 10/26/2018       Ms. Eryn Bennett is a 61 yo woman s/p NMA DCBT for AML day 3+, almost 4 years      1. BMT/AML: continued CR. two year post transplant BM 40% cellular, trilineage hematopoiesis, no leukemia by morphology and flow negative; % donor #2.     2. HEME: Transfusion parameters keep Hgb>8g/dL and plts>10k. Stable counts.  - Patient has history of Hemolytic anemia: Warm anti E; IgG and completment. Received rituximab X 4 September 2015.    Patient has history DVT but her repeat doppler L lower ext negative on OCT 2016. Off coumadin since JUL 2016. During this admission as she was less mobile with viral illness she was given SQ heparin every 12 hours which was stopped on discharge.     3. ID: She already had a flu shot and today we gave her the second boost of the recommended shingles vaccine.  Her IgG is over 600.        4. 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  However they are not totally characteristic and are not worsening to warrant systemic therapy with steroids.  This patient has had significant side effects and morbidity from steroid use.    - Off prednisone since 4/20/16; finished MMF taper on 9/13/16      5. GI: monitor and use PRN medication for GERD  - Compazine available prn      6. FEN/Renal: WNL.  Her vitamin D level is appropriate.  In addition, she also has a glucose level in the 120-130 range when she comes to visit.  She does not have a long fasting but still has at least 4-6 hours.  The  patient was insulin-dependent while taking prednisone.  In addition, she is obese and prone to diabetes.  I asked her to discuss with her primary care for further evaluation of her glucose and management of diabetes if diagnosed.      7. Cardiovascular: High cholesterol and triglycerides may use cholesterol medication. Continue Norvasc 5mg daily Carvedilol 6.25mg bid for cardiomyopathy.      8. Musculoskelatal: physical condition overall improved but achy body in AM and R thumb pain. Unclear cause could be GVHD but she will try other measures and call if worsening symptoms. Must work on exercises at home.   - Dexa scan with osteopenia (5/5/15). Had bisphosphonate 10/5/16; repeat in ~12 months; local doctor managing On vitamin D and calcium replacement.    - continue Tramadol and tylenol for muscle pain and that was working better than hydrocodone/acetaminophen       9. Pulmonary: no active issues.     10. Neurology: feet neuropathy not changed. Monitor and refer to neurology/neuropathy group for assessment.     11. Eyes: eye clinic following after cataract surgery in August 2017.      Plan:   ISABELLE Ni on 2/6/19 with labs  Primary care for fasting glucose (diabetes?)  Ophthalmologist locally  Completed most vaccines except shingles. Recommended Shingrix if she decides.  Up to date regular health care screenings including but not limited to bone density and health, cholesterol, glucose, colonoscopy, mammograms, pap smears, oral cancer yearly, skin cancer yearly.  Further discuss dexa scan with PC MD  Watch hand function closely; call with questions or new issues.

## 2018-10-26 NOTE — NURSING NOTE
"Oncology Rooming Note    October 26, 2018 12:30 PM   Eryn Bennett is a 63 year old female who presents for:    Chief Complaint   Patient presents with     Labs Only     Labs drawn via venipuncture     RECHECK     Return : AML     Initial Vitals: /81 (BP Location: Right arm, Patient Position: Sitting, Cuff Size: Adult Large)  Pulse 77  Temp 97.7  F (36.5  C) (Oral)  Resp 16  Ht 1.562 m (5' 1.5\")  Wt 103.6 kg (228 lb 6.3 oz)  SpO2 95%  BMI 42.46 kg/m2 Estimated body mass index is 42.46 kg/(m^2) as calculated from the following:    Height as of this encounter: 1.562 m (5' 1.5\").    Weight as of this encounter: 103.6 kg (228 lb 6.3 oz). Body surface area is 2.12 meters squared.  No Pain (0) Comment: Data Unavailable   No LMP recorded. Patient has had a hysterectomy.  Allergies reviewed: Yes  Medications reviewed: Yes    Medications: Medication refills not needed today.  Pharmacy name entered into [facility]:    [facility] [place], [place] - [address]  [facility] [id number] [place] [address] [address] [facility] AT [facility] [address]  [facility] [address] [place] [address] [address] [facility]    Clinical concerns: Patient states no further concerns at this time.    8 minutes for nursing intake (face to face time)     Betsy Parker CMA              "

## 2018-10-26 NOTE — PATIENT INSTRUCTIONS
ISABELLE Ni on 2/6/19 with labs  Primary care for fasting glucose (diabetes?)  Ophthalmologist locally  Completed most vaccines except shingles. Recommended Shingrix if she decides.  Up to date regular health care screenings including but not limited to bone density and health, cholesterol, glucose, colonoscopy, mammograms, pap smears, oral cancer yearly, skin cancer yearly.  Further discuss dexa scan with PC MD  Watch hand function closely; call with questions or new issues.

## 2018-10-26 NOTE — NURSING NOTE
"Chief Complaint   Patient presents with     Consult     chronic maxilary sinusitis      Height 1.54 m (5' 0.63\"), weight 103 kg (227 lb).    Bladimir Bautista LPN    "

## 2018-10-26 NOTE — PATIENT INSTRUCTIONS
Please use the NeilMed sinus rinse 2-3 times per week.  Take medication as prescribed and please follow up in clinic in 6 weeks.      Andi Ma RN

## 2018-10-26 NOTE — LETTER
10/26/2018       RE: Eryn Bennett  [address]  [facility] [address]     Dear Colleague,    Thank you for referring your patient, Eryn Bennett, to the [facility] EAR NOSE AND THROAT at [facility]. Please see a copy of my visit note below.    Otolaryngology Adult Consultation    Patient: Eryn Bennett  : 1955      HPI:  Eryn Bennett is a 63 year old female seen today in the Otolaryngology Clinic for chronic sinusitis.  The patient's past medical history is significant for a bone marrow transplant, status post chemotherapy for AML.  She has had issues with chronic sinusitis.  When she reports that she has sinusitis, she typically feels like she has a cold.  She will have cough with nasal congestion.  The nasal congestion will typically switches from side-to-side.  Typically she does not have facial pain or pressure, although last September she did.  The main thing that bothers her is this chronic cough that she has had for many years.  She has had several CT scans which has shown mucosal thickening in the maxillary sinus.  She had a CT scan 2018, which showed moderate thickening and most recently 2018 which showed minimal thickening of the maxillary and ethmoid sinuses significantly improved since February.  She has not ever been on nasal steroid spray.  She has been treated with antibiotics, the most recent one several days ago which she does feel has improved her symptoms.         Medications:  Current Outpatient Rx   Medication Sig Dispense Refill     acetaminophen (TYLENOL) 325 MG tablet Take 1-2 tablets (325-650 mg) by mouth every 4 hours as needed for mild pain or fever 100 tablet      amLODIPine (NORVASC) 5 MG tablet TAKE ONE TABLET BY MOUTH ONCE DAILY 90 tablet 3     Ca Phosphate-Cholecalciferol 200-200 MG-UNIT CHEW        calcium-vitamin D 500-125 MG-UNIT TABS Take by mouth 2 times daily       carvedilol (COREG) 6.25 MG tablet Take 1  tablet (6.25 mg) by mouth 2 times daily (with meals) 180 tablet 3     clarithromycin (BIAXIN XL) 500 MG TB24 Take 500 mg by mouth daily 30 tablet 3     desonide (DESOWEN) 0.05 % cream Apply sparingly to affected area three times daily as needed. 60 g 6     fluticasone (FLONASE ALLERGY RELIEF) 50 MCG/ACT spray Spray 2 sprays into both nostrils daily 1 Bottle 3     fluticasone (FLONASE) 50 MCG/ACT spray Spray 2 sprays into both nostrils daily 1 Bottle 3     guaiFENesin-codeine (ROBITUSSIN AC) 100-10 MG/5ML SOLN solution Take 5-10 mLs by mouth every 4 hours as needed for cough 150 mL 0     methylPREDNISolone (MEDROL DOSEPAK) 4 MG tablet Follow package instructions 21 tablet 0     pantoprazole (PROTONIX) 40 MG EC tablet Take 1 tablet (40 mg) by mouth daily 30 tablet 6     prochlorperazine (COMPAZINE) 5 MG tablet Take 1-2 tablets (5-10 mg) by mouth every 6 hours as needed for nausea or vomiting 20 tablet 0     traMADol (ULTRAM) 50 MG tablet Take 1 tablet (50 mg) by mouth every 6 hours as needed for moderate pain 60 tablet 5     ValACYclovir HCl (VALTREX PO) Take 500 mg by mouth as needed        VITAMIN D, CHOLECALCIFEROL, PO Take 1,000 Units by mouth daily          Allergies: Blood transfusion related (informational only); Cefepime; Sulfa drugs; Allopurinol; Levofloxacin; and Vancomycin     PMH:  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    While pt had Hicman in, pt was on a blood thinner       PSH:  Past Surgical History:   Procedure Laterality Date     BLADDER SURGERY       CHOLECYSTECTOMY  1987     ESOPHAGOSCOPY, GASTROSCOPY, DUODENOSCOPY  (EGD), COMBINED N/A 12/11/2014    Procedure: COMBINED ESOPHAGOSCOPY, GASTROSCOPY, DUODENOSCOPY (EGD), BIOPSY SINGLE OR MULTIPLE;  Surgeon: Saturnino Valera MD;  Location: [facility]     GI SURGERY      part of colon removed r.t benign tumor     GYN SURGERY       Dos Santos Catheter Placment Right 2010    Right IJ vein, in for 8 months     HYSTERECTOMY VAGINAL, BILATERAL SALPINGO-OOPHERECTOMY, COMBINED       PICC INSERTION Left 8/28/2014    5fr DL Power PICC, 46cm (1cm external) in the L basilic vein w/ tip in the SVC RA junction.       FH:  Family History   Problem Relation Age of Onset     Cancer Father      lung, smoker     Cancer Sister      non-melanoma skin cancer     Diabetes Sister      LUNG DISEASE Sister      Intellectual Disability (Mental Retardation) Sister      Melanoma Daughter      Glaucoma Mother      Glaucoma Maternal Grandfather      Macular Degeneration No family hx of         SH:  Social History   Substance Use Topics     Smoking status: Never Smoker     Smokeless tobacco: Never Used     Alcohol use No       Review of Systems  [facility] ROS 10/26/2018   Constitutional Unexplained fatigue, Problems with sleep   Ears, Nose, Throat Nasal congestion or drainage   Cardiopulmonary Cough, Breathing problems   Gastrointestinal/Genitourinary Pain with urination, Diarrhea   Endocrine Frequent urination       Physical Exam:    GEN:  The patient is alert, oriented and in no acute distress.  HEAD:  Head, face scalp is grossly normal.  EARS:  Ears demonstrate normal canals, auricles and tympanic membranes.  NOSE:  External nose is straight, skin is normal.                Intranasal exam (anterior rhinoscopy) reveals normal moist mucosa, turbinate                  tissue without edema, erythema or crusting.  Septum mainly in the midline.    Assessment/Plan:  The patient presents with a chronic rhinosinusitis-type symptoms.  I suspect some of her symptoms are not really from his sinuses, particularly a cold.  She had  been told that the cough is due to postnasal drip.  The patient reports that she does not feel that she has a postnasal drip.  Based on examination today, I do not see really any evidence of postnasal drip, so I agree that I do not think her cough is coming from her sinuses or her nose.  It is likely pulmonary or laryngeal in origin.  She does have chronic sinusitis, but it seems to be mostly asymptomatic.  I discussed with her given her history of chemotherapy, some patients who have chronic sinus inflammation rather than infection which may be difficult to completely eradicate.  I would like to try her on a three month course of clarithromycin.  Studies have shown that macrolides have good anti-inflammatory properties that chronic sinusitis seems to respond well to.  I also will have her do Flonase as well as nasal saline irrigations two to three times a week.        I will have her come back and see me in six weeks for a mid-therapy check-up.  If she is still having a significant amount of cough, then I think it would be worthwhile scoping her and then potentially having her work with speech therapy.     I spent a total of 45 minutes face-to-face with Eryn Bennett during today's office visit.  Over 50% of this time was spent counseling the patient on and/or coordinating care as documented in my assessment and plan.        Again, thank you for allowing me to participate in the care of your patient.      Sincerely,    Yeimy Gorman MD

## 2018-10-26 NOTE — PROGRESS NOTES
Otolaryngology Adult Consultation    Patient: Eryn Bennett  : 1955      HPI:  Eryn Bennett is a 63 year old female seen today in the Otolaryngology Clinic for chronic sinusitis.  The patient's past medical history is significant for a bone marrow transplant, status post chemotherapy for AML.  She has had issues with chronic sinusitis.  When she reports that she has sinusitis, she typically feels like she has a cold.  She will have cough with nasal congestion.  The nasal congestion will typically switches from side-to-side.  Typically she does not have facial pain or pressure, although last September she did.  The main thing that bothers her is this chronic cough that she has had for many years.  She has had several CT scans which has shown mucosal thickening in the maxillary sinus.  She had a CT scan 2018, which showed moderate thickening and most recently 2018 which showed minimal thickening of the maxillary and ethmoid sinuses significantly improved since February.  She has not ever been on nasal steroid spray.  She has been treated with antibiotics, the most recent one several days ago which she does feel has improved her symptoms.         Medications:  Current Outpatient Rx   Medication Sig Dispense Refill     acetaminophen (TYLENOL) 325 MG tablet Take 1-2 tablets (325-650 mg) by mouth every 4 hours as needed for mild pain or fever 100 tablet      amLODIPine (NORVASC) 5 MG tablet TAKE ONE TABLET BY MOUTH ONCE DAILY 90 tablet 3     Ca Phosphate-Cholecalciferol 200-200 MG-UNIT CHEW        calcium-vitamin D 500-125 MG-UNIT TABS Take by mouth 2 times daily       carvedilol (COREG) 6.25 MG tablet Take 1 tablet (6.25 mg) by mouth 2 times daily (with meals) 180 tablet 3     clarithromycin (BIAXIN XL) 500 MG TB24 Take 500 mg by mouth daily 30 tablet 3     desonide (DESOWEN) 0.05 % cream Apply sparingly to affected area three times daily as needed. 60 g 6     fluticasone (FLONASE  ALLERGY RELIEF) 50 MCG/ACT spray Spray 2 sprays into both nostrils daily 1 Bottle 3     fluticasone (FLONASE) 50 MCG/ACT spray Spray 2 sprays into both nostrils daily 1 Bottle 3     guaiFENesin-codeine (ROBITUSSIN AC) 100-10 MG/5ML SOLN solution Take 5-10 mLs by mouth every 4 hours as needed for cough 150 mL 0     methylPREDNISolone (MEDROL DOSEPAK) 4 MG tablet Follow package instructions 21 tablet 0     pantoprazole (PROTONIX) 40 MG EC tablet Take 1 tablet (40 mg) by mouth daily 30 tablet 6     prochlorperazine (COMPAZINE) 5 MG tablet Take 1-2 tablets (5-10 mg) by mouth every 6 hours as needed for nausea or vomiting 20 tablet 0     traMADol (ULTRAM) 50 MG tablet Take 1 tablet (50 mg) by mouth every 6 hours as needed for moderate pain 60 tablet 5     ValACYclovir HCl (VALTREX PO) Take 500 mg by mouth as needed        VITAMIN D, CHOLECALCIFEROL, PO Take 1,000 Units by mouth daily          Allergies: Blood transfusion related (informational only); Cefepime; Sulfa drugs; Allopurinol; Levofloxacin; and Vancomycin     PMH:  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    While pt had Hicman in, pt was on a blood thinner       PSH:  Past Surgical History:   Procedure Laterality Date     BLADDER SURGERY       CHOLECYSTECTOMY  1987     ESOPHAGOSCOPY, GASTROSCOPY, DUODENOSCOPY (EGD), COMBINED N/A 12/11/2014    Procedure: COMBINED ESOPHAGOSCOPY, GASTROSCOPY, DUODENOSCOPY (EGD), BIOPSY SINGLE OR MULTIPLE;  Surgeon: Saturnino Valera MD;  Location: [facility]     GI SURGERY      part of colon removed r.t benign tumor     GYN SURGERY       Dos Santos Catheter  Placment Right 2010    Right IJ vein, in for 8 months     HYSTERECTOMY VAGINAL, BILATERAL SALPINGO-OOPHERECTOMY, COMBINED       PICC INSERTION Left 8/28/2014    5fr DL Power PICC, 46cm (1cm external) in the L basilic vein w/ tip in the SVC RA junction.       FH:  Family History   Problem Relation Age of Onset     Cancer Father      lung, smoker     Cancer Sister      non-melanoma skin cancer     Diabetes Sister      LUNG DISEASE Sister      Intellectual Disability (Mental Retardation) Sister      Melanoma Daughter      Glaucoma Mother      Glaucoma Maternal Grandfather      Macular Degeneration No family hx of         SH:  Social History   Substance Use Topics     Smoking status: Never Smoker     Smokeless tobacco: Never Used     Alcohol use No       Review of Systems  [facility] ROS 10/26/2018   Constitutional Unexplained fatigue, Problems with sleep   Ears, Nose, Throat Nasal congestion or drainage   Cardiopulmonary Cough, Breathing problems   Gastrointestinal/Genitourinary Pain with urination, Diarrhea   Endocrine Frequent urination       Physical Exam:    GEN:  The patient is alert, oriented and in no acute distress.  HEAD:  Head, face scalp is grossly normal.  EARS:  Ears demonstrate normal canals, auricles and tympanic membranes.  NOSE:  External nose is straight, skin is normal.                Intranasal exam (anterior rhinoscopy) reveals normal moist mucosa, turbinate                  tissue without edema, erythema or crusting.  Septum mainly in the midline.    Assessment/Plan:  The patient presents with a chronic rhinosinusitis-type symptoms.  I suspect some of her symptoms are not really from his sinuses, particularly a cold.  She had been told that the cough is due to postnasal drip.  The patient reports that she does not feel that she has a postnasal drip.  Based on examination today, I do not see really any evidence of postnasal drip, so I agree that I do not think her cough is coming from her sinuses or  her nose.  It is likely pulmonary or laryngeal in origin.  She does have chronic sinusitis, but it seems to be mostly asymptomatic.  I discussed with her given her history of chemotherapy, some patients who have chronic sinus inflammation rather than infection which may be difficult to completely eradicate.  I would like to try her on a three month course of clarithromycin.  Studies have shown that macrolides have good anti-inflammatory properties that chronic sinusitis seems to respond well to.  I also will have her do Flonase as well as nasal saline irrigations two to three times a week.        I will have her come back and see me in six weeks for a mid-therapy check-up.  If she is still having a significant amount of cough, then I think it would be worthwhile scoping her and then potentially having her work with speech therapy.     I spent a total of 45 minutes face-to-face with Eryn Bennett during today's office visit.  Over 50% of this time was spent counseling the patient on and/or coordinating care as documented in my assessment and plan.

## 2018-10-26 NOTE — MR AVS SNAPSHOT
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  After hours call the   hospital at [phone] or   [phone]. Ask for the BMT   fellow on-call           Care Instructions    RTC Raine on 2/6/19 with labs  Primary care for fasting glucose (diabetes?)  Ophthalmologist locally  Completed most vaccines except shingles. Recommended Shingrix if she decides.  Up to date regular health care screenings including but not limited to bone density and health, cholesterol, glucose, colonoscopy, mammograms, pap smears, oral cancer yearly, skin cancer yearly.  Further discuss dexa scan with PC MD  Watch hand function closely; call with questions or new issues.          Follow-ups after your visit        Your next 10 appointments already scheduled     Oct 26, 2018  2:00 PM CDT   (Arrive by 1:45 PM)   New Patient Visit with Yeimy Gorman MD   [facility] Ear Nose and Throat ([facility])    [address]  4th Floor  [facility] [id number] [phone]            Dec 11, 2018  3:00 PM CST   (Arrive by 2:45 PM)   Return Visit with Troy Lomeli MD   [facility] ([facility])    [address]  [address]  [facility] [id number] [phone]            Feb 06, 2019  2:45 PM CST   [facility] Draw with  [facility] LAB DRAW   [facility] ([facility])    [address]  [address]  [facility] [id number] [phone]            Feb 06, 2019  3:30 PM CST   Return with Irving Ni MD   [facility] Blood and Marrow Transplant ([facility])    [address]  [address]  [facility] [id number] [phone]              Future tests that were ordered for you today     Open Future Orders        Priority Expected Expires Ordered    IgG Routine 2/6/2019 10/26/2019 10/26/2018    Comprehensive metabolic panel Routine 2/6/2019 10/26/2019 10/26/2018    CBC with platelets differential Routine 2/6/2019 10/26/2019 10/26/2018            Who to contact     If you have questions or need follow up information about today's clinic visit or your schedule please contact [facility] BLOOD AND MARROW TRANSPLANT directly at [phone].  Normal or non-critical lab and imaging results will be communicated to you by Giggemhart, letter or phone within 4 business days after the clinic has received the results. If you do not hear from us within 7 days, please contact the clinic through Touristlinkt or phone. If you have a critical or abnormal lab result, we will notify you by phone as soon as possible.  Submit refill requests through [employer] or call your pharmacy and they will forward the refill request to us. Please allow 3 business days for your refill to be completed.          Additional Information About Your Visit        Giggemhart Information     [employer] gives you secure access to your electronic health record. If you see a primary care provider, you can also send messages to your care team and make appointments. If you have questions, please call your primary care clinic.  If you do not have a primary care provider, please call [phone] and they will assist you.        Care EveryWhere ID     This is your Care EveryWhere ID. This could be used by other organizations to access your  "[place] medical records  [id number]        Your Vitals Were     Pulse Temperature Respirations Height Pulse Oximetry BMI (Body Mass Index)    77 97.7  F (36.5  C) (Oral) 16 1.562 m (5' 1.5\") 95% 42.46 kg/m2       Blood Pressure from Last 3 Encounters:   10/26/18 144/81   10/09/18 132/78   06/08/18 124/74    Weight from Last 3 Encounters:   10/26/18 103.6 kg (228 lb 6.3 oz)   10/09/18 103.8 kg (228 lb 14.4 oz)   06/08/18 105.1 kg (231 lb 11.2 oz)              We Performed the Following     CBC with platelets differential     Comprehensive metabolic panel     IgG          Today's Medication Changes          These changes are accurate as of 10/26/18  1:45 PM.  If you have any questions, ask your nurse or doctor.               Stop taking these medicines if you haven't already. Please contact your care team if you have questions.     amoxicillin-clavulanate 875-125 MG per tablet   Commonly known as:  AUGMENTIN   Stopped by:  Irving Ni MD           AZITHROMYCIN PO   Stopped by:  Irving Ni MD           fluconazole 150 MG tablet   Commonly known as:  DIFLUCAN   Stopped by:  Irving Ni MD           nitroFURantoin (macrocrystal-monohydrate) 100 MG capsule   Commonly known as:  MACROBID   Stopped by:  Irving Ni MD                    Recent Review Flowsheet Data     BMT Recent Results Latest Ref Rng & Units 4/14/2017 6/14/2017 11/17/2017 2/23/2018 3/13/2018 6/8/2018 10/26/2018    WBC 4.0 - 11.0 10e9/L 6.5 8.0 7.2 7.9 6.3 6.5 6.4    Hemoglobin 11.7 - 15.7 g/dL 12.0 12.5 13.9 13.2 13.3 13.4 13.6    Platelet Count 150 - 450 10e9/L 233 194 171 221 145(L) 159 164    Neutrophils (Absolute) 1.6 - 8.3 10e9/L 2.9 4.3 3.5 3.9 2.4 3.2 2.4    INR 0.86 - 1.14 - - - - - - -    Sodium 133 - 144 mmol/L 138 137 138 136 138 142 140    Potassium 3.4 - 5.3 mmol/L 4.0 4.2 4.1 4.3 4.0 3.9 3.8    Chloride 94 - 109 mmol/L 101 103 104 103 105 108 106    Glucose 70 - 99 mg/dL 126(H) " 109(H) 108(H) 132(H) 110(H) 104(H) 136(H)    Urea Nitrogen 7 - 30 mg/dL 18 23 22 21 22 15 22    Creatinine 0.52 - 1.04 mg/dL 1.04 1.13(H) 1.04 1.07(H) 0.90 1.00 1.05(H)    Calcium (Total) 8.5 - 10.1 mg/dL 9.2 9.3 9.4 9.1 9.6 8.8 8.9    Protein (Total) 6.8 - 8.8 g/dL 7.0 7.0 7.1 7.1 6.8 6.9 7.0    Albumin 3.4 - 5.0 g/dL 3.7 3.8 3.8 3.6 3.6 3.7 3.7    Bilirubin (Direct) 0.0 - 0.2 mg/dL - - - - - - -    Alkaline Phosphatase 40 - 150 U/L 72 89 86 88 89 102 95    AST 0 - 45 U/L 18 14 16 14 17 21 21    ALT 0 - 50 U/L 19 18 24 21 20 32 27    MCV 78 - 100 fl 87 89 94 93 93 90 91               Primary Care Provider Office Phone # Fax #    Carmelo Camacho [phone]       [place] [address]        Equal Access to Services     BRIAN FERNANDEZ AH: Hadii ni juarezo Soseth, waaxda luqadaha, qaybta kaalmada adeegyada, susy silvestre . So [facility] [phone].    ATENCIÓN: Si habla español, tiene a orona disposición servicios gratuitos de asistencia lingüística. Ll[facility] [phone].    We comply with applicable federal civil rights laws and [place] laws. We do not discriminate on the basis of race, color, national origin, age, disability, sex, sexual orientation, or gender identity.            Thank you!     Thank you for choosing [facility] BLOOD AND MARROW TRANSPLANT  for your care. Our goal is always to provide you with excellent care. Hearing back from our patients is one way we can continue to improve our services. Please take a few minutes to complete the written survey that you may receive in the mail after your visit with us. Thank you!             Your Updated Medication List - Protect others around you: Learn how to safely use, store and throw away your medicines at www.disposemymeds.org.          This list is accurate as of 10/26/18  1:45 PM.  Always use your most recent med list.                   Brand Name Dispense Instructions for use  Diagnosis    acetaminophen 325 MG tablet    TYLENOL    100 tablet    Take 1-2 tablets (325-650 mg) by mouth every 4 hours as needed for mild pain or fever    Influenza B       amLODIPine 5 MG tablet    NORVASC    90 tablet    TAKE ONE TABLET BY MOUTH ONCE DAILY    Essential hypertension       Ca Phosphate-Cholecalciferol 200-200 MG-UNIT Chew           calcium-vitamin D 500-125 MG-UNIT Tabs      Take by mouth 2 times daily        carvedilol 6.25 MG tablet    COREG    180 tablet    Take 1 tablet (6.25 mg) by mouth 2 times daily (with meals)    Essential hypertension       desonide 0.05 % cream    DESOWEN    60 g    Apply sparingly to affected area three times daily as needed.    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), EBV (Georgette-Barr virus) viremia, Cytomegalovirus (CMV) viremia (H), Autoimmune hemolytic anemia (H)       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), S/P allogeneic bone marrow transplant (H)       prochlorperazine 5 MG tablet    COMPAZINE    20 tablet    Take 1-2 tablets (5-10 mg) by mouth every 6 hours as needed for nausea or vomiting    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)        traMADol 50 MG tablet    ULTRAM    60 tablet    Take 1 tablet (50 mg) by mouth every 6 hours as needed for moderate pain    S/P allogeneic bone marrow transplant (H), 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

## 2018-10-26 NOTE — MR AVS SNAPSHOT
After Visit Summary   10/26/2018    Eryn Bennett    MRN: [id number]           Patient Information     Date Of Birth          1955        Visit Information        Provider Department      10/26/2018 2:00 PM Yeimy Gorman MD [facility] Ear Nose and Throat        Today's Diagnoses     Chronic maxillary sinusitis    -  1      Care Instructions    Please use the NeilMed sinus rinse 2-3 times per week.  Take medication as prescribed and please follow up in clinic in 6 weeks.      Andi Ma RN            Follow-ups after your visit        Follow-up notes from your care team     Return in 7 weeks (on 12/11/2018) for sinus recheck.      Your next 10 appointments already scheduled     Dec 11, 2018  3:00 PM CST   (Arrive by 2:45 PM)   Return Visit with Troy Lomeli MD   [facility] ([facility])    [address] [place] [address]  [address] [place] [id number] [phone]            Feb 06, 2019  2:45 PM CST   [facility] Lab Draw with  [facility] LAB DRAW   [facility] Lab Draw ([facility])    [address] [place] [address]  [address] [place] [id number] [phone]            Feb 06, 2019  3:30 PM CST   Return with Irving Ni MD   [facility] Blood and Marrow Transplant ([facility])    [address] [place] [address]  [address] [place] [id number] [phone]              Future tests that were ordered for you today     Open Future Orders        Priority Expected Expires Ordered    IgG Routine 2/6/2019 10/26/2019 10/26/2018    Comprehensive metabolic panel Routine 2/6/2019 10/26/2019 10/26/2018    CBC with platelets differential Routine 2/6/2019 10/26/2019 10/26/2018            Who to contact     Please call your clinic at [phone] to:    Ask questions about your health    Make or cancel appointments    Discuss your medicines    Learn about your test results    Speak to your  "doctor            Additional Information About Your Visit        TouchOfModernhart Information     Smart Gardener gives you secure access to your electronic health record. If you see a primary care provider, you can also send messages to your care team and make appointments. If you have questions, please call your primary care clinic.  If you do not have a primary care provider, please call [phone] and they will assist you.      Smart Gardener is an electronic gateway that provides easy, online access to your medical records. With Smart Gardener, you can request a clinic appointment, read your test results, renew a prescription or communicate with your care team.     To access your existing account, please contact your [facility] or call [phone] for assistance.        Care EveryWhere ID     This is your Care EveryWhere ID. This could be used by other organizations to access your [facility] medical records  [id number]        Your Vitals Were     Height BMI (Body Mass Index)                1.54 m (5' 0.63\") 43.42 kg/m2           Blood Pressure from Last 3 Encounters:   10/26/18 144/81   10/09/18 132/78   06/08/18 124/74    Weight from Last 3 Encounters:   10/26/18 103 kg (227 lb)   10/26/18 103.6 kg (228 lb 6.3 oz)   10/09/18 103.8 kg (228 lb 14.4 oz)              Today, you had the following     No orders found for display         Today's Medication Changes          These changes are accurate as of 10/26/18  2:41 PM.  If you have any questions, ask your nurse or doctor.               Start taking these medicines.        Dose/Directions    clarithromycin 500 MG Tb24   Commonly known as:  BIAXIN XL   Used for:  Chronic maxillary sinusitis   Started by:  Yeimy Gorman MD        Dose:  500 mg   Take 500 mg by mouth daily   Quantity:  30 tablet   Refills:  3         These medicines have changed or have updated prescriptions.        Dose/Directions    * fluticasone 50 MCG/ACT spray   Commonly known " as:  FLONASE ALLERGY RELIEF   This may have changed:  Another medication with the same name was added. Make sure you understand how and when to take each.   Used for:  Acute maxillary sinusitis, recurrence not specified   Changed by:  Yeimy Gorman MD        Dose:  2 spray   Spray 2 sprays into both nostrils daily   Quantity:  1 Bottle   Refills:  3       * fluticasone 50 MCG/ACT spray   Commonly known as:  FLONASE   This may have changed:  You were already taking a medication with the same name, and this prescription was added. Make sure you understand how and when to take each.   Used for:  Chronic maxillary sinusitis   Changed by:  Yeimy Gorman MD        Dose:  2 spray   Spray 2 sprays into both nostrils daily   Quantity:  1 Bottle   Refills:  3       * Notice:  This list has 2 medication(s) that are the same as other medications prescribed for you. Read the directions carefully, and ask your doctor or other care provider to review them with you.      Stop taking these medicines if you haven't already. Please contact your care team if you have questions.     amoxicillin-clavulanate 875-125 MG per tablet   Commonly known as:  AUGMENTIN   Stopped by:  Irving Ni MD           AZITHROMYCIN PO   Stopped by:  Irving Ni MD           fluconazole 150 MG tablet   Commonly known as:  DIFLUCAN   Stopped by:  Irving Ni MD           nitroFURantoin (macrocrystal-monohydrate) 100 MG capsule   Commonly known as:  MACROBID   Stopped by:  Irving Ni MD                Where to get your medicines      These medications were sent to [facility] [address] [place] [address]     Phone:  [phone]     clarithromycin 500 MG Tb24    fluticasone 50 MCG/ACT spray                Primary Care Provider Office Phone # Fax #    Carmelo Camacho [phone]       [facility]  [address] [address]        Equal Access to Services     JOHANAALIDA REBECCA : Hadii aad ku hadshelbijose Hogan, wakobeda josseline, chagoarmando crawfordzullycarmela schaeffer, susy torrespaulakaruna mandel. So [facility] [phone].    ATENCIÓN: Si habla español, tiene a orona disposición servicios gratuitos de asistencia lingüística. Llame al [phone].    We comply with applicable federal civil rights laws and [place] laws. We do not discriminate on the basis of race, color, national origin, age, disability, sex, sexual orientation, or gender identity.            Thank you!     Thank you for choosing [facility] EAR NOSE AND THROAT  for your care. Our goal is always to provide you with excellent care. Hearing back from our patients is one way we can continue to improve our services. Please take a few minutes to complete the written survey that you may receive in the mail after your visit with us. Thank you!             Your Updated Medication List - Protect others around you: Learn how to safely use, store and throw away your medicines at www.disposemymeds.org.          This list is accurate as of 10/26/18  2:41 PM.  Always use your most recent med list.                   Brand Name Dispense Instructions for use Diagnosis    acetaminophen 325 MG tablet    TYLENOL    100 tablet    Take 1-2 tablets (325-650 mg) by mouth every 4 hours as needed for mild pain or fever    Influenza B       amLODIPine 5 MG tablet    NORVASC    90 tablet    TAKE ONE TABLET BY MOUTH ONCE DAILY    Essential hypertension       Ca Phosphate-Cholecalciferol 200-200 MG-UNIT Chew           calcium-vitamin D 500-125 MG-UNIT Tabs      Take by mouth 2 times daily        carvedilol 6.25 MG tablet    COREG    180 tablet    Take 1 tablet (6.25 mg) by mouth 2 times daily (with meals)    Essential hypertension       clarithromycin 500 MG Tb24    BIAXIN XL    30 tablet    Take 500 mg by mouth daily    Chronic maxillary sinusitis       desonide 0.05 %  cream    DESOWEN    60 g    Apply sparingly to affected area three times daily as needed.    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), EBV (Georgette-Barr virus) viremia, Cytomegalovirus (CMV) viremia (H), Autoimmune hemolytic anemia (H)       * fluticasone 50 MCG/ACT spray    FLONASE ALLERGY RELIEF    1 Bottle    Spray 2 sprays into both nostrils daily    Acute maxillary sinusitis, recurrence not specified       * fluticasone 50 MCG/ACT spray    FLONASE    1 Bottle    Spray 2 sprays into both nostrils daily    Chronic maxillary sinusitis       guaiFENesin-codeine 100-10 MG/5ML Soln solution    ROBITUSSIN AC    150 mL    Take 5-10 mLs by mouth every 4 hours as needed for cough    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)       methylPREDNISolone 4 MG tablet    MEDROL DOSEPAK    21 tablet    Follow package instructions    Acute recurrent maxillary sinusitis       pantoprazole 40 MG EC tablet    PROTONIX    30 tablet    Take 1 tablet (40 mg) by mouth daily    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), S/P allogeneic bone marrow transplant (H)       prochlorperazine 5 MG tablet    COMPAZINE    20 tablet    Take 1-2 tablets (5-10 mg) by mouth every 6 hours as needed for nausea or vomiting    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)       traMADol 50 MG tablet    ULTRAM    60 tablet    Take 1 tablet (50 mg) by mouth every 6 hours as needed for moderate pain    S/P allogeneic bone marrow transplant (H), 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 Read the directions carefully, and ask your doctor or other care provider to review them with you.

## 2018-10-29 DIAGNOSIS — J32.0 CHRONIC MAXILLARY SINUSITIS: ICD-10-CM

## 2018-10-29 NOTE — TELEPHONE ENCOUNTER
"Message from pharmacy: clarithromycin (BIAXIN XL) 500 MG TB24:    \"The ER is currently unavailable; do you have an alternative\"?  "

## 2018-11-27 DIAGNOSIS — Z94.81 S/P ALLOGENEIC BONE MARROW TRANSPLANT (H): ICD-10-CM

## 2018-11-27 DIAGNOSIS — C92.01 ACUTE MYELOID LEUKEMIA IN REMISSION (H): Primary | ICD-10-CM

## 2018-11-27 RX ORDER — VALACYCLOVIR HYDROCHLORIDE 500 MG/1
500 TABLET, FILM COATED ORAL 2 TIMES DAILY
Qty: 60 TABLET | Refills: 6 | Status: SHIPPED | OUTPATIENT
Start: 2018-11-27 | End: 2020-12-31

## 2018-12-05 RX ORDER — CLARITHROMYCIN 500 MG/1
500 TABLET, FILM COATED, EXTENDED RELEASE ORAL DAILY
Qty: 30 TABLET | Refills: 3 | OUTPATIENT
Start: 2018-12-05

## 2018-12-11 ENCOUNTER — OFFICE VISIT (OUTPATIENT)
Dept: OTOLARYNGOLOGY | Facility: CLINIC | Age: 63
End: 2018-12-11
Payer: COMMERCIAL

## 2018-12-11 ENCOUNTER — OFFICE VISIT (OUTPATIENT)
Dept: PULMONOLOGY | Facility: CLINIC | Age: 63
End: 2018-12-11
Attending: INTERNAL MEDICINE
Payer: MEDICARE

## 2018-12-11 VITALS
BODY MASS INDEX: 42.31 KG/M2 | SYSTOLIC BLOOD PRESSURE: 131 MMHG | HEIGHT: 62 IN | RESPIRATION RATE: 16 BRPM | HEART RATE: 78 BPM | TEMPERATURE: 97.3 F | WEIGHT: 229.9 LBS | OXYGEN SATURATION: 94 % | DIASTOLIC BLOOD PRESSURE: 66 MMHG

## 2018-12-11 VITALS
HEIGHT: 62 IN | SYSTOLIC BLOOD PRESSURE: 140 MMHG | DIASTOLIC BLOOD PRESSURE: 80 MMHG | WEIGHT: 228 LBS | BODY MASS INDEX: 41.96 KG/M2

## 2018-12-11 DIAGNOSIS — R06.02 SOB (SHORTNESS OF BREATH): Primary | ICD-10-CM

## 2018-12-11 DIAGNOSIS — J32.0 CHRONIC MAXILLARY SINUSITIS: Primary | ICD-10-CM

## 2018-12-11 RX ORDER — CLARITHROMYCIN 250 MG/1
250 TABLET, FILM COATED ORAL 2 TIMES DAILY
Qty: 120 TABLET | Refills: 2 | Status: SHIPPED | OUTPATIENT
Start: 2018-12-11 | End: 2019-02-13

## 2018-12-11 ASSESSMENT — MIFFLIN-ST. JEOR
SCORE: 1548.13
SCORE: 1539.51

## 2018-12-11 ASSESSMENT — PAIN SCALES - GENERAL: PAINLEVEL: NO PAIN (0)

## 2018-12-11 NOTE — PROGRESS NOTES
Reason for Visit  Eryn Bennett is a 63 year old year old female who is being seen for No chief complaint on file.    Pulmonary HPI  63 YO with AML s/p NMA DCBT over 3 years ago who is referred to the Pulmonary BMT clinic for evaluation of cough and SOB.  Developed URI in 11-17 that had persisted for 6 weeks.  During this time noticed nasal congestion and drainage, post-nasal drip, fevers, and cough. Denies chest burning. Seen in clinic, PCR for influenza was negative but she was treated with Tamiflu.  Was given course of Azithromycin twice for 5 days apiece, felt somewhat better with Azithromycin but symptoms recurred shortly thereafter.   Presented again in 2-12-18 with fever, cough, and nasal congestion. CT scan of sinuses revealed sinusitis, chest CT with LLL bronchial wall thickening and nodularity that was actually improved compared to April 2017.  Was placed on a 10 day course of Levofloxacin, states started to feel better after 5 days. Since completion of antibiotics in the past 10 days she thinks her symptoms have all resolved; no further nasal congestion or drainage, no cough.  All fevers have resolved.  Similar course last Winter with frequent URI's which were slow to resolve.  No prior history of sinus disease or allergies.  No tobacco use.  IgG level checked 11-17 and was 547.  Off all immunosuppressives.    Last seen two months ago. Since her last visit she has been seen by ENT and was started on flonase and nasal saline rinses, plan for course of clarithromycin but the pharmacy did not have the medication.  She developed a URI 6 weeks ago that is slowly resolving.  Increased nasal drainage and cough during that time. This is slowly resolving.  Was only using the nasal saline rinse 2-3 times per week along with the Flonase, is planning on using it more frequently.  Is also to start a course of clarithromycin. Was seen in f/u by ENT today.    The patient was seen and examined by Troy Lomeli            Current Outpatient Medications   Medication     acetaminophen (TYLENOL) 325 MG tablet     amLODIPine (NORVASC) 5 MG tablet     Ca Phosphate-Cholecalciferol 200-200 MG-UNIT CHEW     calcium-vitamin D 500-125 MG-UNIT TABS     carvedilol (COREG) 6.25 MG tablet     clarithromycin (BIAXIN XL) 500 MG TB24     clarithromycin (BIAXIN) 250 MG tablet     desonide (DESOWEN) 0.05 % cream     fluticasone (FLONASE ALLERGY RELIEF) 50 MCG/ACT spray     guaiFENesin-codeine (ROBITUSSIN AC) 100-10 MG/5ML SOLN solution     methylPREDNISolone (MEDROL DOSEPAK) 4 MG tablet     pantoprazole (PROTONIX) 40 MG EC tablet     prochlorperazine (COMPAZINE) 5 MG tablet     traMADol (ULTRAM) 50 MG tablet     valACYclovir (VALTREX) 500 MG tablet     VITAMIN D, CHOLECALCIFEROL, PO     No current facility-administered medications for this visit.      Allergies   Allergen Reactions     Blood Transfusion Related (Informational Only) Other (See Comments)     7/15/15 - Patient has a complex history of clinically significant antibodies against RBC antigens.  Finding compatible RBCs may take up to 24 hours or more.  Consult with the Blood Bank MD for transfusion guidance.  Itching in palms during platelet transfusion in 2010- IV benadryl was given at that time.  Pt has had platelets since then with no reaction.   11/6/14 - Stem cell transplant patient.  Give type O RBCs.     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 Pt would like to avoid, even with premedication, if at all possible.      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    While pt had Hicman in, pt was on a blood thinner       Past Surgical History:   Procedure Laterality Date     BLADDER SURGERY       CHOLECYSTECTOMY  1987     ESOPHAGOSCOPY, GASTROSCOPY, DUODENOSCOPY (EGD), COMBINED N/A 12/11/2014    Procedure: COMBINED ESOPHAGOSCOPY, GASTROSCOPY, DUODENOSCOPY (EGD), BIOPSY SINGLE OR MULTIPLE;  Surgeon: Saturnino Valera MD;  Location: [facility]     GI SURGERY      part of colon removed r.t benign tumor     GYN SURGERY       Dos Santos Catheter Placment Right 2010    Right IJ vein, in for 8 months     HYSTERECTOMY VAGINAL, BILATERAL SALPINGO-OOPHERECTOMY, COMBINED       PICC INSERTION Left 8/28/2014    5fr DL Power PICC, 46cm (1cm external) in the L basilic vein w/ tip in the SVC RA junction.       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 Not Asked   Social History Narrative     Not on file       ROS Pulmonary  A  complete ROS was otherwise negative except as noted in the HPI.  There were no vitals taken for this visit.  Exam:   GENERAL APPEARANCE: Well developed, well nourished, alert, and in no apparent distress.  EYES: PERRL, EOMI  HENT: Nasal mucosa with no edema and no hyperemia. No nasal polyps.  EARS: Canals clear, TMs normal  MOUTH: Oral mucosa is moist, without any lesions, no tonsillar enlargement, no oropharyngeal exudate.  NECK: supple, no masses, no thyromegaly.  LYMPHATICS: No significant axillary, cervical, or supraclavicular nodes.  RESP:  Good air flow throughout.  No crackles. No rhonchi. No wheezes.  CV: Normal S1, S2, regular rhythm, normal rate. No murmur.  No rub. No gallop. No LE edema.   ABDOMEN:  Bowel sounds normal, soft, nontender, no HSM or masses.   MS: extremities normal. No clubbing. No cyanosis.  SKIN: no rash on limited exam  NEURO: Mentation intact, speech normal, normal strength and tone, normal gait and stance  PSYCH: mentation appears normal. and affect normal/bright  Results:  No results found for this or any previous visit (from the past 168 hour(s)).    Assessment and plan:   64 YO with recent viral illness in November 2017 with resultant sinusitis as seen on CT.  Majority of symptoms were upper respiratory in nature at that time.  CT chest 2-18 actually improved.   Off all immunosuppressives and IgG level has maintaind > 400, with last testing 6-18.  Has recurrent sinus infections requiring frequent antibiotic regimens.  Resolves somewhat with antibiotics then recurs; prior sinus CT in 2-28 showed narrowing of ostiomeatal units bilaterally but f/u CT was improved. Seen and followed by ENT.  Recurrent URI's, hopefully with increased treatment of sinuses this will decrease duration of her symptoms.    Continue Flonase and nasal, saline rinses.        RTC in ~ 4-5 months.  Patient to call clinic with any questions or concerns prior to her next clinic visit

## 2018-12-11 NOTE — PROGRESS NOTES
CC: f/u chronic sinusitis    HPI: Patient returns to clinic today for routine follow-up.  To review she has a history of AML status post bone marrow transplant and chemotherapy.  She has had CT evidence of chronic sinusitis.  In February 2018 she had moderate thickening of the maxillary sinuses.  In October 2018 she had improvement with only minimal thickening of the maxillary and ethmoid sinuses.  When I last saw her her biggest complaints was cough and nasal congestion.  She really had no facial pain and is relatively asymptomatic.  I recommended 3 months of clarithromycin to help with inflammation.  We placed her on Flonase as well as nasal saline.  Patient reports there was a issue with getting the medication refill.  [facility] where we extended to did not have the XL version.  She decided to wait till she came and saw me before getting a new prescription.  She had a cold a couple weeks ago in which she had a lot of coughing.  Since the cold has cleared she has much improved coughing.  Typically when she rinses her nose she does get very much out it is mostly clear.  She had a cold she deftly had more greenish stuff come out.  She is only rinsing about twice a week.    PE:  GEN: nad  NOSE: Tissues are healthy.    Assessment and plan\  Patient has radiographic signs of chronic sinusitis.  I discussed with her this may have been a side effect of the chemotherapy particularly given that she is asymptomatic from a pain standpoint.  The clarithromycin that I prescribed was meant to be on long-term medication to help decrease the inflammation.  We can have that filled here as a twice a day medication rather than once a day.  I will see her back in 1 month for recheck.  Ideally we will have this as a 3month medication.  If she still having issues with cough then I do think she might benefit from a visit with a laryngologist for cough therapy.    I spent a total of 15 minutes face-to-face with Eryn Bennett during  today's office visit.  Over 50% of this time was spent counseling the patient on and/or coordinating care as documented in my assessment and plan.

## 2018-12-11 NOTE — LETTER
12/11/2018       RE: Eryn Bennett  [address]  [facility] [address]     Dear Colleague,    Thank you for referring your patient, Eryn Bennett, to the [facility] at [facility]. Please see a copy of my visit note below.    Reason for Visit  Eryn Bennett is a 63 year old year old female who is being seen for No chief complaint on file.    Pulmonary HPI  61 YO with AML s/p NMA DCBT over 3 years ago who is referred to the Pulmonary BMT clinic for evaluation of cough and SOB.  Developed URI in 11-17 that had persisted for 6 weeks.  During this time noticed nasal congestion and drainage, post-nasal drip, fevers, and cough. Denies chest burning. Seen in clinic, PCR for influenza was negative but she was treated with Tamiflu.  Was given course of Azithromycin twice for 5 days apiece, felt somewhat better with Azithromycin but symptoms recurred shortly thereafter.   Presented again in 2-12-18 with fever, cough, and nasal congestion. CT scan of sinuses revealed sinusitis, chest CT with LLL bronchial wall thickening and nodularity that was actually improved compared to April 2017.  Was placed on a 10 day course of Levofloxacin, states started to feel better after 5 days. Since completion of antibiotics in the past 10 days she thinks her symptoms have all resolved; no further nasal congestion or drainage, no cough.  All fevers have resolved.  Similar course last Winter with frequent URI's which were slow to resolve.  No prior history of sinus disease or allergies.  No tobacco use.  IgG level checked 11-17 and was 547.  Off all immunosuppressives.    Last seen two months ago. Since her last visit she has been seen by ENT and was started on flonase and nasal saline rinses, plan for course of clarithromycin but the pharmacy did not have the medication.  She developed a URI 6 weeks ago that is slowly resolving.  Increased nasal drainage and cough during that time. This  is slowly resolving.  Was only using the nasal saline rinse 2-3 times per week along with the Flonase, is planning on using it more frequently.  Is also to start a course of clarithromycin. Was seen in f/u by ENT today.    The patient was seen and examined by Troy Lomeli           Current Outpatient Medications   Medication     acetaminophen (TYLENOL) 325 MG tablet     amLODIPine (NORVASC) 5 MG tablet     Ca Phosphate-Cholecalciferol 200-200 MG-UNIT CHEW     calcium-vitamin D 500-125 MG-UNIT TABS     carvedilol (COREG) 6.25 MG tablet     clarithromycin (BIAXIN XL) 500 MG TB24     clarithromycin (BIAXIN) 250 MG tablet     desonide (DESOWEN) 0.05 % cream     fluticasone (FLONASE ALLERGY RELIEF) 50 MCG/ACT spray     guaiFENesin-codeine (ROBITUSSIN AC) 100-10 MG/5ML SOLN solution     methylPREDNISolone (MEDROL DOSEPAK) 4 MG tablet     pantoprazole (PROTONIX) 40 MG EC tablet     prochlorperazine (COMPAZINE) 5 MG tablet     traMADol (ULTRAM) 50 MG tablet     valACYclovir (VALTREX) 500 MG tablet     VITAMIN D, CHOLECALCIFEROL, PO     No current facility-administered medications for this visit.      Allergies   Allergen Reactions     Blood Transfusion Related (Informational Only) Other (See Comments)     7/15/15 - Patient has a complex history of clinically significant antibodies against RBC antigens.  Finding compatible RBCs may take up to 24 hours or more.  Consult with the Blood Bank MD for transfusion guidance.  Itching in palms during platelet transfusion in 2010- IV benadryl was given at that time.  Pt has had platelets since then with no reaction.   11/6/14 - Stem cell transplant patient.  Give type O RBCs.     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 Pt would like to avoid, even with premedication, if at all possible.      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    While pt had Hicman in, pt was on a blood thinner       Past Surgical History:   Procedure Laterality Date     BLADDER SURGERY       CHOLECYSTECTOMY  1987     ESOPHAGOSCOPY, GASTROSCOPY, DUODENOSCOPY (EGD), COMBINED N/A 12/11/2014    Procedure: COMBINED ESOPHAGOSCOPY, GASTROSCOPY, DUODENOSCOPY (EGD), BIOPSY SINGLE OR MULTIPLE;  Surgeon: Saturnino Valera MD;  Location: [facility]     GI SURGERY      part of colon removed r.t benign tumor     GYN SURGERY       Dos Santos Catheter Placment Right 2010    Right IJ vein, in for 8 months     HYSTERECTOMY VAGINAL, BILATERAL SALPINGO-OOPHERECTOMY, COMBINED       PICC INSERTION Left 8/28/2014    5fr DL Power PICC, 46cm (1cm external) in the L basilic vein w/ tip in the SVC RA junction.       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 Not Asked   Social History Narrative     Not on file       ROS Pulmonary  A complete ROS was otherwise negative except as noted in the HPI.  There were no vitals taken for this visit.  Exam:   GENERAL APPEARANCE: Well developed, well nourished, alert, and in no apparent distress.  EYES: PERRL, EOMI  HENT: Nasal mucosa with no edema and no hyperemia. No nasal polyps.  EARS: Canals clear, TMs normal  MOUTH: Oral mucosa is moist, without any lesions, no tonsillar enlargement, no oropharyngeal exudate.  NECK: supple, no masses, no thyromegaly.  LYMPHATICS: No significant axillary, cervical, or supraclavicular nodes.  RESP:  Good air flow throughout.  No crackles. No rhonchi. No wheezes.  CV: Normal S1, S2, regular rhythm, normal rate. No murmur.  No rub. No gallop. No LE edema.   ABDOMEN:  Bowel sounds normal, soft, nontender, no HSM or masses.   MS: extremities normal. No clubbing. No cyanosis.  SKIN: no rash on limited exam  NEURO: Mentation intact, speech normal, normal strength and tone, normal gait and stance  PSYCH: mentation appears normal. and affect normal/bright  Results:  No results found for this or any previous visit (from the past 168 hour(s)).    Assessment and plan:   64 YO with recent viral illness in November 2017 with resultant sinusitis as seen on CT.  Majority of symptoms were upper respiratory in nature at that time.  CT chest 2-18 actually improved.   Off all immunosuppressives and IgG level has maintaind > 400, with last testing 6-18.  Has recurrent sinus infections requiring frequent antibiotic regimens.  Resolves somewhat with antibiotics then recurs; prior sinus CT in 2-28 showed narrowing of ostiomeatal units bilaterally but f/u CT was improved. Seen and followed by ENT.  Recurrent URI's, hopefully  with increased treatment of sinuses this will decrease duration of her symptoms.    Continue Flonase and nasal, saline rinses.        RTC in  ~ 4-5 months.  Patient to call clinic with any questions or concerns prior to her next clinic visit          Again, thank you for allowing me to participate in the care of your patient.      Sincerely,    Troy Lomeli MD

## 2018-12-11 NOTE — LETTER
12/11/2018       RE: Eryn Bennett  [address]  [facility] [address]     Dear Colleague,    Thank you for referring your patient, Eryn Bennett, to the [facility] at [facility]. Please see a copy of my visit note below.    CC: f/u chronic sinusitis    HPI: Patient returns to clinic today for routine follow-up.  To review she has a history of AML status post bone marrow transplant and chemotherapy.  She has had CT evidence of chronic sinusitis.  In February 2018 she had moderate thickening of the maxillary sinuses.  In October 2018 she had improvement with only minimal thickening of the maxillary and ethmoid sinuses.  When I last saw her her biggest complaints was cough and nasal congestion.  She really had no facial pain and is relatively asymptomatic.  I recommended 3 months of clarithromycin to help with inflammation.  We placed her on Flonase as well as nasal saline.  Patient reports there was a issue with getting the medication refill.  [facility] did not have the XL version.  She decided to wait till she came and saw me before getting a new prescription.  She had a cold a couple weeks ago in which she had a lot of coughing.  Since the cold has cleared she has much improved coughing.  Typically when she rinses her nose she does get very much out it is mostly clear.  She had a cold she deftly had more greenish stuff come out.  She is only rinsing about twice a week.    PE:  GEN: nad  NOSE: Tissues are healthy.    Assessment and plan\has radiographic signs of chronic sinusitis.  I discussed with her this may have been a side effect of the chemotherapy particularly given that she is asymptomatic from a pain standpoint.  The clarithromycin that I prescribed was meant to be on long-term medication to help decrease the inflammation.  We can have that filled here as a twice a day medication rather than once a day.  I will see her back in 1  month for recheck.  Ideally we will have this as a 3month medication.  If she still having issues with cough then I do think she might benefit from a visit with a laryngologist for cough therapy.    I spent a total of 15 minutes face-to-face with Eryn Bennett during today's office visit.  Over 50% of this time was spent counseling the patient on and/or coordinating care as documented in my assessment and plan.      Again, thank you for allowing me to participate in the care of your patient.      Sincerely,    Yeimy Gorman MD

## 2019-02-13 ENCOUNTER — ONCOLOGY VISIT (OUTPATIENT)
Dept: TRANSPLANT | Facility: CLINIC | Age: 64
End: 2019-02-13
Attending: INTERNAL MEDICINE
Payer: MEDICARE

## 2019-02-13 ENCOUNTER — APPOINTMENT (OUTPATIENT)
Dept: LAB | Facility: CLINIC | Age: 64
End: 2019-02-13
Attending: INTERNAL MEDICINE
Payer: MEDICARE

## 2019-02-13 VITALS
TEMPERATURE: 98.2 F | HEART RATE: 89 BPM | DIASTOLIC BLOOD PRESSURE: 68 MMHG | SYSTOLIC BLOOD PRESSURE: 130 MMHG | WEIGHT: 229.9 LBS | OXYGEN SATURATION: 94 % | BODY MASS INDEX: 42.74 KG/M2

## 2019-02-13 DIAGNOSIS — C92.01 ACUTE MYELOID LEUKEMIA IN REMISSION (H): ICD-10-CM

## 2019-02-13 LAB
ALBUMIN SERPL-MCNC: 3.5 G/DL (ref 3.4–5)
ALP SERPL-CCNC: 102 U/L (ref 40–150)
ALT SERPL W P-5'-P-CCNC: 28 U/L (ref 0–50)
ANION GAP SERPL CALCULATED.3IONS-SCNC: 7 MMOL/L (ref 3–14)
AST SERPL W P-5'-P-CCNC: 22 U/L (ref 0–45)
BASOPHILS # BLD AUTO: 0 10E9/L (ref 0–0.2)
BASOPHILS NFR BLD AUTO: 0.3 %
BILIRUB SERPL-MCNC: 0.5 MG/DL (ref 0.2–1.3)
BUN SERPL-MCNC: 19 MG/DL (ref 7–30)
CALCIUM SERPL-MCNC: 8.7 MG/DL (ref 8.5–10.1)
CHLORIDE SERPL-SCNC: 105 MMOL/L (ref 94–109)
CO2 SERPL-SCNC: 28 MMOL/L (ref 20–32)
CREAT SERPL-MCNC: 1.01 MG/DL (ref 0.52–1.04)
DIFFERENTIAL METHOD BLD: ABNORMAL
EOSINOPHIL # BLD AUTO: 0.3 10E9/L (ref 0–0.7)
EOSINOPHIL NFR BLD AUTO: 3.6 %
ERYTHROCYTE [DISTWIDTH] IN BLOOD BY AUTOMATED COUNT: 13.7 % (ref 10–15)
GFR SERPL CREATININE-BSD FRML MDRD: 59 ML/MIN/{1.73_M2}
GLUCOSE SERPL-MCNC: 136 MG/DL (ref 70–99)
HCT VFR BLD AUTO: 45.4 % (ref 35–47)
HGB BLD-MCNC: 13.7 G/DL (ref 11.7–15.7)
IMM GRANULOCYTES # BLD: 0 10E9/L (ref 0–0.4)
IMM GRANULOCYTES NFR BLD: 0.1 %
LYMPHOCYTES # BLD AUTO: 2.9 10E9/L (ref 0.8–5.3)
LYMPHOCYTES NFR BLD AUTO: 39.9 %
MCH RBC QN AUTO: 27.9 PG (ref 26.5–33)
MCHC RBC AUTO-ENTMCNC: 30.2 G/DL (ref 31.5–36.5)
MCV RBC AUTO: 93 FL (ref 78–100)
MONOCYTES # BLD AUTO: 0.7 10E9/L (ref 0–1.3)
MONOCYTES NFR BLD AUTO: 9.4 %
NEUTROPHILS # BLD AUTO: 3.4 10E9/L (ref 1.6–8.3)
NEUTROPHILS NFR BLD AUTO: 46.7 %
NRBC # BLD AUTO: 0 10*3/UL
NRBC BLD AUTO-RTO: 0 /100
PLATELET # BLD AUTO: 175 10E9/L (ref 150–450)
POTASSIUM SERPL-SCNC: 3.8 MMOL/L (ref 3.4–5.3)
PROT SERPL-MCNC: 6.9 G/DL (ref 6.8–8.8)
RBC # BLD AUTO: 4.91 10E12/L (ref 3.8–5.2)
SODIUM SERPL-SCNC: 140 MMOL/L (ref 133–144)
WBC # BLD AUTO: 7.2 10E9/L (ref 4–11)

## 2019-02-13 PROCEDURE — 85025 COMPLETE CBC W/AUTO DIFF WBC: CPT | Performed by: INTERNAL MEDICINE

## 2019-02-13 PROCEDURE — 82784 ASSAY IGA/IGD/IGG/IGM EACH: CPT | Performed by: INTERNAL MEDICINE

## 2019-02-13 PROCEDURE — 80053 COMPREHEN METABOLIC PANEL: CPT | Performed by: INTERNAL MEDICINE

## 2019-02-13 PROCEDURE — G0463 HOSPITAL OUTPT CLINIC VISIT: HCPCS | Mod: ZF

## 2019-02-13 NOTE — NURSING NOTE
"Oncology Rooming Note    February 13, 2019 2:41 PM   Eryn Bennett is a 63 year old female who presents for:    Chief Complaint   Patient presents with     Blood Draw     No labs drawn; vitals     RECHECK     RTN- AML     Initial Vitals: /68 (BP Location: Left arm, Patient Position: Chair, Cuff Size: Adult Regular)   Pulse 89   Temp 98.2  F (36.8  C) (Oral)   Wt 104.3 kg (229 lb 14.4 oz)   SpO2 94%   BMI 42.74 kg/m   Estimated body mass index is 42.74 kg/m  as calculated from the following:    Height as of 12/11/18: 1.562 m (5' 1.5\").    Weight as of this encounter: 104.3 kg (229 lb 14.4 oz). Body surface area is 2.13 meters squared.  Data Unavailable Comment: Data Unavailable   No LMP recorded. Patient has had a hysterectomy.  Allergies reviewed: Yes  Medications reviewed: Yes    Medications: Medication refills not needed today.  Pharmacy name entered into [employer]:    [facility] [place], [place] - [address] [facility] [phone]  [facility] [id number] [place] [address] [address] [facility] [address]  [facility] [address] [place] [address] [address]    Clinical concerns: none     7 minutes for nursing intake (face to face time)     Jackie Witt CMA              "

## 2019-02-13 NOTE — NURSING NOTE
Chief Complaint   Patient presents with     Blood Draw     No labs drawn; vitals     /68 (BP Location: Left arm, Patient Position: Chair, Cuff Size: Adult Regular)   Pulse 89   Temp 98.2  F (36.8  C) (Oral)   Wt 104.3 kg (229 lb 14.4 oz)   SpO2 94%   BMI 42.74 kg/m      No Lab orders. Clinic notified. Vital signs taken in lab. Pt tolerated well. Pt checked in for next appointment.    Shasta Glover RN

## 2019-02-13 NOTE — LETTER
2/13/2019      RE: Eryn Bennett  [address]  [facility] [address]                 BMT Clinic Progress Notes    Ms Bennett, 61 you s/p NMA DUCB transplant     CC: follow-up    History of Present Illness: The patient returns for a planned follow-up with her .  She has been doing well in the last few weeks.  She continues to have multiple recurrent viral infections.  She has had colds and cold sores following them.  Not he has been too severe that she needed to be hospitalized.  In fact, she is now reluctant to go to the local primary care clinic because they only examined her and never do any tests or give her antibiotics.  Her grandchildren were ill last week and now they are better.  She has not developed any new symptoms at this point, yet.  She has dry mouth but no dry eyes.  She continues to have vision changes.  She went to the eye doctor that explained to her that she has some kind of a change in her eye that is, irreversible but it is unclear whether or not this could be progressive and needs close monitoring.  The patient denies any new areas of rashes or fibrosis.  She has no problems with appetite or diet.  She is in good spirits.     Review of Systems: ROS otherwise unremarkable other than what is noted in the HPI.     Physical Exam: KPS 80%  /68 (BP Location: Left arm, Patient Position: Chair, Cuff Size: Adult Regular)   Pulse 89   Temp 98.2  F (36.8  C) (Oral)   Wt 104.3 kg (229 lb 14.4 oz)   SpO2 94%   BMI 42.74 kg/m     General: A&O. NAD.   HEENT: CLARICE, sclera anicteric, the mouth mucosa is mildly dry but there is no erosions or ulcerations and actually minimal lichenoid changes, if any.  Neck: supple  Lungs: CTA bilaterally to bases, no crackles, no wheezing   Heart: RRR, no m/r/g  Abdomen: +BS, soft, NT/ND, no HSM  Extremities: No edema; unable to raise her shoulder above her head.   Skin: She did not have any active skin rashes today.  She usually has some areas of exam on her  neck and under her breast.   Schirmer's test showed 14 mm millimeters on the right eye and more than 20 mm in the left eye.  Labs   Lab Results   Component Value Date    WBC 7.2 02/13/2019    ANEU 3.4 02/13/2019    HGB 13.7 02/13/2019    HCT 45.4 02/13/2019     02/13/2019     02/13/2019    POTASSIUM 3.8 02/13/2019    CHLORIDE 105 02/13/2019    CO2 28 02/13/2019     (H) 02/13/2019    BUN 19 02/13/2019    CR 1.01 02/13/2019    MAG 1.8 04/03/2017    INR 1.03 04/03/2017    BILITOTAL 0.5 02/13/2019    AST 22 02/13/2019    ALT 28 02/13/2019    ALKPHOS 102 02/13/2019    PROTTOTAL 6.9 02/13/2019    ALBUMIN 3.5 02/13/2019       Ms. Eryn Bennett is a 59 yo woman s/p NMA DCBT for AML day 4+ years      1. BMT/AML: continued CR. two year post transplant BM 40% cellular, trilineage hematopoiesis, no leukemia by morphology and flow negative; % donor #2.     2. HEME: Transfusion parameters keep Hgb>8g/dL and plts>10k. Stable counts.  - Patient has history of Hemolytic anemia: Warm anti E; IgG and completment. Received rituximab X 4 September 2015.    Patient has history DVT but her repeat doppler L lower ext negative on OCT 2016. Off coumadin since JUL 2016. During this admission as she was less mobile with viral illness she was given SQ heparin every 12 hours which was stopped on discharge.     3. ID: She already had a flu shot and today we gave her the second boost of the recommended shingles vaccine.  Her IgG is pending.       4. 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  This has been overall stable and there is no indication for systemic therapy.    - Off prednisone since 4/20/16; finished MMF taper on 9/13/16      5. GI: monitor and use PRN medication for GERD  - Compazine available prn      6. FEN/Renal: WNL.  Her vitamin D level is appropriate.  In addition, she also has a glucose level in the 120-130 range when she comes to visit.  She does not have a long fasting but  still has at least 4-6 hours.  The patient was insulin-dependent while taking prednisone.  In addition, she is obese and prone to diabetes.  I asked her to discuss with her primary care for further evaluation of her glucose and management of diabetes if diagnosed.      7. Cardiovascular: High cholesterol and triglycerides may use cholesterol medication. Continue Norvasc 5mg daily Carvedilol 6.25mg bid for cardiomyopathy.      8. Musculoskelatal: She saw an orthopedic surgeon for her right thumb problem.  They recommended monitoring for now.  She still has aches and pains in her lower extremities.  Nothing that is debilitating or keeps her from resting at night, at this point.    - Dexa scan with osteopenia (5/5/15). Had bisphosphonate 10/5/16; repeat in ~12 months; local doctor managing On vitamin D and calcium replacement.    - continue Tramadol and tylenol for muscle pain and that was working better than hydrocodone/acetaminophen       9. Pulmonary: no active issues.  Has had multiple upper respiratory infections.    10. Neurology: feet neuropathy not changed. Monitor and refer to neurology/neuropathy group for assessment.     11. Eyes: eye clinic following after cataract surgery in August 2017.  Recently seen for a reevaluation.  They found there is a scar on her eye that they would like to continue to follow, closely.      Plan:   ISABELLE Ni on 8/16/19 with labs  Primary care found borderline diabetes  Ophthalmologist locally  Completed most vaccines except shingles. Recommended Shingrix if she decides.  Up to date regular health care screenings including but not limited to bone density and health, cholesterol, glucose, colonoscopy had polyps in June 2018; all benign; needs repeat in 3-5 year; mammograms, pap smears, oral cancer yearly up to date; skin cancer yearly pending.  Further discuss dexa scan with PC MD  Watch hand function closely; call with questions or new issues.        Irving Ni,  MD

## 2019-02-13 NOTE — PROGRESS NOTES
BMT Clinic Progress Notes    Ms Bennett, 61 you s/p NMA DUCB transplant     CC: follow-up    History of Present Illness: The patient returns for a planned follow-up with her .  She has been doing well in the last few weeks.  She continues to have multiple recurrent viral infections.  She has had colds and cold sores following them.  Not he has been too severe that she needed to be hospitalized.  In fact, she is now reluctant to go to the local primary care clinic because they only examined her and never do any tests or give her antibiotics.  Her grandchildren were ill last week and now they are better.  She has not developed any new symptoms at this point, yet.  She has dry mouth but no dry eyes.  She continues to have vision changes.  She went to the eye doctor that explained to her that she has some kind of a change in her eye that is, irreversible but it is unclear whether or not this could be progressive and needs close monitoring.  The patient denies any new areas of rashes or fibrosis.  She has no problems with appetite or diet.  She is in good spirits.     Review of Systems: ROS otherwise unremarkable other than what is noted in the HPI.     Physical Exam: KPS 80%  /68 (BP Location: Left arm, Patient Position: Chair, Cuff Size: Adult Regular)   Pulse 89   Temp 98.2  F (36.8  C) (Oral)   Wt 104.3 kg (229 lb 14.4 oz)   SpO2 94%   BMI 42.74 kg/m    General: A&O. NAD.   HEENT: CLARICE, sclera anicteric, the mouth mucosa is mildly dry but there is no erosions or ulcerations and actually minimal lichenoid changes, if any.  Neck: supple  Lungs: CTA bilaterally to bases, no crackles, no wheezing   Heart: RRR, no m/r/g  Abdomen: +BS, soft, NT/ND, no HSM  Extremities: No edema; unable to raise her shoulder above her head.   Skin: She did not have any active skin rashes today.  She usually has some areas of exam on her neck and under her breast.   Schirmer's test showed 14 mm millimeters on the  right eye and more than 20 mm in the left eye.  Labs   Lab Results   Component Value Date    WBC 7.2 02/13/2019    ANEU 3.4 02/13/2019    HGB 13.7 02/13/2019    HCT 45.4 02/13/2019     02/13/2019     02/13/2019    POTASSIUM 3.8 02/13/2019    CHLORIDE 105 02/13/2019    CO2 28 02/13/2019     (H) 02/13/2019    BUN 19 02/13/2019    CR 1.01 02/13/2019    MAG 1.8 04/03/2017    INR 1.03 04/03/2017    BILITOTAL 0.5 02/13/2019    AST 22 02/13/2019    ALT 28 02/13/2019    ALKPHOS 102 02/13/2019    PROTTOTAL 6.9 02/13/2019    ALBUMIN 3.5 02/13/2019       Ms. Eryn Bennett is a 61 yo woman s/p NMA DCBT for AML day 4+ years      1. BMT/AML: continued CR. two year post transplant BM 40% cellular, trilineage hematopoiesis, no leukemia by morphology and flow negative; % donor #2.     2. HEME: Transfusion parameters keep Hgb>8g/dL and plts>10k. Stable counts.  - Patient has history of Hemolytic anemia: Warm anti E; IgG and completment. Received rituximab X 4 September 2015.    Patient has history DVT but her repeat doppler L lower ext negative on OCT 2016. Off coumadin since JUL 2016. During this admission as she was less mobile with viral illness she was given SQ heparin every 12 hours which was stopped on discharge.     3. ID: She already had a flu shot and today we gave her the second boost of the recommended shingles vaccine.  Her IgG is pending.       4. 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  This has been overall stable and there is no indication for systemic therapy.    - Off prednisone since 4/20/16; finished MMF taper on 9/13/16      5. GI: monitor and use PRN medication for GERD  - Compazine available prn      6. FEN/Renal: WNL.  Her vitamin D level is appropriate.  In addition, she also has a glucose level in the 120-130 range when she comes to visit.  She does not have a long fasting but still has at least 4-6 hours.  The patient was insulin-dependent while taking  prednisone.  In addition, she is obese and prone to diabetes.  I asked her to discuss with her primary care for further evaluation of her glucose and management of diabetes if diagnosed.      7. Cardiovascular: High cholesterol and triglycerides may use cholesterol medication. Continue Norvasc 5mg daily Carvedilol 6.25mg bid for cardiomyopathy.      8. Musculoskelatal: She saw an orthopedic surgeon for her right thumb problem.  They recommended monitoring for now.  She still has aches and pains in her lower extremities.  Nothing that is debilitating or keeps her from resting at night, at this point.    - Dexa scan with osteopenia (5/5/15). Had bisphosphonate 10/5/16; repeat in ~12 months; local doctor managing On vitamin D and calcium replacement.    - continue Tramadol and tylenol for muscle pain and that was working better than hydrocodone/acetaminophen       9. Pulmonary: no active issues.  Has had multiple upper respiratory infections.    10. Neurology: feet neuropathy not changed. Monitor and refer to neurology/neuropathy group for assessment.     11. Eyes: eye clinic following after cataract surgery in August 2017.  Recently seen for a reevaluation.  They found there is a scar on her eye that they would like to continue to follow, closely.      Plan:   ISABELLE Ni on 8/16/19 with labs  Primary care found borderline diabetes  Ophthalmologist locally  Completed most vaccines except shingles. Recommended Shingrix if she decides.  Up to date regular health care screenings including but not limited to bone density and health, cholesterol, glucose, colonoscopy had polyps in June 2018; all benign; needs repeat in 3-5 year; mammograms, pap smears, oral cancer yearly up to date; skin cancer yearly pending.  Further discuss dexa scan with PC MD  Watch hand function closely; call with questions or new issues.

## 2019-02-14 LAB — IGG SERPL-MCNC: 616 MG/DL (ref 695–1620)

## 2019-04-02 DIAGNOSIS — I10 ESSENTIAL HYPERTENSION: ICD-10-CM

## 2019-04-02 RX ORDER — CARVEDILOL 6.25 MG/1
TABLET ORAL
Qty: 180 TABLET | Refills: 3 | Status: CANCELLED | OUTPATIENT
Start: 2019-04-02

## 2019-04-02 RX ORDER — CARVEDILOL 6.25 MG/1
6.25 TABLET ORAL 2 TIMES DAILY WITH MEALS
Qty: 180 TABLET | Refills: 4 | Status: SHIPPED | OUTPATIENT
Start: 2019-04-02 | End: 2020-06-17

## 2019-04-02 RX ORDER — AMLODIPINE BESYLATE 5 MG/1
5 TABLET ORAL DAILY
Qty: 90 TABLET | Refills: 4 | Status: SHIPPED | OUTPATIENT
Start: 2019-04-02 | End: 2020-06-17

## 2019-04-02 RX ORDER — AMLODIPINE BESYLATE 5 MG/1
TABLET ORAL
Qty: 90 TABLET | Refills: 3 | Status: CANCELLED | OUTPATIENT
Start: 2019-04-02

## 2019-04-18 PROBLEM — G62.9 PERIPHERAL NEUROPATHY: Status: ACTIVE | Noted: 2019-04-18

## 2019-04-23 ENCOUNTER — CARE COORDINATION (OUTPATIENT)
Dept: TRANSPLANT | Facility: CLINIC | Age: 64
End: 2019-04-23

## 2019-04-23 NOTE — PROGRESS NOTES
Mailed completed [employer] forms back to patient per her request. Pt will mail the forms into [employer].

## 2019-05-14 ENCOUNTER — OFFICE VISIT (OUTPATIENT)
Dept: PULMONOLOGY | Facility: CLINIC | Age: 64
End: 2019-05-14
Attending: INTERNAL MEDICINE
Payer: MEDICARE

## 2019-05-14 VITALS
OXYGEN SATURATION: 93 % | WEIGHT: 232.5 LBS | HEART RATE: 87 BPM | SYSTOLIC BLOOD PRESSURE: 147 MMHG | BODY MASS INDEX: 42.78 KG/M2 | DIASTOLIC BLOOD PRESSURE: 79 MMHG | HEIGHT: 62 IN | TEMPERATURE: 97 F

## 2019-05-14 DIAGNOSIS — J32.0 CHRONIC MAXILLARY SINUSITIS: Primary | ICD-10-CM

## 2019-05-14 ASSESSMENT — PAIN SCALES - GENERAL: PAINLEVEL: NO PAIN (0)

## 2019-05-14 ASSESSMENT — MIFFLIN-ST. JEOR: SCORE: 1559.92

## 2019-05-14 NOTE — LETTER
5/14/2019       RE: Eryn Bennett  [address]  [facility] [address]     Dear Colleague,    Thank you for referring your patient, Eryn Bennett, to the [facility] at [facility]. Please see a copy of my visit note below.    Reason for Visit  Eryn Bennett is a 63 year old year old female who is being seen for Oncology Clinic Visit (Pt is here for a rtn for )    Pulmonary HPI  61 YO with AML s/p NMA DCBT over 3 years ago who is referred to the Pulmonary BMT clinic for evaluation of cough and SOB.  Developed URI in 11-17 that had persisted for 6 weeks.  During this time noticed nasal congestion and drainage, post-nasal drip, fevers, and cough. Denies chest burning. Seen in clinic, PCR for influenza was negative but she was treated with Tamiflu.  Was given course of Azithromycin twice for 5 days apiece, felt somewhat better with Azithromycin but symptoms recurred shortly thereafter.   Presented again in 2-12-18 with fever, cough, and nasal congestion. CT scan of sinuses revealed sinusitis, chest CT with LLL bronchial wall thickening and nodularity that was actually improved compared to April 2017.  Was placed on a 10 day course of Levofloxacin, states started to feel better after 5 days. Since completion of antibiotics in the past 10 days she thinks her symptoms have all resolved; no further nasal congestion or drainage, no cough.  All fevers have resolved.  Similar course last Winter with frequent URI's which were slow to resolve.  No prior history of sinus disease or allergies.  No tobacco use.  IgG level checked 11-17 and was 547.  Off all immunosuppressives.    Last seen 6 months ago. Since her last visit she was seen in Urgent care on 4-26-19 for cough.  Was diagnosed with sinus infection, started on doxycycline (was Augment previously for 10 days) and prednisone. Was restarted on an albuterol inhaler after receiving neb for wheezing.  States did not pick  up albuterol inhaler.  Since that visit she states her wheezing has resolved but still has sinus symptoms of congestion but no sinus pain,  Has post nasal drip and cough.  No fevers or chills.  Is wondering if she can go back on clarithromycin, states was told by ENT clinic not to take for 6 months.  Had diarrhea around the time she took it last but also was around ill people at that time.    The patient was seen and examined by Troy Lomeli           Current Outpatient Medications   Medication     acetaminophen (TYLENOL) 325 MG tablet     amLODIPine (NORVASC) 5 MG tablet     calcium-vitamin D 500-125 MG-UNIT TABS     carvedilol (COREG) 6.25 MG tablet     fluticasone (FLONASE ALLERGY RELIEF) 50 MCG/ACT spray     valACYclovir (VALTREX) 500 MG tablet     desonide (DESOWEN) 0.05 % cream     Multiple Vitamins-Minerals (EYE VITAMINS & MINERALS) TABS     No current facility-administered medications for this visit.      Allergies   Allergen Reactions     Blood Transfusion Related (Informational Only) Other (See Comments)     7/15/15 - Patient has a complex history of clinically significant antibodies against RBC antigens.  Finding compatible RBCs may take up to 24 hours or more.  Consult with the Blood Bank MD for transfusion guidance.  Itching in palms during platelet transfusion in 2010- IV benadryl was given at that time.  Pt has had platelets since then with no reaction.   11/6/14 - Stem cell transplant patient.  Give type O RBCs.     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 Pt would like to avoid, even with premedication, if at all possible.      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    While pt had Hicman in, pt was on a blood thinner       Past Surgical History:   Procedure Laterality Date     BLADDER SURGERY       CHOLECYSTECTOMY  1987     ESOPHAGOSCOPY, GASTROSCOPY, DUODENOSCOPY (EGD), COMBINED N/A 12/11/2014    Procedure: COMBINED ESOPHAGOSCOPY, GASTROSCOPY, DUODENOSCOPY (EGD), BIOPSY SINGLE OR MULTIPLE;  Surgeon: Saturnino Valera MD;  Location: [facility]     GI SURGERY      part of colon removed r.t benign tumor     GYN SURGERY       Dos Santos Catheter Placment Right 2010    Right IJ vein, in for 8 months     HYSTERECTOMY VAGINAL, BILATERAL SALPINGO-OOPHERECTOMY, COMBINED       PICC INSERTION Left 8/28/2014    5fr DL Power PICC, 46cm (1cm external) in the L basilic vein w/ tip in the SVC RA junction.       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      Employer: [employer]   Social Needs     Financial resource strain: Not on file     Food insecurity:     Worry: Not on file     Inability: Not on file     Transportation needs:     Medical: Not on file     Non-medical: Not on file   Tobacco Use     Smoking status: Never Smoker     Smokeless tobacco: Never Used   Substance and Sexual Activity     Alcohol use: No     Alcohol/week: 0.0 oz     Drug use: No     Sexual activity: Not on file   Lifestyle     Physical activity:     Days per week: Not on file     Minutes per session: Not on file      "Stress: Not on file   Relationships     Social connections:     Talks on phone: Not on file     Gets together: Not on file     Attends Sabianism service: Not on file     Active member of club or organization: Not on file     Attends meetings of clubs or organizations: Not on file     Relationship status: Not on file     Intimate partner violence:     Fear of current or ex partner: Not on file     Emotionally abused: Not on file     Physically abused: Not on file     Forced sexual activity: Not on file   Other Topics Concern     Parent/sibling w/ CABG, MI or angioplasty before 65F 55M? Not Asked   Social History Narrative     Not on file       ROS Pulmonary  A complete ROS was otherwise negative except as noted in the HPI.  /79 (BP Location: Right arm, Patient Position: Sitting, Cuff Size: Adult Large)   Pulse 87   Temp 97  F (36.1  C) (Oral)   Ht 1.562 m (5' 1.5\")   Wt 105.5 kg (232 lb 8 oz)   SpO2 93%   BMI 43.22 kg/m     Exam:   GENERAL APPEARANCE: Well developed, well nourished, alert, and in no apparent distress.  EYES: PERRL, EOMI  HENT: Nasal mucosa with no edema and no hyperemia. No nasal polyps.  No sinus tenderness.  EARS: Canals clear, TMs normal  MOUTH: Oral mucosa is moist, without any lesions, no tonsillar enlargement, no oropharyngeal exudate.  NECK: supple, no masses, no thyromegaly.  LYMPHATICS: No significant axillary, cervical, or supraclavicular nodes.  RESP: Good air flow throughout.  No crackles. No rhonchi. No wheezes.  CV: Normal S1, S2, regular rhythm, normal rate. No murmur.  No rub. No gallop. No LE edema.   ABDOMEN:  Bowel sounds normal, soft, nontender, no HSM or masses.   MS: extremities normal. No clubbing. No cyanosis.  SKIN: no rash on limited exam  NEURO: Mentation intact, speech normal, normal strength and tone, normal gait and stance  PSYCH: mentation appears normal. and affect normal/bright  Results:  No results found for this or any previous visit (from the past 168 " hour(s)).    Assessment and plan:   62 YO with viral illness in November 2017 with resultant sinusitis as seen on CT.  Majority of symptoms were upper respiratory in nature at that time.  CT chest 2-18 actually improved.   Off all immunosuppressives and IgG level has maintaind > 400, with last testing 2-3-19.  Has recurrent sinus infections requiring frequent antibiotic regimens.  Resolves somewhat with antibiotics then recurs; prior sinus CT in 2-28 showed narrowing of ostiomeatal units bilaterally but f/u CT was improved. Seen and followed by ENT.   Recent increase in nasal symptoms that have not fully resolved with short-term use of antibiotics.  Needs longer course of antibiotics and increased nasal treatment.  Would re-start clarithromycin if ok'd be ENT (patient states they told her not to re-start prior to one month)- if sinus symptoms do not resolve, will need ENT f/u and repeat sinus CT.  Asthma does not seem to be pressing issue at present.     1. To restart Clarithromycin if ok'd by ENT  2. Albuterol prn  3. Discussed using flonase on a daily basis and saline washes at least once per day; when ill asked to increase use of saline washes at least twice per day.  4. Recommended follow-up with ENT after course of antibiotics        RTC in 3 months.  Patient to call clinic with any questions or concerns prior to her next clinic visit            Again, thank you for allowing me to participate in the care of your patient.      Sincerely,    Troy Lomeli MD

## 2019-05-14 NOTE — PROGRESS NOTES
Reason for Visit  Eryn Bennett is a 63 year old year old female who is being seen for Oncology Clinic Visit (Pt is here for a rtn for )    Pulmonary HPI  63 YO with AML s/p NMA DCBT over 3 years ago who is referred to the Pulmonary BMT clinic for evaluation of cough and SOB.  Developed URI in 11-17 that had persisted for 6 weeks.  During this time noticed nasal congestion and drainage, post-nasal drip, fevers, and cough. Denies chest burning. Seen in clinic, PCR for influenza was negative but she was treated with Tamiflu.  Was given course of Azithromycin twice for 5 days apiece, felt somewhat better with Azithromycin but symptoms recurred shortly thereafter.   Presented again in 2-12-18 with fever, cough, and nasal congestion. CT scan of sinuses revealed sinusitis, chest CT with LLL bronchial wall thickening and nodularity that was actually improved compared to April 2017.  Was placed on a 10 day course of Levofloxacin, states started to feel better after 5 days. Since completion of antibiotics in the past 10 days she thinks her symptoms have all resolved; no further nasal congestion or drainage, no cough.  All fevers have resolved.  Similar course last Winter with frequent URI's which were slow to resolve.  No prior history of sinus disease or allergies.  No tobacco use.  IgG level checked 11-17 and was 547.  Off all immunosuppressives.    Last seen 6 months ago. Since her last visit she was seen in Urgent care on 4-26-19 for cough.  Was diagnosed with sinus infection, started on doxycycline (was Augment previously for 10 days) and prednisone. Was restarted on an albuterol inhaler after receiving neb for wheezing.   did not  albuterol inhaler.  Since that visit she states her wheezing has resolved but still has sinus symptoms of congestion but no sinus pain,  Has post nasal drip and cough.  No fevers or chills.  Is wondering if she can go back on clarithromycin,  was told by ENT clinic not to  take for 6 months.  Had diarrhea around the time she took it last but also was around ill people at that time.    The patient was seen and examined by Troy Lomeli           Current Outpatient Medications   Medication     acetaminophen (TYLENOL) 325 MG tablet     amLODIPine (NORVASC) 5 MG tablet     calcium-vitamin D 500-125 MG-UNIT TABS     carvedilol (COREG) 6.25 MG tablet     fluticasone (FLONASE ALLERGY RELIEF) 50 MCG/ACT spray     valACYclovir (VALTREX) 500 MG tablet     desonide (DESOWEN) 0.05 % cream     Multiple Vitamins-Minerals (EYE VITAMINS & MINERALS) TABS     No current facility-administered medications for this visit.      Allergies   Allergen Reactions     Blood Transfusion Related (Informational Only) Other (See Comments)     7/15/15 - Patient has a complex history of clinically significant antibodies against RBC antigens.  Finding compatible RBCs may take up to 24 hours or more.  Consult with the Blood Bank MD for transfusion guidance.  Itching in palms during platelet transfusion in 2010- IV benadryl was given at that time.  Pt has had platelets since then with no reaction.   11/6/14 - Stem cell transplant patient.  Give type O RBCs.     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 Pt would like to avoid, even with premedication, if at all possible.      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    While pt had Hicman in, pt was on a blood thinner       Past Surgical History:   Procedure Laterality Date     BLADDER SURGERY       CHOLECYSTECTOMY  1987     ESOPHAGOSCOPY, GASTROSCOPY, DUODENOSCOPY (EGD), COMBINED N/A 12/11/2014    Procedure: COMBINED ESOPHAGOSCOPY, GASTROSCOPY, DUODENOSCOPY (EGD), BIOPSY SINGLE OR MULTIPLE;  Surgeon: Saturnino Valera MD;  Location: [facility]     GI SURGERY      part of colon removed r.t benign tumor     GYN SURGERY       Dos Santos Catheter Placment Right 2010    Right IJ vein, in for 8 months     HYSTERECTOMY VAGINAL, BILATERAL SALPINGO-OOPHERECTOMY, COMBINED       PICC INSERTION Left 8/28/2014    5fr DL Power PICC, 46cm (1cm external) in the L basilic vein w/ tip in the SVC RA junction.       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      Employer: [employer]   Social Needs     Financial resource strain: Not on file     Food insecurity:     Worry: Not on file     Inability: Not on file     Transportation needs:     Medical: Not on file     Non-medical: Not on file   Tobacco Use     Smoking status: Never Smoker     Smokeless tobacco: Never Used   Substance and Sexual Activity     Alcohol use: No     Alcohol/week: 0.0 oz     Drug use: No     Sexual activity: Not on file   Lifestyle     Physical activity:     Days per week: Not on file     Minutes per session: Not on file     Stress: Not on file   Relationships     Social connections:     Talks on phone: Not on file     Gets together: Not on file     Attends Faith service: Not on file     Active member of club or organization: Not on file     Attends meetings of clubs or organizations: Not on file  "    Relationship status: Not on file     Intimate partner violence:     Fear of current or ex partner: Not on file     Emotionally abused: Not on file     Physically abused: Not on file     Forced sexual activity: Not on file   Other Topics Concern     Parent/sibling w/ CABG, MI or angioplasty before 65F 55M? Not Asked   Social History Narrative     Not on file       ROS Pulmonary  A complete ROS was otherwise negative except as noted in the HPI.  /79 (BP Location: Right arm, Patient Position: Sitting, Cuff Size: Adult Large)   Pulse 87   Temp 97  F (36.1  C) (Oral)   Ht 1.562 m (5' 1.5\")   Wt 105.5 kg (232 lb 8 oz)   SpO2 93%   BMI 43.22 kg/m    Exam:   GENERAL APPEARANCE: Well developed, well nourished, alert, and in no apparent distress.  EYES: PERRL, EOMI  HENT: Nasal mucosa with no edema and no hyperemia. No nasal polyps.  No sinus tenderness.  EARS: Canals clear, TMs normal  MOUTH: Oral mucosa is moist, without any lesions, no tonsillar enlargement, no oropharyngeal exudate.  NECK: supple, no masses, no thyromegaly.  LYMPHATICS: No significant axillary, cervical, or supraclavicular nodes.  RESP: Good air flow throughout.  No crackles. No rhonchi. No wheezes.  CV: Normal S1, S2, regular rhythm, normal rate. No murmur.  No rub. No gallop. No LE edema.   ABDOMEN:  Bowel sounds normal, soft, nontender, no HSM or masses.   MS: extremities normal. No clubbing. No cyanosis.  SKIN: no rash on limited exam  NEURO: Mentation intact, speech normal, normal strength and tone, normal gait and stance  PSYCH: mentation appears normal. and affect normal/bright  Results:  No results found for this or any previous visit (from the past 168 hour(s)).    Assessment and plan:   62 YO with viral illness in November 2017 with resultant sinusitis as seen on CT.  Majority of symptoms were upper respiratory in nature at that time.  CT chest 2-18 actually improved.   Off all immunosuppressives and IgG level has " maintaind > 400, with last testing 2-3-19.  Has recurrent sinus infections requiring frequent antibiotic regimens.  Resolves somewhat with antibiotics then recurs; prior sinus CT in 2-28 showed narrowing of ostiomeatal units bilaterally but f/u CT was improved. Seen and followed by ENT.  Recent increase in nasal symptoms that have not fully resolved with short-term use of antibiotics.  Needs longer course of antibiotics and increased nasal treatment.  Would re-start clarithromycin if ok'd be ENT (patient states they told her not to re-start prior to one month)- if sinus symptoms do not resolve, will need ENT f/u and repeat sinus CT.  Asthma does not seem to be pressing issue at present.     1. To restart Clarithromycin if ok'd by ENT  2. Albuterol prn  3. Discussed using flonase on a daily basis and saline washes at least once per day; when ill asked to increase use of saline washes at least twice per day.  4. Recommended follow-up with ENT after course of antibiotics        RTC in 3 months.  Patient to call clinic with any questions or concerns prior to her next clinic visit

## 2019-05-14 NOTE — NURSING NOTE
"Oncology Rooming Note    May 14, 2019 2:49 PM   Eryn Bennett is a 63 year old female who presents for:    No chief complaint on file.    Initial Vitals: Blood Pressure 147/79 (BP Location: Right arm, Patient Position: Sitting, Cuff Size: Adult Large)   Pulse 87   Temperature 97  F (36.1  C) (Oral)   Height 1.562 m (5' 1.5\")   Weight 105.5 kg (232 lb 8 oz)   Oxygen Saturation 93%   Body Mass Index 43.22 kg/m   Estimated body mass index is 43.22 kg/m  as calculated from the following:    Height as of this encounter: 1.562 m (5' 1.5\").    Weight as of this encounter: 105.5 kg (232 lb 8 oz). Body surface area is 2.14 meters squared.  No Pain (0) Comment: Data Unavailable   No LMP recorded. Patient has had a hysterectomy.  Allergies reviewed: Yes  Medications reviewed: Yes    Medications: Medication refills not needed today.  Pharmacy name entered into NextMedium:    [facility] [place], [place] - [address] [facility] [address]  [facility] [id number] [place] [address] [address] [facility] AT [facility] [address]  [facility] [address] [place] [address] - [address] [facility]    Clinical concerns: none       Barbie Kulkarni MA                "

## 2019-09-20 ENCOUNTER — APPOINTMENT (OUTPATIENT)
Dept: LAB | Facility: CLINIC | Age: 64
End: 2019-09-20
Attending: INTERNAL MEDICINE
Payer: MEDICARE

## 2019-09-20 ENCOUNTER — ONCOLOGY VISIT (OUTPATIENT)
Dept: TRANSPLANT | Facility: CLINIC | Age: 64
End: 2019-09-20
Attending: INTERNAL MEDICINE
Payer: MEDICARE

## 2019-09-20 VITALS
WEIGHT: 232.5 LBS | OXYGEN SATURATION: 98 % | BODY MASS INDEX: 43.22 KG/M2 | RESPIRATION RATE: 18 BRPM | DIASTOLIC BLOOD PRESSURE: 81 MMHG | SYSTOLIC BLOOD PRESSURE: 147 MMHG | HEART RATE: 70 BPM | TEMPERATURE: 97.6 F

## 2019-09-20 DIAGNOSIS — C92.01 ACUTE MYELOID LEUKEMIA IN REMISSION (H): ICD-10-CM

## 2019-09-20 LAB
ALBUMIN SERPL-MCNC: 3.8 G/DL (ref 3.4–5)
ALP SERPL-CCNC: 93 U/L (ref 40–150)
ALT SERPL W P-5'-P-CCNC: 28 U/L (ref 0–50)
ANION GAP SERPL CALCULATED.3IONS-SCNC: 6 MMOL/L (ref 3–14)
AST SERPL W P-5'-P-CCNC: 18 U/L (ref 0–45)
BASOPHILS # BLD AUTO: 0 10E9/L (ref 0–0.2)
BASOPHILS NFR BLD AUTO: 0.3 %
BILIRUB SERPL-MCNC: 0.4 MG/DL (ref 0.2–1.3)
BUN SERPL-MCNC: 21 MG/DL (ref 7–30)
CALCIUM SERPL-MCNC: 9.2 MG/DL (ref 8.5–10.1)
CHLORIDE SERPL-SCNC: 105 MMOL/L (ref 94–109)
CO2 SERPL-SCNC: 27 MMOL/L (ref 20–32)
CREAT SERPL-MCNC: 0.91 MG/DL (ref 0.52–1.04)
DIFFERENTIAL METHOD BLD: NORMAL
EOSINOPHIL # BLD AUTO: 0.3 10E9/L (ref 0–0.7)
EOSINOPHIL NFR BLD AUTO: 3.3 %
ERYTHROCYTE [DISTWIDTH] IN BLOOD BY AUTOMATED COUNT: 13.2 % (ref 10–15)
GFR SERPL CREATININE-BSD FRML MDRD: 67 ML/MIN/{1.73_M2}
GLUCOSE SERPL-MCNC: 111 MG/DL (ref 70–99)
HCT VFR BLD AUTO: 42.4 % (ref 35–47)
HGB BLD-MCNC: 13.6 G/DL (ref 11.7–15.7)
IMM GRANULOCYTES # BLD: 0 10E9/L (ref 0–0.4)
IMM GRANULOCYTES NFR BLD: 0.3 %
LYMPHOCYTES # BLD AUTO: 3.5 10E9/L (ref 0.8–5.3)
LYMPHOCYTES NFR BLD AUTO: 44.2 %
MCH RBC QN AUTO: 29.9 PG (ref 26.5–33)
MCHC RBC AUTO-ENTMCNC: 32.1 G/DL (ref 31.5–36.5)
MCV RBC AUTO: 93 FL (ref 78–100)
MONOCYTES # BLD AUTO: 0.6 10E9/L (ref 0–1.3)
MONOCYTES NFR BLD AUTO: 7.5 %
NEUTROPHILS # BLD AUTO: 3.5 10E9/L (ref 1.6–8.3)
NEUTROPHILS NFR BLD AUTO: 44.4 %
NRBC # BLD AUTO: 0 10*3/UL
NRBC BLD AUTO-RTO: 0 /100
PLATELET # BLD AUTO: 150 10E9/L (ref 150–450)
POTASSIUM SERPL-SCNC: 4 MMOL/L (ref 3.4–5.3)
PROT SERPL-MCNC: 7 G/DL (ref 6.8–8.8)
RBC # BLD AUTO: 4.55 10E12/L (ref 3.8–5.2)
SODIUM SERPL-SCNC: 138 MMOL/L (ref 133–144)
WBC # BLD AUTO: 7.9 10E9/L (ref 4–11)

## 2019-09-20 PROCEDURE — 80053 COMPREHEN METABOLIC PANEL: CPT | Performed by: INTERNAL MEDICINE

## 2019-09-20 PROCEDURE — 36415 COLL VENOUS BLD VENIPUNCTURE: CPT

## 2019-09-20 PROCEDURE — 85025 COMPLETE CBC W/AUTO DIFF WBC: CPT | Performed by: INTERNAL MEDICINE

## 2019-09-20 PROCEDURE — G0463 HOSPITAL OUTPT CLINIC VISIT: HCPCS | Mod: ZF

## 2019-09-20 ASSESSMENT — PAIN SCALES - GENERAL: PAINLEVEL: NO PAIN (0)

## 2019-09-20 NOTE — PROGRESS NOTES
BMT Clinic Progress Notes    Ms Bennett, 61 you s/p NMA DUCB transplant     CC: follow-up    History of Present Illness: The patient returns for a planned follow-up with her .  She is doing very well.  Still has achiness on her legs at night and some neuropathy in her feet.  However, from her overall clinical health is been good.  She does have some genital dryness that makes it hard to have sex but otherwise no genitourinary complaints.  She did have a visit with dermatology and removed something from her back that turned out to be a pre-melanoma lesion.  Nothing was recommended at the time that time but she will have additional follow-up in a year.    Review of Systems: ROS otherwise unremarkable other than what is noted in the HPI.     Physical Exam: KPS 90%  BP (!) 147/81   Pulse 70   Temp 97.6  F (36.4  C) (Oral)   Resp 18   Wt 105.5 kg (232 lb 8 oz)   SpO2 98%   BMI 43.22 kg/m    General: A&O. NAD.   HEENT: CLARICE, sclera anicteric, the mouth mucosa is mildly dry but there is no erosions or ulcerations and actually minimal lichenoid changes, if any.  Neck: supple  Lungs: CTA bilaterally to bases, no crackles, no wheezing   Heart: RRR, no m/r/g  Abdomen: +BS, soft, NT/ND, no HSM  Extremities: No edema; unable to raise her shoulder above her head.   Skin: She did not have any active skin rashes today.  She usually has some areas of exam on her neck and under her breast.   Schirmer's test showed 14 mm millimeters on the right eye and more than 20 mm in the left eye.  Labs   Lab Results   Component Value Date    WBC 7.9 09/20/2019    ANEU 3.5 09/20/2019    HGB 13.6 09/20/2019    HCT 42.4 09/20/2019     09/20/2019     09/20/2019    POTASSIUM 4.0 09/20/2019    CHLORIDE 105 09/20/2019    CO2 27 09/20/2019     (H) 09/20/2019    BUN 21 09/20/2019    CR 0.91 09/20/2019    MAG 1.8 04/03/2017    INR 1.03 04/03/2017    BILITOTAL 0.4 09/20/2019    AST 18 09/20/2019    ALT 28 09/20/2019     ALKPHOS 93 09/20/2019    PROTTOTAL 7.0 09/20/2019    ALBUMIN 3.8 09/20/2019     Ms. Eryn Bennett is a 59 yo woman s/p NMA DCBT for AML day ~5 years     1. BMT/AML: continued CR. two year post transplant BM 40% cellular, trilineage hematopoiesis, no leukemia by morphology and flow negative; % donor #2.     2. HEME:  Her blood counts are normal now for approximately 5 years after transplant.  - history of Hemolytic anemia: Warm anti E; IgG and completment. Received rituximab X 4 September 2015.   - Patient has history DVT but her repeat doppler L lower ext negative on OCT 2016. Off coumadin since JUL 2016. During this admission as she was less mobile with viral illness she was given SQ heparin every 12 hours which was stopped on discharge.     3. ID: Recommended to have a flu shot in the fall.  Otherwise she is doing great but she knows that she can cause us if he develops infectious complications.        4. 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  She has been off immunosuppression for a long time.    - Off prednisone since 4/20/16; finished MMF taper on 9/13/16      5. GI: monitor and use PRN medication for GERD  - Compazine available prn      6. FEN/Renal: WNL.  Her vitamin D level is appropriate.  In addition, she also has a glucose level in the 120-130 range when she comes to visit.  She does not have a long fasting but still has at least 4-6 hours.  The patient was insulin-dependent while taking prednisone.  In addition, she is obese and prone to diabetes.  I asked her to discuss with her primary care for further evaluation of her glucose and management of diabetes if diagnosed.      7. Cardiovascular: High cholesterol and triglycerides may use cholesterol medication. Continue Norvasc 5mg daily Carvedilol 6.25mg bid for cardiomyopathy.      8. Musculoskelatal: Achy legs.  On vitamin calcium replacement.  Recommended physical therapy and physical  activity.    - Dexa scan with osteopenia (5/5/15). Had bisphosphonate 10/5/16; repeat in ~12 months; local doctor managing On vitamin D and calcium replacement.    - continue Tramadol and tylenol for muscle pain and that was working better than hydrocodone/acetaminophen       9. Pulmonary: no active issues.      10. Neurology: feet neuropathy not changed. Monitor and refer to neurology/neuropathy group for assessment.     11. Eyes: eye clinic following after cataract surgery in August 2017.  Recently seen for a reevaluation.  They found there is a scar on her eye that they would like to continue to follow, closely.    12 General health: recommended additional follow-up   for all the screenings with primary care   and to possibly discuss estrogen replacement, possibly topical.    Plan:   ISABELLE Ni in 1 year with labs  Primary care for diabetes, heart, lipids, GYN, yearly skin cancer screen, cancer screen in general.  Ophthalmologist locally  Recommend flu shot in the fall yearly  Dexa scan in 1-2 years with PCMD

## 2019-09-20 NOTE — NURSING NOTE
"Oncology Rooming Note    September 20, 2019 12:12 PM   Eryn Bennett is a 64 year old female who presents for:    Chief Complaint   Patient presents with     Blood Draw     VPT blood draw and vitals     RECHECK     Return: AML      Initial Vitals: BP (!) 147/81   Pulse 70   Temp 97.6  F (36.4  C) (Oral)   Resp 18   Wt 105.5 kg (232 lb 8 oz)   SpO2 98%   BMI 43.22 kg/m   Estimated body mass index is 43.22 kg/m  as calculated from the following:    Height as of 5/14/19: 1.562 m (5' 1.5\").    Weight as of this encounter: 105.5 kg (232 lb 8 oz). Body surface area is 2.14 meters squared.  No Pain (0) Comment: Data Unavailable   No LMP recorded. Patient has had a hysterectomy.  Allergies reviewed: Yes  Medications reviewed: Yes    Medications: Medication refills not needed today.  Pharmacy name entered into EPIC:    [facility] [place], [place] - [address] [place] [address] [address]  [facility] #[id number]  [place] - [address]  AT [facility] [address]  [facility] [address] [place] [address] [place] - [address] [facility]    Clinical concerns: None       Rama Shoemaker CMA              "

## 2019-09-20 NOTE — LETTER
9/20/2019      RE: Eryn Bennett  [address]  [facility] [address]       BMT Clinic Progress Notes    Ms Bennett, 61 you s/p NMA DUCB transplant     CC: follow-up    History of Present Illness: The patient returns for a planned follow-up with her .  She is doing very well.  Still has achiness on her legs at night and some neuropathy in her feet.  However, from her overall clinical health is been good.  She does have some genital dryness that makes it hard to have sex but otherwise no genitourinary complaints.  She did have a visit with dermatology and removed something from her back that turned out to be a pre-melanoma lesion.  Nothing was recommended at the time that time but she will have additional follow-up in a year.    Review of Systems: ROS otherwise unremarkable other than what is noted in the HPI.     Physical Exam: KPS 90%  BP (!) 147/81   Pulse 70   Temp 97.6  F (36.4  C) (Oral)   Resp 18   Wt 105.5 kg (232 lb 8 oz)   SpO2 98%   BMI 43.22 kg/m     General: A&O. NAD.   HEENT: CLARICE, sclera anicteric, the mouth mucosa is mildly dry but there is no erosions or ulcerations and actually minimal lichenoid changes, if any.  Neck: supple  Lungs: CTA bilaterally to bases, no crackles, no wheezing   Heart: RRR, no m/r/g  Abdomen: +BS, soft, NT/ND, no HSM  Extremities: No edema; unable to raise her shoulder above her head.   Skin: She did not have any active skin rashes today.  She usually has some areas of exam on her neck and under her breast.   Schirmer's test showed 14 mm millimeters on the right eye and more than 20 mm in the left eye.  Labs   Lab Results   Component Value Date    WBC 7.9 09/20/2019    ANEU 3.5 09/20/2019    HGB 13.6 09/20/2019    HCT 42.4 09/20/2019     09/20/2019     09/20/2019    POTASSIUM 4.0 09/20/2019    CHLORIDE 105 09/20/2019    CO2 27 09/20/2019     (H) 09/20/2019    BUN 21 09/20/2019    CR 0.91 09/20/2019    MAG 1.8 04/03/2017    INR 1.03 04/03/2017     BILITOTAL 0.4 09/20/2019    AST 18 09/20/2019    ALT 28 09/20/2019    ALKPHOS 93 09/20/2019    PROTTOTAL 7.0 09/20/2019    ALBUMIN 3.8 09/20/2019     Ms. Eryn Bennett is a 61 yo woman s/p NMA DCBT for AML day ~5 years     1. BMT/AML: continued CR. two year post transplant BM 40% cellular, trilineage hematopoiesis, no leukemia by morphology and flow negative; % donor #2.     2. HEME:  Her blood counts are normal now for approximately 5 years after transplant.  - history of Hemolytic anemia: Warm anti E; IgG and completment. Received rituximab X 4 September 2015.   - Patient has history DVT but her repeat doppler L lower ext negative on OCT 2016. Off coumadin since JUL 2016. During this admission as she was less mobile with viral illness she was given SQ heparin every 12 hours which was stopped on discharge.     3. ID: Recommended to have a flu shot in the fall.  Otherwise she is doing great but she knows that she can cause us if he develops infectious complications.        4. 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  She has been off immunosuppression for a long time.    - Off prednisone since 4/20/16; finished MMF taper on 9/13/16      5. GI: monitor and use PRN medication for GERD  - Compazine available prn      6. FEN/Renal: WNL.  Her vitamin D level is appropriate.  In addition, she also has a glucose level in the 120-130 range when she comes to visit.  She does not have a long fasting but still has at least 4-6 hours.  The patient was insulin-dependent while taking prednisone.  In addition, she is obese and prone to diabetes.  I asked her to discuss with her primary care for further evaluation of her glucose and management of diabetes if diagnosed.      7. Cardiovascular: High cholesterol and triglycerides may use cholesterol medication. Continue Norvasc 5mg daily Carvedilol 6.25mg bid for cardiomyopathy.      8. Musculoskelatal: Achy legs.  On  vitamin calcium replacement.  Recommended physical therapy and physical activity.    - Dexa scan with osteopenia (5/5/15). Had bisphosphonate 10/5/16; repeat in ~12 months; local doctor managing On vitamin D and calcium replacement.    - continue Tramadol and tylenol for muscle pain and that was working better than hydrocodone/acetaminophen       9. Pulmonary: no active issues.      10. Neurology: feet neuropathy not changed. Monitor and refer to neurology/neuropathy group for assessment.     11. Eyes: eye clinic following after cataract surgery in August 2017.  Recently seen for a reevaluation.  They found there is a scar on her eye that they would like to continue to follow, closely.    12 General health: recommended additional follow-up   for all the screenings with primary care   and to possibly discuss estrogen replacement, possibly topical.    Plan:   RTSOWMYA Ni in 1 year with labs  Primary care for diabetes, heart, lipids, GYN, yearly skin cancer screen, cancer screen in general.  Ophthalmologist locally  Recommend flu shot in the fall yearly  Dexa scan in 1-2 years with PCMD    Irving Ni MD

## 2019-09-20 NOTE — NURSING NOTE
Chief Complaint   Patient presents with     Blood Draw     VPT blood draw and vitals     Venipuncture labs drawn from left arm

## 2019-10-31 ENCOUNTER — TELEPHONE (OUTPATIENT)
Dept: TRANSPLANT | Facility: CLINIC | Age: 64
End: 2019-10-31

## 2019-10-31 NOTE — TELEPHONE ENCOUNTER
Received a call from patient on 10/30 regarding new symptoms: beginning on 10/15 pt began c/o sinus congestion and cough for which she went to her local Urgent care on 10/21 and was prescribed Doxycycline (pt has taken this before). On 10/24 pt woke up with a rash, welts and itching. She went back to Urgent care and was prescribed Benadryl, prednisone 40mg daily x 5 days and Amoxicillin. Since then pt continues to have a rash and itching which is temporarily relieved with Benadryl. Pt states her cold/sinus symptoms are improving and she remain afebrile.     Discussed these symptoms on 10/30 with Dr Ni who recommends pt be seen by her local PCP and have a CBC drawn. Called pt back on 10/30 with Dr Ni's recommendations. Pt verbalized understanding and confirmed she will call her PCP's office (Dr Aguayo) for an appt.     Spoke with patient on 10/31 to inquire about PCP appt and her symptoms. Pt states she still gets itchy but gets a few hours of relief from Benadryl. Pt states she has an appt with an NP at her PCP's office this afternoon. Instructed pt to call if she needs to be seen up here or has any new symptoms or concerns. Pt verbalized understanding and had no further questions.

## 2020-01-03 DIAGNOSIS — C92.01 ACUTE MYELOID LEUKEMIA IN REMISSION (H): ICD-10-CM

## 2020-01-03 DIAGNOSIS — Z94.81 S/P ALLOGENEIC BONE MARROW TRANSPLANT (H): Primary | ICD-10-CM

## 2020-01-08 ENCOUNTER — ANCILLARY PROCEDURE (OUTPATIENT)
Dept: CT IMAGING | Facility: CLINIC | Age: 65
End: 2020-01-08
Attending: INTERNAL MEDICINE
Payer: COMMERCIAL

## 2020-01-08 ENCOUNTER — ONCOLOGY VISIT (OUTPATIENT)
Dept: TRANSPLANT | Facility: CLINIC | Age: 65
End: 2020-01-08
Attending: INTERNAL MEDICINE
Payer: MEDICARE

## 2020-01-08 ENCOUNTER — APPOINTMENT (OUTPATIENT)
Dept: LAB | Facility: CLINIC | Age: 65
End: 2020-01-08
Attending: INTERNAL MEDICINE
Payer: MEDICARE

## 2020-01-08 ENCOUNTER — DOCUMENTATION ONLY (OUTPATIENT)
Dept: TRANSPLANT | Facility: CLINIC | Age: 65
End: 2020-01-08

## 2020-01-08 VITALS
SYSTOLIC BLOOD PRESSURE: 150 MMHG | WEIGHT: 228.7 LBS | RESPIRATION RATE: 16 BRPM | HEART RATE: 86 BPM | TEMPERATURE: 98.4 F | BODY MASS INDEX: 42.51 KG/M2 | DIASTOLIC BLOOD PRESSURE: 62 MMHG | OXYGEN SATURATION: 96 %

## 2020-01-08 DIAGNOSIS — Z94.81 S/P ALLOGENEIC BONE MARROW TRANSPLANT (H): ICD-10-CM

## 2020-01-08 DIAGNOSIS — C92.01 ACUTE MYELOID LEUKEMIA IN REMISSION (H): ICD-10-CM

## 2020-01-08 DIAGNOSIS — R93.89 ABNORMAL FINDING ON IMAGING: Primary | ICD-10-CM

## 2020-01-08 LAB
ALBUMIN SERPL-MCNC: 3.4 G/DL (ref 3.4–5)
ALP SERPL-CCNC: 94 U/L (ref 40–150)
ALT SERPL W P-5'-P-CCNC: 20 U/L (ref 0–50)
ANION GAP SERPL CALCULATED.3IONS-SCNC: 4 MMOL/L (ref 3–14)
AST SERPL W P-5'-P-CCNC: 13 U/L (ref 0–45)
BASOPHILS # BLD AUTO: 0.1 10E9/L (ref 0–0.2)
BASOPHILS NFR BLD AUTO: 0.5 %
BILIRUB SERPL-MCNC: 0.4 MG/DL (ref 0.2–1.3)
BUN SERPL-MCNC: 19 MG/DL (ref 7–30)
C PNEUM DNA SPEC QL NAA+PROBE: NOT DETECTED
CALCIUM SERPL-MCNC: 9.3 MG/DL (ref 8.5–10.1)
CHLORIDE SERPL-SCNC: 105 MMOL/L (ref 94–109)
CO2 SERPL-SCNC: 27 MMOL/L (ref 20–32)
CREAT SERPL-MCNC: 0.97 MG/DL (ref 0.52–1.04)
DIFFERENTIAL METHOD BLD: ABNORMAL
EOSINOPHIL # BLD AUTO: 0.4 10E9/L (ref 0–0.7)
EOSINOPHIL NFR BLD AUTO: 4.5 %
ERYTHROCYTE [DISTWIDTH] IN BLOOD BY AUTOMATED COUNT: 13.7 % (ref 10–15)
FLUAV H1 2009 PAND RNA SPEC QL NAA+PROBE: NOT DETECTED
FLUAV H1 RNA SPEC QL NAA+PROBE: NOT DETECTED
FLUAV H3 RNA SPEC QL NAA+PROBE: NOT DETECTED
FLUAV RNA SPEC QL NAA+PROBE: NOT DETECTED
FLUBV RNA SPEC QL NAA+PROBE: NOT DETECTED
GFR SERPL CREATININE-BSD FRML MDRD: 62 ML/MIN/{1.73_M2}
GLUCOSE SERPL-MCNC: 138 MG/DL (ref 70–99)
HADV DNA SPEC QL NAA+PROBE: NOT DETECTED
HCOV PNL SPEC NAA+PROBE: NOT DETECTED
HCT VFR BLD AUTO: 40.8 % (ref 35–47)
HGB BLD-MCNC: 12.7 G/DL (ref 11.7–15.7)
HMPV RNA SPEC QL NAA+PROBE: NOT DETECTED
HPIV1 RNA SPEC QL NAA+PROBE: NOT DETECTED
HPIV2 RNA SPEC QL NAA+PROBE: NOT DETECTED
HPIV3 RNA SPEC QL NAA+PROBE: NOT DETECTED
HPIV4 RNA SPEC QL NAA+PROBE: NOT DETECTED
IGG SERPL-MCNC: 539 MG/DL (ref 610–1616)
IMM GRANULOCYTES # BLD: 0 10E9/L (ref 0–0.4)
IMM GRANULOCYTES NFR BLD: 0.2 %
LYMPHOCYTES # BLD AUTO: 3.6 10E9/L (ref 0.8–5.3)
LYMPHOCYTES NFR BLD AUTO: 36.7 %
M PNEUMO DNA SPEC QL NAA+PROBE: NOT DETECTED
MCH RBC QN AUTO: 27.7 PG (ref 26.5–33)
MCHC RBC AUTO-ENTMCNC: 31.1 G/DL (ref 31.5–36.5)
MCV RBC AUTO: 89 FL (ref 78–100)
MICROBIOLOGIST REVIEW: NORMAL
MONOCYTES # BLD AUTO: 0.8 10E9/L (ref 0–1.3)
MONOCYTES NFR BLD AUTO: 7.7 %
NEUTROPHILS # BLD AUTO: 5 10E9/L (ref 1.6–8.3)
NEUTROPHILS NFR BLD AUTO: 50.4 %
NRBC # BLD AUTO: 0 10*3/UL
NRBC BLD AUTO-RTO: 0 /100
PLATELET # BLD AUTO: 267 10E9/L (ref 150–450)
POTASSIUM SERPL-SCNC: 3.9 MMOL/L (ref 3.4–5.3)
PROT SERPL-MCNC: 7.1 G/DL (ref 6.8–8.8)
RBC # BLD AUTO: 4.58 10E12/L (ref 3.8–5.2)
RSV RNA SPEC QL NAA+PROBE: NOT DETECTED
RSV RNA SPEC QL NAA+PROBE: NOT DETECTED
RV+EV RNA SPEC QL NAA+PROBE: NOT DETECTED
SODIUM SERPL-SCNC: 137 MMOL/L (ref 133–144)
WBC # BLD AUTO: 9.8 10E9/L (ref 4–11)

## 2020-01-08 PROCEDURE — 87486 CHLMYD PNEUM DNA AMP PROBE: CPT | Performed by: INTERNAL MEDICINE

## 2020-01-08 PROCEDURE — 82784 ASSAY IGA/IGD/IGG/IGM EACH: CPT | Performed by: INTERNAL MEDICINE

## 2020-01-08 PROCEDURE — 87581 M.PNEUMON DNA AMP PROBE: CPT | Performed by: INTERNAL MEDICINE

## 2020-01-08 PROCEDURE — 80053 COMPREHEN METABOLIC PANEL: CPT | Performed by: INTERNAL MEDICINE

## 2020-01-08 PROCEDURE — 36415 COLL VENOUS BLD VENIPUNCTURE: CPT

## 2020-01-08 PROCEDURE — 85025 COMPLETE CBC W/AUTO DIFF WBC: CPT | Performed by: INTERNAL MEDICINE

## 2020-01-08 PROCEDURE — G0463 HOSPITAL OUTPT CLINIC VISIT: HCPCS | Mod: ZF

## 2020-01-08 PROCEDURE — 87633 RESP VIRUS 12-25 TARGETS: CPT | Performed by: INTERNAL MEDICINE

## 2020-01-08 ASSESSMENT — PAIN SCALES - GENERAL: PAINLEVEL: SEVERE PAIN (6)

## 2020-01-08 NOTE — LETTER
1/8/2020      RE: Eryn Bennett  [address]  [facility] [address]       BMT Clinic Progress Notes    Ms Bennett, 61 you s/p NMA DUCB transplant     CC: follow-up    History of Present Illness: The patient returns for a unplanned follow-up with her .  She has had upper respiratory symptoms, and occasional lower respiratory symptoms, since October 2019.  She has been to the doctor in [facility] at least 8 times.  She had multiple courses of antibiotics.  There was some initial investigation with x-rays.  But she has not really resolved the symptoms.  She does report that in the last week since she is scheduled this appointment she has been feeling better.  She still has occasional dark green sputum.  She has no fevers.  She has some pain on her left lower back and left chest that is worse with coughing that she thinks she pulled a muscle.  She has no water physical complaints at this time other than she gets fatigue somewhat easily.  She is not following very closely with her primary care that she rarely is able to see.  She did have a flu shot in the fall.  She was given doxycycline that caused a maculopapular rash and some small blisters.  The reaction resolved after stopping the doxycycline and starting amoxicillin with some Benadryl.  She has multiple other drug allergies.  She has no significant drainage from the nose.  She has a recently born granddaughter that she has not been able to see because she has been sick.  An influenza test done in [place] also did not find a viral infection.  She reports that in November she developed a swollen lymph glands on her neck bilaterally, most noticeable on the right side.  She went to the emergency department and he did a neck CT on 11/22/2019.  That showed lymph nodes as largest 2 cm.  At the same time she tested positive for strep throat.  She was treated and lymph nodes have essentially resolved    Review of Systems: ROS otherwise unremarkable other  than what is noted in the HPI.     Physical Exam: KPS 90%  BP (!) 150/62 (BP Location: Right arm, Patient Position: Sitting, Cuff Size: Adult Regular)   Pulse 86   Temp 98.4  F (36.9  C) (Oral)   Resp 16   Wt 103.7 kg (228 lb 11.2 oz)   SpO2 96%   BMI 42.51 kg/m     General: A&O. NAD.   HEENT: CLARICE, sclera anicteric, the mouth mucosa is mildly dry but there is no erosions or ulcerations and actually minimal lichenoid changes, if any.  Neck: supple, I do not feel any palpable lymph nodes or masses.  Lungs: CTA bilaterally to bases, no crackles, no wheezing   Heart: RRR, no m/r/g  Abdomen: +BS, soft, NT/ND, no HSM  Extremities: No edema; unable to raise her shoulder above her head.   Skin: She did not have any active skin rashes today.  She usually has some areas of exam on her neck and under her breast.   Schirmer's test showed 14 mm millimeters on the right eye and more than 20 mm in the left eye.  Labs   Lab Results   Component Value Date    WBC 9.8 01/08/2020    ANEU 5.0 01/08/2020    HGB 12.7 01/08/2020    HCT 40.8 01/08/2020     01/08/2020     01/08/2020    POTASSIUM 3.9 01/08/2020    CHLORIDE 105 01/08/2020    CO2 27 01/08/2020     (H) 01/08/2020    BUN 19 01/08/2020    CR 0.97 01/08/2020    MAG 1.8 04/03/2017    INR 1.03 04/03/2017    BILITOTAL 0.4 01/08/2020    AST 13 01/08/2020    ALT 20 01/08/2020    ALKPHOS 94 01/08/2020    PROTTOTAL 7.1 01/08/2020    ALBUMIN 3.4 01/08/2020     The CT sinus 1/8/2020 shows mild sinusitis  Well with CT chest without contrast 1/8/2020: My review of the imaging suggests a new nodular opacity/consolidation on the right upper lobe.  This seems consistent with a fungal process.  Formal report is pending.     Ms. Eryn Bennett is a 59 yo woman s/p NMA DCBT for AML day ~5 years     1. BMT/AML: continued CR. two year post transplant BM 40% cellular, trilineage hematopoiesis, no leukemia by morphology and flow negative; % donor #2.     2. HEME:  Her  blood counts are normal now for approximately 5 years after transplant.  - history of Hemolytic anemia: Warm anti E; IgG and completment. Received rituximab X 4 September 2015.   - Patient has history DVT but her repeat doppler L lower ext negative on OCT 2016. Off coumadin since JUL 2016. During this admission as she was less mobile with viral illness she was given SQ heparin every 12 hours which was stopped on discharge.     3. ID: Recommended to have a flu shot in the fall.  The patient has a persistent upper respiratory infection/symptoms since October.  This is now finally getting better but she still has a nasal voice and some recurrent cough.  After discussing the pros and cons we decided not to give her another antibiotic.  We will get a respiratory viral panel with PCR for 13 viruses.  We will get a CT of the sinuses and of the lungs without contrast today.  Will determine further investigation and manage based on the imaging studies.  Otherwise we will follow her to improvement.  An IgG level from today's pending the last one was in the 600 range spontaneously.        4. 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  It is however possible that some of his respiratory issues are related to GVHD.  Will wait for all the results and make additional planning based on the findings.  At this time she has no indication for systemic immunosuppression.    - Off prednisone since 4/20/16; finished MMF taper on 9/13/16      5. GI: monitor and use PRN medication for GERD  - Compazine available prn      6. FEN/Renal: WNL.  Her vitamin D level is appropriate.  In addition, she also has a glucose level in the 120-130 range when she comes to visit.  She does not have a long fasting but still has at least 4-6 hours.  The patient was insulin-dependent while taking prednisone.  In addition, she is obese and prone to diabetes.  I asked her to discuss with her primary care for further evaluation of her glucose and  management of diabetes if diagnosed.      7. Cardiovascular: High cholesterol and triglycerides may use cholesterol medication. Continue Norvasc 5mg daily Carvedilol 6.25mg bid for cardiomyopathy.      8. Musculoskelatal: no complain of achy legs today.  On vitamin calcium replacement.  Recommended physical therapy and physical activity.    - Dexa scan with osteopenia (5/5/15). Had bisphosphonate 10/5/16; repeat in ~12 months; local doctor managing On vitamin D and calcium replacement.    - continue Tramadol and tylenol for muscle pain and that was working better than hydrocodone/acetaminophen       9. Pulmonary: See above regarding respiratory symptoms.  The CT chest does not finally read yet but has an area in the right upper lobe suggestive of fungal infection.  I discussed with pulmonary who will see her tomorrow and probably bronc her on Friday.  We would then start her on antifungal until results come back.    10. Neurology: feet neuropathy not changed. Monitor and refer to neurology/neuropathy group for assessment.     11. Eyes: eye clinic following after cataract surgery in August 2017.  Recently seen for a reevaluation.  They found there is a scar on her eye that they would like to continue to follow, closely.    12 General health: She was again encouraged to meet regularly with her primary care physician to address regular healthcare maintenance.  In her case in particular concern about her incipient diabetes, heart, lipids, and thyroid.  She was seen for a Pap smear and felt not to be a candidate for regular Pap smears anymore.  She has a recent mammogram that was fine.  She knows she needs yearly screening for skin cancer and oral cancer as well.     Plan:   ISABELLE Ni in 1 year with labs; as scheduled  Primary care for diabetes, heart, lipids, GYN, thyroid, yearly skin cancer screen, cancer screen in general.  Ophthalmologist locally  Had flu shot in the fall   [facility]  [facility]  sinus w/o contrast and lung w/o contrast   RVP PCR today     Addendum: 11 AM I reviewed the CT chest personally and found a new area suggestive of a fungal infection of the right upper lobe.  Discussed the case with pulmonary who will see her in clinic tomorrow afternoon and probably [facility] her the next day.  She would then be started on voriconazole until results of the bronchoscopy come back.  She should be treated on therapeutic doses of voriconazole or if problems with tolerance posaconazole.     Addendum 1 PM: I spoke to patient and shared with her the findings and the plan.  She is agreeable to it.  I communicated with the  team that is going to make a referral for an overnight stay [facility] from 1/9/2020 to 1/10/2020, so that she can see pulmonary late tomorrow afternoon and have the bronchoscopy the next day without having to travel back and forth from [facility] [facility].    Irving Ni MD

## 2020-01-08 NOTE — PROGRESS NOTES
BMT Clinic Progress Notes    Ms Bennett, 61 you s/p NMA DUCB transplant     CC: follow-up    History of Present Illness: The patient returns for a unplanned follow-up with her .  She has had upper respiratory symptoms, and occasional lower respiratory symptoms, since October 2019.  She has been to the doctor in [facility] at least 8 times.  She had multiple courses of antibiotics.  There was some initial investigation with x-rays.  But she has not really resolved the symptoms.  She does report that in the last week since she is scheduled this appointment she has been feeling better.  She still has occasional dark green sputum.  She has no fevers.  She has some pain on her left lower back and left chest that is worse with coughing that she thinks she pulled a muscle.  She has no water physical complaints at this time other than she gets fatigue somewhat easily.  She is not following very closely with her primary care that she rarely is able to see.  She did have a flu shot in the fall.  She was given doxycycline that caused a maculopapular rash and some small blisters.  The reaction resolved after stopping the doxycycline and starting amoxicillin with some Benadryl.  She has multiple other drug allergies.  She has no significant drainage from the nose.  She has a recently born granddaughter that she has not been able to see because she has been sick.  An influenza test done in [place] also did not find a viral infection.  She reports that in November she developed a swollen lymph glands on her neck bilaterally, most noticeable on the right side.  She went to the emergency department and he did a neck CT on 11/22/2019.  That showed lymph nodes as largest 2 cm.  At the same time she tested positive for strep throat.  She was treated and lymph nodes have essentially resolved    Review of Systems: ROS otherwise unremarkable other than what is noted in the HPI.     Physical Exam: KPS 90%  BP (!) 150/62  (BP Location: Right arm, Patient Position: Sitting, Cuff Size: Adult Regular)   Pulse 86   Temp 98.4  F (36.9  C) (Oral)   Resp 16   Wt 103.7 kg (228 lb 11.2 oz)   SpO2 96%   BMI 42.51 kg/m    General: A&O. NAD.   HEENT: CLARICE, sclera anicteric, the mouth mucosa is mildly dry but there is no erosions or ulcerations and actually minimal lichenoid changes, if any.  Neck: supple, I do not feel any palpable lymph nodes or masses.  Lungs: CTA bilaterally to bases, no crackles, no wheezing   Heart: RRR, no m/r/g  Abdomen: +BS, soft, NT/ND, no HSM  Extremities: No edema; unable to raise her shoulder above her head.   Skin: She did not have any active skin rashes today.  She usually has some areas of exam on her neck and under her breast.   Schirmer's test showed 14 mm millimeters on the right eye and more than 20 mm in the left eye.  Labs   Lab Results   Component Value Date    WBC 9.8 01/08/2020    ANEU 5.0 01/08/2020    HGB 12.7 01/08/2020    HCT 40.8 01/08/2020     01/08/2020     01/08/2020    POTASSIUM 3.9 01/08/2020    CHLORIDE 105 01/08/2020    CO2 27 01/08/2020     (H) 01/08/2020    BUN 19 01/08/2020    CR 0.97 01/08/2020    MAG 1.8 04/03/2017    INR 1.03 04/03/2017    BILITOTAL 0.4 01/08/2020    AST 13 01/08/2020    ALT 20 01/08/2020    ALKPHOS 94 01/08/2020    PROTTOTAL 7.1 01/08/2020    ALBUMIN 3.4 01/08/2020     The CT sinus 1/8/2020 shows mild sinusitis  Well with CT chest without contrast 1/8/2020: My review of the imaging suggests a new nodular opacity/consolidation on the right upper lobe.  This seems consistent with a fungal process.  Formal report is pending.     Ms. Eryn Bennett is a 59 yo woman s/p NMA DCBT for AML day ~5 years     1. BMT/AML: continued CR. two year post transplant BM 40% cellular, trilineage hematopoiesis, no leukemia by morphology and flow negative; % donor #2.     2. HEME:  Her blood counts are normal now for approximately 5 years after transplant.  -  history of Hemolytic anemia: Warm anti E; IgG and completment. Received rituximab X 4 September 2015.   - Patient has history DVT but her repeat doppler L lower ext negative on OCT 2016. Off coumadin since JUL 2016. During this admission as she was less mobile with viral illness she was given SQ heparin every 12 hours which was stopped on discharge.     3. ID: Recommended to have a flu shot in the fall.  The patient has a persistent upper respiratory infection/symptoms since October.  This is now finally getting better but she still has a nasal voice and some recurrent cough.  After discussing the pros and cons we decided not to give her another antibiotic.  We will get a respiratory viral panel with PCR for 13 viruses.  We will get a CT of the sinuses and of the lungs without contrast today.  Will determine further investigation and manage based on the imaging studies.  Otherwise we will follow her to improvement.  An IgG level from today's pending the last one was in the 600 range spontaneously.        4. 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  It is however possible that some of his respiratory issues are related to GVHD.  Will wait for all the results and make additional planning based on the findings.  At this time she has no indication for systemic immunosuppression.    - Off prednisone since 4/20/16; finished MMF taper on 9/13/16      5. GI: monitor and use PRN medication for GERD  - Compazine available prn      6. FEN/Renal: WNL.  Her vitamin D level is appropriate.  In addition, she also has a glucose level in the 120-130 range when she comes to visit.  She does not have a long fasting but still has at least 4-6 hours.  The patient was insulin-dependent while taking prednisone.  In addition, she is obese and prone to diabetes.  I asked her to discuss with her primary care for further evaluation of her glucose and management of diabetes if diagnosed.      7. Cardiovascular: High  cholesterol and triglycerides may use cholesterol medication. Continue Norvasc 5mg daily Carvedilol 6.25mg bid for cardiomyopathy.      8. Musculoskelatal: no complain of achy legs today.  On vitamin calcium replacement.  Recommended physical therapy and physical activity.    - Dexa scan with osteopenia (5/5/15). Had bisphosphonate 10/5/16; repeat in ~12 months; local doctor managing On vitamin D and calcium replacement.    - continue Tramadol and tylenol for muscle pain and that was working better than hydrocodone/acetaminophen       9. Pulmonary: See above regarding respiratory symptoms.  The CT chest does not finally read yet but has an area in the right upper lobe suggestive of fungal infection.  I discussed with pulmonary who will see her tomorrow and probably bronc her on Friday.  We would then start her on antifungal until results come back.    10. Neurology: feet neuropathy not changed. Monitor and refer to neurology/neuropathy group for assessment.     11. Eyes: eye clinic following after cataract surgery in August 2017.  Recently seen for a reevaluation.  They found there is a scar on her eye that they would like to continue to follow, closely.    12 General health: She was again encouraged to meet regularly with her primary care physician to address regular healthcare maintenance.  In her case in particular concern about her incipient diabetes, heart, lipids, and thyroid.  She was seen for a Pap smear and felt not to be a candidate for regular Pap smears anymore.  She has a recent mammogram that was fine.  She knows she needs yearly screening for skin cancer and oral cancer as well.     Plan:   ISABELLE Ni in 1 year with labs; as scheduled  Primary care for diabetes, heart, lipids, GYN, thyroid, yearly skin cancer screen, cancer screen in general.  Ophthalmologist locally  Had flu shot in the fall   Dexa scan in 1-2 years with [facility]  CT sinus w/o contrast and lung w/o contrast   RVP PCR today      Addendum: 11 AM I reviewed the CT chest personally and found a new area suggestive of a fungal infection of the right upper lobe.  Discussed the case with pulmonary who will see her in clinic tomorrow afternoon and probably [facility] her the next day.  She would then be started on voriconazole until results of the bronchoscopy come back.  She should be treated on therapeutic doses of voriconazole or if problems with tolerance posaconazole.     Addendum 1 PM: I spoke to patient and shared with her the findings and the plan.  She is agreeable to it.  I communicated with the  team that is going to make a referral for an overnight stay at the [facility] from 1/9/2020 to 1/10/2020, so that she can see pulmonary late tomorrow afternoon and have the bronchoscopy the next day without having to travel back and forth from [facility].

## 2020-01-08 NOTE — PATIENT INSTRUCTIONS
ISABELLE Ni in 1 year with labs; as scheduled  Primary care for diabetes, heart, lipids, GYN, thyroid, yearly skin cancer screen, cancer screen in general.  Ophthalmologist locally  Had flu shot in the fall   Dexa scan in 1-2 years with [facility]  CT sinus w/o contrast and lung w/o contrast   RVP PCR today

## 2020-01-08 NOTE — PROGRESS NOTES
Clinical   Blood and Marrow Transplant Service    Per MD request [facility] reservation sent in for arrival 1.9.20 - departure 1.10.20.      Keisha CHANEL LICSW    Clinical   1/8/2020  [facility]  Adult Blood and Marrow Transplant Program  [address] [place] [id number]  [email or web address]  [email or web address]  Office: [phone]   Pager: [phone]

## 2020-01-08 NOTE — NURSING NOTE
"Oncology Rooming Note    January 8, 2020 8:06 AM   Eryn Bennett is a 64 year old female who presents for:    Chief Complaint   Patient presents with     Blood Draw     labs drawn by venipuncture by rn.  vital signs taken.     RECHECK     Return: AML      Initial Vitals: BP (!) 150/62 (BP Location: Right arm, Patient Position: Sitting, Cuff Size: Adult Regular)   Pulse 86   Temp 98.4  F (36.9  C) (Oral)   Resp 16   Wt 103.7 kg (228 lb 11.2 oz)   SpO2 96%   BMI 42.51 kg/m   Estimated body mass index is 42.51 kg/m  as calculated from the following:    Height as of 5/14/19: 1.562 m (5' 1.5\").    Weight as of this encounter: 103.7 kg (228 lb 11.2 oz). Body surface area is 2.12 meters squared.  Severe Pain (6) Comment: Data Unavailable   No LMP recorded. Patient has had a hysterectomy.  Allergies reviewed: Yes  Medications reviewed: Yes    Medications: Medication refills not needed today.  Pharmacy name entered into EPIC:    [facility] [place], [place] - [address] [facility] [phone]  [id number] [address] [facility] [address] [facility] [address]  [facility] [address] [place] [address]    Clinical concerns: Still has a cough from 6 weeks ago       Rama Shoemaker CMA              "

## 2020-01-09 ENCOUNTER — OFFICE VISIT (OUTPATIENT)
Dept: PULMONOLOGY | Facility: CLINIC | Age: 65
End: 2020-01-09
Attending: INTERNAL MEDICINE
Payer: MEDICARE

## 2020-01-09 ENCOUNTER — TELEPHONE (OUTPATIENT)
Dept: PULMONOLOGY | Facility: CLINIC | Age: 65
End: 2020-01-09

## 2020-01-09 VITALS
WEIGHT: 230.3 LBS | BODY MASS INDEX: 43.48 KG/M2 | RESPIRATION RATE: 15 BRPM | DIASTOLIC BLOOD PRESSURE: 83 MMHG | HEIGHT: 61 IN | TEMPERATURE: 97.9 F | HEART RATE: 5 BPM | SYSTOLIC BLOOD PRESSURE: 152 MMHG | OXYGEN SATURATION: 95 %

## 2020-01-09 DIAGNOSIS — J01.40 SUBACUTE PANSINUSITIS: ICD-10-CM

## 2020-01-09 DIAGNOSIS — B49 FUNGAL PNEUMONIA: Primary | ICD-10-CM

## 2020-01-09 DIAGNOSIS — J16.8 FUNGAL PNEUMONIA: Primary | ICD-10-CM

## 2020-01-09 PROBLEM — Z90.49 S/P COLON RESECTION: Status: ACTIVE | Noted: 2020-01-09

## 2020-01-09 PROBLEM — M65.311 TRIGGER THUMB OF RIGHT HAND: Status: ACTIVE | Noted: 2017-11-26

## 2020-01-09 PROBLEM — M25.512 CHRONIC LEFT SHOULDER PAIN: Status: ACTIVE | Noted: 2018-02-20

## 2020-01-09 PROBLEM — B00.1 RECURRENT COLD SORES: Status: ACTIVE | Noted: 2018-02-20

## 2020-01-09 PROBLEM — G89.29 CHRONIC LEFT SHOULDER PAIN: Status: ACTIVE | Noted: 2018-02-20

## 2020-01-09 PROCEDURE — G0463 HOSPITAL OUTPT CLINIC VISIT: HCPCS | Mod: ZF

## 2020-01-09 RX ORDER — AZITHROMYCIN 250 MG/1
250 TABLET, FILM COATED ORAL DAILY
Qty: 14 TABLET | Refills: 1 | Status: SHIPPED | OUTPATIENT
Start: 2020-01-09 | End: 2020-10-09

## 2020-01-09 ASSESSMENT — MIFFLIN-ST. JEOR: SCORE: 1544.88

## 2020-01-09 ASSESSMENT — PAIN SCALES - GENERAL: PAINLEVEL: MILD PAIN (3)

## 2020-01-09 NOTE — LETTER
1/9/2020       RE: Eryn Bennett  [address]  [facility] [place] [address]     Dear Colleague,    Thank you for referring your patient, Eryn Bennett, to the [facility] at [facility]. Please see a copy of my visit note below.    Reason for Visit  Eryn Bennett is a 64 year old year old female who is being seen for Oncology Clinic Visit (Return; AML)    Pulmonary HPI  65 YO with AML s/p NMA DCBT over 3 years ago who is referred to the Pulmonary BMT clinic for evaluation of cough and SOB.  Developed URI in 11-17 that had persisted for 6 weeks.  During this time noticed nasal congestion and drainage, post-nasal drip, fevers, and cough. Denies chest burning. Seen in clinic, PCR for influenza was negative but she was treated with Tamiflu.  Was given course of Azithromycin twice for 5 days apiece, felt somewhat better with Azithromycin but symptoms recurred shortly thereafter.   Presented again in 2-12-18 with fever, cough, and nasal congestion. CT scan of sinuses revealed sinusitis, chest CT with LLL bronchial wall thickening and nodularity that was actually improved compared to April 2017.  Was placed on a 10 day course of Levofloxacin, states started to feel better after 5 days. Since completion of antibiotics in the past 10 days she thinks her symptoms have all resolved; no further nasal congestion or drainage, no cough.  All fevers have resolved.  Similar course last Winter with frequent URI's which were slow to resolve.  No prior history of sinus disease or allergies.  No tobacco use.      Last seen 8 months ago.  Referred back to clinic due to an increase in nasal and pulmonary symptoms.  States onset of symptoms of nasal congestion and drainage in mid October. Saw PCP, was started on doxycyline initially but developed an allergic reaction associated with rash; antibiotic changed to Augmentin without significant improvement.  Followed-up and was then started on  a 5 day course of azithromycin 12-19-19- felt somewhat better on that regimen, since that time her nasal symptoms are improving- now able to smell, has decreased congestion.  Has noticed increased cough at night, some post-nasal drip.  Now with cough productive of light yellow to yellow secretions, no blood.  Fell on 12-6-19 after tripping on uneven sidewalk, fell forward hitting abdomen and upper chest.  Initially pain in back but over the past 7-10 days has developed more pain in upper back and scapular area.  No fevers or chills.  Respiratory viral panel was negative on 1-8-20.  Chest CT done on 1-8-20 showed a RUL apical infiltrate concerning for atypical infection, also small area in lateral segment of LLL. Denies any mold exposure.  IgG tested and was > 500.    The patient was seen and examined by Troy Lomeli MD           Current Outpatient Medications   Medication     acetaminophen (TYLENOL) 325 MG tablet     amLODIPine (NORVASC) 5 MG tablet     calcium-vitamin D 500-125 MG-UNIT TABS     carvedilol (COREG) 6.25 MG tablet     fluticasone (FLONASE ALLERGY RELIEF) 50 MCG/ACT spray     Multiple Vitamins-Minerals (EYE VITAMINS & MINERALS) TABS     valACYclovir (VALTREX) 500 MG tablet     desonide (DESOWEN) 0.05 % cream     No current facility-administered medications for this visit.      Allergies   Allergen Reactions     Blood Transfusion Related (Informational Only) Other (See Comments)     7/15/15 - Patient has a complex history of clinically significant antibodies against RBC antigens.  Finding compatible RBCs may take up to 24 hours or more.  Consult with the Blood Bank MD for transfusion guidance.  Itching in palms during platelet transfusion in 2010- IV benadryl was given at that time.  Pt has had platelets since then with no reaction.   11/6/14 - Stem cell transplant patient.  Give type O RBCs.     Cefepime Itching and Rash     Severe rash, itching, and burning skin during cefepime infusion on  9/17/14     Red Blood Cells Rash     7/15/15 - Patient has a complex history of clinically significant antibodies against RBC antigens.  Finding compatible RBCs may take up to 24 hours or more.  Consult with the Blood Bank MD for transfusion guidance.  Itching in palms during platelet transfusion in 2010- IV benadryl was given at that time.  Pt has had platelets since then with no reaction.   11/6/14 - Stem cell transplant patient.  Give type O RBCs.     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 Pt would like to avoid, even with premedication, if at all possible.      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    While pt had Hicman in, pt was on a blood thinner       Past Surgical History:   Procedure Laterality Date     BLADDER SURGERY       CHOLECYSTECTOMY  1987     ESOPHAGOSCOPY, GASTROSCOPY, DUODENOSCOPY (EGD), COMBINED N/A 12/11/2014    Procedure: COMBINED ESOPHAGOSCOPY, GASTROSCOPY, DUODENOSCOPY (EGD), BIOPSY SINGLE OR MULTIPLE;  Surgeon: Saturnino Valera MD;  Location:  GI     GI SURGERY      part of colon removed r.t benign tumor     GYN SURGERY       Dos Santos Catheter Placment Right 2010    Right IJ vein, in for 8 months     HYSTERECTOMY VAGINAL, BILATERAL SALPINGO-OOPHERECTOMY, COMBINED       PICC INSERTION Left 8/28/2014    5fr DL Power PICC,  "46cm (1cm external) in the L basilic vein w/ tip in the SVC RA junction.       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      Employer: [employer]   Social Needs     Financial resource strain: Not on file     Food insecurity:     Worry: Not on file     Inability: Not on file     Transportation needs:     Medical: Not on file     Non-medical: Not on file   Tobacco Use     Smoking status: Never Smoker     Smokeless tobacco: Never Used   Substance and Sexual Activity     Alcohol use: No     Alcohol/week: 0.0 standard drinks     Drug use: No     Sexual activity: Not on file   Lifestyle     Physical activity:     Days per week: Not on file     Minutes per session: Not on file     Stress: Not on file   Relationships     Social connections:     Talks on phone: Not on file     Gets together: Not on file     Attends Gnosticism service: Not on file     Active member of club or organization: Not on file     Attends meetings of clubs or organizations: Not on file     Relationship status: Not on file     Intimate partner violence:     Fear of current or ex partner: Not on file     Emotionally abused: Not on file     Physically abused: Not on file     Forced sexual activity: Not on file   Other Topics Concern     Parent/sibling w/ CABG, MI or angioplasty before 65F 55M? Not Asked   Social History Narrative     Not on file       ROS Pulmonary  A complete ROS was otherwise negative except as noted in the HPI.  BP (!) 152/83   Pulse (!) 5   Temp 97.9  F (36.6  C) (Oral)   Resp 15   Ht 1.562 m (5' 1.5\")   Wt 104.5 kg (230 lb 4.8 oz)   SpO2 95%   BMI 42.82 kg/m     Exam:   GENERAL APPEARANCE: Well developed, well nourished, alert, and in no apparent distress.  EYES: PERRL, EOMI  HENT: Nasal mucosa with no edema and no " hyperemia. No nasal polyps.  No sinus tenderness  EARS: Canals clear, TMs normal  MOUTH: Oral mucosa is moist, without any lesions, no tonsillar enlargement, no oropharyngeal exudate.  NECK: supple, no masses, no thyromegaly.  LYMPHATICS: No significant axillary, cervical, or supraclavicular nodes.  RESP:  Good air flow throughout.  No crackles. No rhonchi. No wheezes.  CV: Normal S1, S2, regular rhythm, normal rate. No murmur.  No rub. No gallop. No LE edema.   ABDOMEN:  Bowel sounds normal, soft, nontender, no HSM or masses.   MS: extremities normal. No clubbing. No cyanosis.  SKIN: no rash on limited exam  NEURO: Mentation intact, speech normal, normal strength and tone, normal gait and stance  PSYCH: mentation appears normal. and affect normal/bright  Results:  Recent Results (from the past 168 hour(s))   IgG    Collection Time: 01/08/20  7:22 AM   Result Value Ref Range     (L) 610 - 1,616 mg/dL   Comprehensive metabolic panel    Collection Time: 01/08/20  7:22 AM   Result Value Ref Range    Sodium 137 133 - 144 mmol/L    Potassium 3.9 3.4 - 5.3 mmol/L    Chloride 105 94 - 109 mmol/L    Carbon Dioxide 27 20 - 32 mmol/L    Anion Gap 4 3 - 14 mmol/L    Glucose 138 (H) 70 - 99 mg/dL    Urea Nitrogen 19 7 - 30 mg/dL    Creatinine 0.97 0.52 - 1.04 mg/dL    GFR Estimate 62 >60 mL/min/[1.73_m2]    GFR Estimate If Black 72 >60 mL/min/[1.73_m2]    Calcium 9.3 8.5 - 10.1 mg/dL    Bilirubin Total 0.4 0.2 - 1.3 mg/dL    Albumin 3.4 3.4 - 5.0 g/dL    Protein Total 7.1 6.8 - 8.8 g/dL    Alkaline Phosphatase 94 40 - 150 U/L    ALT 20 0 - 50 U/L    AST 13 0 - 45 U/L   CBC with platelets differential    Collection Time: 01/08/20  7:22 AM   Result Value Ref Range    WBC 9.8 4.0 - 11.0 10e9/L    RBC Count 4.58 3.8 - 5.2 10e12/L    Hemoglobin 12.7 11.7 - 15.7 g/dL    Hematocrit 40.8 35.0 - 47.0 %    MCV 89 78 - 100 fl    MCH 27.7 26.5 - 33.0 pg    MCHC 31.1 (L) 31.5 - 36.5 g/dL    RDW 13.7 10.0 - 15.0 %    Platelet Count  267 150 - 450 10e9/L    Diff Method Automated Method     % Neutrophils 50.4 %    % Lymphocytes 36.7 %    % Monocytes 7.7 %    % Eosinophils 4.5 %    % Basophils 0.5 %    % Immature Granulocytes 0.2 %    Nucleated RBCs 0 0 /100    Absolute Neutrophil 5.0 1.6 - 8.3 10e9/L    Absolute Lymphocytes 3.6 0.8 - 5.3 10e9/L    Absolute Monocytes 0.8 0.0 - 1.3 10e9/L    Absolute Eosinophils 0.4 0.0 - 0.7 10e9/L    Absolute Basophils 0.1 0.0 - 0.2 10e9/L    Abs Immature Granulocytes 0.0 0 - 0.4 10e9/L    Absolute Nucleated RBC 0.0    Respiratory Panel PCR - NP Swab    Collection Time: 01/08/20  8:50 AM   Result Value Ref Range    Adenovirus Not Detected NDET^Not Detected    Coronavirus Not Detected NDET^Not Detected    Human Metapneumovirus Not Detected NDET^Not Detected    Human Rhinovirus/Enterovirus Not Detected NDET^Not Detected    Influenza A Not Detected NDET^Not Detected    Influenza A, H1 Not Detected NDET^Not Detected    Influenza A 2009 H1N1 Not Detected NDET^Not Detected    Influenza A, H3 Not Detected NDET^Not Detected    Influenza B Not Detected NDET^Not Detected    Parainfluenza Virus 1 Not Detected NDET^Not Detected    Parainfluenza Virus 2 Not Detected NDET^Not Detected    Parainfluenza Virus 3 Not Detected NDET^Not Detected    Parainfluenza Virus 4 Not Detected NDET^Not Detected    Respiratory Syncytial Virus A Not Detected NDET^Not Detected    Respiratory Syncytial Virus B Not Detected NDET^Not Detected    Chlamydia pneumoniae Not Detected NDET^Not Detected    Mycoplasma pneumoniae Not Detected NDET^Not Detected    Respiratory Virus Comment See comment below        Assessment and plan:   65 YO with viral illness in November 2017 with resultant sinusitis as seen on CT.  Majority of symptoms were upper respiratory in nature at that time.  CT chest 2-18 actually improved.   Off all immunosuppressives and IgG level has maintaind > 400, with last testing 2-3-19.  Has recurrent sinus infections requiring frequent  antibiotic regimens.  Resolves somewhat with antibiotics then recurs; prior sinus CT in 2-28 showed narrowing of ostiomeatal units bilaterally but f/u CT was improved. Seen and followed by ENT.  Recent recurrent sinus symptoms for almost three months and with cough. 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     1. Bronchoscopy in am  2. Start antifungal.  I will follow-up culture results  3. Repeat CT in one month  4. Azithromycin 250 mg x 14 days, one refill provided  5. Increase saline nasal rinses to BID  6. If CT does not improve on antifungal agents need to consider organizing pneumonia as a complication of fall as etiology.     RTC in  1 month with repeat CT.  Patient to call clinic with any questions or concerns prior to her next clinic visit          Again, thank you for allowing me to participate in the care of your patient.      Sincerely,    Troy Lomeli MD

## 2020-01-09 NOTE — PROGRESS NOTES
Reason for Visit  Eryn Bennett is a 64 year old year old female who is being seen for Oncology Clinic Visit (Return; AML)    Pulmonary HPI  65 YO with AML s/p NMA DCBT over 3 years ago who is referred to the Pulmonary BMT clinic for evaluation of cough and SOB.  Developed URI in 11-17 that had persisted for 6 weeks.  During this time noticed nasal congestion and drainage, post-nasal drip, fevers, and cough. Denies chest burning. Seen in clinic, PCR for influenza was negative but she was treated with Tamiflu.  Was given course of Azithromycin twice for 5 days apiece, felt somewhat better with Azithromycin but symptoms recurred shortly thereafter.   Presented again in 2-12-18 with fever, cough, and nasal congestion. CT scan of sinuses revealed sinusitis, chest CT with LLL bronchial wall thickening and nodularity that was actually improved compared to April 2017.  Was placed on a 10 day course of Levofloxacin, states started to feel better after 5 days. Since completion of antibiotics in the past 10 days she thinks her symptoms have all resolved; no further nasal congestion or drainage, no cough.  All fevers have resolved.  Similar course last Winter with frequent URI's which were slow to resolve.  No prior history of sinus disease or allergies.  No tobacco use.      Last seen 8 months ago.  Referred back to clinic due to an increase in nasal and pulmonary symptoms.  States onset of symptoms of nasal congestion and drainage in mid October. Saw PCP, was started on doxycyline initially but developed an allergic reaction associated with rash; antibiotic changed to Augmentin without significant improvement.  Followed-up and was then started on a 5 day course of azithromycin 12-19-19- felt somewhat better on that regimen, since that time her nasal symptoms are improving- now able to smell, has decreased congestion.  Has noticed increased cough at night, some post-nasal drip.  Now with cough productive of light yellow to  yellow secretions, no blood.  Fell on 12-6-19 after tripping on uneven sidewalk, fell forward hitting abdomen and upper chest.  Initially pain in back but over the past 7-10 days has developed more pain in upper back and scapular area.  No fevers or chills.  Respiratory viral panel was negative on 1-8-20.  Chest CT done on 1-8-20 showed a RUL apical infiltrate concerning for atypical infection, also small area in lateral segment of LLL. Denies any mold exposure.  IgG tested and was > 500.    The patient was seen and examined by Troy Lomeli MD           Current Outpatient Medications   Medication     acetaminophen (TYLENOL) 325 MG tablet     amLODIPine (NORVASC) 5 MG tablet     calcium-vitamin D 500-125 MG-UNIT TABS     carvedilol (COREG) 6.25 MG tablet     fluticasone (FLONASE ALLERGY RELIEF) 50 MCG/ACT spray     Multiple Vitamins-Minerals (EYE VITAMINS & MINERALS) TABS     valACYclovir (VALTREX) 500 MG tablet     desonide (DESOWEN) 0.05 % cream     No current facility-administered medications for this visit.      Allergies   Allergen Reactions     Blood Transfusion Related (Informational Only) Other (See Comments)     7/15/15 - Patient has a complex history of clinically significant antibodies against RBC antigens.  Finding compatible RBCs may take up to 24 hours or more.  Consult with the Blood Bank MD for transfusion guidance.  Itching in palms during platelet transfusion in 2010- IV benadryl was given at that time.  Pt has had platelets since then with no reaction.   11/6/14 - Stem cell transplant patient.  Give type O RBCs.     Cefepime Itching and Rash     Severe rash, itching, and burning skin during cefepime infusion on 9/17/14     Red Blood Cells Rash     7/15/15 - Patient has a complex history of clinically significant antibodies against RBC antigens.  Finding compatible RBCs may take up to 24 hours or more.  Consult with the Blood Bank MD for transfusion guidance.  Itching in palms during platelet  transfusion in 2010- IV benadryl was given at that time.  Pt has had platelets since then with no reaction.   11/6/14 - Stem cell transplant patient.  Give type O RBCs.     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 Pt would like to avoid, even with premedication, if at all possible.      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    While pt had Hicman in, pt was on a blood thinner       Past Surgical History:   Procedure Laterality Date     BLADDER SURGERY       CHOLECYSTECTOMY  1987     ESOPHAGOSCOPY, GASTROSCOPY, DUODENOSCOPY (EGD), COMBINED N/A 12/11/2014    Procedure: COMBINED ESOPHAGOSCOPY, GASTROSCOPY, DUODENOSCOPY (EGD), BIOPSY SINGLE OR MULTIPLE;  Surgeon: Saturnino Valera MD;  Location:  GI     GI SURGERY      part of colon removed r.t benign tumor     GYN SURGERY       Dos Santos Catheter Placment Right 2010    Right IJ vein, in for 8 months     HYSTERECTOMY VAGINAL, BILATERAL SALPINGO-OOPHERECTOMY, COMBINED       PICC INSERTION Left 8/28/2014    5fr DL Power PICC, 46cm (1cm external) in the L basilic vein w/ tip in the SVC RA junction.       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      Employer: [employer]   Social Needs     Financial resource strain: Not on file     Food insecurity:     Worry: Not on file     Inability: Not on file     Transportation needs:     Medical: Not on file     Non-medical: Not on file   Tobacco Use     Smoking status: Never Smoker     Smokeless tobacco: Never Used   Substance and Sexual Activity     Alcohol use: No     Alcohol/week: 0.0 standard drinks     Drug use: No     Sexual activity: Not on file   Lifestyle     Physical activity:     Days per week: Not on file     Minutes per session: Not on file     Stress: Not on file   Relationships     Social connections:     Talks on phone: Not on file     Gets together: Not on file     Attends Restoration service: Not on file     Active member of club or organization: Not on file     Attends meetings of clubs or organizations: Not on file     Relationship status: Not on file     Intimate partner violence:     Fear of current or ex partner: Not on file     Emotionally abused: Not on file     Physically abused: Not on file     Forced sexual activity: Not on file   Other Topics Concern     Parent/sibling w/ CABG, MI or angioplasty before 65F 55M? Not Asked   Social History Narrative     Not on file       ROS Pulmonary  A complete ROS was otherwise negative except as noted in the HPI.  BP (!) 152/83   Pulse (!) 5   Temp 97.9  F (36.6  C) (Oral)   Resp 15   Ht 1.562 m (5' 1.5\")   Wt 104.5 kg (230 lb 4.8 oz)   SpO2 95%   BMI 42.82 kg/m    Exam:   GENERAL APPEARANCE: Well developed, well nourished, alert, and in no apparent distress.  EYES: PERRL, EOMI  HENT: Nasal mucosa with no edema and no hyperemia. No nasal polyps.  No sinus tenderness  EARS: Canals clear, TMs normal  MOUTH: Oral mucosa is moist, without any lesions, no tonsillar enlargement, no oropharyngeal exudate.  NECK: supple, no masses, no thyromegaly.  LYMPHATICS: No significant axillary, cervical, or " supraclavicular nodes.  RESP:  Good air flow throughout.  No crackles. No rhonchi. No wheezes.  CV: Normal S1, S2, regular rhythm, normal rate. No murmur.  No rub. No gallop. No LE edema.   ABDOMEN:  Bowel sounds normal, soft, nontender, no HSM or masses.   MS: extremities normal. No clubbing. No cyanosis.  SKIN: no rash on limited exam  NEURO: Mentation intact, speech normal, normal strength and tone, normal gait and stance  PSYCH: mentation appears normal. and affect normal/bright  Results:  Recent Results (from the past 168 hour(s))   IgG    Collection Time: 01/08/20  7:22 AM   Result Value Ref Range     (L) 610 - 1,616 mg/dL   Comprehensive metabolic panel    Collection Time: 01/08/20  7:22 AM   Result Value Ref Range    Sodium 137 133 - 144 mmol/L    Potassium 3.9 3.4 - 5.3 mmol/L    Chloride 105 94 - 109 mmol/L    Carbon Dioxide 27 20 - 32 mmol/L    Anion Gap 4 3 - 14 mmol/L    Glucose 138 (H) 70 - 99 mg/dL    Urea Nitrogen 19 7 - 30 mg/dL    Creatinine 0.97 0.52 - 1.04 mg/dL    GFR Estimate 62 >60 mL/min/[1.73_m2]    GFR Estimate If Black 72 >60 mL/min/[1.73_m2]    Calcium 9.3 8.5 - 10.1 mg/dL    Bilirubin Total 0.4 0.2 - 1.3 mg/dL    Albumin 3.4 3.4 - 5.0 g/dL    Protein Total 7.1 6.8 - 8.8 g/dL    Alkaline Phosphatase 94 40 - 150 U/L    ALT 20 0 - 50 U/L    AST 13 0 - 45 U/L   CBC with platelets differential    Collection Time: 01/08/20  7:22 AM   Result Value Ref Range    WBC 9.8 4.0 - 11.0 10e9/L    RBC Count 4.58 3.8 - 5.2 10e12/L    Hemoglobin 12.7 11.7 - 15.7 g/dL    Hematocrit 40.8 35.0 - 47.0 %    MCV 89 78 - 100 fl    MCH 27.7 26.5 - 33.0 pg    MCHC 31.1 (L) 31.5 - 36.5 g/dL    RDW 13.7 10.0 - 15.0 %    Platelet Count 267 150 - 450 10e9/L    Diff Method Automated Method     % Neutrophils 50.4 %    % Lymphocytes 36.7 %    % Monocytes 7.7 %    % Eosinophils 4.5 %    % Basophils 0.5 %    % Immature Granulocytes 0.2 %    Nucleated RBCs 0 0 /100    Absolute Neutrophil 5.0 1.6 - 8.3 10e9/L     Absolute Lymphocytes 3.6 0.8 - 5.3 10e9/L    Absolute Monocytes 0.8 0.0 - 1.3 10e9/L    Absolute Eosinophils 0.4 0.0 - 0.7 10e9/L    Absolute Basophils 0.1 0.0 - 0.2 10e9/L    Abs Immature Granulocytes 0.0 0 - 0.4 10e9/L    Absolute Nucleated RBC 0.0    Respiratory Panel PCR - NP Swab    Collection Time: 01/08/20  8:50 AM   Result Value Ref Range    Adenovirus Not Detected NDET^Not Detected    Coronavirus Not Detected NDET^Not Detected    Human Metapneumovirus Not Detected NDET^Not Detected    Human Rhinovirus/Enterovirus Not Detected NDET^Not Detected    Influenza A Not Detected NDET^Not Detected    Influenza A, H1 Not Detected NDET^Not Detected    Influenza A 2009 H1N1 Not Detected NDET^Not Detected    Influenza A, H3 Not Detected NDET^Not Detected    Influenza B Not Detected NDET^Not Detected    Parainfluenza Virus 1 Not Detected NDET^Not Detected    Parainfluenza Virus 2 Not Detected NDET^Not Detected    Parainfluenza Virus 3 Not Detected NDET^Not Detected    Parainfluenza Virus 4 Not Detected NDET^Not Detected    Respiratory Syncytial Virus A Not Detected NDET^Not Detected    Respiratory Syncytial Virus B Not Detected NDET^Not Detected    Chlamydia pneumoniae Not Detected NDET^Not Detected    Mycoplasma pneumoniae Not Detected NDET^Not Detected    Respiratory Virus Comment See comment below        Assessment and plan:   65 YO with viral illness in November 2017 with resultant sinusitis as seen on CT.  Majority of symptoms were upper respiratory in nature at that time.  CT chest 2-18 actually improved.   Off all immunosuppressives and IgG level has maintaind > 400, with last testing 2-3-19.  Has recurrent sinus infections requiring frequent antibiotic regimens.  Resolves somewhat with antibiotics then recurs; prior sinus CT in 2-28 showed narrowing of ostiomeatal units bilaterally but f/u CT was improved. Seen and followed by ENT.  Recent recurrent sinus symptoms for almost three months and with cough. Findings  on CT concerning for atypical infection (fungus and Nocardia most likely); with recent fall and chest wall trauma need to consider focal organizing pneumonia as cause related to trauma.     1. Bronchoscopy in am  2. Start antifungal.  I will follow-up culture results  3. Repeat CT in one month  4. Azithromycin 250 mg x 14 days, one refill provided  5. Increase saline nasal rinses to BID  6. If CT does not improve on antifungal agents need to consider organizing pneumonia as a complication of fall as etiology.     RTC in 1 month with repeat CT.  Patient to call clinic with any questions or concerns prior to her next clinic visit

## 2020-01-09 NOTE — NURSING NOTE
"Oncology Rooming Note    January 9, 2020 4:25 PM   Eryn Bennett is a 64 year old female who presents for:    Chief Complaint   Patient presents with     Oncology Clinic Visit     Return; AML     Initial Vitals: BP (!) 152/83   Pulse (!) 5   Temp 97.9  F (36.6  C) (Oral)   Resp 15   Ht 1.562 m (5' 1.5\")   Wt 104.5 kg (230 lb 4.8 oz)   SpO2 95%   BMI 42.82 kg/m   Estimated body mass index is 42.82 kg/m  as calculated from the following:    Height as of this encounter: 1.562 m (5' 1.5\").    Weight as of this encounter: 104.5 kg (230 lb 4.8 oz). Body surface area is 2.13 meters squared.  Mild Pain (3) Comment: WHEN COUGHING   No LMP recorded. Patient has had a hysterectomy.  Allergies reviewed: Yes  Medications reviewed: Yes    Medications: Medication refills not needed today.  Pharmacy name entered into VCNC:    [facility] [place], [place] - [phone]  [facility] #[id number] [place], [place] - [address]  AT [facility] [address]  [facility] [address] [place] [address] - [address] [facility]    Clinical concerns: NO NEW CONCERNS        Rose Pimentel CMA              "

## 2020-01-09 NOTE — TELEPHONE ENCOUNTER
Contacted by Dr Lomeli to schedule and prep patient  for bronch 1/10/20 at 8 am with 7 am check in on first floor of hospital in endoscopy. Instructions include: must have , no food or fluids after midnight, hold meds except take Coreg with sip in very early am, allow 4 hrs for entire procedure, no talking during exam, will have IV sedation and throat numbed, tell nurse if having chest pain, SOB or more then flecks of blood with cough after exam, results begin to return in 1 week but some can take 6-8 weeks, risk factors given and clinic number given for any questions patient may have.

## 2020-01-10 ENCOUNTER — HOSPITAL ENCOUNTER (OUTPATIENT)
Facility: CLINIC | Age: 65
Discharge: HOME | End: 2020-01-10
Attending: INTERNAL MEDICINE | Admitting: INTERNAL MEDICINE
Payer: MEDICARE

## 2020-01-10 VITALS
OXYGEN SATURATION: 98 % | SYSTOLIC BLOOD PRESSURE: 102 MMHG | HEART RATE: 83 BPM | RESPIRATION RATE: 10 BRPM | DIASTOLIC BLOOD PRESSURE: 62 MMHG

## 2020-01-10 LAB
BRONCHOSCOPY: NORMAL
GRAM STN SPEC: NORMAL
SPECIMEN SOURCE: NORMAL
SPECIMEN SOURCE: NORMAL

## 2020-01-10 PROCEDURE — 87015 SPECIMEN INFECT AGNT CONCNTJ: CPT | Performed by: INTERNAL MEDICINE

## 2020-01-10 PROCEDURE — 99152 MOD SED SAME PHYS/QHP 5/>YRS: CPT | Performed by: INTERNAL MEDICINE

## 2020-01-10 PROCEDURE — 89051 BODY FLUID CELL COUNT: CPT | Performed by: INTERNAL MEDICINE

## 2020-01-10 PROCEDURE — 25000128 H RX IP 250 OP 636: Performed by: INTERNAL MEDICINE

## 2020-01-10 PROCEDURE — 87185 SC STD ENZYME DETCJ PER NZM: CPT | Performed by: INTERNAL MEDICINE

## 2020-01-10 PROCEDURE — 87116 MYCOBACTERIA CULTURE: CPT | Performed by: INTERNAL MEDICINE

## 2020-01-10 PROCEDURE — 87305 ASPERGILLUS AG IA: CPT | Performed by: INTERNAL MEDICINE

## 2020-01-10 PROCEDURE — 87206 SMEAR FLUORESCENT/ACID STAI: CPT | Mod: XS | Performed by: INTERNAL MEDICINE

## 2020-01-10 PROCEDURE — 87102 FUNGUS ISOLATION CULTURE: CPT | Performed by: INTERNAL MEDICINE

## 2020-01-10 PROCEDURE — 87205 SMEAR GRAM STAIN: CPT | Mod: XS | Performed by: INTERNAL MEDICINE

## 2020-01-10 PROCEDURE — 87070 CULTURE OTHR SPECIMN AEROBIC: CPT | Mod: XS | Performed by: INTERNAL MEDICINE

## 2020-01-10 PROCEDURE — 25000125 ZZHC RX 250: Performed by: INTERNAL MEDICINE

## 2020-01-10 PROCEDURE — 87077 CULTURE AEROBIC IDENTIFY: CPT | Performed by: INTERNAL MEDICINE

## 2020-01-10 PROCEDURE — 31624 DX BRONCHOSCOPE/LAVAGE: CPT | Performed by: INTERNAL MEDICINE

## 2020-01-10 RX ORDER — ALBUTEROL SULFATE 0.83 MG/ML
SOLUTION RESPIRATORY (INHALATION) PRN
Status: DISCONTINUED | OUTPATIENT
Start: 2020-01-10 | End: 2020-01-10 | Stop reason: HOSPADM

## 2020-01-10 RX ORDER — FENTANYL CITRATE 50 UG/ML
INJECTION, SOLUTION INTRAMUSCULAR; INTRAVENOUS PRN
Status: DISCONTINUED | OUTPATIENT
Start: 2020-01-10 | End: 2020-01-10 | Stop reason: HOSPADM

## 2020-01-10 RX ORDER — LIDOCAINE HYDROCHLORIDE 20 MG/ML
SOLUTION OROPHARYNGEAL PRN
Status: DISCONTINUED | OUTPATIENT
Start: 2020-01-10 | End: 2020-01-10 | Stop reason: HOSPADM

## 2020-01-10 RX ORDER — LIDOCAINE HYDROCHLORIDE 40 MG/ML
INJECTION, SOLUTION RETROBULBAR PRN
Status: DISCONTINUED | OUTPATIENT
Start: 2020-01-10 | End: 2020-01-10 | Stop reason: HOSPADM

## 2020-01-10 RX ORDER — MAGNESIUM HYDROXIDE 1200 MG/15ML
LIQUID ORAL PRN
Status: DISCONTINUED | OUTPATIENT
Start: 2020-01-10 | End: 2020-01-10 | Stop reason: HOSPADM

## 2020-01-10 NOTE — DISCHARGE INSTRUCTIONS
Discharge Instructions after Bronchoscopy    Activity  You had medicine to relax and for pain. You may feel dizzy or sleepy.  For 24 hours:    Do not drive or use heavy equipment.    Do not make important decisions.    Do not drink any alcohol.    Diet  When you can swallow easily, you may go back to your regular diet, medicines  and light exercise.    Follow-up  We took small tissue or fluid samples to study. We will call you with the results in about 10 business days.    Call right away if you have:    Unusual chest pain    Temperature above 100.6  F (37.5  C)    Coughing that does not stop.    If you have severe pain, bleeding, or shortness of breath, go to an emergency room.    If you have questions, call:  Monday to Friday, 7 a.m. to 4:30 p.m.  Endoscopy: [phone] (We may have to call you back)    After hours:  Hospital: [phone] (Ask for the pulmonary fellow on call)

## 2020-01-10 NOTE — OR NURSING
Bronchoscopy with lavage completed under conscious sedation. Pt tolerated procedure. Pt on 2-6 L NC oxygen for duration of procedure.

## 2020-01-11 LAB
ASPERGILLUS GALACTOMANNAN ANTIGEN BAL: NEGATIVE
ASPERGILLUS GALACTOMANNAN ANTIGEN BAL: NEGATIVE
CMV DNA SPEC NAA+PROBE-ACNC: NORMAL [IU]/ML
CMV DNA SPEC NAA+PROBE-ACNC: NORMAL [IU]/ML
CMV DNA SPEC NAA+PROBE-LOG#: NORMAL {LOG_IU}/ML
CMV DNA SPEC NAA+PROBE-LOG#: NORMAL {LOG_IU}/ML
GALACTOMANNAN AG SERPL-ACNC: 0.04
GALACTOMANNAN AG SERPL-ACNC: 0.07
SPECIMEN SOURCE: NORMAL
SPECIMEN SOURCE: NORMAL

## 2020-01-12 LAB
ACID FAST STN SPEC QL: NORMAL
BACTERIA SPEC CULT: ABNORMAL
SPECIMEN SOURCE: ABNORMAL
SPECIMEN SOURCE: ABNORMAL
SPECIMEN SOURCE: NORMAL
SPECIMEN SOURCE: NORMAL

## 2020-01-13 DIAGNOSIS — C92.01 ACUTE MYELOID LEUKEMIA IN REMISSION (H): ICD-10-CM

## 2020-01-13 DIAGNOSIS — R05.9 COUGH: ICD-10-CM

## 2020-01-13 DIAGNOSIS — Z94.81 S/P ALLOGENEIC BONE MARROW TRANSPLANT (H): Primary | ICD-10-CM

## 2020-01-13 LAB
APPEARANCE FLD: CLEAR
APPEARANCE FLD: NORMAL
BASOPHILS NFR FLD MANUAL: 1 %
COLOR FLD: COLORLESS
COLOR FLD: NORMAL
EOSINOPHIL NFR FLD MANUAL: 2 %
EOSINOPHIL NFR FLD MANUAL: 5 %
LYMPHOCYTES NFR FLD MANUAL: 10 %
LYMPHOCYTES NFR FLD MANUAL: 11 %
MONOS+MACROS NFR FLD MANUAL: 72 %
MONOS+MACROS NFR FLD MANUAL: 77 %
NEUTS BAND NFR FLD MANUAL: 15 %
NEUTS BAND NFR FLD MANUAL: 7 %
SPECIMEN SOURCE FLD: NORMAL
SPECIMEN SOURCE FLD: NORMAL
WBC # FLD AUTO: 111 /UL
WBC # FLD AUTO: 25 /UL

## 2020-01-13 RX ORDER — VORICONAZOLE 200 MG/1
300 TABLET, FILM COATED ORAL 2 TIMES DAILY
Qty: 90 TABLET | Refills: 1 | Status: SHIPPED | OUTPATIENT
Start: 2020-01-13 | End: 2020-01-29

## 2020-01-13 NOTE — PROGRESS NOTES
Per Dr Ni, pt should begin taking Voriconazole 300mg BID and return to see Dr Lomeli per his recommendations. Rx sent to [facility] pharmacy. Spoke with patient's spouse, Jay with the new medication instructions and informed him it may take a few days to obtain insurance approval but will have the pharmacy mail the medication to them. Jay states pt is starting to feel better, cough is starting to improve. Instructed Jay to have pt call if symptoms worsen or new ones develop. Jay verbalized understanding and had no further questions.

## 2020-01-14 ENCOUNTER — TELEPHONE (OUTPATIENT)
Dept: TRANSPLANT | Facility: CLINIC | Age: 65
End: 2020-01-14

## 2020-01-14 NOTE — PROGRESS NOTES
The patient has bronchoscopy from 1/10/2020 is growing Haemophilus influenza.  Considering the patient's allergy history, the best drug for her is Augmentin 875 mg twice daily for 10 days.  In addition, because of the aspect of the CT findings we think the patient needs to be on antifungal therapy as well.  I called and spoke with the patient's  and explained the rationale for the Augmentin and also for the start of voriconazole 300 mg twice daily.  I did explain to him the most common and serious side effects of voriconazole, in particular liver function abnormality and visual changes.  They know to call and report any new evidence of infection or side effects of the drug.  He showed good understanding and will proceed as proposed.  We will continue to monitor the cultures of the bronchoscopy.  We will repeat imaging in 4 to 6 weeks, or sooner if clinically indicated.

## 2020-01-14 NOTE — TELEPHONE ENCOUNTER
[facility] Prior Authorization Team Request    Medication: voriconazole 200 mg tabs  Dosing: take one and one-half tablets by mouth twice a day  Qty: 90  Day Supply: 30  NDC (required for Medicaid members):      Insurance   BIN: [id number]  PCN: [id number]  Grp: [id number]  ID: [id number]    CoverMyMeds Key (if applicable):     Additional documentation:       Filling Pharmacy: [facility]  Phone Number: [phone]  Contact:    Pharmacy NPI (required for Medicaid members):

## 2020-01-15 NOTE — TELEPHONE ENCOUNTER
Central Prior Authorization Team   Phone: [phone]    PA Initiation    Medication: voriconazole 200 mg tabs  Insurance Company: [employer] [place] - Phone [phone] Fax [phone]  Pharmacy Filling the Rx: [facility] [place], [place] - [address] [place] [address] [phone]  Filling Pharmacy Phone: [phone]  Filling Pharmacy Fax:    Start Date: 1/15/2020

## 2020-01-16 NOTE — TELEPHONE ENCOUNTER
Prior Authorization Approval    Authorization Effective Date: 1/1/2020  Authorization Expiration Date: 7/15/2021  Medication: voriconazole 200 mg tabs - P/A APPROVED  Approved Dose/Quantity:   Reference #: [id number]   Insurance Company: [employer] [place] - Phone [phone] Fax [phone]  Expected CoPay:       CoPay Card Available:      Foundation Assistance Needed:    Which Pharmacy is filling the prescription (Not needed for infusion/clinic administered): [facility] [phone]  Pharmacy Notified: Yes   Patient Notified:

## 2020-01-17 PROBLEM — R91.8 PULMONARY NODULES: Status: ACTIVE | Noted: 2020-01-17

## 2020-01-29 DIAGNOSIS — R05.9 COUGH: ICD-10-CM

## 2020-01-29 DIAGNOSIS — R91.8 PULMONARY NODULES: ICD-10-CM

## 2020-01-29 DIAGNOSIS — Z94.81 S/P ALLOGENEIC BONE MARROW TRANSPLANT (H): Primary | ICD-10-CM

## 2020-01-29 DIAGNOSIS — C92.01 ACUTE MYELOID LEUKEMIA IN REMISSION (H): ICD-10-CM

## 2020-01-29 RX ORDER — VORICONAZOLE 50 MG/1
100 TABLET, FILM COATED ORAL 2 TIMES DAILY
Qty: 120 TABLET | Refills: 3 | Status: SHIPPED | OUTPATIENT
Start: 2020-01-29 | End: 2020-01-31

## 2020-01-29 RX ORDER — VORICONAZOLE 200 MG/1
200 TABLET, FILM COATED ORAL 2 TIMES DAILY
Qty: 90 TABLET | Refills: 1 | COMMUNITY
Start: 2020-01-29 | End: 2020-01-31

## 2020-01-31 DIAGNOSIS — R91.8 PULMONARY NODULES: ICD-10-CM

## 2020-01-31 DIAGNOSIS — R05.9 COUGH: ICD-10-CM

## 2020-01-31 DIAGNOSIS — C92.01 ACUTE MYELOID LEUKEMIA IN REMISSION (H): ICD-10-CM

## 2020-01-31 DIAGNOSIS — Z94.81 S/P ALLOGENEIC BONE MARROW TRANSPLANT (H): ICD-10-CM

## 2020-01-31 RX ORDER — VORICONAZOLE 200 MG/1
200 TABLET, FILM COATED ORAL 2 TIMES DAILY
Qty: 90 TABLET | Refills: 1 | Status: SHIPPED | OUTPATIENT
Start: 2020-01-31 | End: 2020-02-03

## 2020-01-31 RX ORDER — VORICONAZOLE 50 MG/1
100 TABLET, FILM COATED ORAL 2 TIMES DAILY
Qty: 120 TABLET | Refills: 3 | Status: SHIPPED | OUTPATIENT
Start: 2020-01-31 | End: 2020-10-09

## 2020-02-03 DIAGNOSIS — R05.9 COUGH: ICD-10-CM

## 2020-02-03 DIAGNOSIS — Z94.81 S/P ALLOGENEIC BONE MARROW TRANSPLANT (H): ICD-10-CM

## 2020-02-03 DIAGNOSIS — C92.01 ACUTE MYELOID LEUKEMIA IN REMISSION (H): ICD-10-CM

## 2020-02-03 RX ORDER — VORICONAZOLE 200 MG/1
200 TABLET, FILM COATED ORAL 2 TIMES DAILY
Qty: 60 TABLET | Refills: 3 | Status: SHIPPED | OUTPATIENT
Start: 2020-02-03 | End: 2020-02-03

## 2020-02-03 RX ORDER — VORICONAZOLE 200 MG/1
200 TABLET, FILM COATED ORAL 2 TIMES DAILY
Qty: 60 TABLET | Refills: 3 | Status: SHIPPED | OUTPATIENT
Start: 2020-02-03 | End: 2020-10-09

## 2020-02-04 DIAGNOSIS — J01.40 SUBACUTE PANSINUSITIS: ICD-10-CM

## 2020-02-07 LAB
BACTERIA SPEC CULT: NORMAL
BACTERIA SPEC CULT: NORMAL
FUNGUS SPEC CULT: NORMAL
FUNGUS SPEC CULT: NORMAL
SPECIMEN SOURCE: NORMAL

## 2020-02-10 ENCOUNTER — TELEPHONE (OUTPATIENT)
Dept: ONCOLOGY | Facility: CLINIC | Age: 65
End: 2020-02-10

## 2020-02-10 NOTE — TELEPHONE ENCOUNTER
Patient approved for Pf[employer] free drug program for V-Fend through 12/31/2020.    [facility]   Oncology Pharmacy Liaison  [email or web address]   [phone] (phone)   [phone] (fax)

## 2020-02-11 ENCOUNTER — ANCILLARY PROCEDURE (OUTPATIENT)
Dept: CT IMAGING | Facility: CLINIC | Age: 65
End: 2020-02-11
Attending: INTERNAL MEDICINE
Payer: COMMERCIAL

## 2020-02-11 ENCOUNTER — OFFICE VISIT (OUTPATIENT)
Dept: PULMONOLOGY | Facility: CLINIC | Age: 65
End: 2020-02-11
Attending: INTERNAL MEDICINE
Payer: COMMERCIAL

## 2020-02-11 VITALS
BODY MASS INDEX: 43.88 KG/M2 | WEIGHT: 236 LBS | SYSTOLIC BLOOD PRESSURE: 152 MMHG | TEMPERATURE: 97.7 F | OXYGEN SATURATION: 95 % | HEART RATE: 85 BPM | DIASTOLIC BLOOD PRESSURE: 83 MMHG | RESPIRATION RATE: 16 BRPM

## 2020-02-11 DIAGNOSIS — J14: Primary | ICD-10-CM

## 2020-02-11 DIAGNOSIS — J16.8 FUNGAL PNEUMONIA: ICD-10-CM

## 2020-02-11 DIAGNOSIS — B49 FUNGAL PNEUMONIA: ICD-10-CM

## 2020-02-11 RX ORDER — GABAPENTIN 300 MG/1
300 CAPSULE ORAL DAILY
COMMUNITY
End: 2022-01-05

## 2020-02-11 NOTE — NURSING NOTE
"Oncology Rooming Note    February 11, 2020 4:16 PM   Eryn Bennett is a 64 year old female who presents for:    Chief Complaint   Patient presents with     Oncology Clinic Visit     PT is here for a rtn for Fungal Pneumonia     Initial Vitals: Blood Pressure (Abnormal) 152/83   Pulse 85   Temperature 97.7  F (36.5  C) (Oral)   Respiration 16   Weight 107 kg (236 lb)   Oxygen Saturation 95%   Body Mass Index 43.88 kg/m   Estimated body mass index is 43.88 kg/m  as calculated from the following:    Height as of 1/9/20: 1.562 m (5' 1.5\").    Weight as of this encounter: 107 kg (236 lb). Body surface area is 2.15 meters squared.  Data Unavailable Comment: Data Unavailable   No LMP recorded. Patient has had a hysterectomy.  Allergies reviewed: Yes  Medications reviewed: Yes    Medications: Medication refills not needed today.  Pharmacy name entered into EPIC:    [facility] [place], [place] - [address] [place] [address] [address] [place] [address] DRUG STORE #[id number] [facility] [address] [place] [address] AT [facility] [address]  [facility] [address] [place] [address]    Clinical concerns: none       Barbie Kulkarni MA            "

## 2020-02-11 NOTE — PROGRESS NOTES
Reason for Visit  Eryn Bennett is a 64 year old year old female who is being seen for Oncology Clinic Visit (PT is here for a rtn for Fungal Pneumonia)    Pulmonary HPI  65 YO with AML s/p NMA DCBT over 3 years ago who is referred to the Pulmonary BMT clinic for evaluation of cough and SOB.  Developed URI in 11-17 that had persisted for 6 weeks.  During this time noticed nasal congestion and drainage, post-nasal drip, fevers, and cough. Denies chest burning. Seen in clinic, PCR for influenza was negative but she was treated with Tamiflu.  Was given course of Azithromycin twice for 5 days apiece, felt somewhat better with Azithromycin but symptoms recurred shortly thereafter.   Presented again in 2-12-18 with fever, cough, and nasal congestion. CT scan of sinuses revealed sinusitis, chest CT with LLL bronchial wall thickening and nodularity that was actually improved compared to April 2017.  Was placed on a 10 day course of Levofloxacin, states started to feel better after 5 days. Since completion of antibiotics in the past 10 days she thinks her symptoms have all resolved; no further nasal congestion or drainage, no cough.  All fevers have resolved.  Similar course last Winter with frequent URI's which were slow to resolve.  No prior history of sinus disease or allergies.  No tobacco use.      Last seen one month ago. After that visit she underwent bronchoscopy the following day.  Cultures from bronchoscopy were positive for H. Flu, AGM and fungal cultures were negative.  Started on Azithromycin at the time of clinic appt followed by Amoxicillin a few days after the bronchoscopy; plan to start Vfend but patient never received the medication.  Took a total of two 10 day coursed of amoxicillin for a total of 20 days..  CT chest reviewed from today- significant improvement in Right apical infiltrate as well as improvement in nodular infiltrate in LLL, ~ 95% improvement.  States her sinus symptoms have completely  resolved- no further sinus pressure or pain, no drainage.  Denies cough or sputum production. Breathing is better but still get SOB when climbing stairs.    The patient was seen and examined by Troy Lomeli MD           Current Outpatient Medications   Medication     acetaminophen (TYLENOL) 325 MG tablet     amLODIPine (NORVASC) 5 MG tablet     azithromycin (ZITHROMAX) 250 MG tablet     calcium-vitamin D 500-125 MG-UNIT TABS     carvedilol (COREG) 6.25 MG tablet     desonide (DESOWEN) 0.05 % cream     fluticasone (FLONASE ALLERGY RELIEF) 50 MCG/ACT spray     Multiple Vitamins-Minerals (EYE VITAMINS & MINERALS) TABS     valACYclovir (VALTREX) 500 MG tablet     voriconazole (VFEND) 200 MG tablet     voriconazole (VFEND) 50 MG tablet     No current facility-administered medications for this visit.      Allergies   Allergen Reactions     Blood Transfusion Related (Informational Only) Other (See Comments)     7/15/15 - Patient has a complex history of clinically significant antibodies against RBC antigens.  Finding compatible RBCs may take up to 24 hours or more.  Consult with the Blood Bank MD for transfusion guidance.  Itching in palms during platelet transfusion in 2010- IV benadryl was given at that time.  Pt has had platelets since then with no reaction.   11/6/14 - Stem cell transplant patient.  Give type O RBCs.     Cefepime Itching and Rash     Severe rash, itching, and burning skin during cefepime infusion on 9/17/14     Red Blood Cells Rash     7/15/15 - Patient has a complex history of clinically significant antibodies against RBC antigens.  Finding compatible RBCs may take up to 24 hours or more.  Consult with the Blood Bank MD for transfusion guidance.  Itching in palms during platelet transfusion in 2010- IV benadryl was given at that time.  Pt has had platelets since then with no reaction.   11/6/14 - Stem cell transplant patient.  Give type O RBCs.     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 Pt would like to avoid, even with premedication, if at all possible.      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    While pt had Hicman in, pt was on a blood thinner       Past Surgical History:   Procedure Laterality Date     BLADDER SURGERY       BRONCHOSCOPY (RIGID OR FLEXIBLE), DIAGNOSTIC N/A 1/10/2020    Procedure: BRONCHOSCOPY, WITH BRONCHOALVEOLAR LAVAGE;  Surgeon: Troy Lomeli MD;  Location: [facility]     CHOLECYSTECTOMY  1987     ESOPHAGOSCOPY, GASTROSCOPY, DUODENOSCOPY (EGD), COMBINED N/A 12/11/2014    Procedure: COMBINED ESOPHAGOSCOPY, GASTROSCOPY, DUODENOSCOPY (EGD), BIOPSY SINGLE OR MULTIPLE;  Surgeon: Saturnino Valera MD;  Location: [facility]     GI SURGERY      part of colon removed r.t benign tumor     GYN SURGERY       Dos Santos Catheter Placment Right 2010    Right IJ vein, in for 8 months     HYSTERECTOMY VAGINAL, BILATERAL SALPINGO-OOPHERECTOMY, COMBINED       PICC INSERTION Left 8/28/2014    5fr DL Power PICC, 46cm (1cm external) in the L basilic vein w/ tip in the SVC RA junction.       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       Employer: [employer]   Social Needs     Financial resource strain: Not on file     Food insecurity:     Worry: Not on file     Inability: Not on file     Transportation needs:     Medical: Not on file     Non-medical: Not on file   Tobacco Use     Smoking status: Never Smoker     Smokeless tobacco: Never Used   Substance and Sexual Activity     Alcohol use: No     Alcohol/week: 0.0 standard drinks     Drug use: No     Sexual activity: Not on file   Lifestyle     Physical activity:     Days per week: Not on file     Minutes per session: Not on file     Stress: Not on file   Relationships     Social connections:     Talks on phone: Not on file     Gets together: Not on file     Attends [employer] service: Not on file     Active member of club or organization: Not on file     Attends meetings of clubs or organizations: Not on file     Relationship status: Not on file     Intimate partner violence:     Fear of current or ex partner: Not on file     Emotionally abused: Not on file     Physically abused: Not on file     Forced sexual activity: Not on file   Other Topics Concern     Parent/sibling w/ CABG, MI or angioplasty before 65F 55M? Not Asked   Social History Narrative     Not on file       ROS Pulmonary  A complete ROS was otherwise negative except as noted in the HPI.  BP (!) 152/83   Pulse 85   Temp 97.7  F (36.5  C) (Oral)   Resp 16   Wt 107 kg (236 lb)   SpO2 95%   BMI 43.88 kg/m    Exam:   GENERAL APPEARANCE: Well developed, well nourished, alert, and in no apparent distress.  EYES: PERRL, EOMI  HENT: Nasal mucosa with no edema and no hyperemia. No nasal polyps.  No sinus tenderness  EARS: Canals clear, TMs normal  MOUTH: Oral mucosa is moist, without any lesions, no tonsillar enlargement, no oropharyngeal exudate.  NECK: supple, no masses, no thyromegaly.  LYMPHATICS: No significant axillary, cervical, or supraclavicular nodes.  RESP:  Good air flow throughout.  No crackles. No rhonchi. No wheezes.  CV:  Normal S1, S2, regular rhythm, normal rate. No murmur.  No rub. No gallop. No LE edema.   ABDOMEN:  Bowel sounds normal, soft, nontender, no HSM or masses.   MS: extremities normal. No clubbing. No cyanosis.  SKIN: no rash on limited exam  NEURO: Mentation intact, speech normal, normal strength and tone, normal gait and stance  PSYCH: mentation appears normal. and affect normal/bright  Results:  No results found for this or any previous visit (from the past 168 hour(s)).    Assessment and plan:   65 YO with viral illness in November 2017 with resultant sinusitis as seen on CT.  Majority of symptoms were upper respiratory in nature at that time.  CT chest 2-18 actually improved.   Off all immunosuppressives and IgG level has maintaind > 400, with last testing 2-3-19.  Has recurrent sinus infections requiring frequent antibiotic regimens.  Pattern is that resolves somewhat with antibiotics but then recurs; prior sinus CT in 2-28 showed narrowing of ostiomeatal units bilaterally but f/u CT was improved. Seen and followed by ENT.  Recent recurrent sinus symptoms for almost three months and with cough. 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 Overall the CT has signficantly improved with treatment of the H. Flu, near complete resolution.  Sinus symptoms have also all resolved.     1. Will give and additional 10 days of Amoxicillin.  To contact the clinic if she has a return of her nasal symptoms or cough  2. Continue saline nasal rinses to BID       RTC in 2 months.  Patient to call clinic with any questions or concerns prior to her next clinic visit

## 2020-02-11 NOTE — LETTER
2/11/2020       RE: Eryn Bennett  [address]  [facility] [address]     Dear Colleague,    Thank you for referring your patient, Eryn Bennett, to the [facility] at [facility]. Please see a copy of my visit note below.    Reason for Visit  Eryn Bennett is a 64 year old year old female who is being seen for Oncology Clinic Visit (PT is here for a rtn for Fungal Pneumonia)    Pulmonary HPI  65 YO with AML s/p NMA DCBT over 3 years ago who is referred to the Pulmonary BMT clinic for evaluation of cough and SOB.  Developed URI in 11-17 that had persisted for 6 weeks.  During this time noticed nasal congestion and drainage, post-nasal drip, fevers, and cough. Denies chest burning. Seen in clinic, PCR for influenza was negative but she was treated with Tamiflu.  Was given course of Azithromycin twice for 5 days apiece, felt somewhat better with Azithromycin but symptoms recurred shortly thereafter.   Presented again in 2-12-18 with fever, cough, and nasal congestion. CT scan of sinuses revealed sinusitis, chest CT with LLL bronchial wall thickening and nodularity that was actually improved compared to April 2017.  Was placed on a 10 day course of Levofloxacin, states started to feel better after 5 days. Since completion of antibiotics in the past 10 days she thinks her symptoms have all resolved; no further nasal congestion or drainage, no cough.  All fevers have resolved.  Similar course last Winter with frequent URI's which were slow to resolve.  No prior history of sinus disease or allergies.  No tobacco use.      Last seen one month ago. After that visit she underwent bronchoscopy the following day.  Cultures from bronchoscopy were positive for H. Flu, AGM and fungal cultures were negative.  Started on Azithromycin at the time of clinic appt followed by Amoxicillin a few days after the bronchoscopy; plan to start Vfend but patient never received the  medication.  Took a total of two 10 day coursed of amoxicillin for a total of 20 days..  CT chest reviewed from today- significant improvement in Right apical infiltrate as well as improvement in nodular infiltrate in LLL, ~ 95% improvement.  States her sinus symptoms have completely resolved- no further sinus pressure or pain, no drainage.  Denies cough or sputum production. Breathing is better but still get SOB when climbing stairs.    The patient was seen and examined by Troy Lomeli MD           Current Outpatient Medications   Medication     acetaminophen (TYLENOL) 325 MG tablet     amLODIPine (NORVASC) 5 MG tablet     azithromycin (ZITHROMAX) 250 MG tablet     calcium-vitamin D 500-125 MG-UNIT TABS     carvedilol (COREG) 6.25 MG tablet     desonide (DESOWEN) 0.05 % cream     fluticasone (FLONASE ALLERGY RELIEF) 50 MCG/ACT spray     Multiple Vitamins-Minerals (EYE VITAMINS & MINERALS) TABS     valACYclovir (VALTREX) 500 MG tablet     voriconazole (VFEND) 200 MG tablet     voriconazole (VFEND) 50 MG tablet     No current facility-administered medications for this visit.      Allergies   Allergen Reactions     Blood Transfusion Related (Informational Only) Other (See Comments)     7/15/15 - Patient has a complex history of clinically significant antibodies against RBC antigens.  Finding compatible RBCs may take up to 24 hours or more.  Consult with the Blood Bank MD for transfusion guidance.  Itching in palms during platelet transfusion in 2010- IV benadryl was given at that time.  Pt has had platelets since then with no reaction.   11/6/14 - Stem cell transplant patient.  Give type O RBCs.     Cefepime Itching and Rash     Severe rash, itching, and burning skin during cefepime infusion on 9/17/14     Red Blood Cells Rash     7/15/15 - Patient has a complex history of clinically significant antibodies against RBC antigens.  Finding compatible RBCs may take up to 24 hours or more.  Consult with the Blood Bank  MD for transfusion guidance.  Itching in palms during platelet transfusion in 2010- IV benadryl was given at that time.  Pt has had platelets since then with no reaction.   11/6/14 - Stem cell transplant patient.  Give type O RBCs.     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 Pt would like to avoid, even with premedication, if at all possible.      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    While pt had [place] in, pt was on a blood thinner       Past Surgical History:   Procedure Laterality Date     BLADDER SURGERY       BRONCHOSCOPY (RIGID OR FLEXIBLE), DIAGNOSTIC N/A 1/10/2020    Procedure: BRONCHOSCOPY, WITH BRONCHOALVEOLAR LAVAGE;  Surgeon: Troy Lomeli MD;  Location: [facility]     CHOLECYSTECTOMY  1987     ESOPHAGOSCOPY, GASTROSCOPY, DUODENOSCOPY (EGD), COMBINED N/A 12/11/2014    Procedure: COMBINED ESOPHAGOSCOPY, GASTROSCOPY, DUODENOSCOPY (EGD), BIOPSY SINGLE OR MULTIPLE;  Surgeon: Saturnino Valera MD;  Location: [facility]     GI SURGERY      part of colon removed r.t benign tumor     GYN SURGERY       Dos Santos Catheter Placment Right 2010    Right IJ vein, in for 8 months     HYSTERECTOMY VAGINAL, BILATERAL SALPINGO-OOPHERECTOMY, COMBINED       PICC INSERTION Left 8/28/2014    5fr DL Power PICC, 46cm (1cm external) in the L basilic vein w/ tip in the  SVC RA junction.       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      Employer: [employer]   Social Needs     Financial resource strain: Not on file     Food insecurity:     Worry: Not on file     Inability: Not on file     Transportation needs:     Medical: Not on file     Non-medical: Not on file   Tobacco Use     Smoking status: Never Smoker     Smokeless tobacco: Never Used   Substance and Sexual Activity     Alcohol use: No     Alcohol/week: 0.0 standard drinks     Drug use: No     Sexual activity: Not on file   Lifestyle     Physical activity:     Days per week: Not on file     Minutes per session: Not on file     Stress: Not on file   Relationships     Social connections:     Talks on phone: Not on file     Gets together: Not on file     Attends Christianity service: Not on file     Active member of club or organization: Not on file     Attends meetings of clubs or organizations: Not on file     Relationship status: Not on file     Intimate partner violence:     Fear of current or ex partner: Not on file     Emotionally abused: Not on file     Physically abused: Not on file     Forced sexual activity: Not on file   Other Topics Concern     Parent/sibling w/ CABG, MI or angioplasty before 65F 55M? Not Asked   Social History Narrative     Not on file       ROS Pulmonary  A complete ROS was otherwise negative except as noted in the HPI.  BP (!) 152/83   Pulse 85   Temp 97.7  F (36.5  C) (Oral)   Resp 16   Wt 107 kg (236 lb)   SpO2 95%   BMI 43.88 kg/m     Exam:   GENERAL APPEARANCE: Well developed, well nourished, alert, and in no apparent distress.  EYES: PERRL, EOMI  HENT: Nasal mucosa with no edema and no hyperemia. No nasal polyps.  No sinus tenderness  EARS: Canals clear, TMs normal  MOUTH: Oral  mucosa is moist, without any lesions, no tonsillar enlargement, no oropharyngeal exudate.  NECK: supple, no masses, no thyromegaly.  LYMPHATICS: No significant axillary, cervical, or supraclavicular nodes.  RESP:  Good air flow throughout.  No crackles. No rhonchi. No wheezes.  CV: Normal S1, S2, regular rhythm, normal rate. No murmur.  No rub. No gallop. No LE edema.   ABDOMEN:  Bowel sounds normal, soft, nontender, no HSM or masses.   MS: extremities normal. No clubbing. No cyanosis.  SKIN: no rash on limited exam  NEURO: Mentation intact, speech normal, normal strength and tone, normal gait and stance  PSYCH: mentation appears normal. and affect normal/bright  Results:  No results found for this or any previous visit (from the past 168 hour(s)).    Assessment and plan:   65 YO with viral illness in November 2017 with resultant sinusitis as seen on CT.  Majority of symptoms were upper respiratory in nature at that time.  CT chest 2-18 actually improved.   Off all immunosuppressives and IgG level has maintaind > 400, with last testing 2-3-19.  Has recurrent sinus infections requiring frequent antibiotic regimens.  Pattern is that resolves somewhat with antibiotics but then recurs; prior sinus CT in 2-28 showed narrowing of ostiomeatal units bilaterally but f/u CT was improved. Seen and followed by ENT.  Recent recurrent sinus symptoms for almost three months and with cough. 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 Overall the CT has signficantly improved with treatment of the H. Flu, near complete resolution.  Sinus symptoms have also all resolved.     1. Will give and additional 10 days of Amoxicillin.  To contact the clinic if she has a return of her nasal symptoms or cough  2. Continue saline nasal rinses to BID       RTC in 2 months.  Patient to call clinic  with any questions or concerns prior to her next clinic visit         Again, thank you for allowing me to participate in the care of your patient.      Sincerely,    Troy Lomeli MD

## 2020-02-12 DIAGNOSIS — J14: ICD-10-CM

## 2020-02-12 RX ORDER — AMOXICILLIN 500 MG/1
500 CAPSULE ORAL 2 TIMES DAILY
Qty: 20 CAPSULE | Refills: 3 | Status: SHIPPED | OUTPATIENT
Start: 2020-02-12 | End: 2023-01-19

## 2020-02-12 RX ORDER — AMOXICILLIN 500 MG/1
500 CAPSULE ORAL 2 TIMES DAILY
Qty: 20 CAPSULE | Refills: 3 | Status: SHIPPED | OUTPATIENT
Start: 2020-02-12 | End: 2020-02-12

## 2020-02-12 NOTE — TELEPHONE ENCOUNTER
Patient called this writer to state that Dr. Lomeli would like her to continue other antibiotics and not V-Fend at this time. Writer called [employer] and withdrew patient's application for V-Fend. Rep stated that patient could be re-enrolled in the program at any point in the future. Patient thanked writer and had no further questions at this time.    [facility]   Oncology Pharmacy Liaison  [email or web address]   [phone] (phone)   [phone] (fax)

## 2020-02-17 NOTE — TELEPHONE ENCOUNTER
*CORRECTED EFFECTIVE DATES OF PRIOR AUTHORIZATION APPROVAL*    Original letter read: effective 01/01/2020-07/15/2021.  Correction letter reads: effective 01/01/2020-07/15/2020.

## 2020-03-02 ENCOUNTER — HEALTH MAINTENANCE LETTER (OUTPATIENT)
Age: 65
End: 2020-03-02

## 2020-03-07 LAB
MYCOBACTERIUM SPEC CULT: NORMAL
SPECIMEN SOURCE: NORMAL
SPECIMEN SOURCE: NORMAL

## 2020-03-23 ENCOUNTER — CARE COORDINATION (OUTPATIENT)
Dept: TRANSPLANT | Facility: CLINIC | Age: 65
End: 2020-03-23

## 2020-03-23 RX ORDER — SOD CHLOR,BICARB/SQUEEZ BOTTLE
PACKET, WITH RINSE DEVICE NASAL 2 TIMES DAILY
COMMUNITY
Start: 2020-03-23

## 2020-04-09 ENCOUNTER — VIRTUAL VISIT (OUTPATIENT)
Dept: PULMONOLOGY | Facility: CLINIC | Age: 65
End: 2020-04-09
Attending: INTERNAL MEDICINE
Payer: MEDICARE

## 2020-04-09 DIAGNOSIS — R06.02 SHORTNESS OF BREATH: Primary | ICD-10-CM

## 2020-04-09 NOTE — PROGRESS NOTES
"Eryn Bennett is a 64 year old female who is being evaluated via a billable telephone visit.      The patient has been notified of following:     \"This telephone visit will be conducted via a call between you and your physician/provider. We have found that certain health care needs can be provided without the need for a physical exam.  This service lets us provide the care you need with a short phone conversation.  If a prescription is necessary we can send it directly to your pharmacy.  If lab work is needed we can place an order for that and you can then stop by our lab to have the test done at a later time.    Telephone visits are billed at different rates depending on your insurance coverage. During this emergency period, for some insurers they may be billed the same as an in-person visit.  Please reach out to your insurance provider with any questions.    If during the course of the call the physician/provider feels a telephone visit is not appropriate, you will not be charged for this service.\"    Patient has given verbal consent for Telephone visit? Yes    How would you like to obtain your AVS?     Eryn Bennett complains of    Chief Complaint   Patient presents with     RECHECK     Return telephone visit       I have reviewed and updated the patient's Past Medical History, Social History, Family History and Medication List.    ALLERGIES  Blood transfusion related (informational only); Cefepime; Red blood cells; Sulfasalazine; Doxycycline; Sulfa drugs; Allopurinol; Levofloxacin; and Vancomycin    Additional provider notes:     65 YO with AML s/p NMA DCBT over 3 years ago who is referred to the Pulmonary BMT clinic for evaluation of cough and SOB.  Developed URI in 11-17 that had persisted for 6 weeks.  During this time noticed nasal congestion and drainage, post-nasal drip, fevers, and cough. Denies chest burning. Seen in clinic, PCR for influenza was negative but she was treated with Tamiflu.  Was given " course of Azithromycin twice for 5 days apiece, felt somewhat better with Azithromycin but symptoms recurred shortly thereafter.   Presented again in 2-12-18 with fever, cough, and nasal congestion. CT scan of sinuses revealed sinusitis, chest CT with LLL bronchial wall thickening and nodularity that was actually improved compared to April 2017.  Was placed on a 10 day course of Levofloxacin, states started to feel better after 5 days. Since completion of antibiotics in the past 10 days she thinks her symptoms have all resolved; no further nasal congestion or drainage, no cough.  All fevers have resolved.  Similar course last Winter with frequent URI's which were slow to resolve.  No prior history of sinus disease or allergies.  No tobacco use.    Last seen 2 months ago. Since her last visit she has been doing well.  No change in SOB/CRISTOBAL, not as active during Stay at home.  No cough.  No fever, sinus congestion, or other symptoms of COVID-19. Sinus symptoms have been well controlled. Some congestion in am that clears.  Notices SOB during night while asleep, has discomfort around right shoulder blade.   If stretches neck and shoulder it immediately resolves.      Assessment/Plan:  63 YO with viral illness in November 2017 with resultant sinusitis as seen on CT.  Majority of symptoms were upper respiratory in nature at that time.  CT chest 2-18 actually improved.   Off all immunosuppressives and IgG level has maintaind > 400, with last testing 2-3-19.  Has recurrent sinus infections requiring frequent antibiotic regimens.  Pattern is that resolves somewhat with antibiotics but then recurs; prior sinus CT in 2-28 showed narrowing of ostiomeatal units bilaterally but f/u CT was improved. Seen and followed by ENT.  Recent recurrent sinus symptoms for almost three months and with cough. 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 Overall the CT signficantly improved with treatment of the H. Flu, near complete resolution.  Sinus symptoms continue to be well controlled     1. Continue saline nasal rinses to BID  2. To call the clinic if restarts Amoxicillin  3. Continue Social distancing        RTC in 4 months.  Patient to call clinic with any questions or concerns prior to her next clinic visit    Phone call duration: 11 minutes

## 2020-06-01 DIAGNOSIS — M25.569 KNEE PAIN, ACUTE: ICD-10-CM

## 2020-06-01 DIAGNOSIS — Z94.81 S/P ALLOGENEIC BONE MARROW TRANSPLANT (H): Primary | ICD-10-CM

## 2020-06-11 DIAGNOSIS — Z94.81 S/P ALLOGENEIC BONE MARROW TRANSPLANT (H): ICD-10-CM

## 2020-06-11 DIAGNOSIS — M25.569 KNEE PAIN, ACUTE: ICD-10-CM

## 2020-06-17 DIAGNOSIS — I10 ESSENTIAL HYPERTENSION: ICD-10-CM

## 2020-06-17 RX ORDER — CARVEDILOL 6.25 MG/1
6.25 TABLET ORAL 2 TIMES DAILY WITH MEALS
Qty: 180 TABLET | Refills: 4 | Status: SHIPPED | OUTPATIENT
Start: 2020-06-17 | End: 2021-07-20

## 2020-06-17 RX ORDER — AMLODIPINE BESYLATE 5 MG/1
5 TABLET ORAL DAILY
Qty: 90 TABLET | Refills: 4 | Status: SHIPPED | OUTPATIENT
Start: 2020-06-17 | End: 2022-01-05

## 2020-07-28 ENCOUNTER — VIRTUAL VISIT (OUTPATIENT)
Dept: PULMONOLOGY | Facility: CLINIC | Age: 65
End: 2020-07-28
Attending: INTERNAL MEDICINE
Payer: COMMERCIAL

## 2020-07-28 DIAGNOSIS — R06.02 SHORTNESS OF BREATH: Primary | ICD-10-CM

## 2020-07-28 NOTE — PROGRESS NOTES
"Eryn Bennett is a 65 year old female who is being evaluated via a billable telephone visit.      The patient has been notified of following:     \"This telephone visit will be conducted via a call between you and your physician/provider. We have found that certain health care needs can be provided without the need for a physical exam.  This service lets us provide the care you need with a short phone conversation.  If a prescription is necessary we can send it directly to your pharmacy.  If lab work is needed we can place an order for that and you can then stop by our lab to have the test done at a later time.    Telephone visits are billed at different rates depending on your insurance coverage. During this emergency period, for some insurers they may be billed the same as an in-person visit.  Please reach out to your insurance provider with any questions.    If during the course of the call the physician/provider feels a telephone visit is not appropriate, you will not be charged for this service.\"    Patient has given verbal consent for Telephone visit?  Yes    What phone number would you like to be contacted at? [phone]    How would you like to obtain your AVS? Mail a copy     65 YO with AML s/p NMA DCBT over 3 years ago who is referred to the Pulmonary BMT clinic for evaluation of cough and SOB.  Developed URI in 11-17 that had persisted for 6 weeks.  During this time noticed nasal congestion and drainage, post-nasal drip, fevers, and cough. Denies chest burning. Seen in clinic, PCR for influenza was negative but she was treated with Tamiflu.  Was given course of Azithromycin twice for 5 days apiece, felt somewhat better with Azithromycin but symptoms recurred shortly thereafter.   Presented again in 2-12-18 with fever, cough, and nasal congestion. CT scan of sinuses revealed sinusitis, chest CT with LLL bronchial wall thickening and nodularity that was actually improved compared to April 2017.  Was " placed on a 10 day course of Levofloxacin, states started to feel better after 5 days. Since completion of antibiotics in the past 10 days she thinks her symptoms have all resolved; no further nasal congestion or drainage, no cough.  All fevers have resolved.  Similar course last Winter with frequent URI's which were slow to resolve.  No prior history of sinus disease or allergies.  No tobacco use.    Last seen 4 months ago.  Since her last appointment she continues to have intermittent right posterior upper chest pain near her shoulder blade.  Occurs 1-2 times per week, worse when she lays down, can persist for 1-2 days after it starts.  Pain is similar to the pain she had when she was found to have a lung nodule; pain was present in February when the lung nodule was no longer present on CT.  No cough or SOB.  Was trying to walk more but developed knee pain; is being evaluated by Orthopedics.        Assessment/Plan:  65 YO with viral illness in November 2017 with resultant sinusitis as seen on CT.  Majority of symptoms were upper respiratory in nature at that time.  CT chest 2-18 actually improved.   Off all immunosuppressives and IgG level has maintaind > 400, with last testing 2-3-19.  Has recurrent sinus infections requiring frequent antibiotic regimens.  Pattern is that resolves somewhat with antibiotics but then recurs; prior sinus CT in 2-28 showed narrowing of ostiomeatal units bilaterally but f/u CT was improved. Seen and followed by ENT.  Recent recurrent sinus symptoms for almost three months and with cough. 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 Overall the CT signficantly improved with treatment of the H. Flu, near complete resolution.  I do not think her scapular pain is lung related.  Sinus symptoms continue to be well  controlled     1. Continue saline nasal rinses to BID  2. Continue Social distancing        RTC in 4 months.  Patient to call clinic with any questions or concerns prior to her next clinic visit    Phone call duration: 12 minutes

## 2020-07-28 NOTE — LETTER
7/28/2020         RE: Eryn Bennett  [address]  [facility] [address]        Dear Colleague,    Thank you for referring your patient, Eryn Bennett, to the [facility]. Please see a copy of my visit note below.    Eryn Bennett is a 65 year old female who is being evaluated via a billable telephone visit.        63 YO with AML s/p NMA DCBT over 3 years ago who is referred to the Pulmonary BMT clinic for evaluation of cough and SOB.  Developed URI in 11-17 that had persisted for 6 weeks.  During this time noticed nasal congestion and drainage, post-nasal drip, fevers, and cough. Denies chest burning. Seen in clinic, PCR for influenza was negative but she was treated with Tamiflu.  Was given course of Azithromycin twice for 5 days apiece, felt somewhat better with Azithromycin but symptoms recurred shortly thereafter.   Presented again in 2-12-18 with fever, cough, and nasal congestion. CT scan of sinuses revealed sinusitis, chest CT with LLL bronchial wall thickening and nodularity that was actually improved compared to April 2017.  Was placed on a 10 day course of Levofloxacin, states started to feel better after 5 days. Since completion of antibiotics in the past 10 days she thinks her symptoms have all resolved; no further nasal congestion or drainage, no cough.  All fevers have resolved.  Similar course last Winter with frequent URI's which were slow to resolve.  No prior history of sinus disease or allergies.  No tobacco use.    Last seen 4 months ago.  Since her last appointment she continues to have intermittent right posterior upper chest pain near her shoulder blade.  Occurs 1-2 times per week, worse when she lays down, can persist for 1-2 days after it starts.  Pain is similar to the pain she had when she was found to have a lung nodule; pain was present in February when the lung nodule was no longer present on CT.  No cough or SOB.  Was trying to walk more but  developed knee pain; is being evaluated by Orthopedics.        Assessment/Plan:  63 YO with viral illness in November 2017 with resultant sinusitis as seen on CT.  Majority of symptoms were upper respiratory in nature at that time.  CT chest 2-18 actually improved.   Off all immunosuppressives and IgG level has maintaind > 400, with last testing 2-3-19.  Has recurrent sinus infections requiring frequent antibiotic regimens.  Pattern is that resolves somewhat with antibiotics but then recurs; prior sinus CT in 2-28 showed narrowing of ostiomeatal units bilaterally but f/u CT was improved. Seen and followed by ENT.  Recent recurrent sinus symptoms for almost three months and with cough. 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 Overall the CT signficantly improved with treatment of the H. Flu, near complete resolution.  I do not think her scapular pain is lung related.  Sinus symptoms continue to be well controlled     1. Continue saline nasal rinses to BID  2. Continue Social distancing        RTC in 4 months.  Patient to call clinic with any questions or concerns prior to her next clinic visit    Phone call duration: 12 minutes        Again, thank you for allowing me to participate in the care of your patient.        Sincerely,        Troy Lomeli MD

## 2020-08-13 ENCOUNTER — OFFICE VISIT (OUTPATIENT)
Dept: ORTHOPEDICS | Facility: CLINIC | Age: 65
End: 2020-08-13
Attending: INTERNAL MEDICINE
Payer: COMMERCIAL

## 2020-08-13 VITALS
WEIGHT: 237 LBS | HEIGHT: 62 IN | SYSTOLIC BLOOD PRESSURE: 144 MMHG | DIASTOLIC BLOOD PRESSURE: 90 MMHG | BODY MASS INDEX: 43.61 KG/M2

## 2020-08-13 DIAGNOSIS — M17.11 PRIMARY OSTEOARTHRITIS OF RIGHT KNEE: Primary | ICD-10-CM

## 2020-08-13 DIAGNOSIS — E66.01 MORBID OBESITY (H): ICD-10-CM

## 2020-08-13 PROCEDURE — 99203 OFFICE O/P NEW LOW 30 MIN: CPT | Performed by: ORTHOPAEDIC SURGERY

## 2020-08-13 ASSESSMENT — MIFFLIN-ST. JEOR: SCORE: 1570.33

## 2020-08-13 NOTE — PROGRESS NOTES
HISTORY OF PRESENT ILLNESS:    Eryn Bennett is a 65 year old female who is seen in consultation at the request of Dr. Ni for right knee pain. Pain started on 5/15/20, no acute injury or trauma to the knee. Not currently working.     Present symptoms: Pain located along anteromedial aspect of the knee.  Patient has sharp stabbing pain at nighttime, and wakes due to pain. Patient sleeps on the left side, and uses a pillow propping between her knees. Occasional swelling of the right knee. No catching or locking, she notes snapping sounds. Pain with ambulating stairs, prior to cortisone injection she descended stairs on her bottom. She also notes prior to the injection she had difficulties getting up from a seated position, and initial first few steps. Today her main concern is tightness of her knee when bending located both anteriorly, and posteriorly.    Current pain level: 4/10, Worst pain level: 10/10   Treatments tried to this point: cortisone injection on 6/10/20 with relief lasting 10 days, return of pain to the anteromedial aspect of the knee and pain less severe than prior to injection,  Ibuprofen, Tylenol, Tylenol Arthritis. Topical medications: Icy hot, Theraworks. Ace bandage for severe knee pain.    Orthopedic PMH: history of bone marrow transplant, history of cancer with chemotherapy treatments.      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    While pt had [place] in, pt was on a blood thinner       Past Surgical History:   Procedure Laterality Date     BLADDER SURGERY       BRONCHOSCOPY (RIGID OR FLEXIBLE), DIAGNOSTIC N/A  1/10/2020    Procedure: BRONCHOSCOPY, WITH BRONCHOALVEOLAR LAVAGE;  Surgeon: Troy Lomeli MD;  Location: [facility]     CHOLECYSTECTOMY  1987     ESOPHAGOSCOPY, GASTROSCOPY, DUODENOSCOPY (EGD), COMBINED N/A 12/11/2014    Procedure: COMBINED ESOPHAGOSCOPY, GASTROSCOPY, DUODENOSCOPY (EGD), BIOPSY SINGLE OR MULTIPLE;  Surgeon: Saturnino Valera MD;  Location: [facility]     GI SURGERY      part of colon removed r.t benign tumor     GYN SURGERY       Dos Santos Catheter Placment Right 2010    Right IJ vein, in for 8 months     HYSTERECTOMY VAGINAL, BILATERAL SALPINGO-OOPHERECTOMY, COMBINED       PICC INSERTION Left 8/28/2014    5fr DL Power PICC, 46cm (1cm external) in the L basilic vein w/ tip in the SVC RA junction.       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      Employer: [employer]   Social Needs     Financial resource strain: Not on file     Food insecurity     Worry: Not on file     Inability: Not on file     Transportation needs     Medical: Not on file     Non-medical: Not on file   Tobacco Use     Smoking status: Never Smoker     Smokeless tobacco: Never Used   Substance and Sexual Activity     Alcohol use: No     Alcohol/week: 0.0 standard drinks     Drug use: No     Sexual activity: Not on file   Lifestyle     Physical activity     Days per week: Not on file     Minutes per session: Not on file     Stress: Not on file   Relationships      Social connections     Talks on phone: Not on file     Gets together: Not on file     Attends Bahai service: Not on file     Active member of club or organization: Not on file     Attends meetings of clubs or organizations: Not on file     Relationship status: Not on file     Intimate partner violence     Fear of current or ex partner: Not on file     Emotionally abused: Not on file     Physically abused: Not on file     Forced sexual activity: Not on file   Other Topics Concern     Parent/sibling w/ CABG, MI or angioplasty before 65F 55M? Not Asked   Social History Narrative     Not on file       Current Outpatient Medications   Medication Sig Dispense Refill     acetaminophen (TYLENOL) 325 MG tablet Take 325-650 mg by mouth every 4 hours as needed for mild pain or fever Arthritis 100 tablet      amLODIPine (NORVASC) 5 MG tablet Take 1 tablet (5 mg) by mouth daily 90 tablet 4     calcium-vitamin D 500-125 MG-UNIT TABS Take by mouth 2 times daily       carvedilol (COREG) 6.25 MG tablet Take 1 tablet (6.25 mg) by mouth 2 times daily (with meals) 180 tablet 4     fluticasone (FLONASE ALLERGY RELIEF) 50 MCG/ACT spray Spray 2 sprays into both nostrils daily 1 Bottle 3     gabapentin (NEURONTIN) 300 MG capsule Take 300 mg by mouth daily       Hypertonic Nasal Wash (SINUS RINSE) bottle Spray into both nostrils 2 times daily       Multiple Vitamins-Minerals (EYE VITAMINS & MINERALS) TABS        valACYclovir (VALTREX) 500 MG tablet Take 1 tablet (500 mg) by mouth 2 times daily 60 tablet 6     VITAMIN D, CHOLECALCIFEROL, PO Take 5,000 Units by mouth daily        voriconazole (VFEND) 200 MG tablet Take 1 tablet (200 mg) by mouth 2 times daily Take with two 50mg tablets to equal 300mg two times daily. 60 tablet 3     voriconazole (VFEND) 50 MG tablet Take 2 tablets (100 mg) by mouth 2 times daily Take with 200mg tablet to equal 300mg twice daily 120 tablet 3     amoxicillin (AMOXIL) 500 MG capsule Take 1 capsule (500 mg)  by mouth 2 times daily (Patient not taking: Reported on 4/9/2020) 20 capsule 3     azithromycin (ZITHROMAX) 250 MG tablet Take 1 tablet (250 mg) by mouth daily (Patient not taking: Reported on 2/11/2020) 14 tablet 1     desonide (DESOWEN) 0.05 % cream Apply sparingly to affected area three times daily as needed. (Patient not taking: Reported on 2/13/2019) 60 g 6       Allergies   Allergen Reactions     Blood Transfusion Related (Informational Only) Other (See Comments)     7/15/15 - Patient has a complex history of clinically significant antibodies against RBC antigens.  Finding compatible RBCs may take up to 24 hours or more.  Consult with the Blood Bank MD for transfusion guidance.  Itching in palms during platelet transfusion in 2010- IV benadryl was given at that time.  Pt has had platelets since then with no reaction.   11/6/14 - Stem cell transplant patient.  Give type O RBCs.     Cefepime Itching and Rash     Severe rash, itching, and burning skin during cefepime infusion on 9/17/14     Red Blood Cells Rash     7/15/15 - Patient has a complex history of clinically significant antibodies against RBC antigens.  Finding compatible RBCs may take up to 24 hours or more.  Consult with the Blood Bank MD for transfusion guidance.  Itching in palms during platelet transfusion in 2010- IV benadryl was given at that time.  Pt has had platelets since then with no reaction.   11/6/14 - Stem cell transplant patient.  Give type O RBCs.     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 Pt would like to avoid, even with premedication, if at all possible.        REVIEW OF SYSTEMS:  CONSTITUTIONAL:  NEGATIVE for fever, chills, change in  weight  INTEGUMENTARY/SKIN:  NEGATIVE for worrisome rashes, moles or lesions  EYES:  NEGATIVE for vision changes or irritation  ENT/MOUTH:  NEGATIVE for ear, mouth and throat problems  RESP:  NEGATIVE for significant cough or SOB  BREAST:  NEGATIVE for masses, tenderness or discharge  CV: hypertension   GI:  NEGATIVE for nausea, abdominal pain, heartburn, or change in bowel habits  :  Negative   MUSCULOSKELETAL:  See HPI above  NEURO:   paresthesias  ENDOCRINE:  NEGATIVE for temperature intolerance, skin/hair changes  HEME/ALLERGY/IMMUNE:  NEGATIVE for bleeding problems  PSYCHIATRIC:  NEGATIVE for changes in mood or affect      PHYSICAL EXAM:  BP (!) 144/90 (BP Location: Right arm, Patient Position: Chair, Cuff Size: Adult Large)   There is no height or weight on file to calculate BMI.   GENERAL APPEARANCE: healthy, alert and no distress   HEENT: No apparent thyroid megaly. Clear sclera with normal ocular movement  RESPIRATORY: No labored breathing  SKIN: no suspicious lesions or rashes  NEURO: Normal strength and tone, mentation intact and speech normal  VASCULAR: Good pulses, and capillary refill   LYMPH: no lymphadenopathy   PSYCH:  mentation appears normal and affect normal/bright    MUSCULOSKELETAL:  Not in acute distress  Walks with a slight limp  Minimal swelling, right knee compared to left  Painful deep flexion and terminal extension, right knee compared to left  Palpable pain with a patellofemoral compression as well as anterior medial joint line  Intact ligaments  Extensor mechanism is intact  Gross motor function is intact  Sensation is intact  No calf tenderness  Skin is intact  Circulation is intact       ASSESSMENT:  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  Noticeable degenerative changes of the patellofemoral joint are noted.  These findings are  consistent with her symptoms.  We then had a long discussion as to the nature of arthritis as well as particular problem involving anterior aspect.  She is actually having a good day today and for that reason immediate cortisone injection was not felt to be necessary.  Her knee is not bad enough to warrant total knee arthroplasty.  We talked about beneficial exercises including exercise biking routine and quadricep strengthening.  We also talked about the importance of weight loss given her BMI of 44.  Informational materials provided today.  She is going to work with a dietitian service offered by [facility] as far as weight loss is concerned.  We will see her back if her pain gets worse for consideration of another cortisone injection in the future..    Imaging Interpretation:     Result Narrative   3 VIEWS right knee.    INDICATION:  Pain.      IMPRESSION:  Patellar spurring is present.  No visualized fracture.  Alignments anatomic.  Mild patellofemoral joint space narrowing.      Dictated by Mukesh Cha MD @ Jun 2 2020  8:46AM    (Electronically Signed)    [facility]  [email or web address]         Jaime West MD  Department of Orthopedic Surgery        Disclaimer: This note consists of symbols derived from keyboarding, dictation and/or voice recognition software. As a result, there may be errors in the script that have gone undetected. Please consider this when interpreting information found in this chart.

## 2020-08-13 NOTE — LETTER
8/13/2020         RE: Eryn Bennett  [address]  [facility] [address]        Dear Colleague,    Thank you for referring your patient, Eryn Bennett, to the [facility] ORTHOPEDIC SURGERY. Please see a copy of my visit note below.    HISTORY OF PRESENT ILLNESS:    Eryn Bennett is a 65 year old female who is seen in consultation at the request of Dr. Ni for right knee pain. Pain started on 5/15/20, no acute injury or trauma to the knee. Not currently working.     Present symptoms: Pain located along anteromedial aspect of the knee.  Patient has sharp stabbing pain at nighttime, and wakes due to pain. Patient sleeps on the left side, and uses a pillow propping between her knees. Occasional swelling of the right knee. No catching or locking, she notes snapping sounds. Pain with ambulating stairs, prior to cortisone injection she descended stairs on her bottom. She also notes prior to the injection she had difficulties getting up from a seated position, and initial first few steps. Today her main concern is tightness of her knee when bending located both anteriorly, and posteriorly.    Current pain level: 4/10, Worst pain level: 10/10   Treatments tried to this point: cortisone injection on 6/10/20 with relief lasting 10 days, return of pain to the anteromedial aspect of the knee and pain less severe than prior to injection,  Ibuprofen, Tylenol, Tylenol Arthritis. Topical medications: Icy hot, Theraworks. Ace bandage for severe knee pain.    Orthopedic PMH: history of bone marrow transplant, history of cancer with chemotherapy treatments.      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    While pt had [place] in, pt was on a blood thinner       Past Surgical History:   Procedure Laterality Date     BLADDER SURGERY       BRONCHOSCOPY (RIGID OR FLEXIBLE), DIAGNOSTIC N/A 1/10/2020    Procedure: BRONCHOSCOPY, WITH BRONCHOALVEOLAR LAVAGE;  Surgeon: Troy Lomeli MD;  Location:  GI     CHOLECYSTECTOMY  1987     ESOPHAGOSCOPY, GASTROSCOPY, DUODENOSCOPY (EGD), COMBINED N/A 12/11/2014    Procedure: COMBINED ESOPHAGOSCOPY, GASTROSCOPY, DUODENOSCOPY (EGD), BIOPSY SINGLE OR MULTIPLE;  Surgeon: Saturnino Valera MD;  Location: [facility]     GI SURGERY      part of colon removed r.t benign tumor     GYN SURGERY       Dos Santos Catheter Placment Right 2010    Right IJ vein, in for 8 months     HYSTERECTOMY VAGINAL, BILATERAL SALPINGO-OOPHERECTOMY, COMBINED       PICC INSERTION Left 8/28/2014    5fr DL Power PICC, 46cm (1cm external) in the L basilic vein w/ tip in the SVC RA junction.       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      Employer: [employer]   Social Needs     Financial resource strain: Not on file     Food insecurity     Worry: Not on file     Inability: Not on file     Transportation needs     Medical: Not on file     Non-medical: Not on file   Tobacco Use     Smoking status: Never Smoker     Smokeless tobacco: Never Used   Substance and Sexual Activity      Alcohol use: No     Alcohol/week: 0.0 standard drinks     Drug use: No     Sexual activity: Not on file   Lifestyle     Physical activity     Days per week: Not on file     Minutes per session: Not on file     Stress: Not on file   Relationships     Social connections     Talks on phone: Not on file     Gets together: Not on file     Attends Zoroastrian service: Not on file     Active member of club or organization: Not on file     Attends meetings of clubs or organizations: Not on file     Relationship status: Not on file     Intimate partner violence     Fear of current or ex partner: Not on file     Emotionally abused: Not on file     Physically abused: Not on file     Forced sexual activity: Not on file   Other Topics Concern     Parent/sibling w/ CABG, MI or angioplasty before 65F 55M? Not Asked   Social History Narrative     Not on file       Current Outpatient Medications   Medication Sig Dispense Refill     acetaminophen (TYLENOL) 325 MG tablet Take 325-650 mg by mouth every 4 hours as needed for mild pain or fever Arthritis 100 tablet      amLODIPine (NORVASC) 5 MG tablet Take 1 tablet (5 mg) by mouth daily 90 tablet 4     calcium-vitamin D 500-125 MG-UNIT TABS Take by mouth 2 times daily       carvedilol (COREG) 6.25 MG tablet Take 1 tablet (6.25 mg) by mouth 2 times daily (with meals) 180 tablet 4     fluticasone (FLONASE ALLERGY RELIEF) 50 MCG/ACT spray Spray 2 sprays into both nostrils daily 1 Bottle 3     gabapentin (NEURONTIN) 300 MG capsule Take 300 mg by mouth daily       Hypertonic Nasal Wash (SINUS RINSE) bottle Spray into both nostrils 2 times daily       Multiple Vitamins-Minerals (EYE VITAMINS & MINERALS) TABS        valACYclovir (VALTREX) 500 MG tablet Take 1 tablet (500 mg) by mouth 2 times daily 60 tablet 6     VITAMIN D, CHOLECALCIFEROL, PO Take 5,000 Units by mouth daily        voriconazole (VFEND) 200 MG tablet Take 1 tablet (200 mg) by mouth 2 times daily Take with two 50mg tablets to  equal 300mg two times daily. 60 tablet 3     voriconazole (VFEND) 50 MG tablet Take 2 tablets (100 mg) by mouth 2 times daily Take with 200mg tablet to equal 300mg twice daily 120 tablet 3     amoxicillin (AMOXIL) 500 MG capsule Take 1 capsule (500 mg) by mouth 2 times daily (Patient not taking: Reported on 4/9/2020) 20 capsule 3     azithromycin (ZITHROMAX) 250 MG tablet Take 1 tablet (250 mg) by mouth daily (Patient not taking: Reported on 2/11/2020) 14 tablet 1     desonide (DESOWEN) 0.05 % cream Apply sparingly to affected area three times daily as needed. (Patient not taking: Reported on 2/13/2019) 60 g 6       Allergies   Allergen Reactions     Blood Transfusion Related (Informational Only) Other (See Comments)     7/15/15 - Patient has a complex history of clinically significant antibodies against RBC antigens.  Finding compatible RBCs may take up to 24 hours or more.  Consult with the Blood Bank MD for transfusion guidance.  Itching in palms during platelet transfusion in 2010- IV benadryl was given at that time.  Pt has had platelets since then with no reaction.   11/6/14 - Stem cell transplant patient.  Give type O RBCs.     Cefepime Itching and Rash     Severe rash, itching, and burning skin during cefepime infusion on 9/17/14     Red Blood Cells Rash     7/15/15 - Patient has a complex history of clinically significant antibodies against RBC antigens.  Finding compatible RBCs may take up to 24 hours or more.  Consult with the Blood Bank MD for transfusion guidance.  Itching in palms during platelet transfusion in 2010- IV benadryl was given at that time.  Pt has had platelets since then with no reaction.   11/6/14 - Stem cell transplant patient.  Give type O RBCs.     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 Pt would like to avoid, even with premedication, if at all possible.        REVIEW OF SYSTEMS:  CONSTITUTIONAL:  NEGATIVE for fever, chills, change in weight  INTEGUMENTARY/SKIN:  NEGATIVE for worrisome rashes, moles or lesions  EYES:  NEGATIVE for vision changes or irritation  ENT/MOUTH:  NEGATIVE for ear, mouth and throat problems  RESP:  NEGATIVE for significant cough or SOB  BREAST:  NEGATIVE for masses, tenderness or discharge  CV: hypertension   GI:  NEGATIVE for nausea, abdominal pain, heartburn, or change in bowel habits  :  Negative   MUSCULOSKELETAL:  See HPI above  NEURO:   paresthesias  ENDOCRINE:  NEGATIVE for temperature intolerance, skin/hair changes  HEME/ALLERGY/IMMUNE:  NEGATIVE for bleeding problems  PSYCHIATRIC:  NEGATIVE for changes in mood or affect      PHYSICAL EXAM:  BP (!) 144/90 (BP Location: Right arm, Patient Position: Chair, Cuff Size: Adult Large)   There is no height or weight on file to calculate BMI.   GENERAL APPEARANCE: healthy, alert and no distress   HEENT: No apparent thyroid megaly. Clear sclera with normal ocular movement  RESPIRATORY: No labored breathing  SKIN: no suspicious lesions or rashes  NEURO: Normal strength and tone, mentation intact and speech normal  VASCULAR: Good pulses, and capillary refill   LYMPH: no lymphadenopathy   PSYCH:  mentation appears normal and affect normal/bright    MUSCULOSKELETAL:  Not in acute distress  Walks with a slight limp  Minimal swelling, right knee compared to left  Painful deep flexion and terminal extension, right knee compared to left  Palpable pain with a patellofemoral compression as well as anterior medial joint line  Intact ligaments  Extensor mechanism is intact  Gross motor function is intact  Sensation is intact  No calf tenderness  Skin is intact  Circulation is intact       ASSESSMENT:  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  Noticeable degenerative changes of the patellofemoral joint are noted.  These findings are consistent with her symptoms.  We then had a long discussion as to the nature of arthritis as well as particular problem involving anterior aspect.  She is actually having a good day today and for that reason immediate cortisone injection was not felt to be necessary.  Her knee is not bad enough to warrant total knee arthroplasty.  We talked about beneficial exercises including exercise biking routine and quadricep strengthening.  We also talked about the importance of weight loss given her BMI of 44.  Informational materials provided today.  She is going to work with a dietitian service offered by [employer] as far as weight loss is concerned.  We will see her back if her pain gets worse for consideration of another cortisone injection in the future..    Imaging Interpretation:     Result Narrative   3 VIEWS right knee.    INDICATION:  Pain.      IMPRESSION:  Patellar spurring is present.  No visualized fracture.  Alignments anatomic.  Mild patellofemoral joint space narrowing.      Dictated by Mukesh Cha MD @ Jun 2 2020  8:46AM    (Electronically Signed)    [facility]  [email or web address]         Jaime West MD  Department of Orthopedic Surgery        Disclaimer: This note consists of symbols derived from keyboarding, dictation and/or voice recognition software. As a result, there may be errors in the script that have gone undetected. Please consider this when interpreting information found in this chart.      Again, thank you for allowing me to participate in the care of your patient.        Sincerely,        Jaime West MD

## 2020-10-06 ENCOUNTER — TELEPHONE (OUTPATIENT)
Dept: TRANSPLANT | Facility: CLINIC | Age: 65
End: 2020-10-06

## 2020-10-06 NOTE — TELEPHONE ENCOUNTER
Pt called experiencing symptoms, states she is having a cough/sore throat from drainage, answered no to travel and potential exposure to her best knowledge of COVID-19. I informed pt that I will page an SHIRIN to give Eryn a call @ [phone] and if pt does not get a call back within half an hour to call [phone] again. Pt agreed. SHIRIN paged.    Pt has appt w/ Dr. Ni on Friday.  She developed nasal congestion on Sunday and now sinus pressure.  This is common for her.  She denies fever.  No sick contacts.  No covid contacts.      She does not want to drive to the  to see Dr. Ni and be turned away.  I advised her that we would see her despite her current concerns.  I also did advise her to see her local physician for her current symptoms or should she develop a fever.      Note is made that she is > 5year pos SCT, not recently neutropenic and is not on immunosuppression.    Debbie Lassiter pa-c  [phone]

## 2020-10-08 DIAGNOSIS — Z94.81 S/P ALLOGENEIC BONE MARROW TRANSPLANT (H): Primary | ICD-10-CM

## 2020-10-09 ENCOUNTER — TELEPHONE (OUTPATIENT)
Dept: ONCOLOGY | Facility: CLINIC | Age: 65
End: 2020-10-09

## 2020-10-09 ENCOUNTER — APPOINTMENT (OUTPATIENT)
Dept: LAB | Facility: CLINIC | Age: 65
End: 2020-10-09
Attending: INTERNAL MEDICINE
Payer: MEDICARE

## 2020-10-09 ENCOUNTER — ONCOLOGY VISIT (OUTPATIENT)
Dept: TRANSPLANT | Facility: CLINIC | Age: 65
End: 2020-10-09
Attending: INTERNAL MEDICINE
Payer: MEDICARE

## 2020-10-09 VITALS
SYSTOLIC BLOOD PRESSURE: 111 MMHG | OXYGEN SATURATION: 94 % | RESPIRATION RATE: 16 BRPM | HEART RATE: 83 BPM | TEMPERATURE: 97.9 F | DIASTOLIC BLOOD PRESSURE: 75 MMHG

## 2020-10-09 DIAGNOSIS — Z94.81 S/P ALLOGENEIC BONE MARROW TRANSPLANT (H): ICD-10-CM

## 2020-10-09 LAB
ALBUMIN SERPL-MCNC: 3.6 G/DL (ref 3.4–5)
ALP SERPL-CCNC: 84 U/L (ref 40–150)
ALT SERPL W P-5'-P-CCNC: 23 U/L (ref 0–50)
ANION GAP SERPL CALCULATED.3IONS-SCNC: 4 MMOL/L (ref 3–14)
AST SERPL W P-5'-P-CCNC: 14 U/L (ref 0–45)
BASOPHILS # BLD AUTO: 0 10E9/L (ref 0–0.2)
BASOPHILS NFR BLD AUTO: 0.4 %
BILIRUB SERPL-MCNC: 0.7 MG/DL (ref 0.2–1.3)
BUN SERPL-MCNC: 25 MG/DL (ref 7–30)
CALCIUM SERPL-MCNC: 9.2 MG/DL (ref 8.5–10.1)
CHLORIDE SERPL-SCNC: 108 MMOL/L (ref 94–109)
CO2 SERPL-SCNC: 26 MMOL/L (ref 20–32)
CREAT SERPL-MCNC: 0.88 MG/DL (ref 0.52–1.04)
DIFFERENTIAL METHOD BLD: ABNORMAL
EOSINOPHIL # BLD AUTO: 0.4 10E9/L (ref 0–0.7)
EOSINOPHIL NFR BLD AUTO: 4.8 %
ERYTHROCYTE [DISTWIDTH] IN BLOOD BY AUTOMATED COUNT: 12.7 % (ref 10–15)
GFR SERPL CREATININE-BSD FRML MDRD: 69 ML/MIN/{1.73_M2}
GLUCOSE SERPL-MCNC: 148 MG/DL (ref 70–99)
GRAM STN SPEC: NORMAL
HCT VFR BLD AUTO: 43.4 % (ref 35–47)
HGB BLD-MCNC: 13.6 G/DL (ref 11.7–15.7)
IMM GRANULOCYTES # BLD: 0 10E9/L (ref 0–0.4)
IMM GRANULOCYTES NFR BLD: 0.2 %
LYMPHOCYTES # BLD AUTO: 2.7 10E9/L (ref 0.8–5.3)
LYMPHOCYTES NFR BLD AUTO: 30.2 %
Lab: NORMAL
MCH RBC QN AUTO: 29.2 PG (ref 26.5–33)
MCHC RBC AUTO-ENTMCNC: 31.3 G/DL (ref 31.5–36.5)
MCV RBC AUTO: 93 FL (ref 78–100)
MONOCYTES # BLD AUTO: 0.7 10E9/L (ref 0–1.3)
MONOCYTES NFR BLD AUTO: 7.5 %
NEUTROPHILS # BLD AUTO: 5.1 10E9/L (ref 1.6–8.3)
NEUTROPHILS NFR BLD AUTO: 56.9 %
NRBC # BLD AUTO: 0 10*3/UL
NRBC BLD AUTO-RTO: 0 /100
PLATELET # BLD AUTO: 180 10E9/L (ref 150–450)
POTASSIUM SERPL-SCNC: 4 MMOL/L (ref 3.4–5.3)
PROT SERPL-MCNC: 7.1 G/DL (ref 6.8–8.8)
RBC # BLD AUTO: 4.66 10E12/L (ref 3.8–5.2)
SODIUM SERPL-SCNC: 138 MMOL/L (ref 133–144)
SPECIMEN SOURCE: NORMAL
SPECIMEN SOURCE: NORMAL
WBC # BLD AUTO: 9 10E9/L (ref 4–11)

## 2020-10-09 PROCEDURE — 87633 RESP VIRUS 12-25 TARGETS: CPT | Performed by: INTERNAL MEDICINE

## 2020-10-09 PROCEDURE — 80053 COMPREHEN METABOLIC PANEL: CPT | Performed by: PATHOLOGY

## 2020-10-09 PROCEDURE — 87102 FUNGUS ISOLATION CULTURE: CPT | Performed by: INTERNAL MEDICINE

## 2020-10-09 PROCEDURE — U0003 INFECTIOUS AGENT DETECTION BY NUCLEIC ACID (DNA OR RNA); SEVERE ACUTE RESPIRATORY SYNDROME CORONAVIRUS 2 (SARS-COV-2) (CORONAVIRUS DISEASE [COVID-19]), AMPLIFIED PROBE TECHNIQUE, MAKING USE OF HIGH THROUGHPUT TECHNOLOGIES AS DESCRIBED BY CMS-2020-01-R: HCPCS | Performed by: INTERNAL MEDICINE

## 2020-10-09 PROCEDURE — 87486 CHLMYD PNEUM DNA AMP PROBE: CPT | Performed by: INTERNAL MEDICINE

## 2020-10-09 PROCEDURE — 87205 SMEAR GRAM STAIN: CPT | Performed by: INTERNAL MEDICINE

## 2020-10-09 PROCEDURE — 87070 CULTURE OTHR SPECIMN AEROBIC: CPT | Performed by: INTERNAL MEDICINE

## 2020-10-09 PROCEDURE — 85025 COMPLETE CBC W/AUTO DIFF WBC: CPT | Performed by: PATHOLOGY

## 2020-10-09 PROCEDURE — 99215 OFFICE O/P EST HI 40 MIN: CPT | Performed by: INTERNAL MEDICINE

## 2020-10-09 PROCEDURE — 87581 M.PNEUMON DNA AMP PROBE: CPT | Performed by: INTERNAL MEDICINE

## 2020-10-09 PROCEDURE — G0463 HOSPITAL OUTPT CLINIC VISIT: HCPCS

## 2020-10-09 ASSESSMENT — PAIN SCALES - GENERAL: PAINLEVEL: MODERATE PAIN (4)

## 2020-10-09 NOTE — NURSING NOTE
Pt is coming in with a cough that she has had  For a few days. She is very stuffy and blowing her nose often. She said she has taken her temp multiple times with no fever.     Barbie Kulkarni MA

## 2020-10-09 NOTE — LETTER
10/9/2020         RE: Eryn Bennett  [address]  [facility] [address]        Dear Colleague,    Thank you for referring your patient, Eryn Bennett, to the [facility] BLOOD AND MARROW TRANSPLANT PROGRAM [place]. Please see a copy of my visit note below.    BMT Clinic Progress Notes    Ms Bennett, 65  s/p NMA DUCB transplant     CC: follow-up    History of Present Illness: The patient is now approaching 6 years from her transplant and had been doing relatively well except for some orthopedic problems.  In the last 48 hours she developed a productive cough and a sore throat.  The sore throat is already improved but she still coughing and the sputum that was initially clear is now greenish.  She does has a history of a possible fungal infection.  She denies fevers and she was afebrile in clinic.  She is actually otherwise feeling okay but having trouble with sleep with the cough.  She was seen by 2 orthopedic surgeons for her knees and they recommend no surgery at this time.  We may have to revisit that in the future.  She has no worsening dry mouth or dry eyes.  She is supposed to be following with an ophthalmologist near her home.  She denies any exposure to COVID positive people and she reports that she is taken all the precautions to avoid been exposed to this virus.  She was due to get a flu shot tomorrow.    Review of Systems: ROS otherwise unremarkable other than what is noted in the HPI.     Physical Exam: KPS 80%  /75   Pulse 83   Temp 97.9  F (36.6  C) (Oral)   Resp 16   SpO2 94%   General: A&O. NAD.  Productive cough.  HEENT: CLARICE, sclera anicteric, the mouth mucosa is mildly dry but there is no erosions or ulcerations and actually minimal lichenoid changes, if any.  Neck: supple, I do not feel any palpable lymph nodes or masses.  Lungs: CTA bilaterally to bases, no crackles, no wheezing   Heart: RRR, no m/r/g  Abdomen: +BS, soft, NT/ND, no HSM  Extremities: No edema; unable to  raise her shoulder above her head.   Skin: She did not have any active skin rashes today.  She usually has some areas of exam on her neck and under her breast.   Schirmer's test showed 14 mm millimeters on the right eye and more than 20 mm in the left eye.  Labs   Lab Results   Component Value Date    WBC 9.0 10/09/2020    ANEU 5.1 10/09/2020    HGB 13.6 10/09/2020    HCT 43.4 10/09/2020     10/09/2020     10/09/2020    POTASSIUM 4.0 10/09/2020    CHLORIDE 108 10/09/2020    CO2 26 10/09/2020     (H) 10/09/2020    BUN 25 10/09/2020    CR 0.88 10/09/2020    MAG 1.8 04/03/2017    INR 1.03 04/03/2017    BILITOTAL 0.7 10/09/2020    AST 14 10/09/2020    ALT 23 10/09/2020    ALKPHOS 84 10/09/2020    PROTTOTAL 7.1 10/09/2020    ALBUMIN 3.6 10/09/2020     COVID-19 testing pending  Respiratory viral panel pending    Ms. Eryn Bennett is a 59 yo woman s/p NMA DCBT for AML day ~6 years     1. BMT/AML: continued CR.     2. HEME:  Her blood counts are normal now for approximately 6 years after transplant.  - history of Hemolytic anemia: Warm anti E; IgG and completment. Received rituximab X 4 September 2015.   - Patient has history DVT but her repeat doppler L lower ext negative on OCT 2016. Off coumadin since JUL 2016. During this admission as she was less mobile with viral illness she was given SQ heparin every 12 hours which was stopped on discharge.     3. ID: I recommended she delay the flu shot until she resolves the upper respiratory symptoms.  In addition, we sent a COVID-19 and a respiratory viral panel today.  Once we have those results we will decide on further evaluation with imaging.  The patient had suspect fungal infection and took voriconazole last year.  We may need to reassess her with a CT without contrast.  We will try to do that closer to home.  Her last IgG obtained in January was in the 500 range.  Today we do not draw 1 and that may need to be repeated depending on her clinical course.   However, the patient has been off immunosuppression for a very long time.        4. 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  It is however possible that some of his respiratory issues are related to GVHD.  Will wait for all the results and make additional planning based on the findings.  At this time she has no indication for systemic immunosuppression.    - Off prednisone since 4/20/16; finished MMF taper on 9/13/16      5. GI: monitor and use PRN medication for GERD  - Compazine available prn      6. FEN/Renal: Patient's creatinine electrolytes all in the baseline.        7. Cardiovascular: High cholesterol and triglycerides may use cholesterol medication. Continue Norvasc 5mg daily Carvedilol 6.25mg bid for cardiomyopathy.      8. Musculoskelatal: no complain of achy legs or joints today.  Saw 2 orthopedic surgeons for her right knee but they do not recommend surgery.  We may need to reassess that.  They were concerned about her history of transplant.  On vitamin calcium replacement.  Recommended physical therapy and physical activity.    - Dexa scan with osteopenia (5/5/15). Had bisphosphonate 10/5/16; repeat in ~12 months; local doctor managing On vitamin D and calcium replacement.    - continue Tramadol and tylenol for muscle pain and that was working better than hydrocodone/acetaminophen       9. Pulmonary: Upper respiratory symptoms as above.  No obvious pneumonia.  Greenish sputum.  We sent viral PCR's including COVID, and a sputum for culture and Gram stain and fungal culture.  We might consider a chest CT without contrast once we have the results of the COVID.      10. Neurology: feet neuropathy not changed. Monitor and refer to neurology/neuropathy group for assessment.     11. Eyes: eye clinic following after cataract surgery in August 2017.  Recently seen for a reevaluation.  They found there is a scar on her eye that they would like to continue to follow, closely.    12 General  health: Following with primary care physician for general health care, cancer screening, and metabolic monitoring.    13.  COVID: The patient has a new productive cough 48 hours ago.  She was not obviously exposed.  Testing is pending.  Her oxygen saturation was fine and her clinical aspect was also fairly stable despite the cough.  Additional testing was also sent to rule out other causes for the upper respiratory symptoms.  If COVID the patient is to be monitored and she knows to call and come sooner if symptoms worsen.  We will share with her the results once they become available.    Plan:   RTSOWMYA Ni in 6 month year with labs  Primary care for diabetes, heart, lipids, GYN, thyroid, yearly skin cancer screen, cancer screen in general.  Ophthalmologist locally  Had flu shot once healed from current URI  Dexa scan in 1-2 years with PCMD  CoVID19 and RVP PCR today       Again, thank you for allowing me to participate in the care of your patient.        Sincerely,        Irving Ni MD

## 2020-10-09 NOTE — TELEPHONE ENCOUNTER
Patient called stating that she has not received a script for Azithromycin that was discussed during her clinic visit today. Reviewed Dr. Ni's note and do not see [place] mentioned.     COVID-19 test is pending. Per IDSA guidelines, they recommend against using azithromycin in COVID-19 PUIs or COVID-19 patients. She is afebrile and feels well otherwise. Recommended waiting until COVID test results.     Tiffany Lo  Hematology, Oncology & Transplantation fellow  Pager: [phone]  10/9/2020

## 2020-10-09 NOTE — PROGRESS NOTES
BMT Clinic Progress Notes    Ms Bennett, 65  s/p NMA DUCB transplant     CC: follow-up    History of Present Illness: The patient is now approaching 6 years from her transplant and had been doing relatively well except for some orthopedic problems.  In the last 48 hours she developed a productive cough and a sore throat.  The sore throat is already improved but she still coughing and the sputum that was initially clear is now greenish.  She does has a history of a possible fungal infection.  She denies fevers and she was afebrile in clinic.  She is actually otherwise feeling okay but having trouble with sleep with the cough.  She was seen by 2 orthopedic surgeons for her knees and they recommend no surgery at this time.  We may have to revisit that in the future.  She has no worsening dry mouth or dry eyes.  She is supposed to be following with an ophthalmologist near her home.  She denies any exposure to COVID positive people and she reports that she is taken all the precautions to avoid been exposed to this virus.  She was due to get a flu shot tomorrow.    Review of Systems: ROS otherwise unremarkable other than what is noted in the HPI.     Physical Exam: KPS 80%  /75   Pulse 83   Temp 97.9  F (36.6  C) (Oral)   Resp 16   SpO2 94%   General: A&O. NAD.  Productive cough.  HEENT: CLARICE, sclera anicteric, the mouth mucosa is mildly dry but there is no erosions or ulcerations and actually minimal lichenoid changes, if any.  Neck: supple, I do not feel any palpable lymph nodes or masses.  Lungs: CTA bilaterally to bases, no crackles, no wheezing   Heart: RRR, no m/r/g  Abdomen: +BS, soft, NT/ND, no HSM  Extremities: No edema; unable to raise her shoulder above her head.   Skin: She did not have any active skin rashes today.  She usually has some areas of exam on her neck and under her breast.   Schirmer's test showed 14 mm millimeters on the right eye and more than 20 mm in the left eye.  Labs   Lab Results    Component Value Date    WBC 9.0 10/09/2020    ANEU 5.1 10/09/2020    HGB 13.6 10/09/2020    HCT 43.4 10/09/2020     10/09/2020     10/09/2020    POTASSIUM 4.0 10/09/2020    CHLORIDE 108 10/09/2020    CO2 26 10/09/2020     (H) 10/09/2020    BUN 25 10/09/2020    CR 0.88 10/09/2020    MAG 1.8 04/03/2017    INR 1.03 04/03/2017    BILITOTAL 0.7 10/09/2020    AST 14 10/09/2020    ALT 23 10/09/2020    ALKPHOS 84 10/09/2020    PROTTOTAL 7.1 10/09/2020    ALBUMIN 3.6 10/09/2020     COVID-19 testing pending  Respiratory viral panel pending    Ms. Eryn Bennett is a 61 yo woman s/p NMA DCBT for AML day ~6 years     1. BMT/AML: continued CR.     2. HEME:  Her blood counts are normal now for approximately 6 years after transplant.  - history of Hemolytic anemia: Warm anti E; IgG and completment. Received rituximab X 4 September 2015.   - Patient has history DVT but her repeat doppler L lower ext negative on OCT 2016. Off coumadin since JUL 2016. During this admission as she was less mobile with viral illness she was given SQ heparin every 12 hours which was stopped on discharge.     3. ID: I recommended she delay the flu shot until she resolves the upper respiratory symptoms.  In addition, we sent a COVID-19 and a respiratory viral panel today.  Once we have those results we will decide on further evaluation with imaging.  The patient had suspect fungal infection and took voriconazole last year.  We may need to reassess her with a CT without contrast.  We will try to do that closer to home.  Her last IgG obtained in January was in the 500 range.  Today we do not draw 1 and that may need to be repeated depending on her clinical course.  However, the patient has been off immunosuppression for a very long time.        4. 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  It is however possible that some of his respiratory issues are related to GVHD.  Will wait for all the results and make additional  planning based on the findings.  At this time she has no indication for systemic immunosuppression.    - Off prednisone since 4/20/16; finished MMF taper on 9/13/16      5. GI: monitor and use PRN medication for GERD  - Compazine available prn      6. FEN/Renal: Patient's creatinine electrolytes all in the baseline.        7. Cardiovascular: High cholesterol and triglycerides may use cholesterol medication. Continue Norvasc 5mg daily Carvedilol 6.25mg bid for cardiomyopathy.      8. Musculoskelatal: no complain of achy legs or joints today.  Saw 2 orthopedic surgeons for her right knee but they do not recommend surgery.  We may need to reassess that.  They were concerned about her history of transplant.  On vitamin calcium replacement.  Recommended physical therapy and physical activity.    - Dexa scan with osteopenia (5/5/15). Had bisphosphonate 10/5/16; repeat in ~12 months; local doctor managing On vitamin D and calcium replacement.    - continue Tramadol and tylenol for muscle pain and that was working better than hydrocodone/acetaminophen       9. Pulmonary: Upper respiratory symptoms as above.  No obvious pneumonia.  Greenish sputum.  We sent viral PCR's including COVID, and a sputum for culture and Gram stain and fungal culture.  We might consider a chest CT without contrast once we have the results of the COVID.      10. Neurology: feet neuropathy not changed. Monitor and refer to neurology/neuropathy group for assessment.     11. Eyes: eye clinic following after cataract surgery in August 2017.  Recently seen for a reevaluation.  They found there is a scar on her eye that they would like to continue to follow, closely.    12 General health: Following with primary care physician for general health care, cancer screening, and metabolic monitoring.    13.  COVID: The patient has a new productive cough 48 hours ago.  She was not obviously exposed.  Testing is pending.  Her oxygen saturation was fine and her  clinical aspect was also fairly stable despite the cough.  Additional testing was also sent to rule out other causes for the upper respiratory symptoms.  If COVID the patient is to be monitored and she knows to call and come sooner if symptoms worsen.  We will share with her the results once they become available.    Plan:   RTC [facility] in 6 month year with labs  Primary care for diabetes, heart, lipids, GYN, thyroid, yearly skin cancer screen, cancer screen in general.  Ophthalmologist locally  Had flu shot once healed from current URI  Dexa scan in 1-2 years with PCMD  CoVID19 and RVP PCR today

## 2020-10-09 NOTE — PATIENT INSTRUCTIONS
RTC [facility] in 6 month year with labs  Primary care for diabetes, heart, lipids, GYN, thyroid, yearly skin cancer screen, cancer screen in general.  Ophthalmologist locally  Had flu shot once healed from current URI  Dexa scan in 1-2 years with PCMD  CoVID19 and RVP PCR today

## 2020-10-09 NOTE — NURSING NOTE
"Oncology Rooming Note    October 9, 2020 9:11 AM   Eryn Bennett is a 65 year old female who presents for:    Chief Complaint   Patient presents with     RECHECK     Pt is here for a rtn for AML     Initial Vitals: Blood Pressure 111/75   Pulse 83   Temperature 97.9  F (36.6  C) (Oral)   Respiration 16   Oxygen Saturation 94%  Estimated body mass index is 44.06 kg/m  as calculated from the following:    Height as of 8/13/20: 1.562 m (5' 1.5\").    Weight as of 8/13/20: 107.5 kg (237 lb). There is no height or weight on file to calculate BSA.  Moderate Pain (4) Comment: Data Unavailable   No LMP recorded. Patient has had a hysterectomy.  Allergies reviewed: Yes  Medications reviewed: Yes    Medications: Medication refills not needed today.  Pharmacy name entered into EPIC:    [facility] [place], [place] - [address] [facility] [address] [place] [address] [id number] - [place], [place] - [address] [facility] [address]  [facility] [address]  [place] - [address] [facility]    Clinical concerns: none       Barbie Kulkarni MA            "

## 2020-10-11 LAB
BACTERIA SPEC CULT: NORMAL
C PNEUM DNA SPEC QL NAA+PROBE: NOT DETECTED
FLUAV H1 2009 PAND RNA SPEC QL NAA+PROBE: NOT DETECTED
FLUAV H1 RNA SPEC QL NAA+PROBE: NOT DETECTED
FLUAV H3 RNA SPEC QL NAA+PROBE: NOT DETECTED
FLUAV RNA SPEC QL NAA+PROBE: NOT DETECTED
FLUBV RNA SPEC QL NAA+PROBE: NOT DETECTED
HADV DNA SPEC QL NAA+PROBE: NOT DETECTED
HCOV PNL SPEC NAA+PROBE: NOT DETECTED
HMPV RNA SPEC QL NAA+PROBE: NOT DETECTED
HPIV1 RNA SPEC QL NAA+PROBE: NOT DETECTED
HPIV2 RNA SPEC QL NAA+PROBE: NOT DETECTED
HPIV3 RNA SPEC QL NAA+PROBE: NOT DETECTED
HPIV4 RNA SPEC QL NAA+PROBE: NOT DETECTED
M PNEUMO DNA SPEC QL NAA+PROBE: NOT DETECTED
MICROBIOLOGIST REVIEW: ABNORMAL
RSV RNA SPEC QL NAA+PROBE: NOT DETECTED
RSV RNA SPEC QL NAA+PROBE: NOT DETECTED
RV+EV RNA SPEC QL NAA+PROBE: ABNORMAL
SPECIMEN SOURCE: NORMAL

## 2020-10-12 LAB
SARS-COV-2 RNA SPEC QL NAA+PROBE: NOT DETECTED
SPECIMEN SOURCE: NORMAL

## 2020-10-14 ENCOUNTER — CARE COORDINATION (OUTPATIENT)
Dept: TRANSPLANT | Facility: CLINIC | Age: 65
End: 2020-10-14

## 2020-10-14 NOTE — PROGRESS NOTES
Spoke to patient yesterday and again this afternoon to check on her upper resp symptoms. She reports that she slept better last night. No fever. She still has congestion in her head and can feel drainage down her throat that produces clear to green secretions, but says that overall she is feeling better today than yesterday. She agrees to call if her recovery backslides or gets stalled out. She is on day 3 of azithromycin.

## 2020-10-22 ENCOUNTER — CARE COORDINATION (OUTPATIENT)
Dept: TRANSPLANT | Facility: CLINIC | Age: 65
End: 2020-10-22

## 2020-10-22 NOTE — PROGRESS NOTES
Eryn called stating that while she still has sinus drainage and thus a cough, she is overall feeling better and would like to get the flu shot considering that last year she had sinus drainage through the entire fall/winter.   Per Dr. Ni, he is okay with her getting the flu shot despite sinus drainage since she feels good and has no fever, however if added that if she develops worsening  symptoms, he wants her to call to come in and get imaging.  Eryn verbalized back this information accurately.

## 2020-11-06 LAB
FUNGUS SPEC CULT: NORMAL
SPECIMEN SOURCE: NORMAL

## 2020-12-20 ENCOUNTER — HEALTH MAINTENANCE LETTER (OUTPATIENT)
Age: 65
End: 2020-12-20

## 2020-12-31 DIAGNOSIS — Z94.81 S/P ALLOGENEIC BONE MARROW TRANSPLANT (H): ICD-10-CM

## 2020-12-31 DIAGNOSIS — C92.01 ACUTE MYELOID LEUKEMIA IN REMISSION (H): ICD-10-CM

## 2020-12-31 RX ORDER — VALACYCLOVIR HYDROCHLORIDE 500 MG/1
500 TABLET, FILM COATED ORAL 2 TIMES DAILY
Qty: 60 TABLET | Refills: 11 | Status: SHIPPED | OUTPATIENT
Start: 2020-12-31

## 2021-02-05 ENCOUNTER — VIRTUAL VISIT (OUTPATIENT)
Dept: TRANSPLANT | Facility: CLINIC | Age: 66
End: 2021-02-05
Attending: INTERNAL MEDICINE
Payer: COMMERCIAL

## 2021-02-05 DIAGNOSIS — E83.111 IRON OVERLOAD DUE TO REPEATED RED BLOOD CELL TRANSFUSIONS: ICD-10-CM

## 2021-02-05 DIAGNOSIS — Z94.81 S/P ALLOGENEIC BONE MARROW TRANSPLANT (H): Primary | ICD-10-CM

## 2021-02-05 DIAGNOSIS — R73.9 HYPERGLYCEMIA: ICD-10-CM

## 2021-02-05 PROCEDURE — 999N001193 HC VIDEO/TELEPHONE VISIT; NO CHARGE

## 2021-02-05 PROCEDURE — 99214 OFFICE O/P EST MOD 30 MIN: CPT | Mod: 95 | Performed by: INTERNAL MEDICINE

## 2021-02-05 NOTE — LETTER
2/5/2021         RE: Eryn Bennett  [address]  [facility] [address]        Dear Colleague,    Thank you for referring your patient, Eryn Bennett, to the [facility] [place]. Please see a copy of my visit note below.    Eryn is a 65 year old who is being evaluated via a billable video visit.      How would you like to obtain your AVS? MyChart  If the video visit is dropped, the invitation should be resent by: Send to e-mail at: [email or web address]  Will anyone else be joining your video visit? No      Video Start Time: 11:46 AM  Video-Visit Details    Type of service:  Video Visit    Video End Time:12:11 PM    Originating Location (pt. Location): Home    Distant Location (provider location):  [facility] [place]     Platform used for Video Visit: Lucretia Crow CMA on 2/5/2021 at 11:08 AM        BMT Clinic Progress Notes    Ms Bennett, 65  s/p NMA DUCB transplant     CC: follow-up    History of Present Illness: The patient is now 6 years and 2 months after her allogenic, double cord transplant for AML.  She has been doing well and isolating from Covid.  Her eczema rash has not been a problem.  Her breathing has also been relatively stable.  She had questions about Covid vaccination.  She saw her primary care there was significantly concerned with the elevated ferritin and the risk of liver damage.  Testing for hemochromatosis genotype was negative.  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  The patient has not been taking medication for hyperglycemia but has an elevated hemoglobin A1c.  She also has elevated cholesterol that would probably benefit from adjustment of her diet.  She saw the ophthalmologist and has new corrective glasses.    Review of Systems: ROS otherwise unremarkable other than what is noted in the HPI.     Physical Exam: Albeit limited by the nature of the  virtual video visit I assessed her KPS as 90%.  We looked at the areas around her neck and her arms and there was no evident rash.  She was breathing comfortably and seemed neurologically intact.  The remainder of the exam is incomplete due to the virtual visit.    Labs:  C282Y mutation absent; H63D mutation absent  Ferritin 907  Creatinine 1.05  Albumin 4.4, alkaline phosphatase 87, ALT 21, AST 20    Assessment and plan  Ms. Eryn Bennett is a 59 yo woman s/p NMA DCBT for AML day ~6 years     1. BMT/AML: continued CR.     2. HEME:  Her blood counts are normal now 6 years after transplant.  There is some concern with the elevated ferritin despite the normal liver function test.  As I discussed with the patient I think it is reasonable to do phlebotomies to get her ferritin below 500.  Recommendations on how to proceed with the phlebotomies and parameters outlined at the bottom of this note.    - history of Hemolytic anemia: Warm anti E; IgG and completment. Received rituximab X 4 September 2015.   - Patient has history DVT but her repeat doppler L lower ext negative on OCT 2016. Off coumadin since JUL 2016. During this admission as she was less mobile with viral illness she was given SQ heparin every 12 hours which was stopped on discharge.     3. ID: I would recommend getting an IgG level twice yearly.  The patient does not need antibiotics at this time other than she is on acyclovir to suppress VZV.        4. 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   - Off prednisone since 4/20/16; finished MMF taper on 9/13/16      5. GI: monitor and use PRN medication for GERD  - Compazine available prn      6. FEN/Renal: Patient's creatinine electrolytes all in the baseline.        7. Cardiovascular: A recent echocardiogram demonstrates ejection fraction of 65% with normal wall motion. Continue Norvasc 5mg daily Carvedilol 6.25mg bid for cardiomyopathy.      8. Musculoskelatal: Continues to have the leg  achiness at night.  We talked about the pros and cons of medications for this management.  I recommended vitamin and magnesium supplements over-the-counter.  I would avoid Flexeril if at all possible because of the side effects.  Would continue with physical therapy and physical activity.    - Dexa scan with osteopenia (5/5/15). Had bisphosphonate 10/5/16; repeat in ~12 months; local doctor managing On vitamin D and calcium replacement.    - continue Tramadol and tylenol for muscle pain and that was working better than hydrocodone/acetaminophen       9. Pulmonary: She got a note and she will schedule follow-up with the pulmonary team in the near future.      10. Neurology: feet neuropathy not changed. Monitor and refer to neurology/neuropathy group for assessment.     11. Eyes: Had a change in corrective lenses recently.  Should continue periodical follow-up with ophthalmology.      12 General health: Encouraged to continue following with primary care physician for general health care, cancer screening, and metabolic monitoring.    13.  COVID: Recommended to sign up for Covid vaccination and get the shots, when available.      Plan:   ISABELLE Ni in 6 month year with labs; move the April appointment; in person  Continue: Primary care MD for diabetes, heart, lipids, GYN, thyroid, yearly skin cancer screen, cancer screen in general.  For high ferritin: Phlebotomies every two week, keep Hgb > 10; target ferritin < 500        Again, thank you for allowing me to participate in the care of your patient.        Sincerely,        Irving Ni MD

## 2021-02-05 NOTE — PROGRESS NOTES
Eryn is a 65 year old who is being evaluated via a billable video visit.      How would you like to obtain your AVS? MyChart  If the video visit is dropped, the invitation should be resent by: Send to e-mail at: [email or web address]  Will anyone else be joining your video visit? No      Video Start Time: 11:46 AM  Video-Visit Details    Type of service:  Video Visit    Video End Time:12:11 PM    Originating Location (pt. Location): Home    Distant Location (provider location):  [facility] [place]     Platform used for Video Visit: Lucretia Crow CMA on 2/5/2021 at 11:08 AM

## 2021-02-05 NOTE — PROGRESS NOTES
BMT Clinic Progress Notes    Ms Bennett, 65  s/p NMA DUCB transplant     CC: follow-up    History of Present Illness: The patient is now 6 years and 2 months after her allogenic, double cord transplant for AML.  She has been doing well and isolating from Covid.  Her eczema rash has not been a problem.  Her breathing has also been relatively stable.  She had questions about Covid vaccination.  She saw her primary care there was significantly concerned with the elevated ferritin and the risk of liver damage.  Testing for hemochromatosis genotype was negative.  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  The patient has not been taking medication for hyperglycemia but has an elevated hemoglobin A1c.  She also has elevated cholesterol that would probably benefit from adjustment of her diet.  She saw the ophthalmologist and has new corrective glasses.    Review of Systems: ROS otherwise unremarkable other than what is noted in the HPI.     Physical Exam: Albeit limited by the nature of the virtual video visit I assessed her KPS as 90%.  We looked at the areas around her neck and her arms and there was no evident rash.  She was breathing comfortably and seemed neurologically intact.  The remainder of the exam is incomplete due to the virtual visit.    Labs:  C282Y mutation absent; H63D mutation absent  Ferritin 907  Creatinine 1.05  Albumin 4.4, alkaline phosphatase 87, ALT 21, AST 20    Assessment and plan  Ms. Eryn Bennett is a 59 yo woman s/p NMA DCBT for AML day ~6 years     1. BMT/AML: continued CR.     2. HEME:  Her blood counts are normal now 6 years after transplant.  There is some concern with the elevated ferritin despite the normal liver function test.  As I discussed with the patient I think it is reasonable to do phlebotomies to get her ferritin below 500.  Recommendations on how to proceed with the phlebotomies and parameters outlined at the bottom of this note.    - history  of Hemolytic anemia: Warm anti E; IgG and completment. Received rituximab X 4 September 2015.   - Patient has history DVT but her repeat doppler L lower ext negative on OCT 2016. Off coumadin since JUL 2016. During this admission as she was less mobile with viral illness she was given SQ heparin every 12 hours which was stopped on discharge.     3. ID: I would recommend getting an IgG level twice yearly.  The patient does not need antibiotics at this time other than she is on acyclovir to suppress VZV.        4. 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   - Off prednisone since 4/20/16; finished MMF taper on 9/13/16      5. GI: monitor and use PRN medication for GERD  - Compazine available prn      6. FEN/Renal: Patient's creatinine electrolytes all in the baseline.        7. Cardiovascular: A recent echocardiogram demonstrates ejection fraction of 65% with normal wall motion. Continue Norvasc 5mg daily Carvedilol 6.25mg bid for cardiomyopathy.      8. Musculoskelatal: Continues to have the leg achiness at night.  We talked about the pros and cons of medications for this management.  I recommended vitamin and magnesium supplements over-the-counter.  I would avoid Flexeril if at all possible because of the side effects.  Would continue with physical therapy and physical activity.    - Dexa scan with osteopenia (5/5/15). Had bisphosphonate 10/5/16; repeat in ~12 months; local doctor managing On vitamin D and calcium replacement.    - continue Tramadol and tylenol for muscle pain and that was working better than hydrocodone/acetaminophen       9. Pulmonary: She got a note and she will schedule follow-up with the pulmonary team in the near future.      10. Neurology: feet neuropathy not changed. Monitor and refer to neurology/neuropathy group for assessment.     11. Eyes: Had a change in corrective lenses recently.  Should continue periodical follow-up with ophthalmology.      12 General health:  Encouraged to continue following with primary care physician for general health care, cancer screening, and metabolic monitoring.    13.  COVID: Recommended to sign up for Covid vaccination and get the shots, when available.      Plan:   ISABELLE Ni in 6 month year with labs; move the April appointment; in person  Continue: Primary care MD for diabetes, heart, lipids, GYN, thyroid, yearly skin cancer screen, cancer screen in general.  For high ferritin: Phlebotomies every two week, keep Hgb > 10; target ferritin < 500

## 2021-02-05 NOTE — PATIENT INSTRUCTIONS
ISABELLE Ni in 6 month year with labs; move the April appointment; in person  Continue: Primary care MD for diabetes, heart, lipids, GYN, thyroid, yearly skin cancer screen, cancer screen in general.  For high ferritin: Phlebotomies every two week, keep Hgb > 10; target ferritin < 500

## 2021-02-22 DIAGNOSIS — C92.01 ACUTE MYELOID LEUKEMIA IN REMISSION (H): ICD-10-CM

## 2021-02-22 DIAGNOSIS — Z94.81 S/P ALLOGENEIC BONE MARROW TRANSPLANT (H): Primary | ICD-10-CM

## 2021-02-22 DIAGNOSIS — I73.9 PVD (PERIPHERAL VASCULAR DISEASE) (H): ICD-10-CM

## 2021-03-14 NOTE — TELEPHONE ENCOUNTER
DIAGNOSIS: Peripheral Vascular disease and venous insufficiency   DATE RECEIVED: 4.2.21   NOTES STATUS DETAILS   OFFICE NOTE from referring provider Internal 2.5.21  Dr. Irving Ni  [facility] BMT   OFFICE NOTE from other specialist CE    Internal 1.19.21, 6.1.20  Dr. Emani Justin    8.13.20  Dr. Jaime West  [facility]   OPERATIVE REPORT na    MEDICATION LIST Internal    PERTINENT LABS Internal    CTA (CT ANGIOGRAPHY) na    CT na    MRI na    ULTRASOUND Internal

## 2021-03-23 ENCOUNTER — TELEPHONE (OUTPATIENT)
Dept: OTHER | Facility: CLINIC | Age: 66
End: 2021-03-23

## 2021-03-23 NOTE — TELEPHONE ENCOUNTER
Encouraged patient to get second dose and keep appointment with Dr. Salazar as scheduled. He has limited availability at the [facility] and the appointment is 48 hours after the second dose so I would expect she will feel okay enough to come to the visit at that point. Patient will call back to cancel if she ends up not feeling well the day of the scheduled appt.

## 2021-03-23 NOTE — TELEPHONE ENCOUNTER
[facility] Call Center    Phone Message    May a detailed message be left on voicemail: LVM at either cellphone or use MyC to send message    Reason for Call: Other: `    Pt is receiving her 2nd covid vaccination on 3/31/21. Pt is concerned she may not feel well on 4/2/21, the date of her appt with Dr Salazar.    Please call Pt to advise if she should cancel/reschedule her Appt on 4/2/21.    Action Taken: Message routed to:  [facility] ([facility] Vascular    Travel Screening: Not Applicable

## 2021-04-01 ASSESSMENT — ENCOUNTER SYMPTOMS
NUMBNESS: 1
LEG PAIN: 0
SEIZURES: 0
MUSCLE WEAKNESS: 0
SYNCOPE: 0
TREMORS: 0
COUGH DISTURBING SLEEP: 0
MUSCLE WEAKNESS: 0
DOUBLE VISION: 0
TINGLING: 0
NERVOUS/ANXIOUS: 0
LEG PAIN: 0
STIFFNESS: 0
TINGLING: 0
ORTHOPNEA: 1
BACK PAIN: 0
SLEEP DISTURBANCES DUE TO BREATHING: 0
BACK PAIN: 0
COUGH DISTURBING SLEEP: 0
NERVOUS/ANXIOUS: 0
LIGHT-HEADEDNESS: 1
NECK PAIN: 0
DYSPNEA ON EXERTION: 0
POSTURAL DYSPNEA: 1
EXERCISE INTOLERANCE: 0
PANIC: 0
DEPRESSION: 0
ORTHOPNEA: 1
EYE IRRITATION: 1
HYPERTENSION: 0
SHORTNESS OF BREATH: 1
SPEECH CHANGE: 0
LOSS OF CONSCIOUSNESS: 0
HYPOTENSION: 0
DYSURIA: 0
MYALGIAS: 1
WEAKNESS: 0
POSTURAL DYSPNEA: 1
SPUTUM PRODUCTION: 0
SLEEP DISTURBANCES DUE TO BREATHING: 0
DISTURBANCES IN COORDINATION: 0
DEPRESSION: 0
MUSCLE CRAMPS: 1
EYE IRRITATION: 1
SPEECH CHANGE: 0
LIGHT-HEADEDNESS: 1
ARTHRALGIAS: 1
HYPOTENSION: 0
HEMATURIA: 0
BRUISES/BLEEDS EASILY: 1
DOUBLE VISION: 0
SPUTUM PRODUCTION: 0
WHEEZING: 0
DIFFICULTY URINATING: 1
DYSURIA: 0
MYALGIAS: 1
DECREASED CONCENTRATION: 0
SNORES LOUDLY: 1
ARTHRALGIAS: 1
DIFFICULTY URINATING: 1
JOINT SWELLING: 0
EXERCISE INTOLERANCE: 0
BRUISES/BLEEDS EASILY: 1
SHORTNESS OF BREATH: 1
HEMOPTYSIS: 0
PALPITATIONS: 0
COUGH: 0
DYSPNEA ON EXERTION: 0
JOINT SWELLING: 0
PARALYSIS: 0
SWOLLEN GLANDS: 0
MUSCLE CRAMPS: 1
DIZZINESS: 1
EYE REDNESS: 0
HEMOPTYSIS: 0
HEMATURIA: 0
FLANK PAIN: 0
SYNCOPE: 0
EYE PAIN: 1
NECK PAIN: 0
HEADACHES: 0
DISTURBANCES IN COORDINATION: 0
COUGH: 0
PARALYSIS: 0
WHEEZING: 0
SWOLLEN GLANDS: 0
DIZZINESS: 1
EYE REDNESS: 0
DECREASED CONCENTRATION: 0
PALPITATIONS: 0
NUMBNESS: 1
EYE WATERING: 1
STIFFNESS: 0
HYPERTENSION: 0
EYE PAIN: 1
PANIC: 0
LOSS OF CONSCIOUSNESS: 0
MEMORY LOSS: 1
HEADACHES: 0
TREMORS: 0
MEMORY LOSS: 1
INSOMNIA: 1
FLANK PAIN: 0
SNORES LOUDLY: 1
EYE WATERING: 1
WEAKNESS: 0
INSOMNIA: 1
SEIZURES: 0

## 2021-04-02 ENCOUNTER — OFFICE VISIT (OUTPATIENT)
Dept: VASCULAR SURGERY | Facility: CLINIC | Age: 66
End: 2021-04-02
Payer: COMMERCIAL

## 2021-04-02 ENCOUNTER — PRE VISIT (OUTPATIENT)
Dept: VASCULAR SURGERY | Facility: CLINIC | Age: 66
End: 2021-04-02

## 2021-04-02 VITALS — OXYGEN SATURATION: 97 % | SYSTOLIC BLOOD PRESSURE: 167 MMHG | DIASTOLIC BLOOD PRESSURE: 89 MMHG | HEART RATE: 73 BPM

## 2021-04-02 DIAGNOSIS — I73.9 PAD (PERIPHERAL ARTERY DISEASE) (H): ICD-10-CM

## 2021-04-02 DIAGNOSIS — I10 ESSENTIAL HYPERTENSION: ICD-10-CM

## 2021-04-02 DIAGNOSIS — I73.9 PVD (PERIPHERAL VASCULAR DISEASE) (H): ICD-10-CM

## 2021-04-02 DIAGNOSIS — Z94.81 S/P ALLOGENEIC BONE MARROW TRANSPLANT (H): ICD-10-CM

## 2021-04-02 DIAGNOSIS — C92.01 ACUTE MYELOID LEUKEMIA IN REMISSION (H): ICD-10-CM

## 2021-04-02 DIAGNOSIS — E66.01 CLASS 3 SEVERE OBESITY WITH SERIOUS COMORBIDITY IN ADULT, UNSPECIFIED BMI, UNSPECIFIED OBESITY TYPE (H): ICD-10-CM

## 2021-04-02 DIAGNOSIS — I87.2 VENOUS INSUFFICIENCY OF BOTH LOWER EXTREMITIES: ICD-10-CM

## 2021-04-02 DIAGNOSIS — E66.813 CLASS 3 SEVERE OBESITY WITH SERIOUS COMORBIDITY IN ADULT, UNSPECIFIED BMI, UNSPECIFIED OBESITY TYPE (H): ICD-10-CM

## 2021-04-02 DIAGNOSIS — M54.16 LUMBAR RADICULOPATHY: Primary | ICD-10-CM

## 2021-04-02 PROCEDURE — 99214 OFFICE O/P EST MOD 30 MIN: CPT | Performed by: INTERNAL MEDICINE

## 2021-04-02 RX ORDER — AMLODIPINE BESYLATE 5 MG/1
10 TABLET ORAL DAILY
Qty: 60 TABLET | Refills: 3 | Status: SHIPPED | OUTPATIENT
Start: 2021-04-02 | End: 2023-01-19

## 2021-04-02 ASSESSMENT — PAIN SCALES - GENERAL: PAINLEVEL: NO PAIN (0)

## 2021-04-02 NOTE — PROGRESS NOTES
Vascular Medicine Progress Note     Eryn Bennett is a 65 year old female who is here for questionable PAD    Interval History   Patient is here for questionable PAD patient's BMI is on the high side, patient has history of JAZIEL that she started using CPAP recently, patient blood pressure is not to goal and she is on amlodipine 5 mg currently.  Patient is complaining of leg pain the pain affects the thighs and calf muscles pain is worse when she lies in bed she has to stand up and she has to walk around to relieve the discomfort and pain at the same time if she goes to shopping mall or if she goes shopping she has to lean on cords to relieve the discomfort in both lower extremities also she complained of numbness and tingling in the toes and whenever she walks she feels that she is walking on uneven floors.    Patient also complained of lower extremity swelling and discoloration (venous inflammation/discoloration with venous eczema/stasis dermatitis)  Left leg worse than right, patient does not use any compression stockings in addition she has obstructive sleep apnea.    Patient denies any history of rest pain, nocturnal pain apart from what is described above no history of skin ulceration skin breakdown or skin temperature changes yet she do have color changes    Physical Exam       BP: (!) 167/89 Pulse: 73     SpO2: 97 %      There were no vitals filed for this visit.  Vital Signs with Ranges  Pulse:  [73] 73  BP: (167)/(89) 167/89  SpO2:  [97 %] 97 %  [unfilled]    Constitutional: awake, alert, cooperative, no apparent distress, and appears stated age  Eyes: Lids and lashes normal, pupils equal, round and reactive to light, extra ocular muscles intact, sclera clear, conjunctiva normal  ENT: normocepalic, without obvious abnormality, oropharynx pink and moist  Hematologic / Lymphatic: no lymphadenopathy  Respiratory: No increased work of breathing, good air exchange, clear to auscultation bilaterally, no  crackles or wheezing  Cardiovascular: regular rate and rhythm, normal S1 and S2 and no murmur noted  GI: Normal bowel sounds, soft, non-distended, non-tender  Skin: no redness, warmth, or swelling, no rashes and no lesions  Musculoskeletal: There is no redness, warmth, or swelling of the joints.  Full range of motion noted.  Motor strength is 5 out of 5 all extremities bilaterally.  Tone is normal.  Neurologic: Awake, alert, oriented to name, place and time.  Cranial nerves II-XII are grossly intact.  Motor is 5 out of 5 bilaterally.    Neuropsychiatric:  Normal affect, memory, insight.  Pulses: Palpable DP and PT arteries bilaterally and equal  Edema +1 bilaterally  Evidence of stasis dermatitis left more than right  C3  . No carotid bruits appreciated.     Medications         Data   No results found for this or any previous visit (from the past 24 hour(s)).    Assessment & Plan   (M54.16) Lumbar radiculopathy  (primary encounter diagnosis)  Comment: Most probably patient has radiculopathy affecting L3-L4-L5 dermatomes bilaterally  Plan: MRI lumbosacral spine    (Z94.81) S/P allogeneic bone marrow transplant (H)  Comment: Stable      (I73.9) PVD (peripheral vascular disease) (H)  Comment: No solid evidence of PAD,  Plan: Vascular risk factors modifications mainly high blood pressure went ahead and increase her Norvasc to 10 mg daily  We will check lipid profile lipid profile  We will check KIMBERLY with toe pressures/PPG            Summary: We will see the patient once test results are available    Smith Salazar MD  Answers for HPI/ROS submitted by the patient on 4/1/2021   General Symptoms: No  Skin Symptoms: No  HENT Symptoms: No  EYE SYMPTOMS: Yes  HEART SYMPTOMS: Yes  LUNG SYMPTOMS: Yes  INTESTINAL SYMPTOMS: No  URINARY SYMPTOMS: Yes  GYNECOLOGIC SYMPTOMS: No  BREAST SYMPTOMS: No  SKELETAL SYMPTOMS: Yes  BLOOD SYMPTOMS: Yes  NERVOUS SYSTEM SYMPTOMS: Yes  MENTAL HEALTH SYMPTOMS: Yes  Eye pain: Yes  Vision loss:  No  Dry eyes: No  Watery eyes: Yes  Eye bulging: No  Double vision: No  Flashing of lights: No  Spots: No  Floaters: No  Redness: No  Crossed eyes: No  Tunnel Vision: No  Yellowing of eyes: No  Eye irritation: Yes  Cough: No  Sputum or phlegm: No  Coughing up blood: No  Difficulty breating or shortness of breath: Yes  Snoring: Yes  Wheezing: No  Difficulty breathing on exertion: No  Nighttime Cough: No  Difficulty breathing when lying flat: Yes  Chest pain or pressure: No  Fast or irregular heartbeat: No  Pain in legs with walking: No  Trouble breathing while lying down: Yes  Fingers or toes appear blue: No  High blood pressure: No  Low blood pressure: No  Fainting: No  Murmurs: No  Pacemaker: No  Varicose veins: No  Edema or swelling: Yes  Wake up at night with shortness of breath: No  Light-headedness: Yes  Exercise intolerance: No  Trouble holding urine or incontinence: Yes  Pain or burning: No  Trouble starting or stopping: No  Increased frequency of urination: No  Blood in urine: No  Decreased frequency of urination: No  Frequent nighttime urination: Yes  Flank pain: No  Difficulty emptying bladder: Yes  Back pain: No  Muscle aches: Yes  Neck pain: No  Swollen joints: No  Joint pain: Yes  Bone pain: No  Muscle cramps: Yes  Muscle weakness: No  Joint stiffness: No  Bone fracture: No  Anemia: No  Swollen glands: No  Easy bleeding or bruising: Yes  Trouble with coordination: No  Dizziness or trouble with balance: Yes  Fainting or black-out spells: No  Memory loss: Yes  Headache: No  Seizures: No  Speech problems: No  Tingling: No  Tremor: No  Weakness: No  Difficulty walking: No  Paralysis: No  Numbness: Yes  Nervous or Anxious: No  Depression: No  Trouble sleeping: Yes  Trouble thinking or concentrating: No  Mood changes: No  Panic attacks: No

## 2021-04-02 NOTE — LETTER
4/2/2021       RE: Eryn Bennett  [address]   [address]  [facility] [address]     Dear Colleague,    Thank you for referring your patient, Eryn Bennett, to the [facility] [place] [facility]. Please see a copy of my visit note below.    Vascular Medicine Progress Note     Eryn Bennett is a 65 year old female who is here for questionable PAD    Interval History   Patient is here for questionable PAD patient's BMI is on the high side, patient has history of JAZIEL that she started using CPAP recently, patient blood pressure is not to goal and she is on amlodipine 5 mg currently.  Patient is complaining of leg pain the pain affects the thighs and calf muscles pain is worse when she lies in bed she has to stand up and she has to walk around to relieve the discomfort and pain at the same time if she goes to shopping mall or if she goes shopping she has to lean on cords to relieve the discomfort in both lower extremities also she complained of numbness and tingling in the toes and whenever she walks she feels that she is walking on uneven floors.    Patient also complained of lower extremity swelling and discoloration (venous inflammation/discoloration with venous eczema/stasis dermatitis)  Left leg worse than right, patient does not use any compression stockings in addition she has obstructive sleep apnea.    Patient denies any history of rest pain, nocturnal pain apart from what is described above no history of skin ulceration skin breakdown or skin temperature changes yet she do have color changes    Physical Exam       BP: (!) 167/89 Pulse: 73     SpO2: 97 %      There were no vitals filed for this visit.  Vital Signs with Ranges  Pulse:  [73] 73  BP: (167)/(89) 167/89  SpO2:  [97 %] 97 %  [unfilled]    Constitutional: awake, alert, cooperative, no apparent distress, and appears stated age  Eyes: Lids and lashes normal, pupils  equal, round and reactive to light, extra ocular muscles intact, sclera clear, conjunctiva normal  ENT: normocepalic, without obvious abnormality, oropharynx pink and moist  Hematologic / Lymphatic: no lymphadenopathy  Respiratory: No increased work of breathing, good air exchange, clear to auscultation bilaterally, no crackles or wheezing  Cardiovascular: regular rate and rhythm, normal S1 and S2 and no murmur noted  GI: Normal bowel sounds, soft, non-distended, non-tender  Skin: no redness, warmth, or swelling, no rashes and no lesions  Musculoskeletal: There is no redness, warmth, or swelling of the joints.  Full range of motion noted.  Motor strength is 5 out of 5 all extremities bilaterally.  Tone is normal.  Neurologic: Awake, alert, oriented to name, place and time.  Cranial nerves II-XII are grossly intact.  Motor is 5 out of 5 bilaterally.    Neuropsychiatric:  Normal affect, memory, insight.  Pulses: Palpable DP and PT arteries bilaterally and equal  Edema +1 bilaterally  Evidence of stasis dermatitis left more than right  C3  . No carotid bruits appreciated.     Medications     Data   No results found for this or any previous visit (from the past 24 hour(s)).    Assessment & Plan   (M54.16) Lumbar radiculopathy  (primary encounter diagnosis)  Comment: Most probably patient has radiculopathy affecting L3-L4-L5 dermatomes bilaterally  Plan: MRI lumbosacral spine    (Z94.81) S/P allogeneic bone marrow transplant (H)  Comment: Stable      (I73.9) PVD (peripheral vascular disease) (H)  Comment: No solid evidence of PAD,  Plan: Vascular risk factors modifications mainly high blood pressure went ahead and increase her Norvasc to 10 mg daily  We will check lipid profile lipid profile  We will check KIMBERLY with toe pressures/PPG    Summary: We will see the patient once test results are available    Smith Salazar MD

## 2021-04-02 NOTE — PATIENT INSTRUCTIONS
Please see Dr. Salazar after you have completed the MRI, KIMBERLY's, and toe pressure.    Dr. Salazar would also like you to increase your Amlodipine to 10mg/day for high blood pressure at today's visit. Monitor for signs/symptoms of low blood pressure (light headedness, blurred vision, dizziness).    Contact with questions or concerns in the meantime.    Mireille

## 2021-04-02 NOTE — NURSING NOTE
Vascular Rooming Note     Eryn Bennett's goals for this visit include:   Chief Complaint   Patient presents with     Consult     Eryn, is being seen today for a consult regarding PVD and venous insufficiency, feelin gnumbness in feet due to neuropathy, cramping, muscle soreness, and joint pain, was told veins are leaking, as reported by patient.     Melba Rodriguez LPN

## 2021-04-06 ENCOUNTER — ANCILLARY PROCEDURE (OUTPATIENT)
Dept: MRI IMAGING | Facility: CLINIC | Age: 66
End: 2021-04-06
Attending: INTERNAL MEDICINE
Payer: COMMERCIAL

## 2021-04-06 ENCOUNTER — OFFICE VISIT (OUTPATIENT)
Dept: PULMONOLOGY | Facility: CLINIC | Age: 66
End: 2021-04-06
Attending: INTERNAL MEDICINE
Payer: MEDICARE

## 2021-04-06 VITALS
BODY MASS INDEX: 44.06 KG/M2 | RESPIRATION RATE: 18 BRPM | DIASTOLIC BLOOD PRESSURE: 74 MMHG | HEART RATE: 81 BPM | WEIGHT: 237 LBS | OXYGEN SATURATION: 95 % | SYSTOLIC BLOOD PRESSURE: 132 MMHG

## 2021-04-06 DIAGNOSIS — M54.16 LUMBAR RADICULOPATHY: ICD-10-CM

## 2021-04-06 DIAGNOSIS — J30.1 NON-SEASONAL ALLERGIC RHINITIS DUE TO POLLEN: Primary | ICD-10-CM

## 2021-04-06 DIAGNOSIS — I73.9 PAD (PERIPHERAL ARTERY DISEASE) (H): ICD-10-CM

## 2021-04-06 DIAGNOSIS — I73.9 PVD (PERIPHERAL VASCULAR DISEASE) (H): ICD-10-CM

## 2021-04-06 PROCEDURE — G0463 HOSPITAL OUTPT CLINIC VISIT: HCPCS | Mod: 25

## 2021-04-06 PROCEDURE — 72148 MRI LUMBAR SPINE W/O DYE: CPT | Mod: GC | Performed by: RADIOLOGY

## 2021-04-06 PROCEDURE — 99213 OFFICE O/P EST LOW 20 MIN: CPT | Performed by: INTERNAL MEDICINE

## 2021-04-06 ASSESSMENT — PAIN SCALES - GENERAL: PAINLEVEL: NO PAIN (0)

## 2021-04-06 NOTE — LETTER
4/6/2021         RE: Eryn Bennett  [address]   [address]  [place] [address]        Dear Colleague,    Thank you for referring your patient, Eryn Bennett, to the [facility] [place]. Please see a copy of my visit note below.    Reason for Visit  Eryn Bennett is a 65 year old year old female who is being seen for RECHECK (follow up )    Pulmonary HPI  66 YO with AML s/p NMA DCBT over 3 years ago who is referred to the Pulmonary BMT clinic for evaluation of cough and SOB.  Developed URI in 11-17 that had persisted for 6 weeks.  During this time noticed nasal congestion and drainage, post-nasal drip, fevers, and cough. Denies chest burning. Seen in clinic, PCR for influenza was negative but she was treated with Tamiflu.  Was given course of Azithromycin twice for 5 days apiece, felt somewhat better with Azithromycin but symptoms recurred shortly thereafter.   Presented again in 2-12-18 with fever, cough, and nasal congestion. CT scan of sinuses revealed sinusitis, chest CT with LLL bronchial wall thickening and nodularity that was actually improved compared to April 2017.  Was placed on a 10 day course of Levofloxacin, states started to feel better after 5 days. Since completion of antibiotics in the past 10 days she thinks her symptoms have all resolved; no further nasal congestion or drainage, no cough.  All fevers have resolved.  Similar course last Winter with frequent URI's which were slow to resolve.  No prior history of sinus disease or allergies.  No tobacco use.      Last seen 6 months ago. Since her last visit she has been doing well.  No sinus issues over the Winter.  Still has SOB/CRISTOBAL when walking distance or walking up a flight of stairs.  No cough. Did not exercise much over the Winter, is planning to start a walking program soon.    The patient was seen and examined by Troy Lomeli MD           Current Outpatient Medications    Medication     acetaminophen (TYLENOL) 325 MG tablet     amLODIPine (NORVASC) 5 MG tablet     amLODIPine (NORVASC) 5 MG tablet     amoxicillin (AMOXIL) 500 MG capsule     calcium-vitamin D 500-125 MG-UNIT TABS     carvedilol (COREG) 6.25 MG tablet     fluticasone (FLONASE ALLERGY RELIEF) 50 MCG/ACT spray     Hypertonic Nasal Wash (SINUS RINSE) bottle     valACYclovir (VALTREX) 500 MG tablet     VITAMIN D, CHOLECALCIFEROL, PO     desonide (DESOWEN) 0.05 % cream     gabapentin (NEURONTIN) 300 MG capsule     Multiple Vitamins-Minerals (EYE VITAMINS & MINERALS) TABS     No current facility-administered medications for this visit.      Allergies   Allergen Reactions     Blood Transfusion Related (Informational Only) Other (See Comments)     7/15/15 - Patient has a complex history of clinically significant antibodies against RBC antigens.  Finding compatible RBCs may take up to 24 hours or more.  Consult with the Blood Bank MD for transfusion guidance.  Itching in palms during platelet transfusion in 2010- IV benadryl was given at that time.  Pt has had platelets since then with no reaction.   11/6/14 - Stem cell transplant patient.  Give type O RBCs.     Cefepime Itching and Rash     Severe rash, itching, and burning skin during cefepime infusion on 9/17/14     Red Blood Cells Rash     7/15/15 - Patient has a complex history of clinically significant antibodies against RBC antigens.  Finding compatible RBCs may take up to 24 hours or more.  Consult with the Blood Bank MD for transfusion guidance.  Itching in palms during platelet transfusion in 2010- IV benadryl was given at that time.  Pt has had platelets since then with no reaction.   11/6/14 - Stem cell transplant patient.  Give type O RBCs.     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 Pt would like to avoid, even with premedication, if at all possible.      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    While pt had Hicman in, pt was on a blood thinner       Past Surgical History:   Procedure Laterality Date     BLADDER SURGERY       BRONCHOSCOPY (RIGID OR FLEXIBLE), DIAGNOSTIC N/A 1/10/2020    Procedure: BRONCHOSCOPY, WITH BRONCHOALVEOLAR LAVAGE;  Surgeon: Troy Lomeli MD;  Location: [facility]     CHOLECYSTECTOMY  1987     ESOPHAGOSCOPY, GASTROSCOPY, DUODENOSCOPY (EGD), COMBINED N/A 12/11/2014    Procedure: COMBINED ESOPHAGOSCOPY, GASTROSCOPY, DUODENOSCOPY (EGD), BIOPSY SINGLE OR MULTIPLE;  Surgeon: Saturnino Valera MD;  Location: [facility]     GI SURGERY      part of colon removed r.t benign tumor     GYN SURGERY       Dos Santos Catheter Placment Right 2010    Right IJ vein, in for 8 months     HYSTERECTOMY VAGINAL, BILATERAL SALPINGO-OOPHERECTOMY, COMBINED       PICC INSERTION Left 8/28/2014    5fr DL Power PICC, 46cm (1cm external) in the L basilic vein w/ tip in the SVC RA junction.       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      Employer: [employer]   Social Needs     Financial resource strain: Not on file     Food insecurity     Worry: Not on file     Inability: Not on file     Transportation needs      Medical: Not on file     Non-medical: Not on file   Tobacco Use     Smoking status: Never Smoker     Smokeless tobacco: Never Used   Substance and Sexual Activity     Alcohol use: No     Alcohol/week: 0.0 standard drinks     Drug use: No     Sexual activity: Not on file   Lifestyle     Physical activity     Days per week: Not on file     Minutes per session: Not on file     Stress: Not on file   Relationships     Social connections     Talks on phone: Not on file     Gets together: Not on file     Attends Yarsani service: Not on file     Active member of club or organization: Not on file     Attends meetings of clubs or organizations: Not on file     Relationship status: Not on file     Intimate partner violence     Fear of current or ex partner: Not on file     Emotionally abused: Not on file     Physically abused: Not on file     Forced sexual activity: Not on file   Other Topics Concern     Parent/sibling w/ CABG, MI or angioplasty before 65F 55M? Not Asked   Social History Narrative     Not on file       ROS Pulmonary  A complete ROS was otherwise negative except as noted in the HPI.  /74   Pulse 81   Resp 18   Wt 107.5 kg (237 lb)   SpO2 95%   BMI 44.06 kg/m    Exam:   GENERAL APPEARANCE: Well developed, well nourished, alert, and in no apparent distress.  EYES: PERRL, EOMI  HENT: Nasal mucosa with no edema and no hyperemia. No nasal polyps.  EARS: Canals clear, TMs normal  MOUTH: Oral mucosa is moist, without any lesions, no tonsillar enlargement, no oropharyngeal exudate.  NECK: supple, no masses, no thyromegaly.  LYMPHATICS: No significant axillary, cervical, or supraclavicular nodes.  RESP:  Good air flow throughout.  No crackles. No rhonchi. No wheezes.  CV: Normal S1, S2, regular rhythm, normal rate. No murmur.  No rub. No gallop. No LE edema.   ABDOMEN:  Bowel sounds normal, soft, nontender, no HSM or masses.   MS: extremities normal. No clubbing. No cyanosis.  SKIN: no rash on limited  exam  NEURO: Mentation intact, speech normal, normal strength and tone, normal gait and stance  PSYCH: mentation appears normal. and affect normal/bright  Results:  No results found for this or any previous visit (from the past 168 hour(s)).    Assessment and plan:   64 YO with viral illness in November 2017 with resultant sinusitis as seen on CT.  Majority of symptoms were upper respiratory in nature at that time.  CT chest 2-18 actually improved.   Off all immunosuppressives and IgG level has maintaind > 400, with last testing 2-3-19.  Has recurrent sinus infections requiring frequent antibiotic regimens.  Pattern is that resolves somewhat with antibiotics but then recurs; prior sinus CT in 2-28 showed narrowing of ostiomeatal units bilaterally but f/u CT was improved. Seen and followed by ENT.  Recent recurrent sinus symptoms for almost three months and with cough. 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 Overall the CT signficantly improved with treatment of the H. Flu, near complete resolution.  Sinus symptoms have resolved this Winter.  Still component of deconditioning.     1. Continue saline nasal rinses to BID  2. Agree with starting exercise regimen        RTC in 6 months.  Patient to call clinic with any questions or concerns prior to her next clinic visit        Answers for HPI/ROS submitted by the patient on 4/1/2021   General Symptoms: No  Skin Symptoms: No  HENT Symptoms: No  EYE SYMPTOMS: Yes  HEART SYMPTOMS: Yes  LUNG SYMPTOMS: Yes  INTESTINAL SYMPTOMS: No  URINARY SYMPTOMS: Yes  GYNECOLOGIC SYMPTOMS: No  BREAST SYMPTOMS: No  SKELETAL SYMPTOMS: Yes  BLOOD SYMPTOMS: Yes  NERVOUS SYSTEM SYMPTOMS: Yes  MENTAL HEALTH SYMPTOMS: Yes  Eye pain: Yes  Vision loss: No  Dry eyes: No  Watery eyes: Yes  Eye bulging: No  Double vision: No  Flashing of lights:  No  Spots: No  Floaters: No  Redness: No  Crossed eyes: No  Tunnel Vision: No  Yellowing of eyes: No  Eye irritation: Yes  Cough: No  Sputum or phlegm: No  Coughing up blood: No  Difficulty breating or shortness of breath: Yes  Snoring: Yes  Wheezing: No  Difficulty breathing on exertion: No  Nighttime Cough: No  Difficulty breathing when lying flat: Yes  Chest pain or pressure: No  Fast or irregular heartbeat: No  Pain in legs with walking: No  Trouble breathing while lying down: Yes  Fingers or toes appear blue: No  High blood pressure: No  Low blood pressure: No  Fainting: No  Murmurs: No  Pacemaker: No  Varicose veins: No  Edema or swelling: Yes  Wake up at night with shortness of breath: No  Light-headedness: Yes  Exercise intolerance: No  Trouble holding urine or incontinence: Yes  Pain or burning: No  Trouble starting or stopping: No  Increased frequency of urination: No  Blood in urine: No  Decreased frequency of urination: No  Frequent nighttime urination: Yes  Flank pain: No  Difficulty emptying bladder: Yes  Back pain: No  Muscle aches: Yes  Neck pain: No  Swollen joints: No  Joint pain: Yes  Bone pain: No  Muscle cramps: Yes  Muscle weakness: No  Joint stiffness: No  Bone fracture: No  Anemia: No  Swollen glands: No  Easy bleeding or bruising: Yes  Trouble with coordination: No  Dizziness or trouble with balance: Yes  Fainting or black-out spells: No  Memory loss: Yes  Headache: No  Seizures: No  Speech problems: No  Tingling: No  Tremor: No  Weakness: No  Difficulty walking: No  Paralysis: No  Numbness: Yes  Nervous or Anxious: No  Depression: No  Trouble sleeping: Yes  Trouble thinking or concentrating: No  Mood changes: No  Panic attacks: No        Again, thank you for allowing me to participate in the care of your patient.        Sincerely,        Troy Lomeli MD

## 2021-04-06 NOTE — NURSING NOTE
Chief Complaint   Patient presents with     RECHECK     follow up      Medications reviewed and updated.  Vitals taken  Rama Pittman CMA

## 2021-04-06 NOTE — PROGRESS NOTES
Reason for Visit  Eryn Bennett is a 65 year old year old female who is being seen for RECHECK (follow up )    Pulmonary HPI  64 YO with AML s/p NMA DCBT over 3 years ago who is referred to the Pulmonary BMT clinic for evaluation of cough and SOB.  Developed URI in 11-17 that had persisted for 6 weeks.  During this time noticed nasal congestion and drainage, post-nasal drip, fevers, and cough. Denies chest burning. Seen in clinic, PCR for influenza was negative but she was treated with Tamiflu.  Was given course of Azithromycin twice for 5 days apiece, felt somewhat better with Azithromycin but symptoms recurred shortly thereafter.   Presented again in 2-12-18 with fever, cough, and nasal congestion. CT scan of sinuses revealed sinusitis, chest CT with LLL bronchial wall thickening and nodularity that was actually improved compared to April 2017.  Was placed on a 10 day course of Levofloxacin, states started to feel better after 5 days. Since completion of antibiotics in the past 10 days she thinks her symptoms have all resolved; no further nasal congestion or drainage, no cough.  All fevers have resolved.  Similar course last Winter with frequent URI's which were slow to resolve.  No prior history of sinus disease or allergies.  No tobacco use.      Last seen 6 months ago. Since her last visit she has been doing well.  No sinus issues over the Winter.  Still has SOB/CRISTOBAL when walking distance or walking up a flight of stairs.  No cough. Did not exercise much over the Winter, is planning to start a walking program soon.    The patient was seen and examined by Troy Lomeli MD           Current Outpatient Medications   Medication     acetaminophen (TYLENOL) 325 MG tablet     amLODIPine (NORVASC) 5 MG tablet     amLODIPine (NORVASC) 5 MG tablet     amoxicillin (AMOXIL) 500 MG capsule     calcium-vitamin D 500-125 MG-UNIT TABS     carvedilol (COREG) 6.25 MG tablet     fluticasone (FLONASE ALLERGY RELIEF) 50  MCG/ACT spray     Hypertonic Nasal Wash (SINUS RINSE) bottle     valACYclovir (VALTREX) 500 MG tablet     VITAMIN D, CHOLECALCIFEROL, PO     desonide (DESOWEN) 0.05 % cream     gabapentin (NEURONTIN) 300 MG capsule     Multiple Vitamins-Minerals (EYE VITAMINS & MINERALS) TABS     No current facility-administered medications for this visit.      Allergies   Allergen Reactions     Blood Transfusion Related (Informational Only) Other (See Comments)     7/15/15 - Patient has a complex history of clinically significant antibodies against RBC antigens.  Finding compatible RBCs may take up to 24 hours or more.  Consult with the Blood Bank MD for transfusion guidance.  Itching in palms during platelet transfusion in 2010- IV benadryl was given at that time.  Pt has had platelets since then with no reaction.   11/6/14 - Stem cell transplant patient.  Give type O RBCs.     Cefepime Itching and Rash     Severe rash, itching, and burning skin during cefepime infusion on 9/17/14     Red Blood Cells Rash     7/15/15 - Patient has a complex history of clinically significant antibodies against RBC antigens.  Finding compatible RBCs may take up to 24 hours or more.  Consult with the Blood Bank MD for transfusion guidance.  Itching in palms during platelet transfusion in 2010- IV benadryl was given at that time.  Pt has had platelets since then with no reaction.   11/6/14 - Stem cell transplant patient.  Give type O RBCs.     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 Pt would like to avoid, even with premedication, if at all possible.      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    While pt had [place] in, pt was on a blood thinner       Past Surgical History:   Procedure Laterality Date     BLADDER SURGERY       BRONCHOSCOPY (RIGID OR FLEXIBLE), DIAGNOSTIC N/A 1/10/2020    Procedure: BRONCHOSCOPY, WITH BRONCHOALVEOLAR LAVAGE;  Surgeon: Troy Lomeli MD;  Location:  GI     CHOLECYSTECTOMY  1987     ESOPHAGOSCOPY, GASTROSCOPY, DUODENOSCOPY (EGD), COMBINED N/A 12/11/2014    Procedure: COMBINED ESOPHAGOSCOPY, GASTROSCOPY, DUODENOSCOPY (EGD), BIOPSY SINGLE OR MULTIPLE;  Surgeon: Saturnino Valera MD;  Location: [facility]     GI SURGERY      part of colon removed r.t benign tumor     GYN SURGERY       Dos Santos Catheter Placment Right 2010    Right IJ vein, in for 8 months     HYSTERECTOMY VAGINAL, BILATERAL SALPINGO-OOPHERECTOMY, COMBINED       PICC INSERTION Left 8/28/2014    5fr DL Power PICC, 46cm (1cm external) in the L basilic vein w/ tip in the SVC RA junction.       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      Employer: [employer]   Social Needs     Financial resource strain: Not on file     Food insecurity     Worry: Not on file     Inability: Not on file     Transportation needs     Medical: Not on file     Non-medical: Not on file   Tobacco Use     Smoking status: Never Smoker     Smokeless tobacco: Never Used   Substance and Sexual Activity     Alcohol use: No     Alcohol/week: 0.0 standard drinks     Drug use: No     Sexual activity: Not on file   Lifestyle      Physical activity     Days per week: Not on file     Minutes per session: Not on file     Stress: Not on file   Relationships     Social connections     Talks on phone: Not on file     Gets together: Not on file     Attends Taoist service: Not on file     Active member of club or organization: Not on file     Attends meetings of clubs or organizations: Not on file     Relationship status: Not on file     Intimate partner violence     Fear of current or ex partner: Not on file     Emotionally abused: Not on file     Physically abused: Not on file     Forced sexual activity: Not on file   Other Topics Concern     Parent/sibling w/ CABG, MI or angioplasty before 65F 55M? Not Asked   Social History Narrative     Not on file       ROS Pulmonary  A complete ROS was otherwise negative except as noted in the HPI.  /74   Pulse 81   Resp 18   Wt 107.5 kg (237 lb)   SpO2 95%   BMI 44.06 kg/m    Exam:   GENERAL APPEARANCE: Well developed, well nourished, alert, and in no apparent distress.  EYES: PERRL, EOMI  HENT: Nasal mucosa with no edema and no hyperemia. No nasal polyps.  EARS: Canals clear, TMs normal  MOUTH: Oral mucosa is moist, without any lesions, no tonsillar enlargement, no oropharyngeal exudate.  NECK: supple, no masses, no thyromegaly.  LYMPHATICS: No significant axillary, cervical, or supraclavicular nodes.  RESP:  Good air flow throughout.  No crackles. No rhonchi. No wheezes.  CV: Normal S1, S2, regular rhythm, normal rate. No murmur.  No rub. No gallop. No LE edema.   ABDOMEN:  Bowel sounds normal, soft, nontender, no HSM or masses.   MS: extremities normal. No clubbing. No cyanosis.  SKIN: no rash on limited exam  NEURO: Mentation intact, speech normal, normal strength and tone, normal gait and stance  PSYCH: mentation appears normal. and affect normal/bright  Results:  No results found for this or any previous visit (from the past 168 hour(s)).    Assessment and plan:   66 YO with viral illness  in November 2017 with resultant sinusitis as seen on CT.  Majority of symptoms were upper respiratory in nature at that time.  CT chest 2-18 actually improved.   Off all immunosuppressives and IgG level has maintaind > 400, with last testing 2-3-19.  Has recurrent sinus infections requiring frequent antibiotic regimens.  Pattern is that resolves somewhat with antibiotics but then recurs; prior sinus CT in 2-28 showed narrowing of ostiomeatal units bilaterally but f/u CT was improved. Seen and followed by ENT.  Recent recurrent sinus symptoms for almost three months and with cough. 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 Overall the CT signficantly improved with treatment of the H. Flu, near complete resolution.  Sinus symptoms have resolved this Winter.  Still component of deconditioning.     1. Continue saline nasal rinses to BID  2. Agree with starting exercise regimen        RTC in 6 months.  Patient to call clinic with any questions or concerns prior to her next clinic visit        Answers for HPI/ROS submitted by the patient on 4/1/2021   General Symptoms: No  Skin Symptoms: No  HENT Symptoms: No  EYE SYMPTOMS: Yes  HEART SYMPTOMS: Yes  LUNG SYMPTOMS: Yes  INTESTINAL SYMPTOMS: No  URINARY SYMPTOMS: Yes  GYNECOLOGIC SYMPTOMS: No  BREAST SYMPTOMS: No  SKELETAL SYMPTOMS: Yes  BLOOD SYMPTOMS: Yes  NERVOUS SYSTEM SYMPTOMS: Yes  MENTAL HEALTH SYMPTOMS: Yes  Eye pain: Yes  Vision loss: No  Dry eyes: No  Watery eyes: Yes  Eye bulging: No  Double vision: No  Flashing of lights: No  Spots: No  Floaters: No  Redness: No  Crossed eyes: No  Tunnel Vision: No  Yellowing of eyes: No  Eye irritation: Yes  Cough: No  Sputum or phlegm: No  Coughing up blood: No  Difficulty breating or shortness of breath: Yes  Snoring: Yes  Wheezing: No  Difficulty breathing on exertion:  No  Nighttime Cough: No  Difficulty breathing when lying flat: Yes  Chest pain or pressure: No  Fast or irregular heartbeat: No  Pain in legs with walking: No  Trouble breathing while lying down: Yes  Fingers or toes appear blue: No  High blood pressure: No  Low blood pressure: No  Fainting: No  Murmurs: No  Pacemaker: No  Varicose veins: No  Edema or swelling: Yes  Wake up at night with shortness of breath: No  Light-headedness: Yes  Exercise intolerance: No  Trouble holding urine or incontinence: Yes  Pain or burning: No  Trouble starting or stopping: No  Increased frequency of urination: No  Blood in urine: No  Decreased frequency of urination: No  Frequent nighttime urination: Yes  Flank pain: No  Difficulty emptying bladder: Yes  Back pain: No  Muscle aches: Yes  Neck pain: No  Swollen joints: No  Joint pain: Yes  Bone pain: No  Muscle cramps: Yes  Muscle weakness: No  Joint stiffness: No  Bone fracture: No  Anemia: No  Swollen glands: No  Easy bleeding or bruising: Yes  Trouble with coordination: No  Dizziness or trouble with balance: Yes  Fainting or black-out spells: No  Memory loss: Yes  Headache: No  Seizures: No  Speech problems: No  Tingling: No  Tremor: No  Weakness: No  Difficulty walking: No  Paralysis: No  Numbness: Yes  Nervous or Anxious: No  Depression: No  Trouble sleeping: Yes  Trouble thinking or concentrating: No  Mood changes: No  Panic attacks: No

## 2021-04-08 ENCOUNTER — ANCILLARY PROCEDURE (OUTPATIENT)
Dept: ULTRASOUND IMAGING | Facility: CLINIC | Age: 66
End: 2021-04-08
Attending: INTERNAL MEDICINE
Payer: COMMERCIAL

## 2021-04-08 DIAGNOSIS — I73.9 PAD (PERIPHERAL ARTERY DISEASE) (H): ICD-10-CM

## 2021-04-08 DIAGNOSIS — M54.16 LUMBAR RADICULOPATHY: ICD-10-CM

## 2021-04-08 DIAGNOSIS — I73.9 PVD (PERIPHERAL VASCULAR DISEASE) (H): ICD-10-CM

## 2021-04-08 PROCEDURE — 93922 UPR/L XTREMITY ART 2 LEVELS: CPT | Mod: GC | Performed by: RADIOLOGY

## 2021-04-08 PROCEDURE — 99207 US ABI DOPPLER NO EXERCISE, 1-2 LEVELS,  BILAT: CPT | Mod: GC | Performed by: RADIOLOGY

## 2021-04-24 ENCOUNTER — HEALTH MAINTENANCE LETTER (OUTPATIENT)
Age: 66
End: 2021-04-24

## 2021-06-24 ENCOUNTER — TELEPHONE (OUTPATIENT)
Dept: OTHER | Facility: CLINIC | Age: 66
End: 2021-06-24
Payer: COMMERCIAL

## 2021-06-24 NOTE — TELEPHONE ENCOUNTER
[facility] Call Center    Phone Message    May a detailed message be left on voicemail: yes     Reason for Call: Other: `    Pt of Dr Herman. Dr Salazar ordered labs for Pt but she would like to complete them locally. Please fax lab orders to [facility] in [place] at [phone]    Action Taken: Message routed to:  [facility] ([facility]): Vascular    Travel Screening: Not Applicable

## 2021-06-28 ENCOUNTER — TRANSFERRED RECORDS (OUTPATIENT)
Dept: HEALTH INFORMATION MANAGEMENT | Facility: CLINIC | Age: 66
End: 2021-06-28

## 2021-06-28 LAB
CHOLESTEROL (EXTERNAL): 217 MG/DL (ref 100–199)
HDLC SERPL-MCNC: 50 MG/DL
LDL CHOLESTEROL (EXTERNAL): 102 MG/DL
NON HDL CHOLESTEROL (EXTERNAL): 167 MG/DL
TRIGLYCERIDES (EXTERNAL): 323 MG/DL

## 2021-07-02 ENCOUNTER — OFFICE VISIT (OUTPATIENT)
Dept: VASCULAR SURGERY | Facility: CLINIC | Age: 66
End: 2021-07-02
Payer: COMMERCIAL

## 2021-07-02 VITALS — HEART RATE: 78 BPM | DIASTOLIC BLOOD PRESSURE: 72 MMHG | SYSTOLIC BLOOD PRESSURE: 115 MMHG | OXYGEN SATURATION: 96 %

## 2021-07-02 DIAGNOSIS — I87.2 VENOUS INSUFFICIENCY OF BOTH LOWER EXTREMITIES: Primary | ICD-10-CM

## 2021-07-02 PROCEDURE — 99214 OFFICE O/P EST MOD 30 MIN: CPT | Performed by: INTERNAL MEDICINE

## 2021-07-02 ASSESSMENT — PAIN SCALES - GENERAL: PAINLEVEL: MILD PAIN (3)

## 2021-07-02 NOTE — NURSING NOTE
We will see you back with Dr Salazar in December.  Please call us with any questions or concerns in the interim.    Thank You!    Carisa Horn RN

## 2021-07-02 NOTE — NURSING NOTE
Vascular Rooming Note     Eryn Bennett's goals for this visit include:   Chief Complaint   Patient presents with     AIDA Cerna, is being seen today for a follow up PVD, feeling good, constant belem horses now in the calf area, no longer so achy, as reported by patient.     Melba Rodriguez LPN'

## 2021-07-02 NOTE — PROGRESS NOTES
Vascular Medicine Progress Note     Eryn Bennett is a 65 year old female who seen here today for follow-up    Interval History   Patient's KIMBERLY was completely normal, patient wears her compression stockings on and off and her symptoms are getting better with wearing compression stockings    Physical Exam       BP: 115/72 Pulse: 78     SpO2: 96 %      There were no vitals filed for this visit.  Vital Signs with Ranges  Pulse:  [78] 78  BP: (115)/(72) 115/72  SpO2:  [96 %] 96 %  [unfilled]    Constitutional: awake, alert, cooperative, no apparent distress, and appears stated age  Eyes: Lids and lashes normal, pupils equal, round and reactive to light, extra ocular muscles intact, sclera clear, conjunctiva normal  ENT: normocepalic, without obvious abnormality, oropharynx pink and moist  Hematologic / Lymphatic: no lymphadenopathy  Respiratory: No increased work of breathing, good air exchange, clear to auscultation bilaterally, no crackles or wheezing  Cardiovascular: regular rate and rhythm, normal S1 and S2 and no murmur noted  GI: Normal bowel sounds, soft, non-distended, non-tender  Skin: no redness, warmth, or swelling, no rashes and no lesions  Musculoskeletal: There is no redness, warmth, or swelling of the joints.  Full range of motion noted.  Motor strength is 5 out of 5 all extremities bilaterally.  Tone is normal.  Neurologic: Awake, alert, oriented to name, place and time.  Cranial nerves II-XII are grossly intact.  Motor is 5 out of 5 bilaterally.    Neuropsychiatric:  Normal affect, memory, insight.  Pulses: Positive pulsation bilaterally and equal. No carotid bruits appreciated.     Medications         Data   No results found for this or any previous visit (from the past 24 hour(s)).    Assessment & Plan   No diagnosis found.      Summary: Follow-up with the patient coming December    Smith Salazar MD

## 2021-07-02 NOTE — LETTER
7/2/2021       RE: Eryn Bennett  [address]   [address]  [place] [address]     Dear Colleague,    Thank you for referring your patient, Eryn Bennett, to the [facility] [place] [facility]. Please see a copy of my visit note below.        Vascular Medicine Progress Note     Eryn Bennett is a 65 year old female who seen here today for follow-up    Interval History   Patient's KIMBERLY was completely normal, patient wears her compression stockings on and off and her symptoms are getting better with wearing compression stockings    Physical Exam       BP: 115/72 Pulse: 78     SpO2: 96 %      There were no vitals filed for this visit.  Vital Signs with Ranges  Pulse:  [78] 78  BP: (115)/(72) 115/72  SpO2:  [96 %] 96 %  [unfilled]    Constitutional: awake, alert, cooperative, no apparent distress, and appears stated age  Eyes: Lids and lashes normal, pupils equal, round and reactive to light, extra ocular muscles intact, sclera clear, conjunctiva normal  ENT: normocepalic, without obvious abnormality, oropharynx pink and moist  Hematologic / Lymphatic: no lymphadenopathy  Respiratory: No increased work of breathing, good air exchange, clear to auscultation bilaterally, no crackles or wheezing  Cardiovascular: regular rate and rhythm, normal S1 and S2 and no murmur noted  GI: Normal bowel sounds, soft, non-distended, non-tender  Skin: no redness, warmth, or swelling, no rashes and no lesions  Musculoskeletal: There is no redness, warmth, or swelling of the joints.  Full range of motion noted.  Motor strength is 5 out of 5 all extremities bilaterally.  Tone is normal.  Neurologic: Awake, alert, oriented to name, place and time.  Cranial nerves II-XII are grossly intact.  Motor is 5 out of 5 bilaterally.    Neuropsychiatric:  Normal affect, memory, insight.  Pulses: Positive pulsation bilaterally and equal. No carotid bruits appreciated.      Medications         Data   No results found for this or any previous visit (from the past 24 hour(s)).    Assessment & Plan   No diagnosis found.      Summary: Follow-up with the patient coming December    Smith Salazar MD      Again, thank you for allowing me to participate in the care of your patient.      Sincerely,    Smith Salazar MD

## 2021-07-20 DIAGNOSIS — I10 ESSENTIAL HYPERTENSION: ICD-10-CM

## 2021-07-20 RX ORDER — CARVEDILOL 6.25 MG/1
TABLET ORAL
Qty: 180 TABLET | Refills: 0 | Status: SHIPPED | OUTPATIENT
Start: 2021-07-20 | End: 2021-11-02

## 2021-07-29 NOTE — PROGRESS NOTES
BMT Clinic Progress Notes    Ms Bennett, 65  s/p NMA DUCB transplant     CC: follow-up    History of Present Illness: The patient is now 6 years and 8 months after her allogenic, double cord transplant for AML.  She is now recovering from an episode of sinusitis that was treated with azithromycin.  Still has an occasional rash especially on her skin folds.  During the sinusitis she was short of breath and had an oxygen saturation down to the upper 80s, which is now resolved.  Has not been very physically active.  Asked about testing for the ferritin.  Has a new rash/firm skin in the left shin.  Saw vascular surgeon that thinks that is vascular in origin.      Review of Systems: ROS otherwise unremarkable other than what is noted in the HPI.     Physical Exam: Alert oriented and in nonacute distress.  Good spirits.  KPS 90%.  /73 (BP Location: Right arm, Patient Position: Sitting, Cuff Size: Adult Large)   Pulse 80   Temp 97.1  F (36.2  C) (Tympanic)   Resp 16   Wt 105.6 kg (232 lb 14.4 oz)   SpO2 95%   BMI 43.29 kg/m    Lungs with decreased sounds bilaterally but no crackles or wheezes  Heart regular rhythm and rate, no gallop, no rub, no murmur.  Abdomen soft and nontender with no organomegalies or masses.  Extremities warm and well perfused with no obvious edema  Skin he has this area of thickened and somewhat sclerotic skin in the left shin with an area of erythema above it.  See documentation in the media tab under today's date 7/30/2021.  The patient is grossly neurologically intact  The patient does not have an indwelling catheter.    Lab Results   Component Value Date    WBC 9.3 07/30/2021    ANEU 5.1 10/09/2020    HGB 13.3 07/30/2021    HCT 42.9 07/30/2021     07/30/2021     07/30/2021    POTASSIUM 4.1 07/30/2021    CHLORIDE 106 07/30/2021    CO2 26 07/30/2021     (H) 07/30/2021    BUN 15 07/30/2021    CR 0.88 07/30/2021    MAG 1.8 04/03/2017    INR 1.03 04/03/2017     BILITOTAL 0.6 07/30/2021    AST 23 07/30/2021    ALT 34 07/30/2021    ALKPHOS 91 07/30/2021    PROTTOTAL 7.2 07/30/2021    ALBUMIN 3.3 (L) 07/30/2021     Hemoglobin A1c 7.1  Ferritin 981  HDL cholesterol 50  Triglycerides 323  Assessment and plan  Ms. Eryn Bennett is a 59 yo woman s/p NMA DCBT for AML day ~6 years and 8 months.     1. BMT/AML: continued CR.     2. HEME: Very good and stable blood counts her local doctor may reconsider phlebotomies after rechecking the ferritin about a month after resolving the sinusitis.  Her ferritin may in fact be lower than what was documented today.    - history of Hemolytic anemia: Warm anti E; IgG and completment. Received rituximab X 4 September 2015.   - Patient has history DVT but her repeat doppler L lower ext negative on OCT 2016. Off coumadin since JUL 2016. During this admission as she was less mobile with viral illness she was given SQ heparin every 12 hours which was stopped on discharge.     3. ID: We continue to recommend checking the IgG level twice yearly.  If she does not get complete resolution of the sinusitis I would recommend Augmentin as the next line of therapy.  Would recommend continuing the Valtrex 500 mg 3 times a day at least for 3 months from the episode of herpes zoster.  If recurrent herpes zoster then would keep her indefinitely on acyclovir.       4. 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   - Off prednisone since 4/20/16; finished MMF taper on 9/13/16      5. GI: monitor and use PRN medication for GERD  - Compazine available prn      6. FEN/Renal: Patient's creatinine electrolytes all in the baseline.        7. Cardiovascular: A recent echocardiogram demonstrates ejection fraction of 65% with normal wall motion. Continue Norvasc 5mg daily Carvedilol 6.25mg bid for cardiomyopathy.  She does have elevated cholesterol and I would recommend that her primary care consider guidance on diet and perhaps medication.      8.  Musculoskelatal: Her musculoskeletal pain improved a lot with pressure stockings and encourage her to continue to do that as much as possible.    - Dexa scan with osteopenia (5/5/15). Had bisphosphonate 10/5/16; repeat in ~12 months; local doctor managing On vitamin D and calcium replacement.    - continue Tramadol and tylenol for muscle pain and that was working better than hydrocodone/acetaminophen       9. Pulmonary: She had a transient hypoxemia with a sinusitis.  I asked her to monitor her symptoms and if that happens again she should communicate rather than wait to see if it can get better.  She got a note and she will schedule follow-up with the pulmonary team in the near future.      10. Neurology: feet neuropathy not changed. Monitor and refer to neurology/neuropathy group for assessment.     11. Eyes: Had a change in corrective lenses recently.  Should continue periodical follow-up with ophthalmology.      12 General health: She has high cholesterol and an elevated hemoglobin A1c.  I recommended that she goes back to her primary care to work on recommendations and management for high cholesterol and type 2 diabetes.     13.  COVID: Completed Covid vaccination.  Recommend flu shot in the fall.      Plan:   ISABELLE Ni in 6 month with labs, in person  Continue: Primary care MD for diabetes, heart, lipids, GYN, thyroid, yearly skin cancer screen, cancer screen in general.  Phlebotomies on hold  Steroid cream for leg  Measure IgG could not be added to her labs today.  Her primary care could send the test with the next blood work.  Start exercise program   Maintenance Valtrex 500 mg TID for at least 3 months (post-zoster)    Irving Ni MD on 7/30/2021 at 10:34 AM

## 2021-07-30 ENCOUNTER — APPOINTMENT (OUTPATIENT)
Dept: LAB | Facility: CLINIC | Age: 66
End: 2021-07-30
Attending: INTERNAL MEDICINE
Payer: MEDICARE

## 2021-07-30 ENCOUNTER — ONCOLOGY VISIT (OUTPATIENT)
Dept: TRANSPLANT | Facility: CLINIC | Age: 66
End: 2021-07-30
Attending: INTERNAL MEDICINE
Payer: MEDICARE

## 2021-07-30 VITALS
DIASTOLIC BLOOD PRESSURE: 73 MMHG | WEIGHT: 232.9 LBS | RESPIRATION RATE: 16 BRPM | TEMPERATURE: 97.1 F | HEART RATE: 80 BPM | BODY MASS INDEX: 43.29 KG/M2 | SYSTOLIC BLOOD PRESSURE: 125 MMHG | OXYGEN SATURATION: 95 %

## 2021-07-30 DIAGNOSIS — T38.0X5S STEROID-INDUCED DIABETES MELLITUS, SEQUELA (H): ICD-10-CM

## 2021-07-30 DIAGNOSIS — Z13.1 SCREENING FOR DIABETES MELLITUS: ICD-10-CM

## 2021-07-30 DIAGNOSIS — E09.9 STEROID-INDUCED DIABETES MELLITUS, SEQUELA (H): ICD-10-CM

## 2021-07-30 DIAGNOSIS — Z94.81 S/P ALLOGENEIC BONE MARROW TRANSPLANT (H): ICD-10-CM

## 2021-07-30 DIAGNOSIS — E83.111 IRON OVERLOAD DUE TO REPEATED RED BLOOD CELL TRANSFUSIONS: ICD-10-CM

## 2021-07-30 DIAGNOSIS — R73.9 HYPERGLYCEMIA: ICD-10-CM

## 2021-07-30 DIAGNOSIS — E78.5 DYSLIPIDEMIA: Primary | ICD-10-CM

## 2021-07-30 LAB
ALBUMIN SERPL-MCNC: 3.3 G/DL (ref 3.4–5)
ALP SERPL-CCNC: 91 U/L (ref 40–150)
ALT SERPL W P-5'-P-CCNC: 34 U/L (ref 0–50)
ANION GAP SERPL CALCULATED.3IONS-SCNC: 8 MMOL/L (ref 3–14)
AST SERPL W P-5'-P-CCNC: 23 U/L (ref 0–45)
BASOPHILS # BLD AUTO: 0 10E3/UL (ref 0–0.2)
BASOPHILS NFR BLD AUTO: 0 %
BILIRUB SERPL-MCNC: 0.6 MG/DL (ref 0.2–1.3)
BUN SERPL-MCNC: 15 MG/DL (ref 7–30)
CALCIUM SERPL-MCNC: 9 MG/DL (ref 8.5–10.1)
CHLORIDE BLD-SCNC: 106 MMOL/L (ref 94–109)
CO2 SERPL-SCNC: 26 MMOL/L (ref 20–32)
CREAT SERPL-MCNC: 0.88 MG/DL (ref 0.52–1.04)
EOSINOPHIL # BLD AUTO: 0.4 10E3/UL (ref 0–0.7)
EOSINOPHIL NFR BLD AUTO: 4 %
ERYTHROCYTE [DISTWIDTH] IN BLOOD BY AUTOMATED COUNT: 13.9 % (ref 10–15)
FERRITIN SERPL-MCNC: 981 NG/ML (ref 8–252)
GFR SERPL CREATININE-BSD FRML MDRD: 69 ML/MIN/1.73M2
GLUCOSE BLD-MCNC: 167 MG/DL (ref 70–99)
HBA1C MFR BLD: 7.1 % (ref 0–5.6)
HCT VFR BLD AUTO: 42.9 % (ref 35–47)
HGB BLD-MCNC: 13.3 G/DL (ref 11.7–15.7)
IMM GRANULOCYTES # BLD: 0.1 10E3/UL
IMM GRANULOCYTES NFR BLD: 1 %
LYMPHOCYTES # BLD AUTO: 3.7 10E3/UL (ref 0.8–5.3)
LYMPHOCYTES NFR BLD AUTO: 40 %
MCH RBC QN AUTO: 29.4 PG (ref 26.5–33)
MCHC RBC AUTO-ENTMCNC: 31 G/DL (ref 31.5–36.5)
MCV RBC AUTO: 95 FL (ref 78–100)
MONOCYTES # BLD AUTO: 0.7 10E3/UL (ref 0–1.3)
MONOCYTES NFR BLD AUTO: 7 %
NEUTROPHILS # BLD AUTO: 4.5 10E3/UL (ref 1.6–8.3)
NEUTROPHILS NFR BLD AUTO: 48 %
NRBC # BLD AUTO: 0 10E3/UL
NRBC BLD AUTO-RTO: 0 /100
PLATELET # BLD AUTO: 221 10E3/UL (ref 150–450)
POTASSIUM BLD-SCNC: 4.1 MMOL/L (ref 3.4–5.3)
PROT SERPL-MCNC: 7.2 G/DL (ref 6.8–8.8)
RBC # BLD AUTO: 4.53 10E6/UL (ref 3.8–5.2)
SODIUM SERPL-SCNC: 140 MMOL/L (ref 133–144)
WBC # BLD AUTO: 9.3 10E3/UL (ref 4–11)

## 2021-07-30 PROCEDURE — 99214 OFFICE O/P EST MOD 30 MIN: CPT | Performed by: INTERNAL MEDICINE

## 2021-07-30 PROCEDURE — 82728 ASSAY OF FERRITIN: CPT | Performed by: INTERNAL MEDICINE

## 2021-07-30 PROCEDURE — 85025 COMPLETE CBC W/AUTO DIFF WBC: CPT | Performed by: INTERNAL MEDICINE

## 2021-07-30 PROCEDURE — G0463 HOSPITAL OUTPT CLINIC VISIT: HCPCS

## 2021-07-30 PROCEDURE — 80053 COMPREHEN METABOLIC PANEL: CPT | Performed by: INTERNAL MEDICINE

## 2021-07-30 PROCEDURE — 83036 HEMOGLOBIN GLYCOSYLATED A1C: CPT | Performed by: INTERNAL MEDICINE

## 2021-07-30 PROCEDURE — 36415 COLL VENOUS BLD VENIPUNCTURE: CPT | Performed by: INTERNAL MEDICINE

## 2021-07-30 RX ORDER — TRIAMCINOLONE ACETONIDE 1 MG/G
CREAM TOPICAL 2 TIMES DAILY
Qty: 453.6 G | Refills: 2 | Status: SHIPPED | OUTPATIENT
Start: 2021-07-30 | End: 2021-08-29

## 2021-07-30 ASSESSMENT — PAIN SCALES - GENERAL: PAINLEVEL: NO PAIN (0)

## 2021-07-30 NOTE — LETTER
7/30/2021         RE: Eryn Bennett  [address]   [address]  [facility] [address]        Dear Colleague,    Thank you for referring your patient, Eryn Bennett, to the [facility]. Please see a copy of my visit note below.      BMT Clinic Progress Notes    Ms Bennett, 65  s/p NMA DUCB transplant     CC: follow-up    History of Present Illness: The patient is now 6 years and 8 months after her allogenic, double cord transplant for AML.  She is now recovering from an episode of sinusitis that was treated with azithromycin.  Still has an occasional rash especially on her skin folds.  During the sinusitis she was short of breath and had an oxygen saturation down to the upper 80s, which is now resolved.  Has not been very physically active.  Asked about testing for the ferritin.  Has a new rash/firm skin in the left shin.  Saw vascular surgeon that thinks that is vascular in origin.      Review of Systems: ROS otherwise unremarkable other than what is noted in the HPI.     Physical Exam: Alert oriented and in nonacute distress.  Good spirits.  KPS 90%.  /73 (BP Location: Right arm, Patient Position: Sitting, Cuff Size: Adult Large)   Pulse 80   Temp 97.1  F (36.2  C) (Tympanic)   Resp 16   Wt 105.6 kg (232 lb 14.4 oz)   SpO2 95%   BMI 43.29 kg/m    Lungs with decreased sounds bilaterally but no crackles or wheezes  Heart regular rhythm and rate, no gallop, no rub, no murmur.  Abdomen soft and nontender with no organomegalies or masses.  Extremities warm and well perfused with no obvious edema  Skin he has this area of thickened and somewhat sclerotic skin in the left shin with an area of erythema above it.  See documentation in the media tab under today's date 7/30/2021.  The patient is grossly neurologically intact  The patient does not have an indwelling catheter.    Lab Results   Component Value Date    WBC 9.3 07/30/2021    ANEU 5.1  10/09/2020    HGB 13.3 07/30/2021    HCT 42.9 07/30/2021     07/30/2021     07/30/2021    POTASSIUM 4.1 07/30/2021    CHLORIDE 106 07/30/2021    CO2 26 07/30/2021     (H) 07/30/2021    BUN 15 07/30/2021    CR 0.88 07/30/2021    MAG 1.8 04/03/2017    INR 1.03 04/03/2017    BILITOTAL 0.6 07/30/2021    AST 23 07/30/2021    ALT 34 07/30/2021    ALKPHOS 91 07/30/2021    PROTTOTAL 7.2 07/30/2021    ALBUMIN 3.3 (L) 07/30/2021     Hemoglobin A1c 7.1  Ferritin 981  HDL cholesterol 50  Triglycerides 323  Assessment and plan  Ms. Eryn Bennett is a 61 yo woman s/p NMA DCBT for AML day ~6 years and 8 months.     1. BMT/AML: continued CR.     2. HEME: Very good and stable blood counts her local doctor may reconsider phlebotomies after rechecking the ferritin about a month after resolving the sinusitis.  Her ferritin may in fact be lower than what was documented today.    - history of Hemolytic anemia: Warm anti E; IgG and completment. Received rituximab X 4 September 2015.   - Patient has history DVT but her repeat doppler L lower ext negative on OCT 2016. Off coumadin since JUL 2016. During this admission as she was less mobile with viral illness she was given SQ heparin every 12 hours which was stopped on discharge.     3. ID: We continue to recommend checking the IgG level twice yearly.  If she does not get complete resolution of the sinusitis I would recommend Augmentin as the next line of therapy.  Would recommend continuing the Valtrex 500 mg 3 times a day at least for 3 months from the episode of herpes zoster.  If recurrent herpes zoster then would keep her indefinitely on acyclovir.       4. 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   - Off prednisone since 4/20/16; finished MMF taper on 9/13/16      5. GI: monitor and use PRN medication for GERD  - Compazine available prn      6. FEN/Renal: Patient's creatinine electrolytes all in the baseline.        7. Cardiovascular: A recent  echocardiogram demonstrates ejection fraction of 65% with normal wall motion. Continue Norvasc 5mg daily Carvedilol 6.25mg bid for cardiomyopathy.  She does have elevated cholesterol and I would recommend that her primary care consider guidance on diet and perhaps medication.      8. Musculoskelatal: Her musculoskeletal pain improved a lot with pressure stockings and encourage her to continue to do that as much as possible.    - Dexa scan with osteopenia (5/5/15). Had bisphosphonate 10/5/16; repeat in ~12 months; local doctor managing On vitamin D and calcium replacement.    - continue Tramadol and tylenol for muscle pain and that was working better than hydrocodone/acetaminophen       9. Pulmonary: She had a transient hypoxemia with a sinusitis.  I asked her to monitor her symptoms and if that happens again she should communicate rather than wait to see if it can get better.  She got a note and she will schedule follow-up with the pulmonary team in the near future.      10. Neurology: feet neuropathy not changed. Monitor and refer to neurology/neuropathy group for assessment.     11. Eyes: Had a change in corrective lenses recently.  Should continue periodical follow-up with ophthalmology.      12 General health: She has high cholesterol and an elevated hemoglobin A1c.  I recommended that she goes back to her primary care to work on recommendations and management for high cholesterol and type 2 diabetes.     13.  COVID: Completed Covid vaccination.  Recommend flu shot in the fall.      Plan:   ISABELLE Ni in 6 month with labs, in person  Continue: Primary care MD for diabetes, heart, lipids, GYN, thyroid, yearly skin cancer screen, cancer screen in general.  Phlebotomies on hold  Steroid cream for leg  Measure IgG could not be added to her labs today.  Her primary care could send the test with the next blood work.  Start exercise program   Maintenance Valtrex 500 mg TID for at least 3 months  (post-zoster)    Irving Ni MD on 7/30/2021 at 10:34 AM        Again, thank you for allowing me to participate in the care of your patient.        Sincerely,        Irving Ni MD

## 2021-07-30 NOTE — PATIENT INSTRUCTIONS
RTC Raine in 6 month with labs, in person  Continue: Primary care MD for diabetes, heart, lipids, GYN, thyroid, yearly skin cancer screen, cancer screen in general.  Phlebotomies on hold  Steroid cream for leg  Measure IgG today  Start exercise program   Maintenance Valtrex 500 mg TID for at least 3 months (post-zoster)

## 2021-07-30 NOTE — NURSING NOTE
"Oncology Rooming Note    July 30, 2021 9:53 AM   Eryn Bennett is a 66 year old female who presents for:    Chief Complaint   Patient presents with     Blood Draw     Labs drawn with  by RN in lab. VS taken.     Oncology Clinic Visit     Hyperglycemia; Iron overload due to repeated red blood cell transfusions; S/P allogeneic bone marrow transplant (H)      Initial Vitals: /73 (BP Location: Right arm, Patient Position: Sitting, Cuff Size: Adult Large)   Pulse 80   Temp 97.1  F (36.2  C) (Tympanic)   Resp 16   Wt 105.6 kg (232 lb 14.4 oz)   SpO2 95%   BMI 43.29 kg/m   Estimated body mass index is 43.29 kg/m  as calculated from the following:    Height as of 8/13/20: 1.562 m (5' 1.5\").    Weight as of this encounter: 105.6 kg (232 lb 14.4 oz). Body surface area is 2.14 meters squared.  No Pain (0) Comment: Data Unavailable   No LMP recorded. Patient has had a hysterectomy.  Allergies reviewed: Yes  Medications reviewed: Yes    Medications: Medication refills not needed today.  Pharmacy name entered into EPIC:    [facility] [place], [place] - [address] [facility] [phone]  [id number] [place], [place] - [address] [facility] [address]  [facility] [address] [place] [address] - [address] [facility]    Clinical concerns: None.       Melba Villafana MA            "

## 2021-07-30 NOTE — NURSING NOTE
Chief Complaint   Patient presents with     Blood Draw     Labs drawn with  by RN in lab. VS taken.     Labs drawn via venipuncture. Vital signs taken. Checked into next appointment.     Natacha Khan RN

## 2021-09-08 ENCOUNTER — TELEPHONE (OUTPATIENT)
Dept: PULMONOLOGY | Facility: CLINIC | Age: 66
End: 2021-09-08

## 2021-09-08 NOTE — TELEPHONE ENCOUNTER
Spoke with pt about scheduling 6 month f/u with Dr. Lomeli. Pt is scheduled for in person as of now and is going to check if her insurance will cover virtual visits still. Pt prefers virtual but will call back to change if insurance covers. Details confirmed.

## 2021-10-03 ENCOUNTER — HEALTH MAINTENANCE LETTER (OUTPATIENT)
Age: 66
End: 2021-10-03

## 2021-10-05 ENCOUNTER — OFFICE VISIT (OUTPATIENT)
Dept: PULMONOLOGY | Facility: CLINIC | Age: 66
End: 2021-10-05
Attending: INTERNAL MEDICINE
Payer: MEDICARE

## 2021-10-05 VITALS
OXYGEN SATURATION: 97 % | DIASTOLIC BLOOD PRESSURE: 82 MMHG | BODY MASS INDEX: 40.85 KG/M2 | SYSTOLIC BLOOD PRESSURE: 133 MMHG | HEART RATE: 74 BPM | HEIGHT: 62 IN | WEIGHT: 222 LBS

## 2021-10-05 DIAGNOSIS — J31.0 CHRONIC RHINITIS: Primary | ICD-10-CM

## 2021-10-05 PROCEDURE — G0463 HOSPITAL OUTPT CLINIC VISIT: HCPCS

## 2021-10-05 PROCEDURE — 99213 OFFICE O/P EST LOW 20 MIN: CPT | Performed by: INTERNAL MEDICINE

## 2021-10-05 ASSESSMENT — MIFFLIN-ST. JEOR: SCORE: 1497.3

## 2021-10-05 NOTE — NURSING NOTE
Chief Complaint   Patient presents with     Follow Up     6mo BMT f/u     Vitals were taken and medications were reconciled.     LISBETH Awan

## 2021-10-05 NOTE — PROGRESS NOTES
Reason for Visit  Eryn Bennett is a 66 year old year old female who is being seen for Follow Up (6mo BMT f/u)    Pulmonary HPI  67 YO with AML s/p NMA DCBT over 3 years ago who is referred to the Pulmonary BMT clinic for evaluation of cough and SOB.  Developed URI in 11-17 that had persisted for 6 weeks.  During this time noticed nasal congestion and drainage, post-nasal drip, fevers, and cough. Denies chest burning. Seen in clinic, PCR for influenza was negative but she was treated with Tamiflu.  Was given course of Azithromycin twice for 5 days apiece, felt somewhat better with Azithromycin but symptoms recurred shortly thereafter.   Presented again in 2-12-18 with fever, cough, and nasal congestion. CT scan of sinuses revealed sinusitis, chest CT with LLL bronchial wall thickening and nodularity that was actually improved compared to April 2017.  Was placed on a 10 day course of Levofloxacin, states started to feel better after 5 days. Since completion of antibiotics in the past 10 days she thinks her symptoms have all resolved; no further nasal congestion or drainage, no cough.  All fevers have resolved.  Similar course last Winter with frequent URI's which were slow to resolve.  No prior history of sinus disease or allergies.  No tobacco use.      Last seen 6 months ago. Since her last visit her breathing has been unchanged, still has shortness of breath if she climbs stairs particularly if she is carrying any items.  Has had two episodes of sinus disease this Summer since she was last seen.  Episodes lasted for 10-15 days, needed a course of antibiotics with one of the episodes.  No significant symptoms since last episode 2 months ago.  At the present time she has minimal sinus symptoms.  Is walking for 20 minutes two times per week, is more active around the house.  Received COVID vaccination (Moderna) with second vaccination in April; called [facility] regarding booster last week and received booster of  Moderna vaccine.    The patient was seen and examined by Troy Lomeli MD           Current Outpatient Medications   Medication     acetaminophen (TYLENOL) 325 MG tablet     amLODIPine (NORVASC) 5 MG tablet     amLODIPine (NORVASC) 5 MG tablet     amoxicillin (AMOXIL) 500 MG capsule     calcium-vitamin D 500-125 MG-UNIT TABS     carvedilol (COREG) 6.25 MG tablet     desonide (DESOWEN) 0.05 % cream     fluticasone (FLONASE ALLERGY RELIEF) 50 MCG/ACT spray     gabapentin (NEURONTIN) 300 MG capsule     Hypertonic Nasal Wash (SINUS RINSE) bottle     Multiple Vitamins-Minerals (EYE VITAMINS & MINERALS) TABS     valACYclovir (VALTREX) 500 MG tablet     VITAMIN D, CHOLECALCIFEROL, PO     No current facility-administered medications for this visit.     Allergies   Allergen Reactions     Blood Transfusion Related (Informational Only) Other (See Comments)     7/15/15 - Patient has a complex history of clinically significant antibodies against RBC antigens.  Finding compatible RBCs may take up to 24 hours or more.  Consult with the Blood Bank MD for transfusion guidance.  Itching in palms during platelet transfusion in 2010- IV benadryl was given at that time.  Pt has had platelets since then with no reaction.   11/6/14 - Stem cell transplant patient.  Give type O RBCs.     Cefepime Itching and Rash     Severe rash, itching, and burning skin during cefepime infusion on 9/17/14     Red Blood Cells Rash     7/15/15 - Patient has a complex history of clinically significant antibodies against RBC antigens.  Finding compatible RBCs may take up to 24 hours or more.  Consult with the Blood Bank MD for transfusion guidance.  Itching in palms during platelet transfusion in 2010- IV benadryl was given at that time.  Pt has had platelets since then with no reaction.   11/6/14 - Stem cell transplant patient.  Give type O RBCs.     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 Pt would like to avoid, even with premedication, if at all possible.      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    While pt had Hicman in, pt was on a blood thinner       Past Surgical History:   Procedure Laterality Date     BLADDER SURGERY       BRONCHOSCOPY (RIGID OR FLEXIBLE), DIAGNOSTIC N/A 1/10/2020    Procedure: BRONCHOSCOPY, WITH BRONCHOALVEOLAR LAVAGE;  Surgeon: Troy Lomeli MD;  Location: [facility]     CHOLECYSTECTOMY  1987     ESOPHAGOSCOPY, GASTROSCOPY, DUODENOSCOPY (EGD), COMBINED N/A 12/11/2014    Procedure: COMBINED ESOPHAGOSCOPY, GASTROSCOPY, DUODENOSCOPY (EGD), BIOPSY SINGLE OR MULTIPLE;  Surgeon: Saturnino Valera MD;  Location: [facility]     GI SURGERY      part of colon removed r.t benign tumor     GYN SURGERY       Dos Santos Catheter Placment Right 2010    Right IJ vein, in for 8 months     HYSTERECTOMY VAGINAL, BILATERAL SALPINGO-OOPHERECTOMY, COMBINED       PICC INSERTION Left 8/28/2014    5fr DL Power PICC, 46cm (1cm external) in the L basilic vein w/ tip in the SVC RA junction.       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 Not Asked   Social History Narrative     Not on file     Social Determinants of Health     Financial Resource Strain:      Difficulty of Paying Living Expenses:    Food Insecurity:      Worried About Running Out of Food in the Last Year:      Ran Out of Food in the Last Year:    Transportation Needs:      Lack of Transportation (Medical):      Lack of Transportation (Non-Medical):    Physical Activity:      Days of Exercise per Week:      Minutes of Exercise per Session:    Stress:      Feeling of Stress :    Social Connections:      Frequency of Communication with Friends and Family:      Frequency of Social Gatherings with Friends and Family:      Attends Church Services:      Active Member of Clubs or Organizations:      Attends Club or Organization Meetings:      Marital Status:    Intimate Partner Violence:      Fear of Current or Ex-Partner:      Emotionally Abused:      Physically Abused:      Sexually Abused:        ROS Pulmonary  A complete ROS was otherwise negative except as noted in the HPI.  There were no vitals taken for this visit.  Exam:   GENERAL APPEARANCE: Well developed, well nourished, alert, and in no apparent distress.  EYES: PERRL, EOMI  HENT: Nasal mucosa with no edema and no hyperemia. No nasal polyps.  EARS: Canals clear, TMs normal  MOUTH: Oral mucosa is moist, without any lesions, no tonsillar enlargement, no oropharyngeal exudate.  NECK: supple, no masses, no thyromegaly.  LYMPHATICS: No significant axillary, cervical, or supraclavicular nodes.  RESP:  Good air flow throughout.  No crackles. No rhonchi. No wheezes.  CV: Normal S1, S2, regular rhythm, normal rate. No murmur.  No rub. No gallop. No LE edema.   ABDOMEN:   Bowel sounds normal, soft, nontender, no HSM or masses.   MS: extremities normal. No clubbing. No cyanosis.  SKIN: no rash on limited exam  NEURO: Mentation intact, speech normal, normal strength and tone, normal gait and stance  PSYCH: mentation appears normal. and affect normal/bright  Results:  No results found for this or any previous visit (from the past 168 hour(s)).    Assessment and plan:   64 YO with viral illness in November 2017 with resultant sinusitis as seen on CT.  Majority of symptoms were upper respiratory in nature at that time.  CT chest 2-18 actually improved.  Off all immunosuppressives and IgG level has maintaind > 400, with last testing 2-3-19.  Has recurrent sinus infections requiring frequent antibiotic regimens.  Pattern is that it resolves somewhat with antibiotics but then recurs; prior sinus CT in 2-28 showed narrowing of ostiomeatal units bilaterally but f/u CT was improved. Seen and followed by ENT.  Recent recurrent sinus symptoms for almost three months and with cough. 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 Overall the CT signficantly improved with treatment of the H. Flu, near complete resolution.  Issues with sinuses twice this Summer that have since resolved.  Still component of deconditioning which she is working on.     1. Continue saline nasal rinses to BID  2. Agree with continuing exercise regimen  3. Advised to contact the clinic if she had any concerns regarding the COVID booster        RTC in 6 months.  Patient to call clinic with any questions or concerns prior to her next clinic visit

## 2021-10-05 NOTE — LETTER
10/5/2021         RE: Eryn Bennett  [address]   [address]  [facility] [address]        Dear Colleague,    Thank you for referring your patient, Eryn Bennett, to the [facility] [place]. Please see a copy of my visit note below.    Reason for Visit  Eryn Bennett is a 66 year old year old female who is being seen for Follow Up (6mo BMT f/u)    Pulmonary HPI  67 YO with AML s/p NMA DCBT over 3 years ago who is referred to the Pulmonary BMT clinic for evaluation of cough and SOB.  Developed URI in 11-17 that had persisted for 6 weeks.  During this time noticed nasal congestion and drainage, post-nasal drip, fevers, and cough. Denies chest burning. Seen in clinic, PCR for influenza was negative but she was treated with Tamiflu.  Was given course of Azithromycin twice for 5 days apiece, felt somewhat better with Azithromycin but symptoms recurred shortly thereafter.   Presented again in 2-12-18 with fever, cough, and nasal congestion. CT scan of sinuses revealed sinusitis, chest CT with LLL bronchial wall thickening and nodularity that was actually improved compared to April 2017.  Was placed on a 10 day course of Levofloxacin, states started to feel better after 5 days. Since completion of antibiotics in the past 10 days she thinks her symptoms have all resolved; no further nasal congestion or drainage, no cough.  All fevers have resolved.  Similar course last Winter with frequent URI's which were slow to resolve.  No prior history of sinus disease or allergies.  No tobacco use.      Last seen 6 months ago. Since her last visit her breathing has been unchanged, still has shortness of breath if she climbs stairs particularly if she is carrying any items.  Has had two episodes of sinus disease this Summer since she was last seen.  Episodes lasted for 10-15 days, needed a course of antibiotics with one of the episodes.  No significant symptoms since  last episode 2 months ago.  At the present time she has minimal sinus symptoms.  Is walking for 20 minutes two times per week, is more active around the house.  Received COVID vaccination (Moderna) with second vaccination in April; called [facility] regarding booster last week and received booster of Moderna vaccine.    The patient was seen and examined by Troy Lomeli MD           Current Outpatient Medications   Medication     acetaminophen (TYLENOL) 325 MG tablet     amLODIPine (NORVASC) 5 MG tablet     amLODIPine (NORVASC) 5 MG tablet     amoxicillin (AMOXIL) 500 MG capsule     calcium-vitamin D 500-125 MG-UNIT TABS     carvedilol (COREG) 6.25 MG tablet     desonide (DESOWEN) 0.05 % cream     fluticasone (FLONASE ALLERGY RELIEF) 50 MCG/ACT spray     gabapentin (NEURONTIN) 300 MG capsule     Hypertonic Nasal Wash (SINUS RINSE) bottle     Multiple Vitamins-Minerals (EYE VITAMINS & MINERALS) TABS     valACYclovir (VALTREX) 500 MG tablet     VITAMIN D, CHOLECALCIFEROL, PO     No current facility-administered medications for this visit.     Allergies   Allergen Reactions     Blood Transfusion Related (Informational Only) Other (See Comments)     7/15/15 - Patient has a complex history of clinically significant antibodies against RBC antigens.  Finding compatible RBCs may take up to 24 hours or more.  Consult with the Blood Bank MD for transfusion guidance.  Itching in palms during platelet transfusion in 2010- IV benadryl was given at that time.  Pt has had platelets since then with no reaction.   11/6/14 - Stem cell transplant patient.  Give type O RBCs.     Cefepime Itching and Rash     Severe rash, itching, and burning skin during cefepime infusion on 9/17/14     Red Blood Cells Rash     7/15/15 - Patient has a complex history of clinically significant antibodies against RBC antigens.  Finding compatible RBCs may take up to 24 hours or more.  Consult with the Blood Bank MD for transfusion guidance.  Itching  in palms during platelet transfusion in 2010- IV benadryl was given at that time.  Pt has had platelets since then with no reaction.   11/6/14 - Stem cell transplant patient.  Give type O RBCs.     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 Pt would like to avoid, even with premedication, if at all possible.      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    While pt had [place] in, pt was on a blood thinner       Past Surgical History:   Procedure Laterality Date     BLADDER SURGERY       BRONCHOSCOPY (RIGID OR FLEXIBLE), DIAGNOSTIC N/A 1/10/2020    Procedure: BRONCHOSCOPY, WITH BRONCHOALVEOLAR LAVAGE;  Surgeon: Troy Lomeli MD;  Location: [facility]     CHOLECYSTECTOMY  1987     ESOPHAGOSCOPY, GASTROSCOPY, DUODENOSCOPY (EGD), COMBINED N/A 12/11/2014    Procedure: COMBINED ESOPHAGOSCOPY, GASTROSCOPY, DUODENOSCOPY (EGD), BIOPSY SINGLE OR MULTIPLE;  Surgeon: Saturnino Valera MD;  Location: [facility]     GI SURGERY      part of colon removed r.t benign tumor     GYN SURGERY       Dos Santos Catheter Placment Right 2010    Right IJ vein, in for 8 months     HYSTERECTOMY VAGINAL, BILATERAL SALPINGO-OOPHERECTOMY, COMBINED       PICC INSERTION Left 8/28/2014    5fr DL Power PICC, 46cm (1cm external) in the L basilic vein w/ tip in the SVC RA junction.       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 Not Asked   Social History Narrative     Not on file     Social Determinants of Health     Financial Resource Strain:      Difficulty of Paying Living Expenses:    Food Insecurity:      Worried About Running Out of Food in the Last Year:      Ran Out of Food in the Last Year:    Transportation Needs:      Lack of Transportation (Medical):      Lack of Transportation (Non-Medical):    Physical Activity:      Days of Exercise per Week:      Minutes of Exercise per Session:    Stress:      Feeling of Stress :    Social Connections:      Frequency of Communication with Friends and Family:      Frequency of Social Gatherings with Friends and Family:      Attends [employer] Services:      Active Member of Clubs or Organizations:      Attends Club or Organization Meetings:      Marital Status:    Intimate Partner Violence:      Fear of Current or Ex-Partner:      Emotionally Abused:      Physically Abused:      Sexually Abused:        ROS Pulmonary  A complete ROS was otherwise negative except as noted in the HPI.  There were no vitals taken for this visit.  Exam:   GENERAL APPEARANCE: Well developed, well nourished, alert, and in no apparent distress.  EYES: PERRL, EOMI  HENT: Nasal mucosa with no edema and no hyperemia. No nasal polyps.  EARS: Canals clear, TMs normal  MOUTH: Oral mucosa is moist, without any lesions, no tonsillar enlargement, no oropharyngeal  exudate.  NECK: supple, no masses, no thyromegaly.  LYMPHATICS: No significant axillary, cervical, or supraclavicular nodes.  RESP:  Good air flow throughout.  No crackles. No rhonchi. No wheezes.  CV: Normal S1, S2, regular rhythm, normal rate. No murmur.  No rub. No gallop. No LE edema.   ABDOMEN:  Bowel sounds normal, soft, nontender, no HSM or masses.   MS: extremities normal. No clubbing. No cyanosis.  SKIN: no rash on limited exam  NEURO: Mentation intact, speech normal, normal strength and tone, normal gait and stance  PSYCH: mentation appears normal. and affect normal/bright  Results:  No results found for this or any previous visit (from the past 168 hour(s)).    Assessment and plan:   64 YO with viral illness in November 2017 with resultant sinusitis as seen on CT.  Majority of symptoms were upper respiratory in nature at that time.  CT chest 2-18 actually improved.  Off all immunosuppressives and IgG level has maintaind > 400, with last testing 2-3-19.  Has recurrent sinus infections requiring frequent antibiotic regimens.  Pattern is that it resolves somewhat with antibiotics but then recurs; prior sinus CT in 2-28 showed narrowing of ostiomeatal units bilaterally but f/u CT was improved. Seen and followed by ENT.  Recent recurrent sinus symptoms for almost three months and with cough. 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 Overall the CT signficantly improved with treatment of the H. Flu, near complete resolution.  Issues with sinuses twice this Summer that have since resolved.  Still component of deconditioning which she is working on.     1. Continue saline nasal rinses to BID  2. Agree with continuing exercise regimen  3. Advised to contact the clinic if she had any concerns regarding the COVID booster        RTC in 6 months.  Patient to call  clinic with any questions or concerns prior to her next clinic visit            Again, thank you for allowing me to participate in the care of your patient.        Sincerely,        Troy Lomeli MD

## 2021-10-05 NOTE — NURSING NOTE
Chief Complaint   Patient presents with     Follow Up     6mo BMT f/u     Vitals were taken and medications were reconciled.     Nichole Scott RMA  2:54 PM

## 2021-10-13 ENCOUNTER — TELEPHONE (OUTPATIENT)
Dept: PULMONOLOGY | Facility: CLINIC | Age: 66
End: 2021-10-13

## 2021-10-13 NOTE — TELEPHONE ENCOUNTER
Spoke with pt regarding scheduling follow up appt with Dr. Lomeli in April 2022. Appt scheduled and details confirmed with pt

## 2021-10-13 NOTE — TELEPHONE ENCOUNTER
Spoke with pt's  who states patient has stepped out and will be back in a little bit. Left direct call back to discuss scheduling a follow up appt with Dr. Lomeli in 6 months (April 2022). BMT patient.

## 2021-10-28 DIAGNOSIS — I10 ESSENTIAL HYPERTENSION: ICD-10-CM

## 2021-10-29 RX ORDER — CARVEDILOL 6.25 MG/1
TABLET ORAL
Qty: 180 TABLET | Refills: 0 | OUTPATIENT
Start: 2021-10-29

## 2021-11-02 ENCOUNTER — TELEPHONE (OUTPATIENT)
Dept: TRANSPLANT | Facility: CLINIC | Age: 66
End: 2021-11-02

## 2021-11-02 DIAGNOSIS — I10 ESSENTIAL HYPERTENSION: ICD-10-CM

## 2021-11-02 RX ORDER — CARVEDILOL 6.25 MG/1
6.25 TABLET ORAL 2 TIMES DAILY WITH MEALS
Qty: 60 TABLET | Refills: 3 | Status: SHIPPED | OUTPATIENT
Start: 2021-11-02

## 2021-11-02 NOTE — TELEPHONE ENCOUNTER
Pt called to request refill of Coreg. Dr Ni had been prescribing this medication. Refill sent to pts preferred pharmacy, pt instructed to follow up with primary care provider for subsequent refills.

## 2021-12-28 ENCOUNTER — TELEPHONE (OUTPATIENT)
Dept: PULMONOLOGY | Facility: CLINIC | Age: 66
End: 2021-12-28
Payer: COMMERCIAL

## 2021-12-28 NOTE — TELEPHONE ENCOUNTER
Spoke with pt about rescheduling appointment with Dr. Lomeli on 4/12/22. Did inform pt that the provider is no longer available that day. Pt will now see Dr. Lomeli on 4/19/22. Details confirmed.

## 2022-01-04 NOTE — PROGRESS NOTES
BMT Clinic Progress Notes    Ms Bennett, 65  s/p NMA DUCB transplant     CC: follow-up    History of Present Illness: The patient is now 7 years and 1 month after her allogenic, double cord transplant for AML.  She is doing quite well in today's visit.  In particular, she lost a significant amount of weight, controlled her glycemia just with the diet and glycemic checks regularly.  She has lateral joint pain and has more stamina and ability to move around.  Still having recurrent sinus issues that occasionally need antibiotics.  She also brought her ferritin.  She has no new skin rashes, dry mouth or eye issues today.  Very good spirits.  No lower extremity edema today.     Review of Systems: ROS otherwise unremarkable other than what is noted in the HPI.     Physical Exam: Alert oriented and in nonacute distress.  Good spirits.  KPS 90%.  /80 (BP Location: Right arm, Patient Position: Sitting, Cuff Size: Adult Regular)   Pulse 70   Temp 97.9  F (36.6  C) (Oral)   Resp 16   Wt 94 kg (207 lb 3.2 oz)   SpO2 96%   BMI 38.52 kg/m    Lungs with decreased sounds bilaterally but no crackles or wheezes  Heart regular rhythm and rate, no gallop, no rub, no murmur.  Abdomen soft and nontender with no organomegalies or masses.  Extremities warm and well perfused with no obvious edema  Skin he has this area of thickened   The patient is grossly neurologically intact  The patient does not have an indwelling catheter.    Lab Results   Component Value Date    WBC 8.1 01/05/2022    ANEU 5.1 10/09/2020    HGB 13.5 01/05/2022    HCT 43.1 01/05/2022     01/05/2022     01/05/2022    POTASSIUM 4.0 01/05/2022    CHLORIDE 108 01/05/2022    CO2 28 01/05/2022     (H) 01/05/2022    BUN 18 01/05/2022    CR 1.03 01/05/2022    MAG 1.8 04/03/2017    INR 1.03 04/03/2017    BILITOTAL 0.6 01/05/2022    AST 17 01/05/2022    ALT 24 01/05/2022    ALKPHOS 72 01/05/2022    PROTTOTAL 6.8 01/05/2022    ALBUMIN 3.7  01/05/2022     Hemoglobin A1c 6.2  Ferritin pending  Triglycerides 211    Assessment and plan  Ms. Eryn Bennett is a 59 yo woman s/p NMA DCBT for AML.     1. BMT/AML: continued CR.  She is now 7 years and 1 month post transplant.    2. HEME: We are waiting for the ferritin level today to decide whether or not to the phlebotomies.  I would say that it for ferritin is greater than 500 we could consider phlebotomies to get it below the level, if greater than 1000 then I would try to do phlebotomies to get it lower.  Once we start phlebotomies I suggest a target of less than 500   - history of Hemolytic anemia: Warm anti E; IgG and completment. Received rituximab X 4 September 2015.   - Patient has history DVT but her repeat doppler L lower ext negative on OCT 2016. Off coumadin since JUL 2016. During this admission as she was less mobile with viral illness she was given SQ heparin every 12 hours which was stopped on discharge.     3. ID: Patient had all the Covid immunizations and boosters.  She had pneumococcal booster, and had influenza vaccine.  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  Otherwise doing relatively well.  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        4. 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   - Off prednisone since 4/20/16; finished MMF taper on 9/13/16      5. GI: monitor and use PRN medication for GERD  - Compazine available prn      6. FEN/Renal: Patient's creatinine electrolytes all in the baseline.        7. Cardiovascular: The most recent echocardiogram demonstrates ejection fraction of 65% with normal wall motion. Continue Norvasc 5mg daily Carvedilol 6.25mg bid for cardiomyopathy.  She does have elevated cholesterol and I would recommend that her primary care consider guidance on diet and perhaps medication.      8. Musculoskelatal: Significant improvement of her mobility and joint pains with weight loss.  Encouraged her to  continue that and be physically active.    - Dexa scan with osteopenia (5/5/15). Had bisphosphonate 10/5/16; repeat in ~12 months; local doctor managing On vitamin D and calcium replacement.        9. Pulmonary: She had a transient hypoxemia with a sinusitis.  I asked her to monitor her symptoms and if that happens again she should communicate rather than wait to see if it can get better.  She got a note and she will schedule follow-up with the pulmonary team in the near future.      10. Neurology: feet neuropathy not changed. Monitor and refer to neurology/neuropathy group for assessment.     11. Eyes: Had a change in corrective lenses recently.  Should continue periodical follow-up with ophthalmology.      12 General health: See comments above regarding iron overload/ferritin level.  Otherwise continue general health care and cancer prevention, diabetes, cardiovascular disease prophylaxis and screening with primary care physician, locally.  Would also recommend dermatology follow-up, regularly.     Plan:   The patient will have no plan follow-up with BMT from this point on.  She will call and be scheduled as needed.  We will wait for the ferritin level and communicate with the patient whether or not we would recommend phlebotomies, locally.  The patient will work with her  in deciding whether she will follow-up with a hematologist in verbal order in [place].  They will let us know if they need any help.    Irving Ni MD on 1/5/2022 at 8:28 AM    30 minutes spent on the date of the encounter doing chart review, history and exam, documentation and further activities per the note

## 2022-01-05 ENCOUNTER — ONCOLOGY VISIT (OUTPATIENT)
Dept: TRANSPLANT | Facility: CLINIC | Age: 67
End: 2022-01-05
Attending: INTERNAL MEDICINE
Payer: MEDICARE

## 2022-01-05 ENCOUNTER — APPOINTMENT (OUTPATIENT)
Dept: LAB | Facility: CLINIC | Age: 67
End: 2022-01-05
Attending: INTERNAL MEDICINE
Payer: MEDICARE

## 2022-01-05 VITALS
DIASTOLIC BLOOD PRESSURE: 80 MMHG | SYSTOLIC BLOOD PRESSURE: 129 MMHG | WEIGHT: 207.2 LBS | OXYGEN SATURATION: 96 % | HEART RATE: 70 BPM | RESPIRATION RATE: 16 BRPM | BODY MASS INDEX: 38.52 KG/M2 | TEMPERATURE: 97.9 F

## 2022-01-05 DIAGNOSIS — E78.5 DYSLIPIDEMIA: ICD-10-CM

## 2022-01-05 DIAGNOSIS — E83.111 IRON OVERLOAD DUE TO REPEATED RED BLOOD CELL TRANSFUSIONS: Primary | ICD-10-CM

## 2022-01-05 DIAGNOSIS — R73.9 HYPERGLYCEMIA: ICD-10-CM

## 2022-01-05 DIAGNOSIS — Z94.81 S/P ALLOGENEIC BONE MARROW TRANSPLANT (H): ICD-10-CM

## 2022-01-05 LAB
ALBUMIN SERPL-MCNC: 3.7 G/DL (ref 3.4–5)
ALP SERPL-CCNC: 72 U/L (ref 40–150)
ALT SERPL W P-5'-P-CCNC: 24 U/L (ref 0–50)
ANION GAP SERPL CALCULATED.3IONS-SCNC: 3 MMOL/L (ref 3–14)
AST SERPL W P-5'-P-CCNC: 17 U/L (ref 0–45)
BASOPHILS # BLD AUTO: 0 10E3/UL (ref 0–0.2)
BASOPHILS NFR BLD AUTO: 0 %
BILIRUB SERPL-MCNC: 0.6 MG/DL (ref 0.2–1.3)
BUN SERPL-MCNC: 18 MG/DL (ref 7–30)
CALCIUM SERPL-MCNC: 9.7 MG/DL (ref 8.5–10.1)
CHLORIDE BLD-SCNC: 108 MMOL/L (ref 94–109)
CO2 SERPL-SCNC: 28 MMOL/L (ref 20–32)
CREAT SERPL-MCNC: 1.03 MG/DL (ref 0.52–1.04)
EOSINOPHIL # BLD AUTO: 0.3 10E3/UL (ref 0–0.7)
EOSINOPHIL NFR BLD AUTO: 4 %
ERYTHROCYTE [DISTWIDTH] IN BLOOD BY AUTOMATED COUNT: 12.7 % (ref 10–15)
FASTING STATUS PATIENT QL REPORTED: YES
FERRITIN SERPL-MCNC: 701 NG/ML (ref 8–252)
GFR SERPL CREATININE-BSD FRML MDRD: 60 ML/MIN/1.73M2
GLUCOSE BLD-MCNC: 116 MG/DL (ref 70–99)
HBA1C MFR BLD: 6.2 % (ref 0–5.6)
HCT VFR BLD AUTO: 43.1 % (ref 35–47)
HGB BLD-MCNC: 13.5 G/DL (ref 11.7–15.7)
IMM GRANULOCYTES # BLD: 0 10E3/UL
IMM GRANULOCYTES NFR BLD: 0 %
LYMPHOCYTES # BLD AUTO: 3.4 10E3/UL (ref 0.8–5.3)
LYMPHOCYTES NFR BLD AUTO: 42 %
MCH RBC QN AUTO: 28.7 PG (ref 26.5–33)
MCHC RBC AUTO-ENTMCNC: 31.3 G/DL (ref 31.5–36.5)
MCV RBC AUTO: 92 FL (ref 78–100)
MONOCYTES # BLD AUTO: 0.6 10E3/UL (ref 0–1.3)
MONOCYTES NFR BLD AUTO: 7 %
NEUTROPHILS # BLD AUTO: 3.7 10E3/UL (ref 1.6–8.3)
NEUTROPHILS NFR BLD AUTO: 47 %
NRBC # BLD AUTO: 0 10E3/UL
NRBC BLD AUTO-RTO: 0 /100
PLATELET # BLD AUTO: 167 10E3/UL (ref 150–450)
POTASSIUM BLD-SCNC: 4 MMOL/L (ref 3.4–5.3)
PROT SERPL-MCNC: 6.8 G/DL (ref 6.8–8.8)
RBC # BLD AUTO: 4.71 10E6/UL (ref 3.8–5.2)
SODIUM SERPL-SCNC: 139 MMOL/L (ref 133–144)
TRIGL SERPL-MCNC: 211 MG/DL
WBC # BLD AUTO: 8.1 10E3/UL (ref 4–11)

## 2022-01-05 PROCEDURE — 36415 COLL VENOUS BLD VENIPUNCTURE: CPT | Performed by: INTERNAL MEDICINE

## 2022-01-05 PROCEDURE — 84478 ASSAY OF TRIGLYCERIDES: CPT | Performed by: INTERNAL MEDICINE

## 2022-01-05 PROCEDURE — 82728 ASSAY OF FERRITIN: CPT | Performed by: INTERNAL MEDICINE

## 2022-01-05 PROCEDURE — 99214 OFFICE O/P EST MOD 30 MIN: CPT | Performed by: INTERNAL MEDICINE

## 2022-01-05 PROCEDURE — 82784 ASSAY IGA/IGD/IGG/IGM EACH: CPT | Performed by: INTERNAL MEDICINE

## 2022-01-05 PROCEDURE — 85025 COMPLETE CBC W/AUTO DIFF WBC: CPT | Performed by: INTERNAL MEDICINE

## 2022-01-05 PROCEDURE — 80053 COMPREHEN METABOLIC PANEL: CPT | Performed by: INTERNAL MEDICINE

## 2022-01-05 PROCEDURE — G0463 HOSPITAL OUTPT CLINIC VISIT: HCPCS

## 2022-01-05 PROCEDURE — 83036 HEMOGLOBIN GLYCOSYLATED A1C: CPT | Performed by: INTERNAL MEDICINE

## 2022-01-05 ASSESSMENT — PAIN SCALES - GENERAL: PAINLEVEL: NO PAIN (0)

## 2022-01-05 NOTE — LETTER
1/5/2022         RE: Eryn Bennett  [address]   [address]  [facility] [address]        Dear Colleague,    Thank you for referring your patient, Eryn Bennett, to the [facility] BLOOD AND MARROW TRANSPLANT PROGRAM [place]. Please see a copy of my visit note below.      BMT Clinic Progress Notes    Ms Bennett, 65  s/p NMA DUCB transplant     CC: follow-up    History of Present Illness: The patient is now 7 years and 1 month after her allogenic, double cord transplant for AML.  She is doing quite well in today's visit.  In particular, she lost a significant amount of weight, controlled her glycemia just with the diet and glycemic checks regularly.  She has lateral joint pain and has more stamina and ability to move around.  Still having recurrent sinus issues that occasionally need antibiotics.  She also brought her ferritin.  She has no new skin rashes, dry mouth or eye issues today.  Very good spirits.  No lower extremity edema today.     Review of Systems: ROS otherwise unremarkable other than what is noted in the HPI.     Physical Exam: Alert oriented and in nonacute distress.  Good spirits.  KPS 90%.  /80 (BP Location: Right arm, Patient Position: Sitting, Cuff Size: Adult Regular)   Pulse 70   Temp 97.9  F (36.6  C) (Oral)   Resp 16   Wt 94 kg (207 lb 3.2 oz)   SpO2 96%   BMI 38.52 kg/m    Lungs with decreased sounds bilaterally but no crackles or wheezes  Heart regular rhythm and rate, no gallop, no rub, no murmur.  Abdomen soft and nontender with no organomegalies or masses.  Extremities warm and well perfused with no obvious edema  Skin he has this area of thickened   The patient is grossly neurologically intact  The patient does not have an indwelling catheter.    Lab Results   Component Value Date    WBC 8.1 01/05/2022    ANEU 5.1 10/09/2020    HGB 13.5 01/05/2022    HCT 43.1 01/05/2022     01/05/2022     01/05/2022    POTASSIUM 4.0 01/05/2022    CHLORIDE 108  01/05/2022    CO2 28 01/05/2022     (H) 01/05/2022    BUN 18 01/05/2022    CR 1.03 01/05/2022    MAG 1.8 04/03/2017    INR 1.03 04/03/2017    BILITOTAL 0.6 01/05/2022    AST 17 01/05/2022    ALT 24 01/05/2022    ALKPHOS 72 01/05/2022    PROTTOTAL 6.8 01/05/2022    ALBUMIN 3.7 01/05/2022     Hemoglobin A1c 6.2  Ferritin pending  Triglycerides 211    Assessment and plan  Ms. Eryn Bennett is a 61 yo woman s/p NMA DCBT for AML.     1. BMT/AML: continued CR.  She is now 7 years and 1 month post transplant.    2. HEME: We are waiting for the ferritin level today to decide whether or not to the phlebotomies.  I would say that it for ferritin is greater than 500 we could consider phlebotomies to get it below the level, if greater than 1000 then I would try to do phlebotomies to get it lower.  Once we start phlebotomies I suggest a target of less than 500   - history of Hemolytic anemia: Warm anti E; IgG and completment. Received rituximab X 4 September 2015.   - Patient has history DVT but her repeat doppler L lower ext negative on OCT 2016. Off coumadin since JUL 2016. During this admission as she was less mobile with viral illness she was given SQ heparin every 12 hours which was stopped on discharge.     3. ID: Patient had all the Covid immunizations and boosters.  She had pneumococcal booster, and had influenza vaccine.  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  Otherwise doing relatively well.  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        4. 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   - Off prednisone since 4/20/16; finished MMF taper on 9/13/16      5. GI: monitor and use PRN medication for GERD  - Compazine available prn      6. FEN/Renal: Patient's creatinine electrolytes all in the baseline.        7. Cardiovascular: The most recent echocardiogram demonstrates ejection fraction of 65% with normal wall motion. Continue Norvasc 5mg daily Carvedilol  6.25mg bid for cardiomyopathy.  She does have elevated cholesterol and I would recommend that her primary care consider guidance on diet and perhaps medication.      8. Musculoskelatal: Significant improvement of her mobility and joint pains with weight loss.  Encouraged her to continue that and be physically active.    - Dexa scan with osteopenia (5/5/15). Had bisphosphonate 10/5/16; repeat in ~12 months; local doctor managing On vitamin D and calcium replacement.        9. Pulmonary: She had a transient hypoxemia with a sinusitis.  I asked her to monitor her symptoms and if that happens again she should communicate rather than wait to see if it can get better.  She got a note and she will schedule follow-up with the pulmonary team in the near future.      10. Neurology: feet neuropathy not changed. Monitor and refer to neurology/neuropathy group for assessment.     11. Eyes: Had a change in corrective lenses recently.  Should continue periodical follow-up with ophthalmology.      12 General health: See comments above regarding iron overload/ferritin level.  Otherwise continue general health care and cancer prevention, diabetes, cardiovascular disease prophylaxis and screening with primary care physician, locally.  Would also recommend dermatology follow-up, regularly.     Plan:   The patient will have no plan follow-up with BMT from this point on.  She will call and be scheduled as needed.  We will wait for the ferritin level and communicate with the patient whether or not we would recommend phlebotomies, locally.  The patient will work with her  in deciding whether she will follow-up with a hematologist in verbal order in [place].  They will let us know if they need any help.    Irving Ni MD on 1/5/2022 at 8:28 AM    30 minutes spent on the date of the encounter doing chart review, history and exam, documentation and further activities per the note          Again, thank you for allowing  me to participate in the care of your patient.        Sincerely,        Irving Ni MD

## 2022-01-05 NOTE — NURSING NOTE
"Oncology Rooming Note    January 5, 2022 7:39 AM   Eryn Bennett is a 66 year old female who presents for:    Chief Complaint   Patient presents with     Blood Draw     Labs drawn by kwabena in lab by RN. VS taken.     Oncology Clinic Visit     AML     Initial Vitals: /80 (BP Location: Right arm, Patient Position: Sitting, Cuff Size: Adult Regular)   Pulse 70   Temp 97.9  F (36.6  C) (Oral)   Resp 16   Wt 94 kg (207 lb 3.2 oz)   SpO2 96%   BMI 38.52 kg/m   Estimated body mass index is 38.52 kg/m  as calculated from the following:    Height as of 10/5/21: 1.562 m (5' 1.5\").    Weight as of this encounter: 94 kg (207 lb 3.2 oz). Body surface area is 2.02 meters squared.  No Pain (0) Comment: Data Unavailable   No LMP recorded. Patient has had a hysterectomy.  Allergies reviewed: Yes  Medications reviewed: Yes    Medications: Medication refills not needed today.  Pharmacy name entered into [facility]:    [facility] [place], [place] - [phone]  [id number] [place], [place] - [address] AT [facility] [address]  [facility] [address] [place] [address] - [address] [facility]    Clinical concerns: None.        Lenore Fuentes [facility]            "

## 2022-01-05 NOTE — LETTER
Date:January 10, 2022      Patient was self referred, no letter generated. Do not send.        [facility] Health Information

## 2022-01-05 NOTE — NURSING NOTE
Chief Complaint   Patient presents with     Blood Draw     Labs drawn by  in lab by RN. VS taken.     Labs collected from venipuncture by RN. Vitals taken. Checked in for appointment(s).  Burt Childers RN

## 2022-01-10 DIAGNOSIS — Z94.81 S/P ALLOGENEIC BONE MARROW TRANSPLANT (H): Primary | ICD-10-CM

## 2022-01-10 DIAGNOSIS — C92.01 ACUTE MYELOID LEUKEMIA IN REMISSION (H): ICD-10-CM

## 2022-03-09 ENCOUNTER — TELEPHONE (OUTPATIENT)
Dept: TRANSPLANT | Facility: CLINIC | Age: 67
End: 2022-03-09
Payer: COMMERCIAL

## 2022-03-09 NOTE — TELEPHONE ENCOUNTER
Pt sent my chart message regarding COVID boosters. Pt has received 3 doses of COVID vaccine, last in Sept. Pt can receive booster at any time.

## 2022-07-10 ENCOUNTER — HEALTH MAINTENANCE LETTER (OUTPATIENT)
Age: 67
End: 2022-07-10

## 2022-09-10 ENCOUNTER — HEALTH MAINTENANCE LETTER (OUTPATIENT)
Age: 67
End: 2022-09-10

## 2022-12-26 NOTE — TELEPHONE ENCOUNTER
RECORDS STATUS - ALL OTHER DIAGNOSIS      RECORDS RECEIVED FROM: [facility]   DATE RECEIVED: 12/26/22   NOTES STATUS DETAILS   OFFICE NOTE from referring provider EPIC 01/05/22: Dr. Irving Ni   OFFICE NOTE from medical oncologist Epic 01/05/22: Dr. Irving Ni   DISCHARGE SUMMARY from hospital [facility], [facility] 03/31/17, 05/18/15, 03/26/15, 01/01/15, 12/10/14, 10/30/14, 08/28/14: [facility]    07/06/10, 11/09/09: [facility]   DISCHARGE REPORT from the ER [facility] 07/21/10, 12/18/09: [facility]   MEDICATION LIST [facility]    LABS     PATHOLOGY REPORTS Reports in EPIC [facility]:  11/04/16: [id number]  11/17/15: [id number]  05/05/15: [id number]  02/13/15: [id number]  11/26/14: [id number]  09/23/14: [id number]  09/10/14: [id number]    Path Consult:  09/02/14: [id number]   ANYTHING RELATED TO DIAGNOSIS [facility] Most recent 06/29/22   IMAGING (NEED IMAGES & REPORT)     CT SCANS PACS 02/11/20-10/15/14: CT Chest

## 2023-01-03 ENCOUNTER — PRE VISIT (OUTPATIENT)
Dept: ONCOLOGY | Facility: CLINIC | Age: 68
End: 2023-01-03

## 2023-01-05 ENCOUNTER — PATIENT OUTREACH (OUTPATIENT)
Dept: ONCOLOGY | Facility: CLINIC | Age: 68
End: 2023-01-05

## 2023-01-05 NOTE — PROGRESS NOTES
[facility]: Cancer Care                                                                                        Incoming EngagementHealthhart message this am regarding labs needed for visit next week with Dr Miles.    Outgoing My Chart message sent to pt introducing self as RNCC with clinic contact information.  Educated pt that her labs done today are visible to us and that Dr Marshall will review labs at time of visit and will decide if other work up is needed at time of visit. Encouraged pt to bring her questions to her appt and Dr Miles will address at that time.       Arleen Monge, CORONAN, RN  RN Care Coordinator  [facility]     done

## 2023-01-06 DIAGNOSIS — C92.01 ACUTE MYELOID LEUKEMIA IN REMISSION (H): Primary | ICD-10-CM

## 2023-01-06 DIAGNOSIS — E13.9 DIABETES MELLITUS OF OTHER TYPE WITHOUT COMPLICATION, UNSPECIFIED WHETHER LONG TERM INSULIN USE (H): ICD-10-CM

## 2023-01-06 NOTE — PROGRESS NOTES
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)     RECORDS RECEIVED FROM: [facility]   DATE RECEIVED: 1/11/2023    Action    Action Taken 1/10/2023 3pm KEEDIS     I called [facility] PH: [phone]     1/10/2023 4pm KEEDIS   I resolved imaging from [facility] in PACS      NOTES STATUS DETAILS   OFFICE NOTE from referring provider Complete Irving Taylor MD   DISCHARGE SUMMARY from hospital     DISCHARGE REPORT from the ER     OPERATIVE REPORT     MEDICATION LIST Complete [facility]   CLINICAL TRIAL TREATMENTS TO DATE     LABS     PATHOLOGY REPORTS     ANYTHING RELATED TO DIAGNOSIS Complete Labs last updated on 1/6/2023    GENONOMIC TESTING     TYPE:     IMAGING (NEED IMAGES & REPORT)     CT SCANS     Bone Scans Complete - [facility] 8/17/2022   Xray Chest Complete - [facility] 5/11/2022    MRI     MAMMO     ULTRASOUND     PET

## 2023-01-11 ENCOUNTER — PRE VISIT (OUTPATIENT)
Dept: ONCOLOGY | Facility: CLINIC | Age: 68
End: 2023-01-11

## 2023-01-16 NOTE — PROGRESS NOTES
RECORDS STATUS - ALL OTHER DIAGNOSIS    Establishing long term follow up  RECORDS RECEIVED FROM: [facility]/ [facility]   DATE RECEIVED: 1/19/2023    Action    Action Taken 1/16/2023 10:15AM CELSO     I called debbie Cerna- outside records are at [facility].     I sent an ib-message to the nurses asking them to follow up with debbie Cerna about her labs. I spoke to debbie Cerna this morning and she had to cancel her labs last week due to bad weather.       NOTES STATUS DETAILS   OFFICE NOTE from referring provider Complete Irving Ni MD   MEDICATION LIST Complete  [facility]   CLINICAL TRIAL TREATMENTS TO DATE     LABS     PATHOLOGY REPORTS     ANYTHING RELATED TO DIAGNOSIS Labs have been ordered- labs haven't been updated on June 29th 2022 Labs last updated on 1/6/2023 (Ordered/ Future)    GENONOMIC TESTING     TYPE:     IMAGING (NEED IMAGES & REPORT)     CT SCANS     MRI     MAMMO     ULTRASOUND Complete  US Lower Extremity 4/8/2021   PET

## 2023-01-18 LAB
ANTIBODY SCREEN: NEGATIVE
SPECIMEN EXPIRATION DATE: NORMAL

## 2023-01-19 ENCOUNTER — PATIENT OUTREACH (OUTPATIENT)
Dept: ONCOLOGY | Facility: CLINIC | Age: 68
End: 2023-01-19
Payer: COMMERCIAL

## 2023-01-19 ENCOUNTER — PRE VISIT (OUTPATIENT)
Dept: ONCOLOGY | Facility: CLINIC | Age: 68
End: 2023-01-19

## 2023-01-19 ENCOUNTER — ONCOLOGY VISIT (OUTPATIENT)
Dept: ONCOLOGY | Facility: CLINIC | Age: 68
End: 2023-01-19
Attending: INTERNAL MEDICINE
Payer: MEDICARE

## 2023-01-19 VITALS
SYSTOLIC BLOOD PRESSURE: 120 MMHG | DIASTOLIC BLOOD PRESSURE: 77 MMHG | TEMPERATURE: 98.1 F | HEIGHT: 62 IN | BODY MASS INDEX: 37.01 KG/M2 | WEIGHT: 201.1 LBS | HEART RATE: 74 BPM | OXYGEN SATURATION: 96 %

## 2023-01-19 DIAGNOSIS — C92.01 ACUTE MYELOID LEUKEMIA IN REMISSION (H): Primary | ICD-10-CM

## 2023-01-19 LAB
ABO/RH TYPE: NORMAL
ALBUMIN SERPL BCG-MCNC: 4.3 G/DL (ref 3.5–5.2)
ALP SERPL-CCNC: 85 U/L (ref 35–104)
ALT SERPL W P-5'-P-CCNC: 20 U/L (ref 10–35)
ANION GAP SERPL CALCULATED.3IONS-SCNC: 9 MMOL/L (ref 7–15)
AST SERPL W P-5'-P-CCNC: 22 U/L (ref 10–35)
BASOPHILS # BLD AUTO: 0 10E3/UL (ref 0–0.2)
BASOPHILS NFR BLD AUTO: 0 %
BILIRUB SERPL-MCNC: 0.2 MG/DL
BUN SERPL-MCNC: 25.9 MG/DL (ref 8–23)
CALCIUM SERPL-MCNC: 9.6 MG/DL (ref 8.8–10.2)
CHLORIDE SERPL-SCNC: 103 MMOL/L (ref 98–107)
CREAT SERPL-MCNC: 1.16 MG/DL (ref 0.51–0.95)
DEPRECATED HCO3 PLAS-SCNC: 29 MMOL/L (ref 22–29)
EOSINOPHIL # BLD AUTO: 0.2 10E3/UL (ref 0–0.7)
EOSINOPHIL NFR BLD AUTO: 2 %
ERYTHROCYTE [DISTWIDTH] IN BLOOD BY AUTOMATED COUNT: 13 % (ref 10–15)
FERRITIN SERPL-MCNC: 942 NG/ML (ref 11–328)
GFR SERPL CREATININE-BSD FRML MDRD: 52 ML/MIN/1.73M2
GLUCOSE SERPL-MCNC: 117 MG/DL (ref 70–99)
HCT VFR BLD AUTO: 44 % (ref 35–47)
HGB BLD-MCNC: 13.9 G/DL (ref 11.7–15.7)
IMM GRANULOCYTES # BLD: 0 10E3/UL
IMM GRANULOCYTES NFR BLD: 0 %
LDH SERPL L TO P-CCNC: 200 U/L (ref 0–250)
LYMPHOCYTES # BLD AUTO: 3.7 10E3/UL (ref 0.8–5.3)
LYMPHOCYTES NFR BLD AUTO: 38 %
MCH RBC QN AUTO: 29.1 PG (ref 26.5–33)
MCHC RBC AUTO-ENTMCNC: 31.6 G/DL (ref 31.5–36.5)
MCV RBC AUTO: 92 FL (ref 78–100)
MONOCYTES # BLD AUTO: 0.6 10E3/UL (ref 0–1.3)
MONOCYTES NFR BLD AUTO: 7 %
NEUTROPHILS # BLD AUTO: 5.2 10E3/UL (ref 1.6–8.3)
NEUTROPHILS NFR BLD AUTO: 53 %
NRBC # BLD AUTO: 0 10E3/UL
NRBC BLD AUTO-RTO: 0 /100
PLATELET # BLD AUTO: 165 10E3/UL (ref 150–450)
POTASSIUM SERPL-SCNC: 4.3 MMOL/L (ref 3.4–5.3)
PROT SERPL-MCNC: 6.9 G/DL (ref 6.4–8.3)
RBC # BLD AUTO: 4.78 10E6/UL (ref 3.8–5.2)
SODIUM SERPL-SCNC: 141 MMOL/L (ref 136–145)
SPECIMEN EXPIRATION DATE: NORMAL
WBC # BLD AUTO: 9.7 10E3/UL (ref 4–11)

## 2023-01-19 PROCEDURE — 85025 COMPLETE CBC W/AUTO DIFF WBC: CPT | Performed by: INTERNAL MEDICINE

## 2023-01-19 PROCEDURE — 80053 COMPREHEN METABOLIC PANEL: CPT | Performed by: INTERNAL MEDICINE

## 2023-01-19 PROCEDURE — 82784 ASSAY IGA/IGD/IGG/IGM EACH: CPT | Performed by: INTERNAL MEDICINE

## 2023-01-19 PROCEDURE — G0463 HOSPITAL OUTPT CLINIC VISIT: HCPCS | Performed by: INTERNAL MEDICINE

## 2023-01-19 PROCEDURE — G0463 HOSPITAL OUTPT CLINIC VISIT: HCPCS

## 2023-01-19 PROCEDURE — 86850 RBC ANTIBODY SCREEN: CPT | Performed by: INTERNAL MEDICINE

## 2023-01-19 PROCEDURE — 36415 COLL VENOUS BLD VENIPUNCTURE: CPT | Performed by: INTERNAL MEDICINE

## 2023-01-19 PROCEDURE — 99205 OFFICE O/P NEW HI 60 MIN: CPT | Performed by: INTERNAL MEDICINE

## 2023-01-19 PROCEDURE — 82728 ASSAY OF FERRITIN: CPT | Performed by: INTERNAL MEDICINE

## 2023-01-19 PROCEDURE — 83615 LACTATE (LD) (LDH) ENZYME: CPT | Performed by: INTERNAL MEDICINE

## 2023-01-19 PROCEDURE — 86901 BLOOD TYPING SEROLOGIC RH(D): CPT | Performed by: INTERNAL MEDICINE

## 2023-01-19 RX ORDER — GINGER ROOT/GINGER ROOT EXT 262.5 MG
CAPSULE ORAL
COMMUNITY

## 2023-01-19 ASSESSMENT — PAIN SCALES - GENERAL: PAINLEVEL: NO PAIN (0)

## 2023-01-19 NOTE — LETTER
Date:January 20, 2023      Patient was self referred, no letter generated. Do not send.        [facility] Health Information

## 2023-01-19 NOTE — PROGRESS NOTES
"  Reason for Visit: Establish care for AML, DUCBT in CR2.      12/2009 First Diagnosis of AML ([facility]).   10/2014 Second diagnosis of AML (relapse, treated with 7+3)  11/6/2014: NMA [facility]REY transplant      Interval history  Had lost weight and HbA1C had gotten better, but gained some weight halloween.    New quarter-sized spot of burning pain and tenderness on the outside of the right foot. Episodes of burning sensation last about 30 seconds followed by a few minutes of tenderness. Gets them 1-4 times per day almost every day. Not changing, not worse. Toes are always numb (this was due to transplant). That numbness in both feet. Had burning originally, but that burning has resolved. Those symptoms are not worse.    Energy is as expected. Enough to do her housework, doesn't feel like exercising (typical for winter, more in the mood for activity in the summer), spending time with grandchildren (6 of them: 3-16 years old). On disability since transplant.     Appetite is good, no nausea, no abd pain, no diarrhea, no blood in stool.    No HA. Vision ok. Balance is good.    No other pain. No lumps/bumps.    No rash lately. No bruises, no red spots.   Dry mouth associated with sleeping with mouth open, but not otherwise. No dry eyes  No significant LE edema.    Last COVID vaccine was in July 2022.  Gets flu shot every year.    No infections/colds lately. Used to get it from grandchildren and it took her a long time to recover. Last winter has been better.    No fevers, no night sweats.     Review of Systems: ROS otherwise unremarkable other than what is noted in the HPI.     Physical Exam: Alert oriented and in nonacute distress.  Good spirits.  KPS 90%.  /77   Pulse 74   Temp 98.1  F (36.7  C) (Oral)   Ht 1.577 m (5' 2.09\")   Wt 91.2 kg (201 lb 1.6 oz)   SpO2 96%   BMI 36.68 kg/m    Oropharynx: clear, no lesions, no thrush, no bleeding  Neck: supple, no LAD palpated  Lungs clear sounds bilaterally but no crackles " or wheezes  Heart regular rhythm and rate, no gallop, no rub, no murmur.  Abdomen soft and nontender with no organomegalies or masses.    Extremities warm and well perfused with no obvious edema. Right foot skin appears philipp, no rash or abnormality seen or palpated in area of burning sensation (no burning sensation when I examined her).  Skin: no rash  The patient is grossly neurologically intact  The patient does not have an indwelling catheter.    Labs pending today    Lab Results   Component Value Date    WBC 8.1 01/05/2022    ANEU 5.1 10/09/2020    HGB 13.5 01/05/2022    HCT 43.1 01/05/2022     01/05/2022     01/05/2022    POTASSIUM 4.0 01/05/2022    CHLORIDE 108 01/05/2022    CO2 28 01/05/2022     (H) 01/05/2022    BUN 18 01/05/2022    CR 1.03 01/05/2022    MAG 1.8 04/03/2017    INR 1.03 04/03/2017    BILITOTAL 0.6 01/05/2022    AST 17 01/05/2022    ALT 24 01/05/2022    ALKPHOS 72 01/05/2022    PROTTOTAL 6.8 01/05/2022    ALBUMIN 3.7 01/05/2022         Assessment and plan  Ms. Eryn Bennett is 8 years s/p NMA DCBT for AML in  (2009 and 2014)     1. BMT/AML: continued CR.  She is now 8 years and 1 month post transplant. Likelihood of second relapse quite low at this point. I told her that she no longer needs scheduled follow up with hematologist. I asked her to have CBC diff yearly at PCP. Call us if anything comes up (new symptoms new findings). We will be here for her if she needs us.    2. HEME:   - Ferritin has been high but coming down. Will check today. Can consider phlebotomy if >1000, but anticipate it will drop slowly  - History of Hemolytic anemia: Warm anti E; IgG and completment. Received rituximab X 4 September 2015.   - History of DVT but her repeat doppler L lower ext negative on OCT 2016. Off coumadin since JUL 2016.    3. ID: Patient had Covid immunizations and boosters but has not had the bivalent booster. Last booster July 2022. I asked her to get the bivalent booster  now.  She had pneumococcal booster, and had influenza vaccine.  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  Otherwise doing relatively well.  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  Flu shot every fall      4. 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   - Off prednisone since 4/20/16; finished MMF taper on 9/13/16      5. GI: monitor and use PRN medication for GERD  - Compazine available prn      6. FEN/Renal: Patient's creatinine electrolytes have been in good range. Pending today.        7. Cardiovascular: The most recent echocardiogram demonstrates ejection fraction of 65% with normal wall motion. We reviewed today that 7+3 (Twice) and transplant are damaging to the heart so she needs to do what she can to protect her heart: exercise/remain active, eat a healthy diet, maintain normal weigh (lose weight), control glucose levels, BP, and cholesterol. She will f/u with PCP for these      8. Musculoskelatal:   - Dexa scan with osteopenia (5/5/15). Had bisphosphonate 10/5/16; repeat in ~12 months; follow up with PCP. On vitamin D and calcium replacement.        9. Pulmonary: not using her CPAP      10. Neurology: I don't think the symptoms she describes today are likely related to the transplant. I asked her to follow up with PCP and can consider neuro eval. Perhaps due to DM?    11. Eyes: Had a change in corrective lenses recently.  Should continue periodical follow-up with ophthalmology.      12 General health: regular health maintenance and screening with primary care physician, locally.  Would also recommend dermatology follow-up, regularly.     Plan:   1. CBC diff, TSH annually with PCP annually.   2. No need for scheduled follow up with us, but would call us if any new symptoms or signs appear that are worrisome/severe   3. CBC diff, CMP, LDH, ferritin, IgG today.   4. Consider IVIG if IgG < 400-600 AND getting frequent and/or severe infections. (Replase  IVIG if IgG <200)  5. No need for phlebotomy as long as ferritin continuing to stay <1,000  6. Discuss new burning sensation with PCP, consider neuro eval if persistent/bothersome    Elida Vazquez MD/PhD    60 minutes spent on the date of the encounter doing chart review, history and exam, documentation and further activities per the note    CC: PCP

## 2023-01-19 NOTE — PROGRESS NOTES
Met Eryn in clinic. Provided contact information for myself, clinic, and clinic resources. Discussed workflow for orders placed and when to contact triage vs RNCC. Answered all Pt questions.    Per Dr Vazquez entered labs.

## 2023-01-19 NOTE — NURSING NOTE
Patient labs drawn in clinic via venipuncture. Patient tolerated well and was discharged. Specimen(s) sent to first floor lab for processing. See flowsheet for details.      Lenore Fuentes CMA on 1/19/2023 at 4:25 PM

## 2023-01-19 NOTE — NURSING NOTE
"Oncology Rooming Note    January 19, 2023 3:34 PM   Eryn Bennett is a 67 year old female who presents for:    Chief Complaint   Patient presents with     Oncology Clinic Visit     AML     Initial Vitals: /77   Pulse 74   Temp 98.1  F (36.7  C) (Oral)   Ht 1.577 m (5' 2.09\")   Wt 91.2 kg (201 lb 1.6 oz)   SpO2 96%   BMI 36.68 kg/m   Estimated body mass index is 36.68 kg/m  as calculated from the following:    Height as of this encounter: 1.577 m (5' 2.09\").    Weight as of this encounter: 91.2 kg (201 lb 1.6 oz). Body surface area is 2 meters squared.  No Pain (0) Comment: Data Unavailable   No LMP recorded. Patient has had a hysterectomy.  Allergies reviewed: Yes  Medications reviewed: Yes    Medications: Medication refills not needed today.  Pharmacy name entered into EPIC:    [facility] [place], [place] - [address] [place] [address] [phone]  [id number]  [place] - [address]  AT [facility] [address]  [facility] [address] [place] [address] [place] - [address] [facility]    Clinical concerns: none       Susana Bertrand            "

## 2023-01-19 NOTE — LETTER
1/19/2023         RE: Eryn Bennett  [address]   [address]  [facility] [place] [facility] [address]        Dear Colleague,    Thank you for referring your patient, Eryn Bennett, to the [facility]. Please see a copy of my visit note below.      Reason for Visit: Establish care for AML, DUCBT in CR2.      12/2009 First Diagnosis of AML ([facility]).   10/2014 Second diagnosis of AML (relapse, treated with 7+3)  11/6/2014: NMA DUCB transplant      Interval history  Had lost weight and HbA1C had gotten better, but gained some weight halloween.    New quarter-sized spot of burning pain and tenderness on the outside of the right foot. Episodes of burning sensation last about 30 seconds followed by a few minutes of tenderness. Gets them 1-4 times per day almost every day. Not changing, not worse. Toes are always numb (this was due to transplant). That numbness in both feet. Had burning originally, but that burning has resolved. Those symptoms are not worse.    Energy is as expected. Enough to do her housework, doesn't feel like exercising (typical for winter, more in the mood for activity in the summer), spending time with grandchildren (6 of them: 3-16 years old). On disability since transplant.     Appetite is good, no nausea, no abd pain, no diarrhea, no blood in stool.    No HA. Vision ok. Balance is good.    No other pain. No lumps/bumps.    No rash lately. No bruises, no red spots.   Dry mouth associated with sleeping with mouth open, but not otherwise. No dry eyes  No significant LE edema.    Last COVID vaccine was in July 2022.  Gets flu shot every year.    No infections/colds lately. Used to get it from grandchildren and it took her a long time to recover. Last winter has been better.    No fevers, no night sweats.     Review of Systems: ROS otherwise unremarkable other than what is noted in the HPI.     Physical Exam: Alert oriented and in nonacute distress.  Good spirits.  KPS 90%.  BP  "120/77   Pulse 74   Temp 98.1  F (36.7  C) (Oral)   Ht 1.577 m (5' 2.09\")   Wt 91.2 kg (201 lb 1.6 oz)   SpO2 96%   BMI 36.68 kg/m    Oropharynx: clear, no lesions, no thrush, no bleeding  Neck: supple, no LAD palpated  Lungs clear sounds bilaterally but no crackles or wheezes  Heart regular rhythm and rate, no gallop, no rub, no murmur.  Abdomen soft and nontender with no organomegalies or masses.    Extremities warm and well perfused with no obvious edema. Right foot skin appears philipp, no rash or abnormality seen or palpated in area of burning sensation (no burning sensation when I examined her).  Skin: no rash  The patient is grossly neurologically intact  The patient does not have an indwelling catheter.    Labs pending today    Lab Results   Component Value Date    WBC 8.1 01/05/2022    ANEU 5.1 10/09/2020    HGB 13.5 01/05/2022    HCT 43.1 01/05/2022     01/05/2022     01/05/2022    POTASSIUM 4.0 01/05/2022    CHLORIDE 108 01/05/2022    CO2 28 01/05/2022     (H) 01/05/2022    BUN 18 01/05/2022    CR 1.03 01/05/2022    MAG 1.8 04/03/2017    INR 1.03 04/03/2017    BILITOTAL 0.6 01/05/2022    AST 17 01/05/2022    ALT 24 01/05/2022    ALKPHOS 72 01/05/2022    PROTTOTAL 6.8 01/05/2022    ALBUMIN 3.7 01/05/2022         Assessment and plan  Ms. Eryn Bennett is 8 years s/p NMA DCBT for AML in C2 (2009 and 2014)     1. BMT/AML: continued CR.  She is now 8 years and 1 month post transplant. Likelihood of second relapse quite low at this point. I told her that she no longer needs scheduled follow up with hematologist. I asked her to have CBC diff yearly at PCP. Call us if anything comes up (new symptoms new findings). We will be here for her if she needs us.    2. HEME:   - Ferritin has been high but coming down. Will check today. Can consider phlebotomy if >1000, but anticipate it will drop slowly  - History of Hemolytic anemia: Warm anti E; IgG and completment. Received rituximab X 4 " September 2015.   - History of DVT but her repeat doppler L lower ext negative on OCT 2016. Off coumadin since JUL 2016.    3. ID: Patient had Covid immunizations and boosters but has not had the bivalent booster. Last booster July 2022. I asked her to get the bivalent booster now.  She had pneumococcal booster, and had influenza vaccine.  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  Otherwise doing relatively well.  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  Flu shot every fall      4. 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   - Off prednisone since 4/20/16; finished MMF taper on 9/13/16      5. GI: monitor and use PRN medication for GERD  - Compazine available prn      6. FEN/Renal: Patient's creatinine electrolytes have been in good range. Pending today.        7. Cardiovascular: The most recent echocardiogram demonstrates ejection fraction of 65% with normal wall motion. We reviewed today that 7+3 (Twice) and transplant are damaging to the heart so she needs to do what she can to protect her heart: exercise/remain active, eat a healthy diet, maintain normal weigh (lose weight), control glucose levels, BP, and cholesterol. She will f/u with PCP for these      8. Musculoskelatal:   - Dexa scan with osteopenia (5/5/15). Had bisphosphonate 10/5/16; repeat in ~12 months; follow up with PCP. On vitamin D and calcium replacement.        9. Pulmonary: not using her CPAP      10. Neurology: I don't think the symptoms she describes today are likely related to the transplant. I asked her to follow up with PCP and can consider neuro eval. Perhaps due to DM?    11. Eyes: Had a change in corrective lenses recently.  Should continue periodical follow-up with ophthalmology.      12 General health: regular health maintenance and screening with primary care physician, locally.  Would also recommend dermatology follow-up, regularly.     Plan:   1. CBC diff, TSH annually with  PCP annually.   2. No need for scheduled follow up with us, but would call us if any new symptoms or signs appear that are worrisome/severe   3. CBC diff, CMP, LDH, ferritin, IgG today.   4. Consider IVIG if IgG < 400-600 AND getting frequent and/or severe infections. (Replase IVIG if IgG <200)  5. No need for phlebotomy as long as ferritin continuing to stay <1,000  6. Discuss new burning sensation with PCP, consider neuro eval if persistent/bothersome    Elida Vazquez MD/PhD    60 minutes spent on the date of the encounter doing chart review, history and exam, documentation and further activities per the note    CC: PCP        Again, thank you for allowing me to participate in the care of your patient.        Sincerely,        Elida Vazquez MD

## 2023-01-20 LAB — IGG SERPL-MCNC: 599 MG/DL (ref 610–1616)

## 2023-01-22 ENCOUNTER — HEALTH MAINTENANCE LETTER (OUTPATIENT)
Age: 68
End: 2023-01-22

## 2023-07-12 NOTE — NURSING NOTE
"BMT Heme Malignancy Rooming Note    Eryn Bennett - 4/7/2017 9:48 AM     Chief Complaint   Patient presents with     Blood Draw     Pt with hx of aml labs collected via venipuncture.      RECHECK     here for provider visit, s/p hospital discharge HX: AML        /70  Pulse 98  Temp 98  F (36.7  C) (Oral)  Wt 98.2 kg (216 lb 8 oz)  SpO2 94%  BMI 40.91 kg/m2     Medications reviewed: Yes    Labs drawn: Yes    Drawn by: Lab Staff  Via: venipuncture  See Doc Flowsheet for details.      Dressing changed: Not applicable     Medications given: No    Staff time:3 min    Additional information if applicable: Pt arrived, ambulatory in care of , to BMT clinic.  Pt wearing mask and reports frequent cough. PT reports she was recently discharged from hospital and she had the cough there as well.  Pt taking 2 cough suppressants with very little relief.  PT denies recent fevers.  Eating small amounts and \"probably not drinking enough\".  Pt reports dizziness when standing only after lying down in bed.  Orthostatic BP's measured.  Will notify provider.    Sahra Gil RN      " - appreciate transplant team recs  - tacro increased to 3 mg BID to match home dose (upon admission was started on 2 mg BID but confirmed outpatient dose is 3 mg BID)  -daily Tacro level (needs to be obtained ~30 min before AM dose given); goal tacro level 4-6,   -transplant SW team follow up to establish plan for Safe discharge when ready - evaluated by PT today, planning for d/c home with home PT  - pending TTE - appreciate transplant team recs  - tacro increased to 3 mg BID to match home dose (upon admission was started on 2 mg BID but confirmed outpatient dose is 3 mg BID)  -daily Tacro level (needs to be obtained ~30 min before AM dose given); goal tacro level 4-6, level this AM therapeutic  -transplant SW team follow up to establish plan for Safe discharge when ready - per PT, d/c home with home PT  - TTE today, f/u results

## 2023-07-23 ENCOUNTER — HEALTH MAINTENANCE LETTER (OUTPATIENT)
Age: 68
End: 2023-07-23

## 2023-10-18 ENCOUNTER — PATIENT OUTREACH (OUTPATIENT)
Dept: ONCOLOGY | Facility: CLINIC | Age: 68
End: 2023-10-18
Payer: COMMERCIAL

## 2023-10-18 NOTE — PROGRESS NOTES
[facility]: Cancer Care                                                                                          Chart review completed, updated enrollment status.  Pt seen by Dr Vazquez due to reschedule.  Removed self from care team.    Arleen Monge, CORONAN, RN  RN Care Coordinator  [facility]

## 2024-01-25 ENCOUNTER — HOSPITAL ENCOUNTER (EMERGENCY)
Facility: CLINIC | Age: 69
Discharge: HOME | End: 2024-01-25
Attending: EMERGENCY MEDICINE | Admitting: EMERGENCY MEDICINE
Payer: MEDICARE

## 2024-01-25 VITALS
OXYGEN SATURATION: 96 % | SYSTOLIC BLOOD PRESSURE: 142 MMHG | DIASTOLIC BLOOD PRESSURE: 73 MMHG | TEMPERATURE: 98.1 F | HEART RATE: 83 BPM | RESPIRATION RATE: 16 BRPM

## 2024-01-25 DIAGNOSIS — R21 RASH: ICD-10-CM

## 2024-01-25 PROCEDURE — 99284 EMERGENCY DEPT VISIT MOD MDM: CPT | Performed by: EMERGENCY MEDICINE

## 2024-01-25 PROCEDURE — 99203 OFFICE O/P NEW LOW 30 MIN: CPT | Mod: GC | Performed by: DERMATOLOGY

## 2024-01-25 PROCEDURE — 99283 EMERGENCY DEPT VISIT LOW MDM: CPT | Performed by: EMERGENCY MEDICINE

## 2024-01-25 RX ORDER — TRIAMCINOLONE ACETONIDE 1 MG/G
CREAM TOPICAL 2 TIMES DAILY
Qty: 15 G | Refills: 0 | Status: SHIPPED | OUTPATIENT
Start: 2024-01-25

## 2024-01-25 ASSESSMENT — ACTIVITIES OF DAILY LIVING (ADL)
ADLS_ACUITY_SCORE: 35
ADLS_ACUITY_SCORE: 37

## 2024-01-25 NOTE — ED TRIAGE NOTES
Pt says [facility] told her to come up here to be seen. 15 days ago she had hand foot and mouth dx. The skin is peeling off on her fingers and her feet skin are thickened and peeling. She had a BMT 1.5-2 yrs ago done here. They are worried it could be something else. They wanted to her to come up here asap. Pt says it is not in her mouth- just hands and feet. No chest pain, no shortness of breath. Hot flashes yes. She says her stomach feels sick too    Pt is concerned for graft v host disease.

## 2024-01-25 NOTE — CONSULTS
[facility] Inpatient Consult Dermatology Note    Impression/Plan:  1. Hand and foot desquamation  Pt presents with 1-2 weeks of hand and foot desquamation following acute Hand Foot Mouth infection. First developed pink papules and vesicles on palms and soles ~ 2023. Acral surfaces are tender to touch and with patchy erythema and keratotic desquamation. No concern for superimposed bacterial infection, no areas of denuded skin. W hx of recent viral infection and photos provided by patient showing progression, HFMD sequelae most likely.  In absence of clinical history, would also consider keratolysis exfoliativa, ICD, dyshidrotic eczema.     Background: Hand-foot-and-mouth disease (HFMD) is a self-limited viral illness (usually coxsackieviruses) that presents w oral sx accompanied by a cutaneous rash, typically on hands, feet, buttocks and groin. The rash can last for 7-14 days. This virus is highly contagious and can be transmitted for 3-6 weeks following initial infection. Common prodromal symptoms in adults include fever, abdominal pain, emesis, and diarrhea. Submandibular and cervical lymphadenopathy is common. While severe sequale are uncommon, they can occur ; inclduing encephalitis, interstitial pneumonia, flaccid paralysis, myocarditis, heart failure, meningoencephalitis, pancreatitis, conjunctival ulceration, and spontaneous .     Reviewed most likely etiology and natural progression with patient. She is otherwise feeling well and will continue to manage sequelae at home. So far, doing a very good job. All questions answered.    - can trial topical lidocaine on in-tact skin q12h prn pain  - vaseline w cotton gloves/stockings at night  - topical triamcinolone 0.1% cream bid prn or pain  - otc analgesics such as acetaminophen or ibuprofen if medically safe for patient  - encouraged consistent, regular and gentle hygiene   - recommend disinfection of communal surfaces to prevent  transmission  - provided anticipatory guidance re: signs of infection  - pt to follow up with pcp     Thank you for the dermatology consultation. Please do not hesitate to contact the dermatology resident/faculty on call for any additional questions or concerns. We will continue to follow.     Ellen Mcleod MD  Dermatology Resident    I, Ellen Mcleod, saw and staffed this patient with the attending. All recommendations, therapies and procedures were pre-staffed with the attending or administered with direct supervision.     I have seen and examined this patient and agree with the assessment and plan as documented in the resident's note.    Ladarius Rucker MD  Dermatology Attending        Dermatology Problem List:  1. Hand Foot Mouth Disease, sequalae    Date of Admission: Jan 25, 2024   Encounter Date: 01/25/2024    Reason for Consultation:   Hand and foot desquamation    History of Present Illness:  Ms. Eryn Bennett is a 68 year old female with pmhx of AML s/p bmt in 2017 who presents to the ED for dermatologic evaluation.    Pt was diagnosed w Hand Foot Mouth disease several weeks ago; grandchildren most likely source. Pt developed pain and swelling of hands with small pin point blisters and vesicles. Eventually blisters flattened out with time.  Now having dry crackling of skin w desquamation. Amount of dead/crackling skin is steadily improving. Reports hypersensitivity w extreme temperatures. Pt was recommended to go to [facility] ED for treatment.    Using vaseline nightly. Trimming desquamated skin prn. Not peeling skin off manually. Concerned about when feet peel because the bumps on the feet were very painful    Past Medical History:   Patient Active Problem List   Diagnosis     AML (acute myelogenous leukemia) (H)     Acute myeloid leukemia (H)     Diarrhea     DVT (deep venous thrombosis) (H)     S/P allogeneic bone marrow transplant (H)     Fever     SOB (shortness of breath)     Pneumonia due to respiratory  syncytial virus (RSV)     Hypogammaglobulinemia (H24)     Lymphedema     Stasis dermatitis of both legs     Intertrigo     Osteoporosis     Long-term current use of steroids     Blurred vision     Autoimmune hemolytic anemia (H)     EBV (Georgette-Barr virus) viremia     HTN (hypertension)     Cytomegalovirus (CMV) viremia (H)     Steroid-induced diabetes mellitus  (H24)     Long-term (current) use of anticoagulants [Z79.01]     Deep vein thrombosis (DVT) (H) [I82.409]     Shoulder pain, bilateral     Steroid-induced osteoporosis     Adhesive capsulitis of both shoulders     Cough     HSV (herpes simplex virus) infection     Cataract     Peripheral neuropathy     Chronic left shoulder pain     Deep vein thrombosis (DVT) of left lower extremity (H)     Diabetes mellitus (H)     Dyslipidemia     Obesity     Obstructive sleep apnea     Rash and other nonspecific skin eruption     Recurrent cold sores     S/P colon resection     Stress-induced cardiomyopathy     Trigger thumb of right hand     Pulmonary nodules     Morbid obesity (H)     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    While pt had Hicman in, pt was on a blood thinner     Past Surgical History:   Procedure Laterality Date     BLADDER SURGERY       BRONCHOSCOPY (RIGID OR FLEXIBLE), DIAGNOSTIC N/A 1/10/2020    Procedure: BRONCHOSCOPY, WITH BRONCHOALVEOLAR LAVAGE;  Surgeon: Troy Lomeli MD;  Location:  GI     CHOLECYSTECTOMY  1987     ESOPHAGOSCOPY, GASTROSCOPY, DUODENOSCOPY (EGD), COMBINED N/A 12/11/2014    Procedure: COMBINED ESOPHAGOSCOPY, GASTROSCOPY, DUODENOSCOPY (EGD), BIOPSY  SINGLE OR MULTIPLE;  Surgeon: Saturnino Valera MD;  Location: [facility]     GI SURGERY      part of colon removed r.t benign tumor     GYN SURGERY       Dos Santos Catheter Placment Right 2010    Right IJ vein, in for 8 months     HYSTERECTOMY VAGINAL, BILATERAL SALPINGO-OOPHERECTOMY, COMBINED       PICC INSERTION Left 8/28/2014    5fr DL Power PICC, 46cm (1cm external) in the L basilic vein w/ tip in the SVC RA junction.         Social History:  Patient reports that she has never smoked. She has never used smokeless tobacco. She reports that she does not drink alcohol and does not use drugs.    Family History:  Family History   Problem Relation Age of Onset     Cancer Father         lung, smoker     Cancer Sister         non-melanoma skin cancer     Diabetes Sister      LUNG DISEASE Sister      Intellectual Disability (Mental Retardation) Sister      Melanoma Daughter      Glaucoma Mother      Glaucoma Maternal Grandfather      Macular Degeneration No family hx of        Medications:  No current facility-administered medications for this encounter.     Current Outpatient Medications   Medication     acetaminophen (TYLENOL) 325 MG tablet     Calcium Carb-Cholecalciferol (CALCIUM+D3) 600-20 MG-MCG TABS     carvedilol (COREG) 6.25 MG tablet     fluticasone (FLONASE ALLERGY RELIEF) 50 MCG/ACT spray     Hypertonic Nasal Wash (SINUS RINSE) bottle     valACYclovir (VALTREX) 500 MG tablet     VITAMIN D, CHOLECALCIFEROL, PO          Allergies   Allergen Reactions     Blood Transfusion Related (Informational Only) Other (See Comments)     7/15/15 - Patient has a complex history of clinically significant antibodies against RBC antigens.  Finding compatible RBCs may take up to 24 hours or more.  Consult with the Blood Bank MD for transfusion guidance.  Itching in palms during platelet transfusion in 2010- IV benadryl was given at that time.  Pt has had platelets since then with no reaction.   11/6/14 - Stem cell transplant  patient.  Give type O RBCs.     Cefepime Itching and Rash     Severe rash, itching, and burning skin during cefepime infusion on 9/17/14     Red Blood Cells Rash     7/15/15 - Patient has a complex history of clinically significant antibodies against RBC antigens.  Finding compatible RBCs may take up to 24 hours or more.  Consult with the Blood Bank MD for transfusion guidance.  Itching in palms during platelet transfusion in 2010- IV benadryl was given at that time.  Pt has had platelets since then with no reaction.   11/6/14 - Stem cell transplant patient.  Give type O RBCs.     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 Pt would like to avoid, even with premedication, if at all possible.          Review of Systems:  -As per HPI      Physical exam:  Vitals: BP (!) 170/87   Pulse 79   Temp 98.1  F (36.7  C) (Oral)   SpO2 96%   Skin:   - b/l palms w patchy erythema and keratotic desquamation. Somewhat tender to palpation.   - B/l soles are hyperkeratotic without acute desquamation   - no purulence or focal edema  - no areas of denuded skin or ulceration  - No other lesions of concern on areas examined.     Laboratory:  No results found for this or any previous visit (from the past 24 hour(s)).     staffed the patient.    Staff Involved:  Resident/Staff

## 2024-01-25 NOTE — ED PROVIDER NOTES
[place] EMERGENCY DEPARTMENT ([facility])    1/25/24       ED PROVIDER NOTE    History     Chief Complaint   Patient presents with    Rash     The history is provided by the patient and medical records.     Eryn Bennett is a 68 year old female with PMH notable for AML s/p bone marrow transplant (2017), HTN, HLD who presents to the Emergency Department due to skin desquamation. Patient reports thickening skin in her hands and feet. She also notes that the skin in her hands started peeling off from the tip of the finger since last week. She states that she has had exposure with her grandchildren who was diagnosed with hand-foot-and-mouth disease. She states that this happens since 01/2024. She notes of putting some Vaseline around the desquamated areas.        Per chart review, patient was diagnosed with possible hand-foot-and-mouth disease on 01/17/24 and was started on Prednisone to decrease pain from blisters and swelling in her hands and feet. She presented to [facility] ED on 01/19/24 with similar concerns. She was advised to take Tylenol PRN for pain/fever control, increase fluid intake and follow up with PCP in 3-5 days, which she was unable to make up.     Past Medical History  Past Medical History:   Diagnosis Date    Adhesive capsulitis of both shoulders 11/28/2016    AML (acute myelogenous leukaemia) 2010    relapse 2014    Autoimmune hemolytic anemia (H) 8/19/2015    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    EBV (Georgette-Barr virus) viremia 9/4/2015    HTN (hypertension) 9/9/2015    Hypertension     Osteoporosis 7/21/2015    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    While pt had [place] in, pt was on a blood thinner     Past Surgical History:   Procedure Laterality Date    BLADDER SURGERY      BRONCHOSCOPY (RIGID OR FLEXIBLE), DIAGNOSTIC N/A 1/10/2020    Procedure: BRONCHOSCOPY, WITH BRONCHOALVEOLAR LAVAGE;  Surgeon: Troy Lomeli MD;  Location: [facility]     CHOLECYSTECTOMY  1987    ESOPHAGOSCOPY, GASTROSCOPY, DUODENOSCOPY (EGD), COMBINED N/A 12/11/2014    Procedure: COMBINED ESOPHAGOSCOPY, GASTROSCOPY, DUODENOSCOPY (EGD), BIOPSY SINGLE OR MULTIPLE;  Surgeon: Saturnino Valera MD;  Location:  GI    GI SURGERY      part of colon removed r.t benign tumor    GYN SURGERY      Dos Santos Catheter Placment Right 2010    Right IJ vein, in for 8 months    HYSTERECTOMY VAGINAL, BILATERAL SALPINGO-OOPHERECTOMY, COMBINED      PICC INSERTION Left 8/28/2014    5fr DL Power PICC, 46cm (1cm external) in the L basilic vein w/ tip in the SVC RA junction.     triamcinolone (KENALOG) 0.1 % external cream  acetaminophen (TYLENOL) 325 MG tablet  Calcium Carb-Cholecalciferol (CALCIUM+D3) 600-20 MG-MCG TABS  carvedilol (COREG) 6.25 MG tablet  fluticasone (FLONASE ALLERGY RELIEF) 50 MCG/ACT spray  Hypertonic Nasal Wash (SINUS RINSE) bottle  valACYclovir (VALTREX) 500 MG tablet  VITAMIN D, CHOLECALCIFEROL, PO      Allergies   Allergen Reactions    Blood Transfusion Related (Informational Only) Other (See Comments)     7/15/15 - Patient has a complex history of clinically significant antibodies against RBC antigens.  Finding compatible RBCs may take up to 24 hours or more.  Consult with the Blood Bank MD for transfusion guidance.  Itching in palms during platelet transfusion in 2010- IV benadryl was given at that time.  Pt has had platelets since then with no reaction.   11/6/14 - Stem cell transplant patient.  Give type O RBCs.    Cefepime Itching and Rash     Severe rash, itching, and burning skin during cefepime infusion on 9/17/14    Red Blood Cells Rash     7/15/15 - Patient has a complex history of clinically significant antibodies against RBC antigens.  Finding compatible RBCs may take up to 24 hours or more.  Consult with the Blood Bank MD for transfusion guidance.  Itching in palms during platelet transfusion in 2010- IV benadryl was given at that time.  Pt has had platelets since  then with no reaction.   11/6/14 - Stem cell transplant patient.  Give type O RBCs.    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 Pt would like to avoid, even with premedication, if at all possible.      Family History  Family History   Problem Relation Age of Onset    Cancer Father         lung, smoker    Cancer Sister         non-melanoma skin cancer    Diabetes Sister     LUNG DISEASE Sister     Intellectual Disability (Mental Retardation) Sister     Melanoma Daughter     Glaucoma Mother     Glaucoma Maternal Grandfather     Macular Degeneration No family hx of      Social History   Social History     Tobacco Use    Smoking status: Never    Smokeless tobacco: Never   Substance Use Topics    Alcohol use: No     Alcohol/week: 0.0 standard drinks of alcohol    Drug use: No         A medically appropriate review of systems was performed with pertinent positives and negatives noted in the HPI, and all other systems negative.    Physical Exam   BP: (!) 170/87  Pulse: 79  Temp: 98.1  F (36.7  C)  Resp: 15  SpO2: 96 %  Physical Exam                      ED Course, Procedures, & Data      Procedures       A consult was attained from the dermatology service. The case was discussed with did not from that service.             No results found for any visits on 01/25/24.  Medications - No data to display  Labs Ordered and Resulted from Time of ED Arrival to Time of ED Departure - No data to display  No orders to display          Critical care was not performed.     Medical Decision Making  The patient's presentation was of low complexity (a stable chronic illness).    The patient's evaluation involved:  ordering and/or review of 3+ test(s) in  this encounter (CBC, metabolic panel) consultation with dermatology    The patient's management necessitated moderate risk (prescription drug management including medications given in the ED).    Assessment & Plan    Patient presents with subacute rash to be seen by dermatology.  I discussed the case on the phone and they reviewed the photographs.  Appreciate the consultation.  She will go home with topical triamcinolone 0.1% and follow-up in the dermatology clinic.    I have reviewed the nursing notes. I have reviewed the findings, diagnosis, plan and need for follow up with the patient.    Discharge Medication List as of 1/25/2024  6:28 PM        START taking these medications    Details   triamcinolone (KENALOG) 0.1 % external cream Apply topically 2 times dailyDisp-15 g, R-0Local Print             Final diagnoses:   Julio Mandel MD  [facility] EMERGENCY DEPARTMENT  1/25/2024        Jaiden Mandel MD  02/10/24 [id number]

## 2024-01-26 NOTE — DISCHARGE INSTRUCTIONS
Follow the recommendations that were given to you by the dermatologist   Use triamcinolone as directed   Follow-up in the dermatology clinic

## 2024-02-18 ENCOUNTER — HEALTH MAINTENANCE LETTER (OUTPATIENT)
Age: 69
End: 2024-02-18

## 2024-04-03 NOTE — NURSING NOTE
Chief Complaint   Patient presents with     Blood Draw     labs drawn by venipuncture by rn.  vital signs taken.     Labs drawn by venipuncture by RN in lab.  Vital signs taken.  Pt checked in to next appointment.    Jenna Ji RN       03-Apr-2024

## 2024-07-07 ENCOUNTER — HEALTH MAINTENANCE LETTER (OUTPATIENT)
Age: 69
End: 2024-07-07

## 2024-09-15 ENCOUNTER — HEALTH MAINTENANCE LETTER (OUTPATIENT)
Age: 69
End: 2024-09-15

## 2025-04-05 ENCOUNTER — HEALTH MAINTENANCE LETTER (OUTPATIENT)
Age: 70
End: 2025-04-05

## 2025-07-19 ENCOUNTER — HEALTH MAINTENANCE LETTER (OUTPATIENT)
Age: 70
End: 2025-07-19

## 2025-08-09 ENCOUNTER — HEALTH MAINTENANCE LETTER (OUTPATIENT)
Age: 70
End: 2025-08-09

## (undated) RX ORDER — LIDOCAINE HYDROCHLORIDE 40 MG/ML
SOLUTION TOPICAL
Status: DISPENSED
Start: 2020-01-10

## (undated) RX ORDER — LIDOCAINE HYDROCHLORIDE 10 MG/ML
INJECTION, SOLUTION EPIDURAL; INFILTRATION; INTRACAUDAL; PERINEURAL
Status: DISPENSED
Start: 2020-01-10

## (undated) RX ORDER — FENTANYL CITRATE 50 UG/ML
INJECTION, SOLUTION INTRAMUSCULAR; INTRAVENOUS
Status: DISPENSED
Start: 2020-01-10

## (undated) RX ORDER — ALBUTEROL SULFATE 0.83 MG/ML
SOLUTION RESPIRATORY (INHALATION)
Status: DISPENSED
Start: 2020-01-10

## (undated) RX ORDER — LIDOCAINE HYDROCHLORIDE 20 MG/ML
SOLUTION OROPHARYNGEAL
Status: DISPENSED
Start: 2020-01-10